# Patient Record
Sex: FEMALE | Race: BLACK OR AFRICAN AMERICAN | Employment: PART TIME | URBAN - METROPOLITAN AREA
[De-identification: names, ages, dates, MRNs, and addresses within clinical notes are randomized per-mention and may not be internally consistent; named-entity substitution may affect disease eponyms.]

---

## 2018-06-10 ENCOUNTER — HOSPITAL ENCOUNTER (EMERGENCY)
Age: 43
Discharge: HOME | End: 2018-06-10
Attending: EMERGENCY MEDICINE
Payer: COMMERCIAL

## 2018-06-10 ENCOUNTER — APPOINTMENT (OUTPATIENT)
Dept: CT IMAGING | Age: 43
End: 2018-06-10
Attending: NURSE PRACTITIONER
Payer: COMMERCIAL

## 2018-06-10 VITALS
WEIGHT: 170 LBS | HEART RATE: 88 BPM | SYSTOLIC BLOOD PRESSURE: 158 MMHG | HEIGHT: 64 IN | OXYGEN SATURATION: 96 % | RESPIRATION RATE: 20 BRPM | DIASTOLIC BLOOD PRESSURE: 67 MMHG | BODY MASS INDEX: 29.02 KG/M2 | TEMPERATURE: 98.7 F

## 2018-06-10 DIAGNOSIS — R30.9 URINARY PAIN: Primary | ICD-10-CM

## 2018-06-10 DIAGNOSIS — R10.84 ABDOMINAL PAIN, GENERALIZED: ICD-10-CM

## 2018-06-10 LAB
ALBUMIN SERPL-MCNC: 2.7 G/DL (ref 3.5–5)
ALBUMIN/GLOB SERPL: 0.6 {RATIO} (ref 1.1–2.2)
ALP SERPL-CCNC: 108 U/L (ref 45–117)
ALT SERPL-CCNC: 19 U/L (ref 12–78)
ANION GAP SERPL CALC-SCNC: 9 MMOL/L (ref 5–15)
APPEARANCE UR: CLEAR
AST SERPL-CCNC: 24 U/L (ref 15–37)
BACTERIA URNS QL MICRO: NEGATIVE /HPF
BASOPHILS # BLD: 0 K/UL (ref 0–0.1)
BASOPHILS NFR BLD: 0 % (ref 0–1)
BILIRUB DIRECT SERPL-MCNC: <0.1 MG/DL (ref 0–0.2)
BILIRUB SERPL-MCNC: 0.4 MG/DL (ref 0.2–1)
BILIRUB UR QL: NEGATIVE
BUN SERPL-MCNC: 20 MG/DL (ref 6–20)
BUN/CREAT SERPL: 18 (ref 12–20)
CALCIUM SERPL-MCNC: 8.7 MG/DL (ref 8.5–10.1)
CHLORIDE SERPL-SCNC: 103 MMOL/L (ref 97–108)
CO2 SERPL-SCNC: 23 MMOL/L (ref 21–32)
COLOR UR: ABNORMAL
CREAT SERPL-MCNC: 1.09 MG/DL (ref 0.55–1.02)
DIFFERENTIAL METHOD BLD: ABNORMAL
EOSINOPHIL # BLD: 0.4 K/UL (ref 0–0.4)
EOSINOPHIL NFR BLD: 4 % (ref 0–7)
EPITH CASTS URNS QL MICRO: ABNORMAL /LPF
ERYTHROCYTE [DISTWIDTH] IN BLOOD BY AUTOMATED COUNT: 14 % (ref 11.5–14.5)
GLOBULIN SER CALC-MCNC: 4.3 G/DL (ref 2–4)
GLUCOSE SERPL-MCNC: 250 MG/DL (ref 65–100)
GLUCOSE UR STRIP.AUTO-MCNC: 250 MG/DL
HCT VFR BLD AUTO: 38.3 % (ref 35–47)
HGB BLD-MCNC: 12.2 G/DL (ref 11.5–16)
HGB UR QL STRIP: NEGATIVE
HYALINE CASTS URNS QL MICRO: ABNORMAL /LPF (ref 0–5)
IMM GRANULOCYTES # BLD: 0.1 K/UL (ref 0–0.04)
IMM GRANULOCYTES NFR BLD AUTO: 1 % (ref 0–0.5)
KETONES UR QL STRIP.AUTO: NEGATIVE MG/DL
LEUKOCYTE ESTERASE UR QL STRIP.AUTO: ABNORMAL
LYMPHOCYTES # BLD: 2.7 K/UL (ref 0.8–3.5)
LYMPHOCYTES NFR BLD: 28 % (ref 12–49)
MCH RBC QN AUTO: 25.5 PG (ref 26–34)
MCHC RBC AUTO-ENTMCNC: 31.9 G/DL (ref 30–36.5)
MCV RBC AUTO: 80 FL (ref 80–99)
MONOCYTES # BLD: 0.8 K/UL (ref 0–1)
MONOCYTES NFR BLD: 9 % (ref 5–13)
NEUTS SEG # BLD: 5.7 K/UL (ref 1.8–8)
NEUTS SEG NFR BLD: 58 % (ref 32–75)
NITRITE UR QL STRIP.AUTO: NEGATIVE
NRBC # BLD: 0 K/UL (ref 0–0.01)
NRBC BLD-RTO: 0 PER 100 WBC
PH UR STRIP: 7 [PH] (ref 5–8)
PLATELET # BLD AUTO: 216 K/UL (ref 150–400)
PMV BLD AUTO: 9.7 FL (ref 8.9–12.9)
POTASSIUM SERPL-SCNC: 4.4 MMOL/L (ref 3.5–5.1)
PROT SERPL-MCNC: 7 G/DL (ref 6.4–8.2)
PROT UR STRIP-MCNC: 30 MG/DL
RBC # BLD AUTO: 4.79 M/UL (ref 3.8–5.2)
RBC #/AREA URNS HPF: ABNORMAL /HPF (ref 0–5)
SODIUM SERPL-SCNC: 135 MMOL/L (ref 136–145)
SP GR UR REFRACTOMETRY: 1.02 (ref 1–1.03)
UA: UC IF INDICATED,UAUC: ABNORMAL
UROBILINOGEN UR QL STRIP.AUTO: 0.2 EU/DL (ref 0.2–1)
WBC # BLD AUTO: 9.7 K/UL (ref 3.6–11)
WBC URNS QL MICRO: ABNORMAL /HPF (ref 0–4)

## 2018-06-10 PROCEDURE — 80076 HEPATIC FUNCTION PANEL: CPT | Performed by: NURSE PRACTITIONER

## 2018-06-10 PROCEDURE — 96375 TX/PRO/DX INJ NEW DRUG ADDON: CPT

## 2018-06-10 PROCEDURE — 99283 EMERGENCY DEPT VISIT LOW MDM: CPT

## 2018-06-10 PROCEDURE — 96374 THER/PROPH/DIAG INJ IV PUSH: CPT

## 2018-06-10 PROCEDURE — 85025 COMPLETE CBC W/AUTO DIFF WBC: CPT | Performed by: NURSE PRACTITIONER

## 2018-06-10 PROCEDURE — 74011250636 HC RX REV CODE- 250/636: Performed by: NURSE PRACTITIONER

## 2018-06-10 PROCEDURE — 74011636320 HC RX REV CODE- 636/320: Performed by: NURSE PRACTITIONER

## 2018-06-10 PROCEDURE — 80048 BASIC METABOLIC PNL TOTAL CA: CPT | Performed by: NURSE PRACTITIONER

## 2018-06-10 PROCEDURE — 81001 URINALYSIS AUTO W/SCOPE: CPT | Performed by: NURSE PRACTITIONER

## 2018-06-10 PROCEDURE — 36415 COLL VENOUS BLD VENIPUNCTURE: CPT | Performed by: NURSE PRACTITIONER

## 2018-06-10 PROCEDURE — 74177 CT ABD & PELVIS W/CONTRAST: CPT

## 2018-06-10 PROCEDURE — 74011250636 HC RX REV CODE- 250/636

## 2018-06-10 PROCEDURE — 74011636320 HC RX REV CODE- 636/320: Performed by: RADIOLOGY

## 2018-06-10 RX ORDER — KETOROLAC TROMETHAMINE 30 MG/ML
INJECTION, SOLUTION INTRAMUSCULAR; INTRAVENOUS
Status: COMPLETED
Start: 2018-06-10 | End: 2018-06-10

## 2018-06-10 RX ORDER — CEPHALEXIN 500 MG/1
500 CAPSULE ORAL 3 TIMES DAILY
Qty: 21 CAP | Refills: 0 | Status: SHIPPED | OUTPATIENT
Start: 2018-06-10 | End: 2018-06-17

## 2018-06-10 RX ORDER — KETOROLAC TROMETHAMINE 30 MG/ML
15 INJECTION, SOLUTION INTRAMUSCULAR; INTRAVENOUS
Status: COMPLETED | OUTPATIENT
Start: 2018-06-10 | End: 2018-06-10

## 2018-06-10 RX ORDER — MORPHINE SULFATE 4 MG/ML
2 INJECTION INTRAVENOUS
Status: COMPLETED | OUTPATIENT
Start: 2018-06-10 | End: 2018-06-10

## 2018-06-10 RX ORDER — TRAMADOL HYDROCHLORIDE 50 MG/1
50 TABLET ORAL
Qty: 15 TAB | Refills: 0 | Status: SHIPPED | OUTPATIENT
Start: 2018-06-10 | End: 2019-01-21

## 2018-06-10 RX ADMIN — MORPHINE SULFATE 2 MG: 4 INJECTION INTRAVENOUS at 15:41

## 2018-06-10 RX ADMIN — IOPAMIDOL 100 ML: 755 INJECTION, SOLUTION INTRAVENOUS at 14:50

## 2018-06-10 RX ADMIN — KETOROLAC TROMETHAMINE 15 MG: 30 INJECTION, SOLUTION INTRAMUSCULAR; INTRAVENOUS at 14:45

## 2018-06-10 RX ADMIN — KETOROLAC TROMETHAMINE 15 MG: 30 INJECTION, SOLUTION INTRAMUSCULAR at 14:45

## 2018-06-10 RX ADMIN — DIATRIZOATE MEGLUMINE AND DIATRIZOATE SODIUM 30 ML: 660; 100 LIQUID ORAL; RECTAL at 14:45

## 2018-06-10 NOTE — ED TRIAGE NOTES
Patient arrived through triage c/o lower abdominal pain, lower back pain and nausea, and dysuria x 1 week. Patient states she was seen at pcp a few days ago and placed on cipro without relief, states symptoms have worsened.

## 2018-06-10 NOTE — ED PROVIDER NOTES
Patient is a 43 y.o. female presenting with urinary pain. The history is provided by the patient. Urinary Pain    This is a new problem. The current episode started more than 2 days ago. The problem has not changed since onset. The quality of the pain is described as burning and aching. The pain is at a severity of 9/10. There has been no fever. There is no history of pyelonephritis. Associated symptoms include frequency and urgency. Pertinent negatives include no chills, no sweats, no nausea, no vomiting, no hematuria, no hesitancy, no flank pain and no vaginal discharge. The patient is not pregnant. She has tried antibiotics and NSAIDs for the symptoms. The treatment provided no relief. Her past medical history is significant for recurrent UTIs. Her past medical history does not include kidney stones, single kidney, urological procedure, urinary stasis, catheterization or urinary catheter problem. Patient reports dysuria which started greater than one week ago. Was seen by primary care and started on Cipro 500 mg, and she reports her symptoms are not improving and she feels she is getting worse. She has pain with urination and has now developed back pain. She has a history of pyelonephritis with last episode occurring a year ago. She reports a history of recurrent urinary tract infections approximately twice a year and she was previously hospitalized approximately 3 years ago for pyelonephritis. Past Medical History:   Diagnosis Date    Arthritis     Bipolar 1 disorder ([address]     Diabetes [facility] [address]     Gastrointestinal disorder     gastroparesis    Gastroparesis     GERD (gastroesophageal reflux disease)     Hypertension     Psychiatric disorder     Bipolar       Past Surgical History:   Procedure Laterality Date    HX GYN      BTL    HX ORTHOPAEDIC      toe         No family history on file.     Social History     Social History    Marital status:      Spouse name: N/A    Number of children: N/A    Years of education: N/A     Occupational History    Not on file. Social History Main Topics    Smoking status: Current Every Day Smoker     Packs/day: 1.00    Smokeless tobacco: Not on file    Alcohol use Yes      Comment: occasionally    Drug use: No    Sexual activity: Not on file     Other Topics Concern    Not on file     Social History Narrative         ALLERGIES: Other medication; Flagyl [metronidazole]; and Levaquin [levofloxacin]    Review of Systems   Constitutional: Negative for activity change, appetite change and chills. Respiratory: Negative for apnea, chest tightness and shortness of breath. Gastrointestinal: Negative for nausea and vomiting. Genitourinary: Positive for dysuria, frequency and urgency. Negative for decreased urine volume, enuresis, flank pain, hematuria, hesitancy, menstrual problem, pelvic pain and vaginal discharge. Vitals:    06/10/18 1349   BP: 161/89   Pulse: 97   Resp: 18   Temp: 98.7 °F (37.1 °C)   SpO2: 97%   Weight: 77.1 kg (170 lb)   Height: 5' 4\" (1.626 m)            Physical Exam   Constitutional: She is oriented to person, place, and time. She appears well-developed and well-nourished. HENT:   Head: Normocephalic and atraumatic. Right Ear: External ear normal.   Left Ear: External ear normal.   Eyes: Pupils are equal, round, and reactive to light. Neck: Normal range of motion. Cardiovascular: Normal rate, regular rhythm, normal heart sounds and intact distal pulses. Pulmonary/Chest: Effort normal and breath sounds normal. No respiratory distress. She has no wheezes. Abdominal: Soft. Bowel sounds are normal. She exhibits no distension and no mass. There is tenderness in the suprapubic area. There is no rebound, no guarding and no CVA tenderness. Neurological: She is alert and oriented to person, place, and time. Psychiatric: She has a normal mood and affect.  Her behavior is normal. Judgment and thought content normal. [facility]      ED Course       Procedures      4:15 PM patient to Ct scan   4:39 PM  Ct reviewed no acute findings. Will D/C Cipro and use Keflex and PRn pain medications.  Follow up with PCP

## 2018-06-10 NOTE — PROGRESS NOTES
During injection of IV contrast for CT A/P, pt complained of pain. Contrast was immediately stopped and pt scanned. CT was reviewed and contrast was in present on images. Pt left arm and IV site were also checked and slight raising of the skin was noticed. IV was taken out, bandage and ice were applied, and ED nurse, Aleja Brand, notified. Discharge instructions were given to pt and copy placed in [facility]. Contrast was injected @ 1.7 / sec and was stopped in under 5 seconds of patient  complaining of pain. Total contrast injected was 48.6cc on [address].

## 2018-06-10 NOTE — DISCHARGE INSTRUCTIONS
Abdominal Pain: Care Instructions  Your Care Instructions    Abdominal pain has many possible causes. Some aren't serious and get better on their own in a few days. Others need more testing and treatment. If your pain continues or gets worse, you need to be rechecked and may need more tests to find out what is wrong. You may need surgery to correct the problem. Don't ignore new symptoms, such as fever, nausea and vomiting, urination problems, pain that gets worse, and dizziness. These may be signs of a more serious problem. Your doctor may have recommended a follow-up visit in the next 8 to 12 hours. If you are not getting better, you may need more tests or treatment. The doctor has checked you carefully, but problems can develop later. If you notice any problems or new symptoms, get medical treatment right away. Follow-up care is a key part of your treatment and safety. Be sure to make and go to all appointments, and call your doctor if you are having problems. It's also a good idea to know your test results and keep a list of the medicines you take. How can you care for yourself at home? · Rest until you feel better. · To prevent dehydration, drink plenty of fluids, enough so that your urine is light yellow or clear like water. Choose water and other caffeine-free clear liquids until you feel better. If you have kidney, heart, or liver disease and have to limit fluids, talk with your doctor before you increase the amount of fluids you drink. · If your stomach is upset, eat mild foods, such as rice, dry toast or crackers, bananas, and applesauce. Try eating several small meals instead of two or three large ones. · Wait until 48 hours after all symptoms have gone away before you have spicy foods, alcohol, and drinks that contain caffeine. · Do not eat foods that are high in fat. · Avoid anti-inflammatory medicines such as aspirin, ibuprofen (Advil, Motrin), and naproxen (Aleve).  These can cause stomach upset. Talk to your doctor if you take daily aspirin for another health problem. When should you call for help? Call 911 anytime you think you may need emergency care. For example, call if:  ? · You passed out (lost consciousness). ? · You pass maroon or very bloody stools. ? · You vomit blood or what looks like coffee grounds. ? · You have new, severe belly pain. ?Call your doctor now or seek immediate medical care if:  ? · Your pain gets worse, especially if it becomes focused in one area of your belly. ? · You have a new or higher fever. ? · Your stools are black and look like tar, or they have streaks of blood. ? · You have unexpected vaginal bleeding. ? · You have symptoms of a urinary tract infection. These may include:  ¨ Pain when you urinate. ¨ Urinating more often than usual.  ¨ Blood in your urine. ? · You are dizzy or lightheaded, or you feel like you may faint. ? Watch closely for changes in your health, and be sure to contact your doctor if:  ? · You are not getting better after 1 day (24 hours). Where can you learn more? Go to http://mauri-mario.info/. Enter C477 in the search box to learn more about \"Abdominal Pain: Care Instructions. \"  Current as of: March 20, 2017  Content Version: 11.4  © 4450-9366 [employer]. Care instructions adapted under license by [employer] (which disclaims liability or warranty for this information). If you have questions about a medical condition or this instruction, always ask your healthcare professional. [place] [address] any warranty or liability for your use of this information. Painful Urination (Dysuria): Care Instructions  Your Care Instructions  Burning pain with urination (dysuria) is a common symptom of a urinary tract infection or other urinary problems. The bladder may become inflamed. This can cause pain when the bladder fills and empties.  You may also feel pain if the tube that carries urine from the bladder to the outside of the body (urethra) gets irritated or infected. Sexually transmitted infections (STIs) also may cause pain when you urinate. Sometimes the pain can be caused by things other than an infection. The urethra can be irritated by soaps, perfumes, or foreign objects in the urethra. Kidney stones can cause pain when they pass through the urethra. The cause may be hard to find. You may need tests. Treatment for painful urination depends on the cause. Follow-up care is a key part of your treatment and safety. Be sure to make and go to all appointments, and call your doctor if you are having problems. It's also a good idea to know your test results and keep a list of the medicines you take. How can you care for yourself at home? · Drink extra water for the next day or two. This will help make the urine less concentrated. (If you have kidney, heart, or liver disease and have to limit fluids, talk with your doctor before you increase the amount of fluids you drink.)  · Avoid drinks that are carbonated or have caffeine. They can irritate the bladder. · Urinate often. Try to empty your bladder each time. For women:  · Urinate right after you have sex. · After going to the bathroom, wipe from front to back. · Avoid douches, bubble baths, and feminine hygiene sprays. And avoid other feminine hygiene products that have deodorants. When should you call for help? Call your doctor now or seek immediate medical care if:  ? · You have new symptoms, such as fever, nausea, or vomiting. ? · You have new or worse symptoms of a urinary problem. For example:  ¨ You have blood or pus in your urine. ¨ You have chills or body aches. ¨ It hurts worse to urinate. ¨ You have groin or belly pain. ¨ You have pain in your back just below your rib cage (the flank area). ? Watch closely for changes in your health, and be sure to contact your doctor if you have any problems. Where can you learn more? Go to http://mauri-mario.info/. Enter J180 in the search box to learn more about \"Painful Urination (Dysuria): Care Instructions. \"  Current as of: May 12, 2017  Content Version: 11.4  © 9244-8603 [employer]. Care instructions adapted under license by [employer] (which disclaims liability or warranty for this information). If you have questions about a medical condition or this instruction, always ask your healthcare professional. [address] any warranty or liability for your use of this information.

## 2018-09-14 NOTE — TELEPHONE ENCOUNTER
Patient last seen 06/05/2018, fax  received from [address], [address] [facility]

## 2018-09-17 RX ORDER — METOPROLOL SUCCINATE 25 MG/1
25 TABLET, EXTENDED RELEASE ORAL DAILY
Qty: 90 TAB | Refills: 0 | Status: SHIPPED | OUTPATIENT
Start: 2018-09-17 | End: 2018-09-18 | Stop reason: SDUPTHER

## 2018-09-18 ENCOUNTER — TELEPHONE (OUTPATIENT)
Dept: FAMILY MEDICINE CLINIC | Age: 43
End: 2018-09-18

## 2018-09-18 RX ORDER — METOPROLOL SUCCINATE 25 MG/1
25 TABLET, EXTENDED RELEASE ORAL DAILY
Qty: 90 TAB | Refills: 0 | Status: SHIPPED | OUTPATIENT
Start: 2018-09-18 | End: 2019-01-21 | Stop reason: SDUPTHER

## 2018-11-30 ENCOUNTER — OFFICE VISIT (OUTPATIENT)
Dept: FAMILY MEDICINE CLINIC | Age: 43
End: 2018-11-30

## 2018-11-30 VITALS
SYSTOLIC BLOOD PRESSURE: 131 MMHG | HEIGHT: 64 IN | BODY MASS INDEX: 29.11 KG/M2 | OXYGEN SATURATION: 100 % | HEART RATE: 75 BPM | TEMPERATURE: 98.5 F | WEIGHT: 170.5 LBS | RESPIRATION RATE: 18 BRPM | DIASTOLIC BLOOD PRESSURE: 81 MMHG

## 2018-11-30 DIAGNOSIS — N30.01 ACUTE CYSTITIS WITH HEMATURIA: ICD-10-CM

## 2018-11-30 DIAGNOSIS — R30.9 URINARY PAIN: Primary | ICD-10-CM

## 2018-11-30 LAB
BILIRUB UR QL STRIP: NEGATIVE
GLUCOSE UR-MCNC: NORMAL MG/DL
KETONES P FAST UR STRIP-MCNC: NEGATIVE MG/DL
PH UR STRIP: 6 [PH] (ref 4.6–8)
PROT UR QL STRIP: POSITIVE
SP GR UR STRIP: 1.02 (ref 1–1.03)
UA UROBILINOGEN AMB POC: NORMAL (ref 0.2–1)
URINALYSIS CLARITY POC: CLEAR
URINALYSIS COLOR POC: YELLOW
URINE BLOOD POC: NORMAL
URINE LEUKOCYTES POC: NORMAL
URINE NITRITES POC: POSITIVE

## 2018-11-30 RX ORDER — DIVALPROEX SODIUM 500 MG/1
TABLET, DELAYED RELEASE ORAL 3 TIMES DAILY
COMMUNITY
End: 2018-12-12 | Stop reason: SDUPTHER

## 2018-11-30 RX ORDER — CLOPIDOGREL BISULFATE 75 MG/1
TABLET ORAL
COMMUNITY
End: 2019-06-07

## 2018-11-30 RX ORDER — DULOXETIN HYDROCHLORIDE 60 MG/1
60 CAPSULE, DELAYED RELEASE ORAL DAILY
COMMUNITY
End: 2019-01-21 | Stop reason: SDUPTHER

## 2018-11-30 RX ORDER — CEPHALEXIN 500 MG/1
500 CAPSULE ORAL 4 TIMES DAILY
Qty: 28 CAP | Refills: 0 | Status: SHIPPED | OUTPATIENT
Start: 2018-11-30 | End: 2018-12-07

## 2018-11-30 RX ORDER — METOPROLOL TARTRATE 50 MG/1
TABLET ORAL 2 TIMES DAILY
COMMUNITY
End: 2019-01-21

## 2018-11-30 RX ORDER — ROSUVASTATIN CALCIUM 20 MG/1
20 TABLET, COATED ORAL
Status: ON HOLD | COMMUNITY
End: 2019-07-27

## 2018-11-30 NOTE — PROGRESS NOTES
HPI       Zeynep Domingo is a 43 y.o. female who presents for urinary symptoms. Patient says that 4 days ago her urine started to smell like ammonia and she was having dysuria with lower abdominal pain and some back pain. She also reports feeling nauseous. She saw a little bit of blood in her urine one time. No history of kidney stones. She has a history of UTI. Has them 2 or 3 times a year  She denies fever, chills, vomiting    PMHx-reviewed:  Past Medical History:   Diagnosis Date    Arthritis     Bipolar 1 disorder (HCC)     Diabetes ([address]     Gastrointestinal disorder     gastroparesis    Gastroparesis     GERD (gastroesophageal reflux disease)     Heart disease     Hypertension     Psychiatric disorder     Bipolar     Meds-reviewed:   Current Outpatient Medications   Medication Sig Dispense Refill    metoprolol tartrate (LOPRESSOR) 50 mg tablet Take  by mouth two (2) times a day.  rosuvastatin (CRESTOR) 20 mg tablet Take 20 mg by mouth nightly.  clopidogrel (PLAVIX) 75 mg tab Take  by mouth.  apixaban (ELIQUIS) 5 mg tablet Take 5 mg by mouth two (2) times a day.  divalproex DR (DEPAKOTE) 500 mg tablet Take  by mouth three (3) times daily.  DULoxetine (CYMBALTA) 60 mg capsule Take 60 mg by mouth daily.  cephALEXin (KEFLEX) 500 mg capsule Take 1 Cap by mouth four (4) times daily for 7 days. 28 Cap 0    insulin lispro (HUMALOG) 100 unit/mL injection by SubCUTAneous route once. Sliding scale  8 units per 15 CHO      gabapentin (NEURONTIN) 100 mg capsule Take 100 mg by mouth four (4) times daily.  B.infantis-B.ani-B.long-B.bifi 10-15 mg TbEC Take 1 Cap by mouth.  cholecalciferol, vitamin D3, 2,000 unit tab Take 2,000 Units by mouth daily.  losartan (COZAAR) 100 mg tablet Take 1 Tab by mouth daily. 30 Tab 0    insulin glargine (LANTUS) 100 unit/mL injection 32 Units by SubCUTAneous route daily.       metoprolol succinate (TOPROL-XL) 25 mg XL tablet Take 1 Tab by mouth daily. 90 Tab 0    traMADol (ULTRAM) 50 mg tablet Take 1 Tab by mouth every eight (8) hours as needed for Pain. Max Daily Amount: 150 mg. 15 Tab 0    atorvastatin (LIPITOR) 20 mg tablet Take 20 mg by mouth daily.  amLODIPine (NORVASC) 2.5 mg tablet Take 2.5 mg by mouth daily.  HYDROcodone-acetaminophen (NORCO) 5-325 mg per tablet Take 1 Tab by mouth every four (4) hours as needed for Pain. Max Daily Amount: 6 Tabs. 20 Tab 0    ondansetron (ZOFRAN ODT) 4 mg disintegrating tablet Take 1 Tab by mouth every eight (8) hours as needed for Nausea. 12 Tab 0    ibuprofen (MOTRIN) 600 mg tablet Take 1 Tab by mouth every six (6) hours as needed for Pain. 20 Tab 0     Allergies-reviewed: Allergies   Allergen Reactions    Other Medication Anaphylaxis     Other anti-inflammatory    Flagyl [Metronidazole] Nausea and Vomiting    Levaquin [Levofloxacin] Nausea Only       Smoker-reviewed:  Social History     Tobacco Use   Smoking Status Current Every Day Smoker    Packs/day: 0.25   Smokeless Tobacco Never Used     ETOH-reviewed:   Social History     Substance and Sexual Activity   Alcohol Use Yes    Comment: occasionally     FH-reviewed:   No family history on file. ROS:  Review of Systems   Constitutional: Negative. Respiratory: Negative. Cardiovascular: Negative. Gastrointestinal: Positive for nausea. Negative for abdominal pain and vomiting. Genitourinary: Positive for dysuria, flank pain, hematuria and pelvic pain. Skin: Negative.       Physical Exam:  Visit Vitals  /81 (BP 1 Location: Left arm, BP Patient Position: Sitting)   Pulse 75   Temp 98.5 °F (36.9 °C) (Oral)   Resp 18   Ht 5' 4\" (1.626 m)   Wt 170 lb 8 oz (77.3 kg)   SpO2 100%   BMI 29.27 kg/m²       Wt Readings from Last 3 Encounters:   11/30/18 170 lb 8 oz (77.3 kg)   06/10/18 170 lb (77.1 kg)   06/13/16 150 lb (68 kg)     BP Readings from Last 3 Encounters:   11/30/18 131/81   06/10/18 158/67   06/13/16 150/74      Physical Exam   Constitutional: She appears well-developed and well-nourished. No distress. Cardiovascular: Normal rate, regular rhythm and normal heart sounds. Pulmonary/Chest: Effort normal and breath sounds normal. No respiratory distress. Abdominal: Soft. Bowel sounds are normal. There is tenderness in the suprapubic area. There is CVA tenderness. There is no rebound and no guarding. Skin: Skin is warm and dry. I personally reviewed the following lab results:   Recent Results (from the past 12 hour(s))   AMB POC URINALYSIS DIP STICK AUTO W/O MICRO    Collection Time: 11/30/18 10:39 AM   Result Value Ref Range    Color (UA POC) Yellow     Clarity (UA POC) Clear     Glucose (UA POC) 2+ Negative    Bilirubin (UA POC) Negative Negative    Ketones (UA POC) Negative Negative    Specific gravity (UA POC) 1.020 1.001 - 1.035    Blood (UA POC) 1+ Negative    pH (UA POC) 6.0 4.6 - 8.0    Protein (UA POC) Positive Negative    Urobilinogen (UA POC) 0.2 mg/dL 0.2 - 1    Nitrites (UA POC) Positive Negative    Leukocyte esterase (UA POC) 2+ Negative            Assessment     43 y.o. female with:    ICD-10-CM ICD-9-CM    1. Urinary pain R30.9 788.1 AMB POC URINALYSIS DIP STICK AUTO W/O MICRO   2. Acute cystitis with hematuria N30.01 595.0 cephALEXin (KEFLEX) 500 mg capsule      CULTURE, URINE              Plan       Orders Placed This Encounter    CULTURE, URINE    AMB POC URINALYSIS DIP STICK AUTO W/O MICRO    cephALEXin (KEFLEX) 500 mg capsule     Patient with dysuria, suprapubic pain and CVA tenderness. UA in office showed + nitrates, 2+ LE, 1+ blood. Will send urine culture and treat with Keflex 500 mg QID. She can take Uristat OTC to help with the pain. tylenol or motrin prn     Patient discussed with Dr. Aby Yeung    I have discussed the diagnosis with the patient and the intended plan as seen in the above orders.  The patient has received an after-visit summary and questions were answered concerning future plans. I have discussed medication side effects and warnings with the patient as well.     Vinita Angeles MD  Family Medicine Resident

## 2018-12-03 LAB — BACTERIA UR CULT: ABNORMAL

## 2018-12-07 ENCOUNTER — TELEPHONE (OUTPATIENT)
Dept: FAMILY MEDICINE CLINIC | Age: 43
End: 2018-12-07

## 2018-12-07 NOTE — TELEPHONE ENCOUNTER
If she continues to have pain she needs to be re-evaluated. If we don't have any appointments she should go to an urgent care or an ED.      Thanks,  Nikky Best, DO

## 2018-12-07 NOTE — TELEPHONE ENCOUNTER
Patient was seen in the office and is still feeling and having a lot of pain. Pain is at a level 9 out of 10. Only has 3 pills left of her antibiotic . Pain when urinate. Patient needs to know what to do or can she get another antibiotic.

## 2018-12-13 NOTE — TELEPHONE ENCOUNTER
Patient advised needs, appointment or can go to [facility] urgent care or ED.  Patient having concerns about financial situation and stated she will figure it out and call back to schedule if needed

## 2019-01-17 RX ORDER — LOSARTAN POTASSIUM AND HYDROCHLOROTHIAZIDE 25; 100 MG/1; MG/1
TABLET ORAL
Qty: 90 TAB | Refills: 1 | Status: SHIPPED | OUTPATIENT
Start: 2019-01-17 | End: 2019-01-21 | Stop reason: SDUPTHER

## 2019-01-17 NOTE — PROGRESS NOTES
CC: UTI    History of Present Illness:  Flash Barrett is a 37 y.o. female. She complains of bilateral lower abdominal pain for 2 months. Gets frequent UTIs, 2 - 3 per year. Last treated 11/30/18 with Keflex and culture grew E. coli. She did complete the course of Keflex. Symptoms did not clear after Keflex course. She does have urinary frequency. She has seen some blood in her urine over the past couple weeks. She denies burning with urination, urgency, nausea, vomtiing, diarrhea, constipation, change in diet. Does have some flank pain. She has one sexual partner. Denies vaginal discharge or itching. Declines STD testing. Has a GYN appointment on 1/31/19. She has HTN treated with losartan/HCTZ 100/25, metoprolol succinate 50mg daily. BP at work has been 140s/80s-90s. She used to take amlodipine but stopped due to LE swelling that improved after she stopped it. Diet tries to eat healthy. Exercise no dedicated exercise time but is active at work. She is followed by endocrinology at [facility] for her diabetes. She takes cymbalta 60mg daily for fibromyalgia. She asks for a refill. Her symptoms are stable on the medicine. She denies side effects from the medicine. She takes depakote 500mg tid for bipolar disorder. She denies depression, anxiety, elevated mood, xiomara symptoms. She denies side effects from the medicine. She says she needs a refill. Allergies   Allergen Reactions    Other Medication Anaphylaxis     Other anti-inflammatory    Flagyl [Metronidazole] Nausea and Vomiting    Levaquin [Levofloxacin] Nausea Only     Current Outpatient Medications   Medication Sig Dispense Refill    metoprolol succinate (TOPROL-XL) 50 mg XL tablet Take  by mouth daily.  gabapentin (NEURONTIN) 300 mg capsule Take 300 mg by mouth three (3) times daily.  DULoxetine (CYMBALTA) 60 mg capsule Take 1 Cap by mouth daily.  90 Cap 1    divalproex DR (DEPAKOTE) 500 mg tablet Take 1 Tab by mouth three (3) times daily. 270 Tab 1    losartan-hydroCHLOROthiazide (HYZAAR) 100-25 mg per tablet TAKE 1 TABLET BY MOUTH ONCE DAILY 90 Tab 1    rosuvastatin (CRESTOR) 20 mg tablet Take 20 mg by mouth nightly.  clopidogrel (PLAVIX) 75 mg tab Take  by mouth.  apixaban (ELIQUIS) 5 mg tablet Take 5 mg by mouth two (2) times a day.  insulin lispro (HUMALOG) 100 unit/mL injection by SubCUTAneous route once. Sliding scale  8 units per 15 CHO      insulin glargine (LANTUS) 100 unit/mL injection 32 Units by SubCUTAneous route daily. There is no problem list on file for this patient. Past Medical History:   Diagnosis Date    Arthritis     [facility]     Diabetes ([facility]     Gastrointestinal disorder     gastroparesis    Gastroparesis     GERD (gastroesophageal reflux disease)     Heart disease     Hypertension     Psychiatric disorder     Bipolar     Past Surgical History:   Procedure Laterality Date    HX ANKLE FRACTURE TX      HX CAROTID STENT      HX GYN      BTL    HX ORTHOPAEDIC      toe     Social History     Socioeconomic History    Marital status:      Spouse name: Not on file    Number of children: Not on file    Years of education: Not on file    Highest education level: Not on file   Tobacco Use    Smoking status: Current Every Day Smoker     Packs/day: 0.25    Smokeless tobacco: Never Used   Substance and Sexual Activity    Alcohol use: Yes     Comment: occasionally    Drug use: No    Sexual activity: Yes     Family History   Problem Relation Age of Onset    Thyroid Disease Mother     Hypertension Father     Heart Disease Father     Diabetes Sister     Heart Disease Sister     Hypertension Sister     Thyroid Disease Brother          Review of Systems   Constitutional: Negative for chills and fever. Respiratory: Negative for shortness of breath. Cardiovascular: Negative for chest pain. Gastrointestinal: Positive for abdominal pain.  Negative for constipation, diarrhea, nausea and vomiting. Genitourinary: Positive for flank pain, frequency and hematuria. Negative for dysuria and urgency. Musculoskeletal: Negative for myalgias. Psychiatric/Behavioral: Negative for depression, hallucinations and suicidal ideas. The patient is not nervous/anxious and does not have insomnia. Physical Exam:  Visit Vitals  /84 (BP 1 Location: Right arm, BP Patient Position: Sitting)   Pulse 78   Temp 97.9 °F (36.6 °C) (Oral)   Resp 18   Ht 5' 4\" (1.626 m)   Wt 174 lb (78.9 kg)   SpO2 99%   BMI 29.87 kg/m²     Physical Exam   Constitutional: She is oriented to person, place, and time. She appears well-developed and well-nourished. No distress. Cardiovascular: Normal rate, regular rhythm and normal heart sounds. Pulmonary/Chest: Effort normal and breath sounds normal.   Abdominal: Soft. Bowel sounds are normal. She exhibits no distension and no mass. There is tenderness (suprapubic and bilateral lower quadrants). There is CVA tenderness. There is no rebound and no guarding. Neurological: She is alert and oriented to person, place, and time. Psychiatric: She has a normal mood and affect.  Her behavior is normal.     Recent Results (from the past 12 hour(s))   AMB POC URINALYSIS DIP STICK AUTO W/O MICRO    Collection Time: 01/21/19 10:56 AM   Result Value Ref Range    Color (UA POC) Yellow     Clarity (UA POC) Clear     Glucose (UA POC) 1+ Negative    Bilirubin (UA POC) Negative Negative    Ketones (UA POC) Negative Negative    Specific gravity (UA POC) 1.020 1.001 - 1.035    Blood (UA POC) 2+ Negative    pH (UA POC) 5.5 4.6 - 8.0    Protein (UA POC) Positive Negative    Urobilinogen (UA POC) 0.2 mg/dL 0.2 - 1    Nitrites (UA POC) Negative Negative    Leukocyte esterase (UA POC) Negative Negative   AMB POC GLUCOSE BLOOD, BY GLUCOSE MONITORING DEVICE    Collection Time: 01/21/19 11:05 AM   Result Value Ref Range    Glucose  mg/dL Assessment/Plan:  1. Essential hypertension  At goal today. Check labs. - METABOLIC PANEL, BASIC  - losartan-hydroCHLOROthiazide (HYZAAR) 100-25 mg per tablet; TAKE 1 TABLET BY MOUTH ONCE DAILY  Dispense: 90 Tab; Refill: 1    2. Urinary pain  Urinalysis today does not appear infectious. Will check microscopy and culture. Check retroperitoneal ultrasound to assess for hydronephrosis or nephrolithiasis. - CULTURE, URINE  - AMB POC URINALYSIS DIP STICK AUTO W/O MICRO  - US RETROPERITONEUM COMP; Future  - REFERRAL TO UROLOGY    3. Abdominal pain, unspecified abdominal location  Mild suprapubic, bilateral lower, and flank tenderness on exam today. Frequent UTI history and hematuria, and smoking, will assess with retroperitoneal ultrasound, urine microscopy, and urine culture and referral to urology. She says she will call [facility] urology to make an appointment; she works at [address] [place] [address] COMP; Future  - REFERRAL TO UROLOGY    4. Fibromyalgia  Stable  - DULoxetine (CYMBALTA) 60 mg capsule; Take 1 Cap by mouth daily. Dispense: 90 Cap; Refill: 1    5. Bipolar 1 disorder (HCC)  Stable  - divalproex DR (DEPAKOTE) 500 mg tablet; Take 1 Tab by mouth three (3) times daily. Dispense: 270 Tab; Refill: 1    6. Gross hematuria   See above  - REFERRAL TO UROLOGY  - CULTURE, URINE  - AMB POC URINALYSIS DIP STICK AUTO W/O MICRO  - US RETROPERITONEUM COMP; Future      Follow-up Disposition:  Return in about 4 weeks (around 2/18/2019). Diagnoses, tests, plan, and follow up discussed with patient. I have given to and reviewed with the patient the after visit summary. I have reviewed medication side effects and precautions. Patient expressed agreement and understanding. All questions were answered. Discussed with Dr. Asha Resendiz.

## 2019-01-21 ENCOUNTER — OFFICE VISIT (OUTPATIENT)
Dept: FAMILY MEDICINE CLINIC | Age: 44
End: 2019-01-21

## 2019-01-21 VITALS
DIASTOLIC BLOOD PRESSURE: 84 MMHG | HEIGHT: 64 IN | WEIGHT: 174 LBS | BODY MASS INDEX: 29.71 KG/M2 | OXYGEN SATURATION: 99 % | HEART RATE: 78 BPM | SYSTOLIC BLOOD PRESSURE: 134 MMHG | TEMPERATURE: 97.9 F | RESPIRATION RATE: 18 BRPM

## 2019-01-21 DIAGNOSIS — R10.9 ABDOMINAL PAIN, UNSPECIFIED ABDOMINAL LOCATION: ICD-10-CM

## 2019-01-21 DIAGNOSIS — F31.9 BIPOLAR 1 DISORDER (HCC): ICD-10-CM

## 2019-01-21 DIAGNOSIS — R31.0 GROSS HEMATURIA: ICD-10-CM

## 2019-01-21 DIAGNOSIS — M79.7 FIBROMYALGIA: ICD-10-CM

## 2019-01-21 DIAGNOSIS — R30.9 URINARY PAIN: ICD-10-CM

## 2019-01-21 DIAGNOSIS — I10 ESSENTIAL HYPERTENSION: Primary | ICD-10-CM

## 2019-01-21 LAB
BILIRUB UR QL STRIP: NEGATIVE
GLUCOSE POC: 234 MG/DL
GLUCOSE UR-MCNC: NORMAL MG/DL
KETONES P FAST UR STRIP-MCNC: NEGATIVE MG/DL
PH UR STRIP: 5.5 [PH] (ref 4.6–8)
PROT UR QL STRIP: POSITIVE
SP GR UR STRIP: 1.02 (ref 1–1.03)
UA UROBILINOGEN AMB POC: NORMAL (ref 0.2–1)
URINALYSIS CLARITY POC: CLEAR
URINALYSIS COLOR POC: YELLOW
URINE BLOOD POC: NORMAL
URINE LEUKOCYTES POC: NEGATIVE
URINE NITRITES POC: NEGATIVE

## 2019-01-21 RX ORDER — LOSARTAN POTASSIUM AND HYDROCHLOROTHIAZIDE 25; 100 MG/1; MG/1
TABLET ORAL
Qty: 90 TAB | Refills: 1 | Status: SHIPPED | OUTPATIENT
Start: 2019-01-21 | End: 2019-01-23 | Stop reason: ALTCHOICE

## 2019-01-21 RX ORDER — METOPROLOL SUCCINATE 50 MG/1
50 TABLET, EXTENDED RELEASE ORAL DAILY
Status: ON HOLD | COMMUNITY
End: 2019-07-30 | Stop reason: SDUPTHER

## 2019-01-21 RX ORDER — DIVALPROEX SODIUM 500 MG/1
500 TABLET, DELAYED RELEASE ORAL 3 TIMES DAILY
Qty: 270 TAB | Refills: 1 | Status: SHIPPED | OUTPATIENT
Start: 2019-01-21 | End: 2019-07-30

## 2019-01-21 RX ORDER — DULOXETIN HYDROCHLORIDE 60 MG/1
60 CAPSULE, DELAYED RELEASE ORAL DAILY
Qty: 90 CAP | Refills: 1 | Status: SHIPPED | OUTPATIENT
Start: 2019-01-21 | End: 2021-06-07 | Stop reason: SDUPTHER

## 2019-01-21 RX ORDER — GABAPENTIN 300 MG/1
300 CAPSULE ORAL 3 TIMES DAILY
COMMUNITY
End: 2021-06-07 | Stop reason: SDUPTHER

## 2019-01-21 NOTE — PROGRESS NOTES
1. Have you been to the ER, urgent care clinic since your last visit? Hospitalized since your last visit? No    2. Have you seen or consulted any other health care providers outside of the [address] [place] [address] since your last visit? Include any pap smears or colon screening.  No

## 2019-01-21 NOTE — PATIENT INSTRUCTIONS
Ultrasound of the Abdomen: About This Test  What is it? An abdominal ultrasound uses reflected sound waves to produce a picture of the organs and blood vessels in the upper belly. These include your liver, gallbladder, spleen, pancreas, kidneys, and major blood vessels like the aorta. The sound waves create a picture on a video monitor. Why is this test done? An ultrasound can be done to:  · Find out what's causing belly pain--a gallstone, for example. · Look at a lump or mass that was found in a prior exam and, in some cases, tell what it is. · Check for problems in the liver and kidneys. · Look for changes in an aortic aneurysm, a bulging section in the wall of the large artery that carries blood out of the heart. · Find fluid in the belly. · Guide needles or other medical instruments in treatment. How can you prepare for the test?  · Depending on the part of your belly your doctor will be looking at, you may need to eat a fat-free meal the night before your test. Or you may need to avoid eating for 4 to 12 hours before the test. Your doctor will tell you what to do. What happens before the test?  · You will need to take off any jewelry that might interfere with the ultrasound test.  · You will need to take off your clothes around the area to be scanned. You will get a cloth or paper covering to use during the test.  What happens during the test?  · You lie down on your back or your side on an exam table. · The doctor or technologist spreads some warm gel on your belly to improve the transmission of the sound waves. A small handheld unit called a transducer is pressed against your belly and is moved back and forth. A picture of the organs and blood vessels can be seen on a video monitor. · You need to lie still. You may be asked to take a breath and hold it for several seconds during the scanning. What else should you know about the test?  · The ultrasound is a very safe procedure.   · There is no discomfort from the test itself. If your belly hurts already, you will feel some pain from the probe being pressed down on it. · You will not hear or feel the sound waves. How long does the test take? · The test will take about 30 minutes. · You may be asked to wait until the radiologist has reviewed the scan. He or she may want to do more ultrasound views of some areas of your belly. What happens after the test?  · You will probably be able to go home right away. · You can go back to your usual activities right away. Follow-up care is a key part of your treatment and safety. Be sure to make and go to all appointments, and call your doctor if you are having problems. It's also a good idea to keep a list of the medicines you take. Ask your doctor when you can expect to have your test results. Where can you learn more? Go to http://mauri-mario.info/. Enter T376 in the search box to learn more about \"Ultrasound of the Abdomen: About This Test.\"  Current as of: June 25, 2018  Content Version: 11.9  © 2167-5186 [employer]. Care instructions adapted under license by [employer] (which disclaims liability or warranty for this information). If you have questions about a medical condition or this instruction, always ask your healthcare professional. [address] any warranty or liability for your use of this information.

## 2019-01-22 LAB — BACTERIA UR CULT: NO GROWTH

## 2019-01-23 ENCOUNTER — TELEPHONE (OUTPATIENT)
Dept: FAMILY MEDICINE CLINIC | Age: 44
End: 2019-01-23

## 2019-01-23 DIAGNOSIS — I10 ESSENTIAL HYPERTENSION: Primary | ICD-10-CM

## 2019-01-23 RX ORDER — HYDROCHLOROTHIAZIDE 25 MG/1
25 TABLET ORAL DAILY
Qty: 90 TAB | Refills: 1 | Status: SHIPPED | OUTPATIENT
Start: 2019-01-23 | End: 2019-06-08

## 2019-01-23 RX ORDER — LOSARTAN POTASSIUM 100 MG/1
100 TABLET ORAL DAILY
Qty: 90 TAB | Refills: 1 | Status: SHIPPED | OUTPATIENT
Start: 2019-01-23 | End: 2019-06-08

## 2019-01-23 NOTE — TELEPHONE ENCOUNTER
Please call patient and tell her the combination blood pressure pill she normally takes is on backorder, so I have sent the two individual medicines to her pharmacy.

## 2019-02-18 RX ORDER — INSULIN GLARGINE 100 [IU]/ML
32 INJECTION, SOLUTION SUBCUTANEOUS DAILY
Qty: 1 VIAL | OUTPATIENT
Start: 2019-02-18

## 2019-02-18 RX ORDER — INSULIN GLARGINE 100 [IU]/ML
32 INJECTION, SOLUTION SUBCUTANEOUS DAILY
Qty: 1 VIAL | Refills: 0 | Status: SHIPPED | OUTPATIENT
Start: 2019-02-18 | End: 2019-06-07 | Stop reason: DRUGHIGH

## 2019-02-18 RX ORDER — INSULIN GLARGINE 100 [IU]/ML
32 INJECTION, SOLUTION SUBCUTANEOUS DAILY
Qty: 1 VIAL | Status: CANCELLED | OUTPATIENT
Start: 2019-02-18

## 2019-02-18 NOTE — TELEPHONE ENCOUNTER
Patient stated she has an appointment scheduled with [facility] 03/1/2019. I advised her Dr. Alfredo Basurto stated he would provide a temporary refill to hold her over but future refills should be obtained from [facility].  Patient stated understanding

## 2019-02-18 NOTE — TELEPHONE ENCOUNTER
Patient was seen in out office on 11/30/18 and 01/21/19. Patient was seen in June 2018 prior to the new system coming into effect.

## 2019-03-02 ENCOUNTER — HOSPITAL ENCOUNTER (EMERGENCY)
Age: 44
Discharge: HOME | End: 2019-03-02
Attending: EMERGENCY MEDICINE
Payer: COMMERCIAL

## 2019-03-02 VITALS
HEART RATE: 72 BPM | TEMPERATURE: 98.1 F | DIASTOLIC BLOOD PRESSURE: 92 MMHG | OXYGEN SATURATION: 99 % | SYSTOLIC BLOOD PRESSURE: 148 MMHG | RESPIRATION RATE: 20 BRPM

## 2019-03-02 DIAGNOSIS — R73.9 HYPERGLYCEMIA: ICD-10-CM

## 2019-03-02 DIAGNOSIS — N30.01 ACUTE CYSTITIS WITH HEMATURIA: Primary | ICD-10-CM

## 2019-03-02 LAB
ANION GAP BLD CALC-SCNC: 19 MMOL/L (ref 10–20)
APPEARANCE UR: ABNORMAL
BACTERIA URNS QL MICRO: ABNORMAL /HPF
BILIRUB UR QL: NEGATIVE
BUN BLD-MCNC: 21 MG/DL (ref 9–20)
CA-I BLD-MCNC: 1.23 MMOL/L (ref 1.12–1.32)
CHLORIDE BLD-SCNC: 103 MMOL/L (ref 98–107)
CO2 BLD-SCNC: 24 MMOL/L (ref 21–32)
COLOR UR: ABNORMAL
COMMENT, HOLDF: NORMAL
CREAT BLD-MCNC: 0.9 MG/DL (ref 0.6–1.3)
EPITH CASTS URNS QL MICRO: ABNORMAL /LPF
GLUCOSE BLD-MCNC: 263 MG/DL (ref 65–100)
GLUCOSE UR STRIP.AUTO-MCNC: >1000 MG/DL
HCT VFR BLD CALC: 47 % (ref 35–47)
HGB UR QL STRIP: ABNORMAL
HYALINE CASTS URNS QL MICRO: ABNORMAL /LPF (ref 0–5)
KETONES UR QL STRIP.AUTO: NEGATIVE MG/DL
LEUKOCYTE ESTERASE UR QL STRIP.AUTO: ABNORMAL
NITRITE UR QL STRIP.AUTO: POSITIVE
PH UR STRIP: 5.5 [PH] (ref 5–8)
POTASSIUM BLD-SCNC: 3.9 MMOL/L (ref 3.5–5.1)
PROT UR STRIP-MCNC: 100 MG/DL
RBC #/AREA URNS HPF: ABNORMAL /HPF (ref 0–5)
SAMPLES BEING HELD,HOLD: NORMAL
SERVICE CMNT-IMP: ABNORMAL
SODIUM BLD-SCNC: 141 MMOL/L (ref 136–145)
SP GR UR REFRACTOMETRY: 1.02 (ref 1–1.03)
UR CULT HOLD, URHOLD: NORMAL
UROBILINOGEN UR QL STRIP.AUTO: 1 EU/DL (ref 0.2–1)
WBC URNS QL MICRO: ABNORMAL /HPF (ref 0–4)

## 2019-03-02 PROCEDURE — 87086 URINE CULTURE/COLONY COUNT: CPT

## 2019-03-02 PROCEDURE — 81001 URINALYSIS AUTO W/SCOPE: CPT

## 2019-03-02 PROCEDURE — 87077 CULTURE AEROBIC IDENTIFY: CPT

## 2019-03-02 PROCEDURE — 80047 BASIC METABLC PNL IONIZED CA: CPT

## 2019-03-02 PROCEDURE — 74011250637 HC RX REV CODE- 250/637: Performed by: NURSE PRACTITIONER

## 2019-03-02 PROCEDURE — 99284 EMERGENCY DEPT VISIT MOD MDM: CPT

## 2019-03-02 PROCEDURE — 87186 SC STD MICRODIL/AGAR DIL: CPT

## 2019-03-02 PROCEDURE — 77030038269 HC DRN EXT URIN PURWCK BARD -A

## 2019-03-02 RX ORDER — SULFAMETHOXAZOLE AND TRIMETHOPRIM 800; 160 MG/1; MG/1
1 TABLET ORAL 2 TIMES DAILY
Qty: 20 TAB | Refills: 0 | Status: SHIPPED | OUTPATIENT
Start: 2019-03-02 | End: 2019-03-02

## 2019-03-02 RX ORDER — TRAMADOL HYDROCHLORIDE 50 MG/1
100 TABLET ORAL
Status: COMPLETED | OUTPATIENT
Start: 2019-03-02 | End: 2019-03-02

## 2019-03-02 RX ORDER — PHENAZOPYRIDINE HYDROCHLORIDE 100 MG/1
200 TABLET, FILM COATED ORAL
Status: COMPLETED | OUTPATIENT
Start: 2019-03-02 | End: 2019-03-02

## 2019-03-02 RX ORDER — TRAMADOL HYDROCHLORIDE 50 MG/1
50 TABLET ORAL
Qty: 8 TAB | Refills: 0 | Status: SHIPPED | OUTPATIENT
Start: 2019-03-02 | End: 2019-03-04

## 2019-03-02 RX ORDER — AMOXICILLIN AND CLAVULANATE POTASSIUM 875; 125 MG/1; MG/1
1 TABLET, FILM COATED ORAL 2 TIMES DAILY
Qty: 20 TAB | Refills: 0 | Status: SHIPPED | OUTPATIENT
Start: 2019-03-02 | End: 2019-03-12

## 2019-03-02 RX ORDER — IBUPROFEN 400 MG/1
800 TABLET ORAL
Status: DISCONTINUED | OUTPATIENT
Start: 2019-03-02 | End: 2019-03-02

## 2019-03-02 RX ORDER — FLUCONAZOLE 150 MG/1
150 TABLET ORAL
Qty: 1 TAB | Refills: 0 | Status: SHIPPED | OUTPATIENT
Start: 2019-03-02 | End: 2019-03-02

## 2019-03-02 RX ADMIN — PHENAZOPYRIDINE HYDROCHLORIDE 200 MG: 100 TABLET ORAL at 18:52

## 2019-03-02 RX ADMIN — TRAMADOL HYDROCHLORIDE 100 MG: 50 TABLET, FILM COATED ORAL at 19:20

## 2019-03-02 NOTE — ED PROVIDER NOTES
37 y.o. female with past medical history significant for HTN, Gastroparesis, CAD and IDDM who presents ambulatory to the ED with chief complaint of dsyuria onset towards the end of January 2019, with associated odorous urine, chills, myalgias, nausea, decreased appetite and 8/10 lower back pain (x1.5 weeks). Pt notes that she was seen by her gynecologist, who dx'd her with a UTI and prescribed her Macrobid (end of January 2019). When her sx continued to persist, she was seen again and was prescribed Keflex (also end of January 2019). She notes that sx still have not resolved and seem to be getting worse. Pt states that although she has been taking her long-acting insulin as prescribed, her BG has still been high; she only takes the short-acting when she eats, and as she has not been eating very much, she has not been taking the short acting insulin very often. Pt denies fever,abdominal pain/ pelvic pain, cough,  vomiting and congestion. She states that she has been taking Tylenol and Pyridium, but neither have helped her sx. Pt notes that Augmentin has worked well for her UTIs in the past. There are no other acute medical concerns at this time. Positive Tobacco use; Negative EtOH use; Negative Illicit Drug Abuse PCP: Megan Barrow MD 
 
Note written by Vani Martinez, as dictated by Luis Angel Romero NP 6:37 PM  
 
 
The history is provided by the patient and medical records. No  was used. Past Medical History:  
Diagnosis Date  Arthritis  B[facility] 1 disorder ([facility]  Diabetes ([facility]  Gastrointestinal disorder   
 gastroparesis  Gastroparesis  GERD (gastroesophageal reflux disease)  Heart disease  Hypertension  Psychiatric disorder Bipolar Past Surgical History:  
Procedure Laterality Date  HX ANKLE FRACTURE TX    
 HX CAROTID STENT    
 HX GYN    
 BTL  
 HX ORTHOPAEDIC    
 toe Family History: Problem Relation Age of Onset  Thyroid Disease Mother  Hypertension Father  Heart Disease Father  Diabetes Sister  Heart Disease Sister  Hypertension Sister  Thyroid Disease Brother Social History Socioeconomic History  Marital status:  Spouse name: Not on file  Number of children: Not on file  Years of education: Not on file  Highest education level: Not on file Social Needs  Financial resource strain: Not on file  Food insecurity - worry: Not on file  Food insecurity - inability: Not on file  Transportation needs - medical: Not on file  Transportation needs - non-medical: Not on file Occupational History  Not on file Tobacco Use  Smoking status: Current Every Day Smoker Packs/day: 0.25  Smokeless tobacco: Never Used Substance and Sexual Activity  Alcohol use: Yes Comment: occasionally  Drug use: No  
 Sexual activity: Yes Other Topics Concern  Not on file Social History Narrative  Not on file ALLERGIES: Other medication; Flagyl [metronidazole]; and Levaquin [levofloxacin] Review of Systems Constitutional: Positive for appetite change and chills. Negative for fatigue and fever. HENT: Negative. Negative for congestion, ear pain, facial swelling, rhinorrhea, sneezing and sore throat. Eyes: Negative for pain, discharge and itching. Respiratory: Negative for cough, chest tightness and shortness of breath. Cardiovascular: Negative. Negative for chest pain and leg swelling. Gastrointestinal: Positive for nausea. Negative for abdominal distention, abdominal pain, constipation, diarrhea and vomiting. Genitourinary: Positive for dysuria and frequency. Negative for difficulty urinating and urgency. Musculoskeletal: Positive for back pain and myalgias. Negative for arthralgias, joint swelling, neck pain and neck stiffness. Skin: Negative for color change and rash. Neurological: Negative for dizziness, numbness and headaches. Psychiatric/Behavioral: Negative for confusion and decreased concentration. All other systems reviewed and are negative. Vitals:  
 03/02/19 1822 03/02/19 1841 BP:  144/86 Pulse: 83 72 Resp:  20 Temp:  98.1 °F (36.7 °C) SpO2: 100% 99% Physical Exam  
Constitutional: She is oriented to person, place, and time. She appears well-developed and well-nourished. No distress. HENT:  
Head: Normocephalic and atraumatic. Right Ear: External ear normal.  
Left Ear: External ear normal.  
Nose: Nose normal.  
Mouth/Throat: Oropharynx is clear and moist. No oropharyngeal exudate. Eyes: Conjunctivae and EOM are normal. Pupils are equal, round, and reactive to light. Neck: Normal range of motion. Neck supple. Cardiovascular: Normal rate, regular rhythm, normal heart sounds and intact distal pulses. Pulmonary/Chest: Effort normal and breath sounds normal.  
Abdominal: Soft. There is no tenderness. There is no rebound and no guarding. Musculoskeletal: Normal range of motion. Neurological: She is alert and oriented to person, place, and time. Skin: Skin is warm and dry. Psychiatric: She has a normal mood and affect. Her behavior is normal. Judgment and thought content normal.  
Nursing note and vitals reviewed. MDM Number of Diagnoses or Management Options Acute cystitis with hematuria:  
Hyperglycemia:  
Diagnosis management comments: DDx: UTI, hyperglycemia, AJ, dehydration 36 yo F presents w/ concerns of acute on chronic UTI  
- UA (+) UTI- UCx pending- started on Augmentin per last available C&S \ 
- elevated BG- no s/sx of emergent diabetic crisis, renal fxn intact  
- referred to urology given chronicity of UTI, urged f/u - Push fluids, take antibiotics as directed, patient may use OTC pyridium as desired  
- reasons to return to ED provided Amount and/or Complexity of Data Reviewed Clinical lab tests: ordered and reviewed Review and summarize past medical records: yes Procedures LABORATORY TESTS: 
Recent Results (from the past 12 hour(s)) URINALYSIS W/MICROSCOPIC Collection Time: 03/02/19  6:52 PM  
Result Value Ref Range Color YELLOW/STRAW Appearance CLOUDY (A) CLEAR Specific gravity 1.019 1.003 - 1.030    
 pH (UA) 5.5 5.0 - 8.0 Protein 100 (A) NEG mg/dL Glucose >1,000 (A) NEG mg/dL Ketone NEGATIVE  NEG mg/dL Bilirubin NEGATIVE  NEG Blood MODERATE (A) NEG Urobilinogen 1.0 0.2 - 1.0 EU/dL Nitrites POSITIVE (A) NEG Leukocyte Esterase SMALL (A) NEG    
 WBC 10-20 0 - 4 /hpf  
 RBC 5-10 0 - 5 /hpf Epithelial cells FEW FEW /lpf Bacteria 4+ (A) NEG /hpf Hyaline cast 0-2 0 - 5 /lpf URINE CULTURE HOLD SAMPLE Collection Time: 03/02/19  6:52 PM  
Result Value Ref Range Urine culture hold URINE ON HOLD IN MICROBIOLOGY DEPT FOR 3 DAYS. IF UNPRESERVED URINE IS SUBMITTED, IT CANNOT BE USED FOR ADDITIONAL TESTING AFTER 24 HRS, RECOLLECTION WILL BE REQUIRED. SAMPLES BEING HELD Collection Time: 03/02/19  6:52 PM  
Result Value Ref Range SAMPLES BEING HELD 1LAV   
 COMMENT Add-on orders for these samples will be processed based on acceptable specimen integrity and analyte stability, which may vary by analyte. POC CHEM8 Collection Time: 03/02/19  7:08 PM  
Result Value Ref Range Calcium, ionized (POC) 1.23 1.12 - 1.32 mmol/L Sodium (POC) 141 136 - 145 mmol/L Potassium (POC) 3.9 3.5 - 5.1 mmol/L Chloride (POC) 103 98 - 107 mmol/L  
 CO2 (POC) 24 21 - 32 mmol/L Anion gap (POC) 19 10 - 20 mmol/L Glucose (POC) 263 (H) 65 - 100 mg/dL BUN (POC) 21 (H) 9 - 20 mg/dL Creatinine (POC) 0.9 0.6 - 1.3 mg/dL GFRAA, POC >60 >60 ml/min/1.73m2 GFRNA, POC >60 >60 ml/min/1.73m2 Hematocrit (POC) 47 35.0 - 47.0 % Comment Comment Not Indicated.     
 
 
IMAGING RESULTS: 
 No orders to display MEDICATIONS GIVEN: 
Medications  
phenazopyridine (PYRIDIUM) tablet 200 mg (200 mg Oral Given 3/2/19 1852) traMADol (ULTRAM) tablet 100 mg (100 mg Oral Given 3/2/19 1920) IMPRESSION: 
1. Acute cystitis with hematuria 2. Hyperglycemia PLAN: 
1. Discharge Medication List as of 3/2/2019  7:31 PM  
  
START taking these medications Details  
traMADol (ULTRAM) 50 mg tablet Take 1 Tab by mouth every six (6) hours as needed for Pain for up to 2 days. Max Daily Amount: 200 mg., Print, Disp-8 Tab, R-0  
  
trimethoprim-sulfamethoxazole (BACTRIM DS) 160-800 mg per tablet Take 1 Tab by mouth two (2) times a day for 10 days. , Print, Disp-20 Tab, R-0  
  
  
CONTINUE these medications which have NOT CHANGED Details  
insulin glargine (LANTUS U-100 INSULIN) 100 unit/mL injection 32 Units by SubCUTAneous route daily. , NormalFuture refills should be directed to her [facility] provider. Disp-1 Vial, R-0  
  
losartan (COZAAR) 100 mg tablet Take 1 Tab by mouth daily. , Normal, Disp-90 Tab, R-1  
  
hydroCHLOROthiazide (HYDRODIURIL) 25 mg tablet Take 1 Tab by mouth daily. , Normal, Disp-90 Tab, R-1  
  
metoprolol succinate (TOPROL-XL) 50 mg XL tablet Take  by mouth daily. , Historical Med  
  
gabapentin (NEURONTIN) 300 mg capsule Take 300 mg by mouth three (3) times daily. , Historical Med DULoxetine (CYMBALTA) 60 mg capsule Take 1 Cap by mouth daily. , Normal, Disp-90 Cap, R-1  
  
divalproex DR (DEPAKOTE) 500 mg tablet Take 1 Tab by mouth three (3) times daily. , Normal, Disp-270 Tab, R-1  
  
rosuvastatin (CRESTOR) 20 mg tablet Take 20 mg by mouth nightly., Historical Med  
  
clopidogrel (PLAVIX) 75 mg tab Take  by mouth., Historical Med  
  
apixaban (ELIQUIS) 5 mg tablet Take 5 mg by mouth two (2) times a day., Historical Med  
  
insulin lispro (HUMALOG) 100 unit/mL injection by SubCUTAneous route once. Sliding scale 8 units per 15 CHO, Historical Med 2. Follow-up Information Follow up With Specialties Details Why Contact Info Alvin Fernandes MD Urology, Gynecology Schedule an appointment as soon as possible for a visit for urology follow up and ongoing management of your chronic UTI  [address] [facility] [address] [facility] [address] 
[phone] [facility] EMERGENCY DEP Emergency Medicine Go to As needed, If symptoms worsen [address] [place] [id number] [phone] 3. Return to ED if worse

## 2019-03-03 NOTE — DISCHARGE INSTRUCTIONS

## 2019-03-04 LAB
BACTERIA SPEC CULT: ABNORMAL
CC UR VC: ABNORMAL
SERVICE CMNT-IMP: ABNORMAL

## 2019-04-15 ENCOUNTER — HOSPITAL ENCOUNTER (EMERGENCY)
Age: 44
Discharge: HOME | End: 2019-04-15
Attending: EMERGENCY MEDICINE
Payer: COMMERCIAL

## 2019-04-15 VITALS
RESPIRATION RATE: 16 BRPM | SYSTOLIC BLOOD PRESSURE: 122 MMHG | BODY MASS INDEX: 28.17 KG/M2 | HEIGHT: 64 IN | HEART RATE: 78 BPM | DIASTOLIC BLOOD PRESSURE: 59 MMHG | TEMPERATURE: 98.1 F | WEIGHT: 165 LBS | OXYGEN SATURATION: 98 %

## 2019-04-15 DIAGNOSIS — G47.00 INSOMNIA, UNSPECIFIED TYPE: Primary | ICD-10-CM
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
## 2019-04-15 LAB
ALBUMIN SERPL-MCNC: 3.3 G/DL (ref 3.5–5)
ALBUMIN/GLOB SERPL: 1 {RATIO} (ref 1.1–2.2)
ALP SERPL-CCNC: 133 U/L (ref 45–117)
ALT SERPL-CCNC: 18 U/L (ref 12–78)
ANION GAP SERPL CALC-SCNC: 5 MMOL/L (ref 5–15)
APAP SERPL-MCNC: 6 UG/ML (ref 10–30)
APAP SERPL-MCNC: 9 UG/ML (ref 10–30)
APPEARANCE UR: CLEAR
AST SERPL-CCNC: 16 U/L (ref 15–37)
ATRIAL RATE: 74 BPM
BACTERIA URNS QL MICRO: ABNORMAL /HPF
BASOPHILS # BLD: 0.1 K/UL (ref 0–0.1)
BASOPHILS NFR BLD: 1 % (ref 0–1)
BILIRUB SERPL-MCNC: 0.2 MG/DL (ref 0.2–1)
BILIRUB UR QL: NEGATIVE
BUN SERPL-MCNC: 23 MG/DL (ref 6–20)
BUN/CREAT SERPL: 19 (ref 12–20)
CALCIUM SERPL-MCNC: 9.2 MG/DL (ref 8.5–10.1)
CALCULATED P AXIS, ECG09: 54 DEGREES
CALCULATED R AXIS, ECG10: 99 DEGREES
CALCULATED T AXIS, ECG11: 86 DEGREES
CHLORIDE SERPL-SCNC: 102 MMOL/L (ref 97–108)
CO2 SERPL-SCNC: 28 MMOL/L (ref 21–32)
COLOR UR: ABNORMAL
COMMENT, HOLDF: NORMAL
CREAT SERPL-MCNC: 1.19 MG/DL (ref 0.55–1.02)
DIAGNOSIS, 93000: NORMAL
DIFFERENTIAL METHOD BLD: ABNORMAL
EOSINOPHIL # BLD: 0.2 K/UL (ref 0–0.4)
EOSINOPHIL NFR BLD: 3 % (ref 0–7)
EPITH CASTS URNS QL MICRO: ABNORMAL /LPF
ERYTHROCYTE [DISTWIDTH] IN BLOOD BY AUTOMATED COUNT: 15.2 % (ref 11.5–14.5)
ETHANOL SERPL-MCNC: <10 MG/DL
GLOBULIN SER CALC-MCNC: 3.4 G/DL (ref 2–4)
GLUCOSE BLD STRIP.AUTO-MCNC: 205 MG/DL (ref 65–100)
GLUCOSE SERPL-MCNC: 195 MG/DL (ref 65–100)
GLUCOSE UR STRIP.AUTO-MCNC: 250 MG/DL
HCT VFR BLD AUTO: 41.4 % (ref 35–47)
HGB BLD-MCNC: 13.2 G/DL (ref 11.5–16)
HGB UR QL STRIP: ABNORMAL
HYALINE CASTS URNS QL MICRO: ABNORMAL /LPF (ref 0–5)
IMM GRANULOCYTES # BLD AUTO: 0.1 K/UL (ref 0–0.04)
IMM GRANULOCYTES NFR BLD AUTO: 1 % (ref 0–0.5)
KETONES UR QL STRIP.AUTO: NEGATIVE MG/DL
LEUKOCYTE ESTERASE UR QL STRIP.AUTO: NEGATIVE
LYMPHOCYTES # BLD: 3 K/UL (ref 0.8–3.5)
LYMPHOCYTES NFR BLD: 37 % (ref 12–49)
MCH RBC QN AUTO: 26.8 PG (ref 26–34)
MCHC RBC AUTO-ENTMCNC: 31.9 G/DL (ref 30–36.5)
MCV RBC AUTO: 84 FL (ref 80–99)
MONOCYTES # BLD: 0.5 K/UL (ref 0–1)
MONOCYTES NFR BLD: 6 % (ref 5–13)
NEUTS SEG # BLD: 4.3 K/UL (ref 1.8–8)
NEUTS SEG NFR BLD: 52 % (ref 32–75)
NITRITE UR QL STRIP.AUTO: POSITIVE
NRBC # BLD: 0 K/UL (ref 0–0.01)
NRBC BLD-RTO: 0 PER 100 WBC
P-R INTERVAL, ECG05: 144 MS
PH UR STRIP: 6 [PH] (ref 5–8)
PLATELET # BLD AUTO: 279 K/UL (ref 150–400)
PMV BLD AUTO: 10.1 FL (ref 8.9–12.9)
POTASSIUM SERPL-SCNC: 3.8 MMOL/L (ref 3.5–5.1)
PROT SERPL-MCNC: 6.7 G/DL (ref 6.4–8.2)
PROT UR STRIP-MCNC: 30 MG/DL
Q-T INTERVAL, ECG07: 396 MS
QRS DURATION, ECG06: 98 MS
QTC CALCULATION (BEZET), ECG08: 439 MS
RBC # BLD AUTO: 4.93 M/UL (ref 3.8–5.2)
RBC #/AREA URNS HPF: ABNORMAL /HPF (ref 0–5)
SALICYLATES SERPL-MCNC: 2.4 MG/DL (ref 2.8–20)
SALICYLATES SERPL-MCNC: <1.7 MG/DL (ref 2.8–20)
SAMPLES BEING HELD,HOLD: NORMAL
SERVICE CMNT-IMP: ABNORMAL
SODIUM SERPL-SCNC: 135 MMOL/L (ref 136–145)
SP GR UR REFRACTOMETRY: 1.02 (ref 1–1.03)
TSH SERPL DL<=0.05 MIU/L-ACNC: 0.6 UIU/ML (ref 0.36–3.74)
UR CULT HOLD, URHOLD: NORMAL
UROBILINOGEN UR QL STRIP.AUTO: 1 EU/DL (ref 0.2–1)
VALPROATE SERPL-MCNC: 29 UG/ML (ref 50–100)
VENTRICULAR RATE, ECG03: 74 BPM
WBC # BLD AUTO: 8.1 K/UL (ref 3.6–11)
WBC URNS QL MICRO: ABNORMAL /HPF (ref 0–4)

## 2019-04-15 PROCEDURE — 85025 COMPLETE CBC W/AUTO DIFF WBC: CPT

## 2019-04-15 PROCEDURE — 36415 COLL VENOUS BLD VENIPUNCTURE: CPT

## 2019-04-15 PROCEDURE — 87186 SC STD MICRODIL/AGAR DIL: CPT

## 2019-04-15 PROCEDURE — 93005 ELECTROCARDIOGRAM TRACING: CPT

## 2019-04-15 PROCEDURE — 80164 ASSAY DIPROPYLACETIC ACD TOT: CPT

## 2019-04-15 PROCEDURE — 82962 GLUCOSE BLOOD TEST: CPT

## 2019-04-15 PROCEDURE — 80053 COMPREHEN METABOLIC PANEL: CPT

## 2019-04-15 PROCEDURE — 99285 EMERGENCY DEPT VISIT HI MDM: CPT

## 2019-04-15 PROCEDURE — 81001 URINALYSIS AUTO W/SCOPE: CPT

## 2019-04-15 PROCEDURE — 90791 PSYCH DIAGNOSTIC EVALUATION: CPT

## 2019-04-15 PROCEDURE — 87077 CULTURE AEROBIC IDENTIFY: CPT

## 2019-04-15 PROCEDURE — 87086 URINE CULTURE/COLONY COUNT: CPT

## 2019-04-15 PROCEDURE — 80307 DRUG TEST PRSMV CHEM ANLYZR: CPT

## 2019-04-15 PROCEDURE — 84443 ASSAY THYROID STIM HORMONE: CPT

## 2019-04-15 NOTE — ED TRIAGE NOTES
Triage Note: Patient is coming in with insomnia last night and has taken 40 mg of Valium since last early this morning.

## 2019-04-15 NOTE — DISCHARGE INSTRUCTIONS
Patient Education        Insomnia: Care Instructions  Your Care Instructions    Insomnia is the inability to sleep well. It is a common problem for most people at some time. Insomnia may make it hard for you to get to sleep, stay asleep, or sleep as long as you need to. This can make you tired and grouchy during the day. It can also make you forgetful, less effective at work, and unhappy. Insomnia can be caused by conditions such as depression or anxiety. Pain can also affect your ability to sleep. When these problems are solved, the insomnia usually clears up. But sometimes bad sleep habits can cause insomnia. If insomnia is affecting your work or your enjoyment of life, you can take steps to improve your sleep. Follow-up care is a key part of your treatment and safety. Be sure to make and go to all appointments, and call your doctor if you are having problems. It's also a good idea to know your test results and keep a list of the medicines you take. How can you care for yourself at home? What to avoid  · Do not have drinks with caffeine, such as coffee or black tea, for 8 hours before bed. · Do not smoke or use other types of tobacco near bedtime. Nicotine is a stimulant and can keep you awake. · Avoid drinking alcohol late in the evening, because it can cause you to wake in the middle of the night. · Do not eat a big meal close to bedtime. If you are hungry, eat a light snack. · Do not drink a lot of water close to bedtime, because the need to urinate may wake you up during the night. · Do not read or watch TV in bed. Use the bed only for sleeping and sexual activity. What to try  · Go to bed at the same time every night, and wake up at the same time every morning. Do not take naps during the day. · Keep your bedroom quiet, dark, and cool. · Sleep on a comfortable pillow and mattress. · If watching the clock makes you anxious, turn it facing away from you so you cannot see the time.   · If you worry when you lie down, start a worry book. Well before bedtime, write down your worries, and then set the book and your concerns aside. · Try meditation or other relaxation techniques before you go to bed. · If you cannot fall asleep, get up and go to another room until you feel sleepy. Do something relaxing. Repeat your bedtime routine before you go to bed again. · Make your house quiet and calm about an hour before bedtime. Turn down the lights, turn off the TV, log off the computer, and turn down the volume on music. This can help you relax after a busy day. When should you call for help? Watch closely for changes in your health, and be sure to contact your doctor if:    · Your efforts to improve your sleep do not work.     · Your insomnia gets worse.     · You have been feeling down, depressed, or hopeless or have lost interest in things that you usually enjoy. Where can you learn more? Go to http://mauri-mario.info/. Enter P513 in the search box to learn more about \"Insomnia: Care Instructions. \"  Current as of: June 28, 2018  Content Version: 11.9  © 2215-9165 [employer]. Care instructions adapted under license by [employer] (which disclaims liability or warranty for this information). If you have questions about a medical condition or this instruction, always ask your healthcare professional. [address] any warranty or liability for your use of this information.

## 2019-04-15 NOTE — BSMART NOTE
Comprehensive Assessment Form Part 1 Section I - Disposition Axis I - Bipolar Disorder by history Insomnia Axis II - Deferred Axis III - Past Medical History:  
Diagnosis Date  Arthritis  B[facility] 1 disorder ([facility] [address] [facility]  [facility]  Gastrointestinal disorder   
 gastroparesis  Gastroparesis  GERD (gastroesophageal reflux disease)  Heart disease  Hypertension  Psychiatric disorder Bipolar Axis IV - Medication noncompliance, employment, sleep [place] V - 50 The Medical Doctor to Psychiatrist conference was not completed. The Medical Doctor is in agreement with Psychiatrist disposition because of (reason) patient is not seeking admission and does not meet criteria for a TDO. The plan is discharge with resources once medically cleared. Patient given information for [place] [address] providers in [facility] as she lives in [place] but stays frequently in [facility]. The Payor source is [facility]. Section II - Integrated Summary Summary:  Patient is a 46yo female who presents to the ER due to insomnia and taking valium with alcohol to try to sleep. At patient's request her boyfriend was present during assessment. She reports that she has struggled with sleep for the last few months since running out of her 25mg of Seroquel. She reports she had a disagreement with her psychiatrist so she has not gone back and has not had Seroquel. She reports that she takes Cymbalta for depression and fibromyalgia from her neurologist and that she gets Depakote from her PCP for her mood. She reports having issues with these providers as well and has not gone back so she has not asked if they would write her a prescription for Seroquel to help her sleep. Patient denies suicidal and homicidal ideation. She reports trying to sleep and taking more medication because it did not help and have some alcohol as well to help.  She talked to her mother who was concerned by the amount of medication she took which is why she is currently at the ER. She reports she would have rather stayed home and slept. Patient reports some depression due to not sleeping and stress at work. She reports she does but does not want to step down from her current position as a  but knows she needs to due to her current stress. She reports working at [employer] for 5 years but in this position for about 1 year. She reports she was placed on Seroquel when she was last at [facility] for her mental health and suicidal ideation. She reports past attempts including by overdosing. She again denied today was an overdose attempt. Patient denies needing admission and states that she would be interested in a new psychiatrist and possibly a counselor. Patient reports she lives in [place] but that she stays in [place] [address] and would be open to any facilities in the [facility] that accept [place]. Discussed insurance website that patient could verify specific facilities accept her insurance or calling the facilities as patient reports concern that she would not have a facility that accepts her plan. Also gave information for partial hospitalization and IOP at [facility] in [facility] as patient reports being open the idea of group counseling and IOP. Patient and boyfriend did not have any other concerns for patient other than her blood sugar and medical results. The patienthas demonstrated mental capacity to provide informed consent. The information is given by the patient and past medical records. The Chief Complaint is insomnia with overdose on valium. The Precipitant Factors are chronic insomnia. Previous Hospitalizations: yes The patient has not previously been in restraints. Current Psychiatrist and/or  is patient is unsure of name but has not seen \"in a while\".  
 
Lethality Assessment: 
 
The potential for suicide noted by the following: previous history of attempts which occured in the form of overdose . The potential for homicide is not noted. The patient has not been a perpetrator of sexual or physical abuse. There are not pending charges. The patient is not felt to be at risk for self harm or harm to others. The attending nurse was advised that security has not been notified. Section III - Psychosocial 
The patient's overall mood and attitude is calm and cooperative but sleepy and frustrated. Feelings of helplessness and hopelessness are not observed. Generalized anxiety is not observed. Panic is not observed. Phobias are not observed. Obsessive compulsive tendencies are not observed. Section IV - Mental Status Exam 
The patient's appearance shows no evidence of impairment. The patient's behavior shows poor impulse control. The patient is oriented to time, place, person and situation. The patient's speech shows no evidence of impairment. The patient's mood is euthymic and is irritable. The range of affect is constricted. The patient's thought content demonstrates no evidence of impairment. The thought process shows no evidence of impairment. The patient's perception shows no evidence of impairment. The patient's memory shows no evidence of impairment. The patient's appetite shows no evidence of impairment. The patient's sleep has evidence of insomnia. The patient's insight shows no evidence of impairment. The patient's judgement shows no evidence of impairment. Section V - Substance Abuse The patient is using substances. The patient is using alcohol for unknown with last use on today and benzodiazepines/barbiturates orally for unknown with last use on today. The patient has experienced the following withdrawal symptoms: N/A. Section VI - Living Arrangements The patient has a significant other. The patient does plan to return home upon discharge. The patient does not have legal issues pending. The patient's source of income comes from employment. Orthodoxy and cultural practices have not been voiced at this time. The patient's greatest support comes from boyfriend and mother and this person will be involved with the treatment. The patient has not been in an event described as horrible or outside the realm of ordinary life experience either currently or in the past. 
The patient has not been a victim of sexual/physical abuse. Section VII - Other Areas of Clinical Concern The highest grade achieved is not assessed with the overall quality of school experience being described as not assessed. The patient is currently employed and speaks [place] as a primary language. The patient has no communication impairments affecting communication. The patient's preference for learning can be described as: can read and write adequately.   The patient's hearing is normal.  The patient's vision is normal. 
 
 
Rica Aguilera [facility]

## 2019-04-15 NOTE — ED NOTES
Spoke to Miguelito at [facility] control. Will call back to update when we get lab results

## 2019-04-15 NOTE — ED PROVIDER NOTES
37 y.o. female with past medical history significant for diabetes, hypertension, heart disease, and bipolar 1 disorder who presents from home via a private vehicle with chief complaint of severe insomnia. Pt reports that she has a history of insomnia and sometimes does not sleep for up to three days. Pt reports she took 10 mg of valium around 0800 and took 30 mg more 20 minutes later because she could not fall asleep. Pt states she is not suicidal or homicidal at this time, she just wanted to fall asleep. Pt reports the valium is not prescribed to her and was given to her by a friend because of her difficulty sleeping. Pt reports that she also had 2 8 oz glasses of wine this morning. Furthermore, pt reports taking Tylenol 1 g at 0300 and another gram of tylenol at 0500. Pt states her mother told her to come to the ED after she told her about the medications she had taken today. Pt states her mother was worried about the effects of the medications on her heart and kidney. Pt notes that she has a history of marijuana and alcohol abuse. Pt reports that she has been drink 2-3 8 oz glasses of wine recently. Of note, pt reports that she has attempted suicide by overdosing in the past. Pt states feeling a bit depressed at this time. Pt reports she is on Depakote for bipolar 1. Pt denies any using other medications not prescribed to her. Pt denies any SI, HI, cough, vomiting, diarrhea, congestion, nausea, vomiting, or other acute medical concerns at this time. Chart review: Pt last seen in the ED on 3/2/19 for cystitis, hematuria, and hyperglycemia. Social hx - Tobacco use: currently smokes 0.5 packs/day, Alcohol Use: drinks 2-3 8 oz glasses of wine daily PCP: Timothy Hercules MD 
 
Note written by Vernona Opitz, Scribe, as dictated by Sarah Odell,  11:40 AM 
 
 
 
  
 
Past Medical History:  
Diagnosis Date  Arthritis  Bipolar 1 disorder ([facility]  Diabetes ([facility]  Gastrointestinal disorder   
 gastroparesis  Gastroparesis  GERD (gastroesophageal reflux disease)  Heart disease  Hypertension  Psychiatric disorder Bipolar Past Surgical History:  
Procedure Laterality Date  HX ANKLE FRACTURE TX    
 HX CAROTID STENT    
 HX GYN    
 BTL  
 HX ORTHOPAEDIC    
 toe Family History:  
Problem Relation Age of Onset  Thyroid Disease Mother  Hypertension Father  Heart Disease Father  Diabetes Sister  Heart Disease Sister  Hypertension Sister  Thyroid Disease Brother Social History Socioeconomic History  Marital status:  Spouse name: Not on file  Number of children: Not on file  Years of education: Not on file  Highest education level: Not on file Occupational History  Not on file Social Needs  Financial resource strain: Not on file  Food insecurity:  
  Worry: Not on file Inability: Not on file  Transportation needs:  
  Medical: Not on file Non-medical: Not on file Tobacco Use  Smoking status: Current Every Day Smoker Packs/day: 0.25  Smokeless tobacco: Never Used Substance and Sexual Activity  Alcohol use: Yes Comment: occasionally  Drug use: No  
 Sexual activity: Yes Lifestyle  Physical activity:  
  Days per week: Not on file Minutes per session: Not on file  Stress: Not on file Relationships  Social connections:  
  Talks on phone: Not on file Gets together: Not on file Attends Sikhism service: Not on file Active member of club or organization: Not on file Attends meetings of clubs or organizations: Not on file Relationship status: Not on file  Intimate partner violence:  
  Fear of current or ex partner: Not on file Emotionally abused: Not on file Physically abused: Not on file Forced sexual activity: Not on file Other Topics Concern  Not on file Social History Narrative  Not on file ALLERGIES: Other medication; Flagyl [metronidazole]; and Levaquin [levofloxacin] Review of Systems Constitutional: Negative for chills and fever. HENT: Negative for congestion. Respiratory: Negative for shortness of breath. Cardiovascular: Negative for chest pain. Gastrointestinal: Negative for nausea and vomiting. Psychiatric/Behavioral: Positive for sleep disturbance. Negative for self-injury and suicidal ideas. All other systems reviewed and are negative. Vitals:  
 04/15/19 1032 04/15/19 1052 04/15/19 1208 BP:  116/71 (!) 183/104 Pulse: 78 80 90 Resp:  20 16 Temp:  97.8 °F (36.6 °C) 98.6 °F (37 °C) SpO2: 96% 97% 92% Weight:  74.4 kg (164 lb) 74.8 kg (165 lb) Height:  5' 4\" (1.626 m) 5' 4\" (1.626 m) Physical Exam  
Nursing note and vitals reviewed. Constitutional: Pt appears sleepy, but is awake and alert. Pt appears well-developed and well-nourished. NAD. HENT:  
Head: Normocephalic and atraumatic. Nose: Nose normal.  
Mouth/Throat: Oropharynx is clear and moist. No oropharyngeal exudate. Eyes: Conjunctivae and extraocular motions are normal. Pupils are equal, round, and reactive to light. Right eye exhibits no discharge. Left eye exhibits no discharge. No scleral icterus. Neck: No tracheal deviation present. Supple neck. Cardiovascular: Normal rate, regular rhythm, normal heart sounds and intact distal pulses. Exam reveals no gallop and no friction rub. No murmur heard. Pulmonary/Chest: Effort normal and breath sounds normal.  Pt  has no wheezes. Pt  has no rales. Abdominal: Soft. Pt  exhibits no distension and no mass. No tenderness. Pt  has no rebound and no guarding. Musculoskeletal:  Pt  exhibits no edema and no tenderness. Ext: Normal ROM in all four extremities; not tender to palpation; distal pulses are normal, no edema. Neurological:  Pt is alert.   nonfocal neuro exam. 
 Skin: Skin is warm and dry. Pt  is not diaphoretic. Psychiatric:  Pt  has a normal mood and affect. Behavior is normal. No suicidal ideation or homicidal ideation. Note written by Vani Barbosa, as dictated by Luciano Reich DO 11:40 AM 
 
[place] Procedures PROGRESS NOTE: 
1:24 PM 
BSMART has evaluated the pt. Pt had been previously prescribed Seroquel to help her sleep by her psychiatrist, but has not been prescribed it anymore as she had an argument with her psychiatrist and has not returned to see them again. 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 BSMART agrees with my assessment and will provide her with resources to follow-up as an outpatient. 2:09 PM 
Patient's results have been reviewed with them. Patient and/or family have verbally conveyed their understanding and agreement of the patient's signs, symptoms, diagnosis, treatment and prognosis and additionally agree to follow up as recommended or return to the Emergency Room should their condition change prior to follow-up. Discharge instructions have also been provided to the patient with some educational information regarding their diagnosis as well a list of reasons why they would want to return to the ER prior to their follow-up appointment should their condition change. Consulted BSMART Consulted Poison Control DC home Labs Reviewed CULTURE, URINE - Abnormal; Notable for the following components:  
    Result Value Culture result: GRAM NEGATIVE RODS (*) All other components within normal limits CBC WITH AUTOMATED DIFF - Abnormal; Notable for the following components: RDW 15.2 (*) IMMATURE GRANULOCYTES 1 (*)   
 ABS. IMM. GRANS. 0.1 (*) All other components within normal limits METABOLIC PANEL, COMPREHENSIVE - Abnormal; Notable for the following components:  
 Sodium 135 (*) Glucose 195 (*) BUN 23 (*) Creatinine 1.19 (*)   
 GFR est non-AA 50 (*) Alk. phosphatase 133 (*) Albumin 3.3 (*) A-G Ratio 1.0 (*) All other components within normal limits URINALYSIS W/MICROSCOPIC - Abnormal; Notable for the following components:  
 Protein 30 (*) Glucose 250 (*) Blood MODERATE (*) Nitrites POSITIVE (*) Bacteria 4+ (*) All other components within normal limits VALPROIC ACID - Abnormal; Notable for the following components:  
 Valproic acid 29 (*) All other components within normal limits ACETAMINOPHEN - Abnormal; Notable for the following components:  
 Acetaminophen level 6 (*) All other components within normal limits SALICYLATE - Abnormal; Notable for the following components:  
 Salicylate level 2.4 (*) All other components within normal limits ACETAMINOPHEN - Abnormal; Notable for the following components:  
 Acetaminophen level 9 (*) All other components within normal limits SALICYLATE - Abnormal; Notable for the following components:  
 Salicylate level <4.4 (*) All other components within normal limits GLUCOSE, POC - Abnormal; Notable for the following components:  
 Glucose (POC) 205 (*) All other components within normal limits URINE CULTURE HOLD SAMPLE  
SAMPLES BEING HELD  
ETHYL ALCOHOL  
DRUG SCREEN, URINE  
TSH 3RD GENERATION

## 2019-04-16 ENCOUNTER — PATIENT OUTREACH (OUTPATIENT)
Dept: FAMILY MEDICINE CLINIC | Age: 44
End: 2019-04-16

## 2019-04-16 NOTE — PROGRESS NOTES
This NN was able to reach pt who states she is at an appointment right now to get a root canal.  This NN advised to pt to let her dentist know the amount of medication that was taken and that she was in the ED yesterday. This NN advised that the dentist might not want to do the procedure due to the amount of medication that was ingested. Will NN will follow up with pt tomorrow.

## 2019-04-17 LAB
BACTERIA SPEC CULT: ABNORMAL
CC UR VC: ABNORMAL
SERVICE CMNT-IMP: ABNORMAL

## 2019-04-17 NOTE — PROGRESS NOTES
Attempted to reach patient. Message left for call back concerning culture result and need for treatment.  
P; macrobid 100 mg bid x 3 d

## 2019-04-19 RX ORDER — AMOXICILLIN AND CLAVULANATE POTASSIUM 875; 125 MG/1; MG/1
1 TABLET, FILM COATED ORAL 2 TIMES DAILY
Qty: 14 TAB | Refills: 0 | Status: SHIPPED | OUTPATIENT
Start: 2019-04-19 | End: 2019-04-26

## 2019-04-19 RX ORDER — FLUCONAZOLE 150 MG/1
150 TABLET ORAL ONCE
Qty: 1 TAB | Refills: 0 | Status: SHIPPED | OUTPATIENT
Start: 2019-04-19 | End: 2019-04-19

## 2019-04-19 NOTE — PROGRESS NOTES
I called and spoke with patient. She reports mild dysuria. She would like augmentin.  
ERx for augmentin 1 bid x 7 d to [facility] [address]

## 2019-04-30 ENCOUNTER — PATIENT OUTREACH (OUTPATIENT)
Dept: FAMILY MEDICINE CLINIC | Age: 44
End: 2019-04-30

## 2019-06-07 ENCOUNTER — APPOINTMENT (OUTPATIENT)
Dept: NON INVASIVE DIAGNOSTICS | Age: 44
DRG: 280 | End: 2019-06-07
Attending: FAMILY MEDICINE
Payer: COMMERCIAL

## 2019-06-07 ENCOUNTER — APPOINTMENT (OUTPATIENT)
Dept: GENERAL RADIOLOGY | Age: 44
DRG: 280 | End: 2019-06-07
Attending: EMERGENCY MEDICINE
Payer: COMMERCIAL

## 2019-06-07 ENCOUNTER — HOSPITAL ENCOUNTER (INPATIENT)
Age: 44
LOS: 1 days | Discharge: ANOTHER HOSPITAL | DRG: 280 | End: 2019-06-08
Attending: EMERGENCY MEDICINE | Admitting: INTERNAL MEDICINE
Payer: COMMERCIAL

## 2019-06-07 DIAGNOSIS — I21.4 NON-STEMI (NON-ST ELEVATED MYOCARDIAL INFARCTION) (HCC): ICD-10-CM

## 2019-06-07 DIAGNOSIS — J81.0 ACUTE PULMONARY EDEMA (HCC): Primary | ICD-10-CM

## 2019-06-07 PROBLEM — R06.02 SOB (SHORTNESS OF BREATH): Status: ACTIVE | Noted: 2019-06-07

## 2019-06-07 LAB
ALBUMIN SERPL-MCNC: 3 G/DL (ref 3.5–5)
ALBUMIN/GLOB SERPL: 0.8 {RATIO} (ref 1.1–2.2)
ALP SERPL-CCNC: 117 U/L (ref 45–117)
ALT SERPL-CCNC: 29 U/L (ref 12–78)
ANION GAP SERPL CALC-SCNC: 5 MMOL/L (ref 5–15)
APTT PPP: 30.4 SEC (ref 22.1–32)
AST SERPL-CCNC: 38 U/L (ref 15–37)
BASOPHILS # BLD: 0 K/UL (ref 0–0.1)
BASOPHILS NFR BLD: 0 % (ref 0–1)
BILIRUB SERPL-MCNC: 0.4 MG/DL (ref 0.2–1)
BNP SERPL-MCNC: 9863 PG/ML
BUN SERPL-MCNC: 27 MG/DL (ref 6–20)
BUN/CREAT SERPL: 26 (ref 12–20)
CALCIUM SERPL-MCNC: 9.1 MG/DL (ref 8.5–10.1)
CHLORIDE SERPL-SCNC: 105 MMOL/L (ref 97–108)
CO2 SERPL-SCNC: 25 MMOL/L (ref 21–32)
COMMENT, HOLDF: NORMAL
CREAT SERPL-MCNC: 1.02 MG/DL (ref 0.55–1.02)
DIFFERENTIAL METHOD BLD: ABNORMAL
ECHO AO ROOT DIAM: 2.73 CM
ECHO AV AREA PEAK VELOCITY: 2.1 CM2
ECHO AV PEAK GRADIENT: 6.1 MMHG
ECHO AV PEAK VELOCITY: 123.32 CM/S
ECHO EST RA PRESSURE: 5 MMHG
ECHO LA AREA 4C: 20.7 CM2
ECHO LA MAJOR AXIS: 4.49 CM
ECHO LA TO AORTIC ROOT RATIO: 1.64
ECHO LA VOL 2C: 56.05 ML (ref 22–52)
ECHO LA VOL 4C: 66.33 ML (ref 22–52)
ECHO LA VOL BP: 65.27 ML (ref 22–52)
ECHO LA VOL/BSA BIPLANE: 36.03 ML/M2 (ref 16–28)
ECHO LA VOLUME INDEX A2C: 30.94 ML/M2 (ref 16–28)
ECHO LA VOLUME INDEX A4C: 36.61 ML/M2 (ref 16–28)
ECHO LV E' LATERAL VELOCITY: 7.41 CM/S
ECHO LV E' SEPTAL VELOCITY: 12.07 CM/S
ECHO LV EDV A2C: 143.2 ML
ECHO LV EDV A4C: 102 ML
ECHO LV EDV BP: 123.6 ML (ref 56–104)
ECHO LV EDV INDEX A4C: 56.3 ML/M2
ECHO LV EDV INDEX BP: 68.2 ML/M2
ECHO LV EDV NDEX A2C: 79 ML/M2
ECHO LV EJECTION FRACTION A2C: 45 %
ECHO LV EJECTION FRACTION A4C: 16 %
ECHO LV EJECTION FRACTION BIPLANE: 31.6 % (ref 55–100)
ECHO LV ESV A2C: 78.6 ML
ECHO LV ESV A4C: 85.9 ML
ECHO LV ESV BP: 84.6 ML (ref 19–49)
ECHO LV ESV INDEX A2C: 43.4 ML/M2
ECHO LV ESV INDEX A4C: 47.4 ML/M2
ECHO LV ESV INDEX BP: 46.7 ML/M2
ECHO LV INTERNAL DIMENSION DIASTOLIC: 5.21 CM (ref 3.9–5.3)
ECHO LV INTERNAL DIMENSION SYSTOLIC: 3.86 CM
ECHO LV IVSD: 0.96 CM (ref 0.6–0.9)
ECHO LV MASS 2D: 229.8 G (ref 67–162)
ECHO LV MASS INDEX 2D: 126.8 G/M2 (ref 43–95)
ECHO LV POSTERIOR WALL DIASTOLIC: 1.05 CM (ref 0.6–0.9)
ECHO LVOT DIAM: 1.84 CM
ECHO LVOT PEAK GRADIENT: 3.8 MMHG
ECHO LVOT PEAK VELOCITY: 97.81 CM/S
ECHO MV A VELOCITY: 26.02 CM/S
ECHO MV AREA PHT: 5.1 CM2
ECHO MV E DECELERATION TIME (DT): 149.7 MS
ECHO MV E VELOCITY: 114.33 CM/S
ECHO MV E/A RATIO: 4.39
ECHO MV E/E' LATERAL: 15.43
ECHO MV E/E' RATIO (AVERAGED): 12.45
ECHO MV E/E' SEPTAL: 9.47
ECHO MV PRESSURE HALF TIME (PHT): 43.4 MS
ECHO PULMONARY ARTERY SYSTOLIC PRESSURE (PASP): 36.2 MMHG
ECHO PV MAX VELOCITY: 86.24 CM/S
ECHO PV PEAK GRADIENT: 3 MMHG
ECHO RIGHT VENTRICULAR SYSTOLIC PRESSURE (RVSP): 36.2 MMHG
ECHO RV INTERNAL DIMENSION: 3.44 CM
ECHO RV TAPSE: 2.63 CM (ref 1.5–2)
ECHO TV REGURGITANT MAX VELOCITY: 279.5 CM/S
ECHO TV REGURGITANT PEAK GRADIENT: 31.2 MMHG
EOSINOPHIL # BLD: 0 K/UL (ref 0–0.4)
EOSINOPHIL NFR BLD: 0 % (ref 0–7)
ERYTHROCYTE [DISTWIDTH] IN BLOOD BY AUTOMATED COUNT: 15 % (ref 11.5–14.5)
EST. AVERAGE GLUCOSE BLD GHB EST-MCNC: 197 MG/DL
GLOBULIN SER CALC-MCNC: 3.9 G/DL (ref 2–4)
GLUCOSE SERPL-MCNC: 248 MG/DL (ref 65–100)
HBA1C MFR BLD: 8.5 % (ref 4.2–6.3)
HCT VFR BLD AUTO: 42.3 % (ref 35–47)
HGB BLD-MCNC: 13.6 G/DL (ref 11.5–16)
IMM GRANULOCYTES # BLD AUTO: 0.1 K/UL (ref 0–0.04)
IMM GRANULOCYTES NFR BLD AUTO: 1 % (ref 0–0.5)
LACTATE SERPL-SCNC: 1.7 MMOL/L (ref 0.4–2)
LYMPHOCYTES # BLD: 1.5 K/UL (ref 0.8–3.5)
LYMPHOCYTES NFR BLD: 8 % (ref 12–49)
MCH RBC QN AUTO: 26.5 PG (ref 26–34)
MCHC RBC AUTO-ENTMCNC: 32.2 G/DL (ref 30–36.5)
MCV RBC AUTO: 82.5 FL (ref 80–99)
MONOCYTES # BLD: 1.1 K/UL (ref 0–1)
MONOCYTES NFR BLD: 6 % (ref 5–13)
NEUTS SEG # BLD: 15.7 K/UL (ref 1.8–8)
NEUTS SEG NFR BLD: 85 % (ref 32–75)
NRBC # BLD: 0 K/UL (ref 0–0.01)
NRBC BLD-RTO: 0 PER 100 WBC
PLATELET # BLD AUTO: 353 K/UL (ref 150–400)
PMV BLD AUTO: 10.4 FL (ref 8.9–12.9)
POTASSIUM SERPL-SCNC: 4.2 MMOL/L (ref 3.5–5.1)
PROT SERPL-MCNC: 6.9 G/DL (ref 6.4–8.2)
RBC # BLD AUTO: 5.13 M/UL (ref 3.8–5.2)
SAMPLES BEING HELD,HOLD: NORMAL
SODIUM SERPL-SCNC: 135 MMOL/L (ref 136–145)
THERAPEUTIC RANGE,PTTT: NORMAL SECS (ref 58–77)
TROPONIN I SERPL-MCNC: 7.9 NG/ML
TROPONIN I SERPL-MCNC: 8.94 NG/ML
WBC # BLD AUTO: 18.5 K/UL (ref 3.6–11)

## 2019-06-07 PROCEDURE — 93306 TTE W/DOPPLER COMPLETE: CPT

## 2019-06-07 PROCEDURE — 74011250636 HC RX REV CODE- 250/636: Performed by: FAMILY MEDICINE

## 2019-06-07 PROCEDURE — 94660 CPAP INITIATION&MGMT: CPT

## 2019-06-07 PROCEDURE — 83036 HEMOGLOBIN GLYCOSYLATED A1C: CPT

## 2019-06-07 PROCEDURE — 74011250637 HC RX REV CODE- 250/637: Performed by: FAMILY MEDICINE

## 2019-06-07 PROCEDURE — 87040 BLOOD CULTURE FOR BACTERIA: CPT

## 2019-06-07 PROCEDURE — 93005 ELECTROCARDIOGRAM TRACING: CPT

## 2019-06-07 PROCEDURE — 83605 ASSAY OF LACTIC ACID: CPT

## 2019-06-07 PROCEDURE — 71045 X-RAY EXAM CHEST 1 VIEW: CPT

## 2019-06-07 PROCEDURE — 74011000258 HC RX REV CODE- 258: Performed by: EMERGENCY MEDICINE

## 2019-06-07 PROCEDURE — 85730 THROMBOPLASTIN TIME PARTIAL: CPT

## 2019-06-07 PROCEDURE — 94762 N-INVAS EAR/PLS OXIMTRY CONT: CPT

## 2019-06-07 PROCEDURE — 36415 COLL VENOUS BLD VENIPUNCTURE: CPT

## 2019-06-07 PROCEDURE — 74011250636 HC RX REV CODE- 250/636: Performed by: EMERGENCY MEDICINE

## 2019-06-07 PROCEDURE — 83880 ASSAY OF NATRIURETIC PEPTIDE: CPT

## 2019-06-07 PROCEDURE — 96365 THER/PROPH/DIAG IV INF INIT: CPT

## 2019-06-07 PROCEDURE — 77030013033 HC MSK BPAP/CPAP MMKA -B

## 2019-06-07 PROCEDURE — 84484 ASSAY OF TROPONIN QUANT: CPT

## 2019-06-07 PROCEDURE — 99218 HC RM OBSERVATION: CPT

## 2019-06-07 PROCEDURE — 99285 EMERGENCY DEPT VISIT HI MDM: CPT

## 2019-06-07 PROCEDURE — 80053 COMPREHEN METABOLIC PANEL: CPT

## 2019-06-07 PROCEDURE — 96375 TX/PRO/DX INJ NEW DRUG ADDON: CPT

## 2019-06-07 PROCEDURE — 85025 COMPLETE CBC W/AUTO DIFF WBC: CPT

## 2019-06-07 PROCEDURE — 36600 WITHDRAWAL OF ARTERIAL BLOOD: CPT

## 2019-06-07 RX ORDER — ROSUVASTATIN CALCIUM 10 MG/1
20 TABLET, COATED ORAL
Status: DISCONTINUED | OUTPATIENT
Start: 2019-06-07 | End: 2019-06-08 | Stop reason: HOSPADM

## 2019-06-07 RX ORDER — ACETAMINOPHEN 325 MG/1
650 TABLET ORAL
Status: DISCONTINUED | OUTPATIENT
Start: 2019-06-07 | End: 2019-06-08 | Stop reason: HOSPADM

## 2019-06-07 RX ORDER — HEPARIN SODIUM 10000 [USP'U]/100ML
12-25 INJECTION, SOLUTION INTRAVENOUS
Status: DISCONTINUED | OUTPATIENT
Start: 2019-06-07 | End: 2019-06-07

## 2019-06-07 RX ORDER — SODIUM CHLORIDE 0.9 % (FLUSH) 0.9 %
5-40 SYRINGE (ML) INJECTION AS NEEDED
Status: DISCONTINUED | OUTPATIENT
Start: 2019-06-07 | End: 2019-06-08 | Stop reason: HOSPADM

## 2019-06-07 RX ORDER — SODIUM CHLORIDE 0.9 % (FLUSH) 0.9 %
5-40 SYRINGE (ML) INJECTION EVERY 8 HOURS
Status: DISCONTINUED | OUTPATIENT
Start: 2019-06-07 | End: 2019-06-08 | Stop reason: HOSPADM

## 2019-06-07 RX ORDER — INSULIN GLARGINE 100 [IU]/ML
28 INJECTION, SOLUTION SUBCUTANEOUS
Status: DISCONTINUED | OUTPATIENT
Start: 2019-06-07 | End: 2019-06-07

## 2019-06-07 RX ORDER — ASPIRIN 81 MG/1
162 TABLET ORAL DAILY
Status: ON HOLD | COMMUNITY
End: 2019-07-26

## 2019-06-07 RX ORDER — GABAPENTIN 300 MG/1
300 CAPSULE ORAL 3 TIMES DAILY
Status: DISCONTINUED | OUTPATIENT
Start: 2019-06-07 | End: 2019-06-08 | Stop reason: HOSPADM

## 2019-06-07 RX ORDER — ASPIRIN 81 MG/1
81 TABLET ORAL DAILY
Status: DISCONTINUED | OUTPATIENT
Start: 2019-06-08 | End: 2019-06-08 | Stop reason: HOSPADM

## 2019-06-07 RX ORDER — LOSARTAN POTASSIUM 50 MG/1
100 TABLET ORAL DAILY
Status: DISCONTINUED | OUTPATIENT
Start: 2019-06-08 | End: 2019-06-07

## 2019-06-07 RX ORDER — DIVALPROEX SODIUM 500 MG/1
500 TABLET, DELAYED RELEASE ORAL 3 TIMES DAILY
Status: DISCONTINUED | OUTPATIENT
Start: 2019-06-07 | End: 2019-06-08 | Stop reason: HOSPADM

## 2019-06-07 RX ORDER — DULOXETIN HYDROCHLORIDE 60 MG/1
60 CAPSULE, DELAYED RELEASE ORAL DAILY
Status: DISCONTINUED | OUTPATIENT
Start: 2019-06-08 | End: 2019-06-08 | Stop reason: HOSPADM

## 2019-06-07 RX ORDER — FUROSEMIDE 10 MG/ML
40 INJECTION INTRAMUSCULAR; INTRAVENOUS
Status: COMPLETED | OUTPATIENT
Start: 2019-06-07 | End: 2019-06-07

## 2019-06-07 RX ORDER — MORPHINE SULFATE 2 MG/ML
4 INJECTION, SOLUTION INTRAMUSCULAR; INTRAVENOUS
Status: COMPLETED | OUTPATIENT
Start: 2019-06-07 | End: 2019-06-07

## 2019-06-07 RX ORDER — METOPROLOL SUCCINATE 50 MG/1
50 TABLET, EXTENDED RELEASE ORAL DAILY
Status: DISCONTINUED | OUTPATIENT
Start: 2019-06-08 | End: 2019-06-07

## 2019-06-07 RX ORDER — ONDANSETRON 2 MG/ML
4 INJECTION INTRAMUSCULAR; INTRAVENOUS
Status: COMPLETED | OUTPATIENT
Start: 2019-06-07 | End: 2019-06-07

## 2019-06-07 RX ORDER — MAGNESIUM SULFATE 100 %
4 CRYSTALS MISCELLANEOUS AS NEEDED
Status: DISCONTINUED | OUTPATIENT
Start: 2019-06-07 | End: 2019-06-08 | Stop reason: HOSPADM

## 2019-06-07 RX ORDER — FUROSEMIDE 10 MG/ML
40 INJECTION INTRAMUSCULAR; INTRAVENOUS 2 TIMES DAILY
Status: DISCONTINUED | OUTPATIENT
Start: 2019-06-07 | End: 2019-06-08 | Stop reason: HOSPADM

## 2019-06-07 RX ORDER — METOPROLOL SUCCINATE 25 MG/1
25 TABLET, EXTENDED RELEASE ORAL DAILY
Status: DISCONTINUED | OUTPATIENT
Start: 2019-06-08 | End: 2019-06-08 | Stop reason: HOSPADM

## 2019-06-07 RX ORDER — DEXTROSE 50 % IN WATER (D50W) INTRAVENOUS SYRINGE
25-50 AS NEEDED
Status: DISCONTINUED | OUTPATIENT
Start: 2019-06-07 | End: 2019-06-08 | Stop reason: HOSPADM

## 2019-06-07 RX ORDER — INSULIN LISPRO 100 [IU]/ML
INJECTION, SOLUTION INTRAVENOUS; SUBCUTANEOUS AS NEEDED
COMMUNITY
End: 2019-07-30

## 2019-06-07 RX ORDER — INSULIN GLARGINE 100 [IU]/ML
28 INJECTION, SOLUTION SUBCUTANEOUS
COMMUNITY
End: 2019-07-30

## 2019-06-07 RX ORDER — INSULIN LISPRO 100 [IU]/ML
INJECTION, SOLUTION INTRAVENOUS; SUBCUTANEOUS EVERY 6 HOURS
Status: DISCONTINUED | OUTPATIENT
Start: 2019-06-08 | End: 2019-06-08

## 2019-06-07 RX ORDER — ONDANSETRON 2 MG/ML
4 INJECTION INTRAMUSCULAR; INTRAVENOUS
Status: DISCONTINUED | OUTPATIENT
Start: 2019-06-07 | End: 2019-06-08 | Stop reason: HOSPADM

## 2019-06-07 RX ADMIN — MORPHINE SULFATE 4 MG: 2 INJECTION, SOLUTION INTRAMUSCULAR; INTRAVENOUS at 18:32

## 2019-06-07 RX ADMIN — FUROSEMIDE 40 MG: 10 INJECTION, SOLUTION INTRAMUSCULAR; INTRAVENOUS at 17:43

## 2019-06-07 RX ADMIN — ROSUVASTATIN CALCIUM 20 MG: 10 TABLET, COATED ORAL at 21:08

## 2019-06-07 RX ADMIN — CEFEPIME HYDROCHLORIDE 1 G: 1 INJECTION, POWDER, FOR SOLUTION INTRAMUSCULAR; INTRAVENOUS at 19:17

## 2019-06-07 RX ADMIN — GABAPENTIN 300 MG: 300 CAPSULE ORAL at 21:08

## 2019-06-07 RX ADMIN — NITROGLYCERIN 0.5 INCH: 20 OINTMENT TOPICAL at 21:08

## 2019-06-07 RX ADMIN — Medication 10 ML: at 21:22

## 2019-06-07 RX ADMIN — APIXABAN 5 MG: 5 TABLET, FILM COATED ORAL at 21:08

## 2019-06-07 RX ADMIN — FUROSEMIDE 40 MG: 10 INJECTION, SOLUTION INTRAMUSCULAR; INTRAVENOUS at 21:26

## 2019-06-07 RX ADMIN — DIVALPROEX SODIUM 500 MG: 500 TABLET, DELAYED RELEASE ORAL at 21:08

## 2019-06-07 RX ADMIN — AZITHROMYCIN MONOHYDRATE 500 MG: 500 INJECTION, POWDER, LYOPHILIZED, FOR SOLUTION INTRAVENOUS at 20:59

## 2019-06-07 RX ADMIN — ONDANSETRON 4 MG: 2 INJECTION INTRAMUSCULAR; INTRAVENOUS at 18:33

## 2019-06-07 NOTE — PROGRESS NOTES
Admission Medication Reconciliation:    Information obtained from:  patient, RxQuery    Comments/Recommendations:     Spoke with patient regarding Jose Greene's medications. She was on CPAP at the time of my interview but was willing and able to participate. The following changes were made to the PTA medication list:  1. Removed clopidogrel  2. Added aspirin  3. Adjusted the following:  - Lantus 32 units daily --> 28 units qHS  - Humalog SSI --> 5 units TIDAC + SSI    Allergies and reactions were verified with the patient. I have added imipramine and adjusted \"other medication\" to diclofenac per patient report. Patient's pharmacy: [facility] on [address] [place] [address]       Allergies:  Voltaren [diclofenac sodium]; Flagyl [metronidazole]; Imipramine; and Levaquin [levofloxacin]    Chief Complaint for this Admission:    Chief Complaint   Patient presents with    Shortness of Breath       Significant PMH/Disease States:   Past Medical History:   Diagnosis Date    Arthritis     Bipolar 1 disorder ([facility] [address]     Diabetes ([facility] [address]     Gastrointestinal disorder     gastroparesis    Gastroparesis     GERD (gastroesophageal reflux disease)     Heart disease     Hypertension     Psychiatric disorder     Bipolar       Prior to Admission Medications:   Prior to Admission Medications   Prescriptions Last Dose Informant Patient Reported? Taking? DULoxetine (CYMBALTA) 60 mg capsule 6/6/2019 at Unknown time  No Yes   Sig: Take 1 Cap by mouth daily. apixaban (ELIQUIS) 5 mg tablet 6/6/2019 at Unknown time  Yes Yes   Sig: Take 5 mg by mouth two (2) times a day. aspirin delayed-release 81 mg tablet 6/6/2019 at Unknown time  Yes Yes   Sig: Take 81 mg by mouth daily. divalproex DR (DEPAKOTE) 500 mg tablet 6/6/2019 at Unknown time  No Yes   Sig: Take 1 Tab by mouth three (3) times daily. gabapentin (NEURONTIN) 300 mg capsule 6/6/2019 at Unknown time  Yes Yes   Sig: Take 300 mg by mouth three (3) times daily.    hydroCHLOROthiazide (HYDRODIURIL) 25 mg tablet 2019 at Unknown time  No Yes   Sig: Take 1 Tab by mouth daily. insulin glargine (LANTUS U-100 INSULIN) 100 unit/mL injection 2019 at Unknown time  Yes Yes   Si Units by SubCUTAneous route nightly. insulin lispro (HUMALOG U-100 INSULIN) 100 unit/mL injection   Yes Yes   Sig: by SubCUTAneous route as needed. Sliding scale   insulin lispro (HUMALOG) 100 unit/mL injection   Yes No   Si Units by SubCUTAneous route Before breakfast, lunch, and dinner. losartan (COZAAR) 100 mg tablet 2019 at Unknown time  No Yes   Sig: Take 1 Tab by mouth daily. metoprolol succinate (TOPROL-XL) 50 mg XL tablet 2019 at Unknown time  Yes Yes   Sig: Take 50 mg by mouth daily. rosuvastatin (CRESTOR) 20 mg tablet 2019 at Unknown time  Yes Yes   Sig: Take 20 mg by mouth nightly. Facility-Administered Medications: None       Thank you for allowing me to participate in this patient's care. Please call the main pharmacy at  or the [facility] pharmacist at  with any questions.     Rekha Lim PharmD

## 2019-06-07 NOTE — ED NOTES
1907L: TRANSFER - OUT REPORT:    Verbal report given to Kirby Izquierdo RN (name) on [address]  being transferred to CCU (unit) for routine progression of care       Report consisted of patients Situation, Background, Assessment and   Recommendations(SBAR). Information from the following report(s) SBAR, Kardex, ED Summary, MAR, Recent Results and Cardiac Rhythm Sinus tach was reviewed with the receiving nurse. Lines:   Peripheral IV 06/07/19 Left Hand (Active)   Site Assessment Clean, dry, & intact 6/7/2019  4:51 PM   Phlebitis Assessment 0 6/7/2019  4:51 PM   Infiltration Assessment 0 6/7/2019  4:51 PM   Dressing Status Clean, dry, & intact 6/7/2019  4:51 PM   Dressing Type Transparent 6/7/2019  4:51 PM   Hub Color/Line Status Patent; Flushed;Capped;Blue 6/7/2019  4:51 PM   Action Taken Blood drawn 6/7/2019  4:51 PM        Opportunity for questions and clarification was provided.       Patient transported with:   Monitor  O2 @ CPAP liters  Registered Nurse

## 2019-06-07 NOTE — ED TRIAGE NOTES
She arrives coughing, short of breath. She took a nap and awoke unable to breath, and coughing. Her  brought her in a wheelchair. Taken to trreatment area and placed on 100% non rebreather.

## 2019-06-07 NOTE — PROGRESS NOTES
06/07/19 1954   CPAP/BIPAP   CPAP/BIPAP Start/Stop On   Device Mode CPAP   PIP Observed 5 cm H20   EPAP (cm H2O) 5 cm H2O   Vt Spont (ml) 612 ml   Ve Observed (l/min) 15.2 l/min   Total RR (Spontaneous) 26 breaths per minute   Leak (Estimated) 12 L/min   Pt's Home Machine No   Biomedical Check Performed Yes   Settings Verified Yes     Pt transported with CPAP to CCU.  No complications noted

## 2019-06-07 NOTE — PROGRESS NOTES
TRANSFER - IN REPORT:    Verbal report received from Mauricio Yarbrough RN(name) on [address]  being received from ED(unit) for routine progression of care      Report consisted of patients Situation, Background, Assessment and   Recommendations(SBAR). Information from the following report(s) SBAR, MAR, Recent Results and Cardiac Rhythm ST was reviewed with the receiving nurse. Opportunity for questions and clarification was provided. Verbal shift change report given to Arnulfo Sidhu RN (oncoming nurse) by Josue Lou RN (offgoing nurse). Report included the following information ED Summary and MAR. Patient has not arrived on the unit at the time of handoff.

## 2019-06-07 NOTE — ED PROVIDER NOTES
37 y.o. female with past medical history significant for gastroparesis, diabetes, hypertension, bipolar, GERD, arthritis, and heart disease who presents from home via personal vehicle with chief complaint of SOB. Pt states she was taking a nap this afternoon and when she woke up she was coughing constantly and was extremely SOB. Pt states these symptoms have never happened to her before. Pt states she felt completely fine prior to her nap. Pt states she has a history of clogged arteries and stents. Pt denies history of asthma or COPD. Pt denies leg pain. Pt denies CP, fever and chills. There are no other acute medical concerns at this time. Social hx: Current everyday tobacco smoker (0.25 packs/day), Occasional EtOH use    PCP: Sylvia Dorantes MD    Note written by Irais Gallardo. Connie Maldonado, as dictated by Lily Wong MD 4:46 PM          The history is provided by the patient. No  was used.         Past Medical History:   Diagnosis Date    Arthritis     Bipolar 1 disorder ([facility]     Diabetes ([facility]     Gastrointestinal disorder     gastroparesis    Gastroparesis     GERD (gastroesophageal reflux disease)     Heart disease     Hypertension     Psychiatric disorder     Bipolar       Past Surgical History:   Procedure Laterality Date    HX ANKLE FRACTURE TX      HX CAROTID STENT      HX GYN      BTL    HX ORTHOPAEDIC      toe         Family History:   Problem Relation Age of Onset    Thyroid Disease Mother     Hypertension Father     Heart Disease Father     Diabetes Sister     Heart Disease Sister     Hypertension Sister     Thyroid Disease Brother        Social History     Socioeconomic History    Marital status:      Spouse name: Not on file    Number of children: Not on file    Years of education: Not on file    Highest education level: Not on file   Occupational History    Not on file   Social Needs    Financial resource strain: Not on file   Gabriel-Candi insecurity:     Worry: Not on file     Inability: Not on file    Transportation needs:     Medical: Not on file     Non-medical: Not on file   Tobacco Use    Smoking status: Current Every Day Smoker     Packs/day: 0.25    Smokeless tobacco: Never Used   Substance and Sexual Activity    Alcohol use: Yes     Comment: occasionally    Drug use: No    Sexual activity: Yes   Lifestyle    Physical activity:     Days per week: Not on file     Minutes per session: Not on file    Stress: Not on file   Relationships    Social connections:     Talks on phone: Not on file     Gets together: Not on file     Attends Yazidi service: Not on file     Active member of club or organization: Not on file     Attends meetings of clubs or organizations: Not on file     Relationship status: Not on file    Intimate partner violence:     Fear of current or ex partner: Not on file     Emotionally abused: Not on file     Physically abused: Not on file     Forced sexual activity: Not on file   Other Topics Concern    Not on file   Social History Narrative    Not on file         ALLERGIES: Other medication; Flagyl [metronidazole]; and Levaquin [levofloxacin]    Review of Systems   Constitutional: Negative for activity change, appetite change, chills, fatigue and fever. HENT: Negative for ear pain, facial swelling, sore throat and trouble swallowing. Eyes: Negative for pain, discharge and visual disturbance. Respiratory: Positive for cough and shortness of breath. Negative for chest tightness and wheezing. Cardiovascular: Negative for chest pain and palpitations. Gastrointestinal: Negative for abdominal pain, blood in stool, nausea and vomiting. Genitourinary: Negative for difficulty urinating, flank pain and hematuria. Musculoskeletal: Negative for arthralgias, joint swelling, myalgias and neck pain. Skin: Negative for color change and rash. Neurological: Negative for dizziness, weakness, numbness and headaches. Hematological: Negative for adenopathy. Does not bruise/bleed easily. Psychiatric/Behavioral: Negative for behavioral problems, confusion and sleep disturbance. All other systems reviewed and are negative. Vitals:    06/07/19 1636   SpO2: (!) 88%            Physical Exam   Constitutional: She is oriented to person, place, and time. She appears well-developed and well-nourished. No distress. HENT:   Head: Normocephalic and atraumatic. Nose: Nose normal.   Mouth/Throat: Oropharynx is clear and moist.   Eyes: Pupils are equal, round, and reactive to light. Conjunctivae and EOM are normal. No scleral icterus. Neck: Normal range of motion. Neck supple. No JVD present. No tracheal deviation present. No thyromegaly present. No carotid bruits noted. Pt has no neck vein distension. Cardiovascular: Normal rate, regular rhythm, normal heart sounds and intact distal pulses. Exam reveals no gallop and no friction rub. No murmur heard. Pulmonary/Chest: Effort normal. No respiratory distress. She exhibits no tenderness. Pt has diffuse crackles bilaterally. Abdominal: Soft. Bowel sounds are normal. She exhibits no distension and no mass. There is no tenderness. There is no rebound and no guarding. Musculoskeletal: Normal range of motion. She exhibits no edema or tenderness. Lymphadenopathy:     She has no cervical adenopathy. Neurological: She is alert and oriented to person, place, and time. She has normal reflexes. No cranial nerve deficit. Coordination normal.   Skin: Skin is warm and dry. No rash noted. No erythema. Pt has 1+ pitting edema in bilateral lower extremities. Psychiatric: She has a normal mood and affect. Her behavior is normal. Judgment and thought content normal.   Nursing note and vitals reviewed. Note written by Catalina Irvin, as dictated by Devin Machuca MD 4:45 PM    MDM  Number of Diagnoses or Management Options  Acute pulmonary edema [facility]): new and requires workup     Amount and/or Complexity of Data Reviewed  Clinical lab tests: reviewed and ordered  Tests in the radiology section of CPT®: ordered and reviewed  Decide to obtain previous medical records or to obtain history from someone other than the patient: yes  Review and summarize past medical records: yes  Discuss the patient with other providers: yes  Independent visualization of images, tracings, or specimens: yes           Procedures    Patents x rays demonstrate what appears to be pulmonary edema. She has no fever or productive cough. All symptoms began abruptly. Suspect flash pulmonary edema. The troponin returned at 7.9   ED MD EKG interpretation : Sinus tach at 123 BPM. No ectopy noted. Intervals normal. ST depression laterally. Balta Muñiz MD    Hospitalist [facility] for Admission  5:51 PM    ED Room Number: ER05/05  Patient Name and age:  Sharin Bumpers [place] 37 y.o.  female  Working Diagnosis:   1. Acute pulmonary edema (HCC)    2. Acute non stemi  Readmission: no  Isolation Requirements:  no  Recommended Level of Care:  ICU  Code Status:  full    ED MD Ekg interpretation: Sinus tach. At 123 BPM. No ectopy noted. Axis and intervals are normal. Poor R wave progression. Non specific ST T changes . Balta Muñiz MD    6:26 PM  Pt's troponin came back at 7.9. Pt is resting more comfortably with CPAP machine. I will be administering morphine for chest pain. Patient is being admitted for further evaluation and treatment. Have consulted cardiology as well. Patient has stabilized with meds and bipap. Feeling better and speaking in full sentences.  No chest pain

## 2019-06-07 NOTE — PROGRESS NOTES
06/07/19 1750   CPAP/BIPAP   CPAP/BIPAP Start/Stop On   Device Mode CPAP   $$ CPAP Daily Yes   Bio[employer] ID # [id number]   Mask Type and Size Medium   Skin Condition intact   PIP Observed 5 cm H20   EPAP (cm H2O) 5 cm H2O   Vt Spont (ml) 536 ml   Ve Observed (l/min) 14.7 l/min   Total RR (Spontaneous) 32 breaths per minute   Leak (Estimated) 3 L/min   Pt's Home Machine No   Biomedical Check Performed Yes   Settings Verified Yes       Pt placed on CPAP at this time, per order.  Pt tolerating well

## 2019-06-08 ENCOUNTER — APPOINTMENT (OUTPATIENT)
Dept: GENERAL RADIOLOGY | Age: 44
DRG: 280 | End: 2019-06-08
Attending: INTERNAL MEDICINE
Payer: COMMERCIAL

## 2019-06-08 VITALS
HEART RATE: 87 BPM | TEMPERATURE: 98.7 F | RESPIRATION RATE: 17 BRPM | BODY MASS INDEX: 29.28 KG/M2 | DIASTOLIC BLOOD PRESSURE: 61 MMHG | OXYGEN SATURATION: 99 % | SYSTOLIC BLOOD PRESSURE: 100 MMHG | WEIGHT: 171.52 LBS | HEIGHT: 64 IN

## 2019-06-08 PROBLEM — I21.4 NSTEMI (NON-ST ELEVATED MYOCARDIAL INFARCTION) (HCC): Status: ACTIVE | Noted: 2019-06-08

## 2019-06-08 PROBLEM — I50.9 ACUTE EXACERBATION OF CHF (CONGESTIVE HEART FAILURE) (HCC): Status: ACTIVE | Noted: 2019-06-08

## 2019-06-08 LAB
ANION GAP SERPL CALC-SCNC: 7 MMOL/L (ref 5–15)
APPEARANCE UR: ABNORMAL
APTT PPP: 36.3 SEC (ref 22.1–32)
BACTERIA URNS QL MICRO: NEGATIVE /HPF
BASOPHILS # BLD: 0 K/UL (ref 0–0.1)
BASOPHILS NFR BLD: 0 % (ref 0–1)
BILIRUB UR QL: NEGATIVE
BUN SERPL-MCNC: 26 MG/DL (ref 6–20)
BUN/CREAT SERPL: 25 (ref 12–20)
CALCIUM SERPL-MCNC: 8.6 MG/DL (ref 8.5–10.1)
CHLORIDE SERPL-SCNC: 105 MMOL/L (ref 97–108)
CHOLEST SERPL-MCNC: 115 MG/DL
CO2 SERPL-SCNC: 27 MMOL/L (ref 21–32)
COLOR UR: ABNORMAL
CREAT SERPL-MCNC: 1.03 MG/DL (ref 0.55–1.02)
DIFFERENTIAL METHOD BLD: ABNORMAL
EOSINOPHIL # BLD: 0 K/UL (ref 0–0.4)
EOSINOPHIL NFR BLD: 0 % (ref 0–7)
EPITH CASTS URNS QL MICRO: ABNORMAL /LPF
ERYTHROCYTE [DISTWIDTH] IN BLOOD BY AUTOMATED COUNT: 15.4 % (ref 11.5–14.5)
GLUCOSE BLD STRIP.AUTO-MCNC: 141 MG/DL (ref 65–100)
GLUCOSE BLD STRIP.AUTO-MCNC: 149 MG/DL (ref 65–100)
GLUCOSE BLD STRIP.AUTO-MCNC: 240 MG/DL (ref 65–100)
GLUCOSE BLD STRIP.AUTO-MCNC: 396 MG/DL (ref 65–100)
GLUCOSE SERPL-MCNC: 126 MG/DL (ref 65–100)
GLUCOSE UR STRIP.AUTO-MCNC: NEGATIVE MG/DL
HCT VFR BLD AUTO: 40.2 % (ref 35–47)
HDLC SERPL-MCNC: 64 MG/DL
HDLC SERPL: 1.8 {RATIO} (ref 0–5)
HGB BLD-MCNC: 12.7 G/DL (ref 11.5–16)
HGB UR QL STRIP: ABNORMAL
HYALINE CASTS URNS QL MICRO: ABNORMAL /LPF (ref 0–5)
IMM GRANULOCYTES # BLD AUTO: 0.1 K/UL (ref 0–0.04)
IMM GRANULOCYTES NFR BLD AUTO: 1 % (ref 0–0.5)
KETONES UR QL STRIP.AUTO: NEGATIVE MG/DL
LDLC SERPL CALC-MCNC: 35.2 MG/DL (ref 0–100)
LEUKOCYTE ESTERASE UR QL STRIP.AUTO: ABNORMAL
LIPID PROFILE,FLP: NORMAL
LYMPHOCYTES # BLD: 2.2 K/UL (ref 0.8–3.5)
LYMPHOCYTES NFR BLD: 14 % (ref 12–49)
MCH RBC QN AUTO: 26 PG (ref 26–34)
MCHC RBC AUTO-ENTMCNC: 31.6 G/DL (ref 30–36.5)
MCV RBC AUTO: 82.4 FL (ref 80–99)
MONOCYTES # BLD: 1 K/UL (ref 0–1)
MONOCYTES NFR BLD: 6 % (ref 5–13)
NEUTS SEG # BLD: 12.7 K/UL (ref 1.8–8)
NEUTS SEG NFR BLD: 79 % (ref 32–75)
NITRITE UR QL STRIP.AUTO: NEGATIVE
NRBC # BLD: 0 K/UL (ref 0–0.01)
NRBC BLD-RTO: 0 PER 100 WBC
PH UR STRIP: 6 [PH] (ref 5–8)
PLATELET # BLD AUTO: 305 K/UL (ref 150–400)
PMV BLD AUTO: 9.9 FL (ref 8.9–12.9)
POTASSIUM SERPL-SCNC: 4.3 MMOL/L (ref 3.5–5.1)
PROT UR STRIP-MCNC: NEGATIVE MG/DL
RBC # BLD AUTO: 4.88 M/UL (ref 3.8–5.2)
RBC #/AREA URNS HPF: ABNORMAL /HPF (ref 0–5)
SERVICE CMNT-IMP: ABNORMAL
SODIUM SERPL-SCNC: 139 MMOL/L (ref 136–145)
SP GR UR REFRACTOMETRY: 1.01 (ref 1–1.03)
THERAPEUTIC RANGE,PTTT: ABNORMAL SECS (ref 58–77)
TRIGL SERPL-MCNC: 79 MG/DL (ref ?–150)
TROPONIN I SERPL-MCNC: 10.1 NG/ML
TROPONIN I SERPL-MCNC: 11.9 NG/ML
TROPONIN I SERPL-MCNC: 13 NG/ML
UR CULT HOLD, URHOLD: NORMAL
UROBILINOGEN UR QL STRIP.AUTO: 0.2 EU/DL (ref 0.2–1)
VLDLC SERPL CALC-MCNC: 15.8 MG/DL
WBC # BLD AUTO: 16.1 K/UL (ref 3.6–11)
WBC URNS QL MICRO: >100 /HPF (ref 0–4)

## 2019-06-08 PROCEDURE — 77010033678 HC OXYGEN DAILY

## 2019-06-08 PROCEDURE — C1751 CATH, INF, PER/CENT/MIDLINE: HCPCS

## 2019-06-08 PROCEDURE — 36600 WITHDRAWAL OF ARTERIAL BLOOD: CPT

## 2019-06-08 PROCEDURE — 82962 GLUCOSE BLOOD TEST: CPT

## 2019-06-08 PROCEDURE — 93005 ELECTROCARDIOGRAM TRACING: CPT

## 2019-06-08 PROCEDURE — 71045 X-RAY EXAM CHEST 1 VIEW: CPT

## 2019-06-08 PROCEDURE — 74011250636 HC RX REV CODE- 250/636: Performed by: FAMILY MEDICINE

## 2019-06-08 PROCEDURE — 74011636637 HC RX REV CODE- 636/637: Performed by: INTERNAL MEDICINE

## 2019-06-08 PROCEDURE — 80048 BASIC METABOLIC PNL TOTAL CA: CPT

## 2019-06-08 PROCEDURE — 74011250637 HC RX REV CODE- 250/637: Performed by: FAMILY MEDICINE

## 2019-06-08 PROCEDURE — 36415 COLL VENOUS BLD VENIPUNCTURE: CPT

## 2019-06-08 PROCEDURE — 85730 THROMBOPLASTIN TIME PARTIAL: CPT

## 2019-06-08 PROCEDURE — 85025 COMPLETE CBC W/AUTO DIFF WBC: CPT

## 2019-06-08 PROCEDURE — 81001 URINALYSIS AUTO W/SCOPE: CPT

## 2019-06-08 PROCEDURE — 99218 HC RM OBSERVATION: CPT

## 2019-06-08 PROCEDURE — 74011636637 HC RX REV CODE- 636/637: Performed by: FAMILY MEDICINE

## 2019-06-08 PROCEDURE — 77030002996 HC SUT SLK J&J -A

## 2019-06-08 PROCEDURE — 84484 ASSAY OF TROPONIN QUANT: CPT

## 2019-06-08 PROCEDURE — 65620000000 HC RM CCU GENERAL

## 2019-06-08 PROCEDURE — 74011250637 HC RX REV CODE- 250/637: Performed by: NURSE PRACTITIONER

## 2019-06-08 PROCEDURE — 80061 LIPID PANEL: CPT

## 2019-06-08 PROCEDURE — 74011250636 HC RX REV CODE- 250/636: Performed by: INTERNAL MEDICINE

## 2019-06-08 RX ORDER — MORPHINE SULFATE 2 MG/ML
2 INJECTION, SOLUTION INTRAMUSCULAR; INTRAVENOUS
Status: DISCONTINUED | OUTPATIENT
Start: 2019-06-08 | End: 2019-06-08 | Stop reason: HOSPADM

## 2019-06-08 RX ORDER — INSULIN GLARGINE 100 [IU]/ML
20 INJECTION, SOLUTION SUBCUTANEOUS
Status: DISCONTINUED | OUTPATIENT
Start: 2019-06-08 | End: 2019-06-08 | Stop reason: HOSPADM

## 2019-06-08 RX ORDER — INSULIN LISPRO 100 [IU]/ML
8 INJECTION, SOLUTION INTRAVENOUS; SUBCUTANEOUS ONCE
Status: COMPLETED | OUTPATIENT
Start: 2019-06-08 | End: 2019-06-08

## 2019-06-08 RX ORDER — HEPARIN SODIUM 10000 [USP'U]/100ML
12-25 INJECTION, SOLUTION INTRAVENOUS
Qty: 1 ML | Refills: 0 | Status: SHIPPED
Start: 2019-06-08 | End: 2019-07-30

## 2019-06-08 RX ORDER — FUROSEMIDE 10 MG/ML
40 INJECTION INTRAMUSCULAR; INTRAVENOUS 2 TIMES DAILY
Qty: 1 VIAL | Refills: 0 | Status: ON HOLD
Start: 2019-06-09 | End: 2019-07-27

## 2019-06-08 RX ORDER — INSULIN LISPRO 100 [IU]/ML
INJECTION, SOLUTION INTRAVENOUS; SUBCUTANEOUS
Status: DISCONTINUED | OUTPATIENT
Start: 2019-06-08 | End: 2019-06-08 | Stop reason: HOSPADM

## 2019-06-08 RX ORDER — HEPARIN SODIUM 10000 [USP'U]/100ML
12-25 INJECTION, SOLUTION INTRAVENOUS
Status: DISCONTINUED | OUTPATIENT
Start: 2019-06-08 | End: 2019-06-08 | Stop reason: HOSPADM

## 2019-06-08 RX ORDER — HEPARIN SODIUM 5000 [USP'U]/ML
4000 INJECTION, SOLUTION INTRAVENOUS; SUBCUTANEOUS ONCE
Status: COMPLETED | OUTPATIENT
Start: 2019-06-08 | End: 2019-06-08

## 2019-06-08 RX ADMIN — DIVALPROEX SODIUM 500 MG: 500 TABLET, DELAYED RELEASE ORAL at 11:24

## 2019-06-08 RX ADMIN — INSULIN LISPRO 2 UNITS: 100 INJECTION, SOLUTION INTRAVENOUS; SUBCUTANEOUS at 00:19

## 2019-06-08 RX ADMIN — ACETAMINOPHEN 650 MG: 325 TABLET ORAL at 11:26

## 2019-06-08 RX ADMIN — Medication 10 ML: at 06:00

## 2019-06-08 RX ADMIN — ASPIRIN 81 MG: 81 TABLET ORAL at 11:24

## 2019-06-08 RX ADMIN — DIVALPROEX SODIUM 500 MG: 500 TABLET, DELAYED RELEASE ORAL at 16:26

## 2019-06-08 RX ADMIN — DULOXETINE HYDROCHLORIDE 60 MG: 60 CAPSULE, DELAYED RELEASE ORAL at 11:24

## 2019-06-08 RX ADMIN — Medication 10 ML: at 14:33

## 2019-06-08 RX ADMIN — METOPROLOL SUCCINATE 25 MG: 25 TABLET, EXTENDED RELEASE ORAL at 11:24

## 2019-06-08 RX ADMIN — INSULIN LISPRO 8 UNITS: 100 INJECTION, SOLUTION INTRAVENOUS; SUBCUTANEOUS at 17:17

## 2019-06-08 RX ADMIN — FUROSEMIDE 40 MG: 10 INJECTION, SOLUTION INTRAMUSCULAR; INTRAVENOUS at 17:16

## 2019-06-08 RX ADMIN — FUROSEMIDE 40 MG: 10 INJECTION, SOLUTION INTRAMUSCULAR; INTRAVENOUS at 11:23

## 2019-06-08 RX ADMIN — HEPARIN SODIUM AND DEXTROSE 12 UNITS/KG/HR: 10000; 5 INJECTION INTRAVENOUS at 19:17

## 2019-06-08 RX ADMIN — INSULIN GLARGINE 20 UNITS: 100 INJECTION, SOLUTION SUBCUTANEOUS at 17:58

## 2019-06-08 RX ADMIN — GABAPENTIN 300 MG: 300 CAPSULE ORAL at 11:24

## 2019-06-08 RX ADMIN — GABAPENTIN 300 MG: 300 CAPSULE ORAL at 16:26

## 2019-06-08 RX ADMIN — CHLORASEPTIC 1 SPRAY: 1.5 LIQUID ORAL at 04:56

## 2019-06-08 RX ADMIN — HEPARIN SODIUM 4000 UNITS: 5000 INJECTION INTRAVENOUS; SUBCUTANEOUS at 19:17

## 2019-06-08 NOTE — PROGRESS NOTES
Hospitalist Progress Note  Magi Godfrey MD  Answering service: [phone] from in house phone        Date of Service:  2019  NAME:  Sahra King  :  1975  MRN:  [id number]      Admission Summary:   The patient is a 68-year-old female with past medical history of gastroparesis, diabetes mellitus type 2, hypertension, bipolar disorder, GERD, arthritis and coronary artery disease, who presents to the hospital with the above-mentioned symptom. The patient reports that she had sudden onset of shortness of breath this afternoon where she was sleeping on her bed when she woke up and was coughing and had extreme shortness of breath. Interval history / Subjective:    Troponin elevated this afternoon, plan for cath tomorrow since she already received a dose of her home apixaban. Patient;s initial SOB has improved, remains on NC     Assessment & Plan:     NSTEMI -   - trend Troponin   - EKG with any acute changes  - Plan for cath   - Nitro, O2, and morphine PRN   - Cardiology following   - Received apixaban evening of , per cardiology do not start heparin until 24 hours after her dose of apixaban which would be this evening.      Flash pulmonary edema - likely secondary to NSTEMI  - Lasix per cardiology   - I and O, and daily weights  - Repeat echocardiogram pending     Type 2 diabetes - A1c 8.6%  - SSI, POCT glucose checks and hypoglycemia protocol  - Start glargine 20U daily     Leukocytosis - likely reactive, no further signs of infection, will monitor     Code status: FULL  DVT prophylaxis: apixaban, heparin tomorrow     Care Plan discussed with: Patient/Family  Disposition: TBD     Hospital Problems  Date Reviewed: 2019          Codes Class Noted POA    SOB (shortness of breath) ICD-10-CM: R06.02  ICD-9-CM: 786.05  2019 Unknown                Review of Systems:   A comprehensive review of systems was negative except for that written in the HPI. Vital Signs:    Last 24hrs VS reviewed since prior progress note. Most recent are:  Visit Vitals  /60   Pulse 99   Temp 98.7 °F (37.1 °C)   Resp 21   Ht 5' 4\" (1.626 m)   Wt 77.8 kg (171 lb 8.3 oz)   SpO2 97%   BMI 29.44 kg/m²         Intake/Output Summary (Last 24 hours) at 6/8/2019 1637  Last data filed at 6/8/2019 1340  Gross per 24 hour   Intake 250 ml   Output 2225 ml   Net -1975 ml        Physical Examination:             Constitutional:  No acute distress, cooperative, pleasant    ENT:  Oral mucous moist, oropharynx benign. Neck supple,    Resp:  Crackles at the bases   CV:  Regular rhythm, normal rate, no murmurs, gallops, rubs     GI:  Soft, non distended, non tender. normoactive bowel sounds, no hepatosplenomegaly     Musculoskeletal:  1+ edema, warm, 2+ pulses throughout    Neurologic:  Moves all extremities. AAOx3, CN II-XII reviewed     Skin:  Good turgor, no rashes or ulcers       Data Review:    Imaging and laboratory data reviewed. Labs:     Recent Labs     06/08/19  0455 06/07/19  1649   WBC 16.1* 18.5*   HGB 12.7 13.6   HCT 40.2 42.3    353     Recent Labs     06/08/19  0457 06/07/19  1649    135*   K 4.3 4.2    105   CO2 27 25   BUN 26* 27*   CREA 1.03* 1.02   * 248*   CA 8.6 9.1     Recent Labs     06/07/19  1649   SGOT 38*   ALT 29      TBILI 0.4   TP 6.9   ALB 3.0*   GLOB 3.9     Recent Labs     06/08/19  0455 06/07/19  1649   APTT 36.3* 30.4      No results for input(s): FE, TIBC, PSAT, FERR in the last 72 hours. No results found for: FOL, RBCF   No results for input(s): PH, PCO2, PO2 in the last 72 hours.   Recent Labs     06/08/19  1131 06/08/19  0455 06/07/19  2210   TROIQ 13.00* 11.90* 8.94*     Lab Results   Component Value Date/Time    Cholesterol, total 115 06/08/2019 04:57 AM    HDL Cholesterol 64 06/08/2019 04:57 AM    LDL, calculated 35.2 06/08/2019 04:57 AM    Triglyceride 79 06/08/2019 04:57 AM    CHOL/HDL Ratio 1.8 06/08/2019 04:57 AM     Lab Results   Component Value Date/Time    Glucose (POC) 396 (H) 06/08/2019 04:21 PM    Glucose (POC) 141 (H) 06/08/2019 11:40 AM    Glucose (POC) 149 (H) 06/08/2019 06:14 AM    Glucose (POC) 240 (H) 06/08/2019 12:13 AM    Glucose (POC) 205 (H) 04/15/2019 01:16 PM     Lab Results   Component Value Date/Time    Color YELLOW/STRAW 06/08/2019 01:55 PM    Appearance CLOUDY (A) 06/08/2019 01:55 PM    Specific gravity 1.013 06/08/2019 01:55 PM    pH (UA) 6.0 06/08/2019 01:55 PM    Protein NEGATIVE  06/08/2019 01:55 PM    Glucose NEGATIVE  06/08/2019 01:55 PM    Ketone NEGATIVE  06/08/2019 01:55 PM    Bilirubin NEGATIVE  06/08/2019 01:55 PM    Urobilinogen 0.2 06/08/2019 01:55 PM    Nitrites NEGATIVE  06/08/2019 01:55 PM    Leukocyte Esterase MODERATE (A) 06/08/2019 01:55 PM    Epithelial cells FEW 06/08/2019 01:55 PM    Bacteria NEGATIVE  06/08/2019 01:55 PM    WBC >100 (H) 06/08/2019 01:55 PM    RBC 10-20 06/08/2019 01:55 PM         Medications Reviewed:     Current Facility-Administered Medications   Medication Dose Route Frequency    phenol throat spray (CHLORASEPTIC) 1 Spray  1 Spray Oral PRN    aspirin delayed-release tablet 81 mg  81 mg Oral DAILY    divalproex DR (DEPAKOTE) tablet 500 mg  500 mg Oral TID    DULoxetine (CYMBALTA) capsule 60 mg  60 mg Oral DAILY    gabapentin (NEURONTIN) capsule 300 mg  300 mg Oral TID    rosuvastatin (CRESTOR) tablet 20 mg  20 mg Oral QHS    sodium chloride (NS) flush 5-40 mL  5-40 mL IntraVENous Q8H    sodium chloride (NS) flush 5-40 mL  5-40 mL IntraVENous PRN    furosemide (LASIX) injection 40 mg  40 mg IntraVENous BID    ondansetron (ZOFRAN) injection 4 mg  4 mg IntraVENous Q6H PRN    glucose chewable tablet 16 g  4 Tab Oral PRN    dextrose (D50W) injection syrg 12.5-25 g  25-50 mL IntraVENous PRN    glucagon (GLUCAGEN) injection 1 mg  1 mg IntraMUSCular PRN    insulin lispro (HUMALOG) injection SubCUTAneous Q6H    metoprolol succinate (TOPROL-XL) XL tablet 25 mg  25 mg Oral DAILY    acetaminophen (TYLENOL) tablet 650 mg  650 mg Oral Q4H PRN     ______________________________________________________________________  EXPECTED LENGTH OF STAY: - - -  ACTUAL LENGTH OF STAY:          0                 Erika Baca MD

## 2019-06-08 NOTE — H&P
[address] [facility]  HISTORY AND PHYSICAL    Name:  Felix Warner  MR#:  [id number]  :  1975  ACCOUNT #:  [de-identified]  ADMIT DATE:  2019    CHIEF COMPLAINT:  Shortness of breath. HISTORY OF PRESENT ILLNESS:  The patient is a 60-year-old female with past medical history of gastroparesis, diabetes mellitus type 2, hypertension, bipolar disorder, GERD, arthritis and coronary artery disease, who presents to the hospital with the above-mentioned symptom. The patient reports that she had sudden onset of shortness of breath this afternoon where she was sleeping on her bed when she woke up and was coughing and had extreme shortness of breath. The patient reports that she has never had shortness of breath in the past.  Reports that she has a history of DVT and is on Eliquis for that and has been taking the medication. The patient reports she has a history of multiple cardiac stents in the past.  The patient reports that the shortness of breath got worse, she got concerned and decided to come to the hospital.  In the ER, the patient was found to be hypoxic with a pulse oximetry of around 88%, tachycardic with heart rate of around 120 and a temperature of 99.4. The patient got a chest x-ray which showed possibly bilateral infiltrates, pneumonia versus pulmonary effusion, and the patient also was found to have a troponin of 7.9 and was requested to be admitted under hospitalist service. The patient reports that she has minimal chest pain associated with her symptoms. Denies any radiation of pain. Denies any nausea, vomiting, palpitation, diaphoresis associated with the pain. The patient reports that she has been taking her medications on a regular basis. The patient denies any headache, blurry vision, sore throat, trouble swallowing, trouble with speech. Does admit to orthopnea.   Denies any abdominal pain, constipation, diarrhea, urinary symptoms, focal or generalized neurological weakness, recent travel, sick contacts, falls, injuries, hematemesis, melena, hemoptysis, hematuria or any other concerns or problems. PAST MEDICAL HISTORY:  See above. HOME MEDICATIONS:  Currently, the patient is on  1. Lantus 28 units daily. 2.  Aspirin 81 mg daily. 3.  Losartan 100 mg daily. 4.  Hydrochlorothiazide 100 mg daily. 5.  Metoprolol 50 mg daily. 6.  Gabapentin 300 mg daily. 7.  Cymbalta 60 mg daily. 8.  Depakote 500 mg t.i.d.  9.  Crestor 20 mg daily. 10.  Eliquis 5 mg b.i.d.  11.  Sliding scale Novolog insulin. ALLERGIES:  TO VOLTAREN, FLAGYL, IMIPRAMINE, LEVAQUIN. SOCIAL HISTORY:  Everyday current smoker, smokes quarter of a pack per day. Occasional alcohol. No IV drug abuse. Lives at home. FAMILY HISTORY:  Mother has a history of thyroid disease. Father has a history of hypertension and heart disease. Sister has a history of diabetes, heart disease, hypertension. Brother has a history of thyroid disease. REVIEW OF SYMPTOMS:  All systems were reviewed and found to be essentially negative except for the symptoms mentioned above. PHYSICAL EXAMINATION:  VITAL SIGNS:  Temperature 99.4, pulse 113, respiratory rate 28, blood pressure 137/90, pulse oximetry 95% on room air. GENERAL:  Alert, oriented x3, awake, moderately distressed, pleasant female, appears her stated age. HEENT:  Pupils equal and reactive to light. Dry mucous membranes. Tympanic membranes clear. NECK:  Supple. CHEST:  Decreased basilar breath sounds with crackles. HEART:  S1 and S2 were heard. ABDOMEN:  Soft, nontender, nondistended. Bowel sounds are physiologic. EXTREMITIES:  No clubbing, no cyanosis. Trace edema. NEURO/PSYCH:  Pleasant mood and affect. Cranial nerves II through XII grossly intact. No focal neurological deficits. SKIN:  Warm. LABORATORY DATA:  White count 18.5, hemoglobin 13.6, hematocrit 42.3, platelets 821.   Sodium 135, potassium 4.2, chloride 105, bicarbonate 25, anion gap 5, glucose 248, BUN 27, creatinine 1.02, calcium 9.1, bilirubin total 0.4, ALT 29, AST 38, alkaline phosphatase 117, lactic acid 1.7. Troponin 7.90. Blood cultures are pending. X-ray of the chest shows diffuse bilateral airspace disease. EKG shows sinus tachycardia. ASSESSMENT AND PLAN:  1.  Non-ST elevation myocardial infarction:  The patient will be admitted on a telemetry bed. Spoke with Dr. Ana Yu who will be coming to evaluate the patient. He agrees with current management and plan. Continue Eliquis. We will hold all other blood pressure medications, especially if blood pressures drop. We will see if the patient tolerates nitroglycerin. We will continue aspirin. We will cycle cardiac enzymes x3 sets, stat echocardiogram and further intervention per hospital course. Monitor on telemetry and reassess as needed. The patient may need cardiac catheterization tonight. We will defer to Cardiology. Reassess as needed and continue to closely monitor. 2.  Flash pulmonary edema:  Likely cardiogenic secondary to non-ST elevation myocardial infarction. Lasix b.i.d. as tolerated by blood pressure. Strict intake and outputs, daily weights, aspirin and close monitoring. Further intervention per hospital course. Reassess as needed. Continue to closely monitor. Echocardiogram pending. 3.  Diabetes mellitus type 2:  Poorly controlled. Sliding scale Novolog insulin, Accu-Cheks, diet control, close monitoring. Further intervention per hospital course. 4.  Leukocytosis:  Unclear etiology, stress related versus other etiology. Get a UA. The patient received antibiotics in the emergency room. Currently, no signs of infection. We will hold antibiotics for now and repeat labs in the morning. Continue to closely monitor. 7.  Gastrointestinal and deep venous thrombosis prophylaxes: The patient is on Eliquis.       Neal Simmons MD [id number]  D:  06/07/2019 20:12  T: 06/07/2019 21:25  JOB #:  [id number]

## 2019-06-08 NOTE — PROGRESS NOTES
1930 sbar received from [address] [place] [address] rn    2015 Pt arrived onto unit Primary Nurse Miky Mcclure, LEISA and Mary Jane Sagastume, RN performed a dual skin assessment on this patient No impairment noted    Current Bed:   [facility] SPORT (air with burgundy cover) yes      Eben score is charted     2130 EKG completed. Notified Dr. Tiffanie Salazar of 40mg lasix orders at 2000. MD stated to give the second dose 12 hrs from 1st dose. 2140 Dr. Justen Molina at pt bedside. reviewing echo. Orders to give 40 mg lasix and to get medical records from [facility].     2200 Dr. Justen Molina at pt bedside.  Received orders to discontinue eliquis in case of possible cath and keep pt NPO    0500 am labs drawn    0630 Dr. Justen Molina paged about pt elevated troponins    0730 sbar given to elio hilario

## 2019-06-08 NOTE — PROGRESS NOTES
Ms. Selene Hardy and her mother were seen to deliver the observation letter. This was completed. Ms. Selene Hardy also requested information to transfer to [facility]. She is a [employer] employee and is concerned that her health insurance will not cover her at [facility].  She was given her doctor's phone number at [facility], Dr. Edgar Obrien and the phone number for the transfer office if she cannot talk to Dr. Belkis Burris. Nursing and the Cardiologist were informed. Nursing called the Access Line. Will continue to follow for discharge planning.   Signed By: Ester Caruso LCSW     June 8, 2019

## 2019-06-08 NOTE — CONSULTS
Cardiology Consult/Progress Note           6/7/2019  4:35 PM   SOB (shortness of breath) [R06.02]    Subjective:     Reason for visit/consult: Acute onset shortness of breath    Maeve Ty is a 37 y.o. female admitted for SOB (shortness of breath) [R06.02]. M [facility] had presented with acute cardiogenic pulmonary edema which was secondary to severe LV systolic dysfunction from likely underlying coronary artery disease. After overnight aggressive diuresis she has improved significantly. Her oxygenation is better she can lay flat and is not requiring CPAP anymore. Troponins are consistent with non-ST elevation myocardial      Past social and family history: Could not be obtained due to ongoing respiratory distress leading to limited conversation. From the chart it was noted that she has history of prior coronary disease, diabetes, bipolar disorder, tobacco abuse, anxiety. Investigations    Trop 15  ECG: Sinus tachycardia with poor progression of R waves in precordial leads and nonspecific ST-T changes. Echo: LV systolic function of about 25 to 30% with regional wall motion abnormalities apparent in. LAD and RCA territory. No evidence of significant mitral or aortic valve disease though images are suboptimal.  No evidence of RV systolic dysfunction or enlargement. Chest x-ray portable: Consistent with bilateral pulmonary edema of cardiogenic etiology   Assessment and PLAN   1) Acute hypoxemic respiratory failure: Improving with IV diuretics. Plan to continue twice a day IV Lasix. Recommend daily I/O.       2) NSTEMI/coronary artery disease manifested in the form of prior multivessel disease status post PCI of 1 of the 2 vessels and failed PCI to the other vessel. LV systolic function is not known from the past but echo today shows severely reduced LV systolic function which is the most likely etiology of her cardiogenic pulmonary edema.   She will need a coronary angiography once she is able to lay flat. There is evidence of myocardial injury as suggested by troponin elevation and hence we will treat her as non-ST elevation myocardial infarction. At this time it is reasonable to hold off apixaban in preparation for cath. She would be at high risk for any revascularization given her extremely reduced LV systolic function and ongoing decompensated heart failure. Will plan to transition her to heparin 24 hours to 36 hours after last dose of apixaban. She will likely require surgical revascularization hence will recommend holding off Plavix. Will plan for coronary angiography/left heart cath tomorrow. Will start heparin. May need hemodynamic support with Impella should revascularization be considered    3) Hypertension: Uncertain about the control of hypertension at this time continue home medications    4) Diabetes mellitus: Appears to be poorly controlled has evidence of endorgan dysfunction/neuropathy with underlying gastroparesis. Defer management to primary team.    5) Tobacco abuse: Currently smokes 0.25 pack per day. Recommend discontinuing smoking. 6) History of deep vein thrombosis: Currently no evidence on echocardiogram to suggest RV failure. While her chest pain is atypical he still's appears to be consistent with cardiac in etiology rather than PE related. May have to temporarily discontinue apixaban as we are planning to catheter.       Patient Active Problem List    Diagnosis Date Noted    SOB (shortness of breath) 06/07/2019      Melida Lee MD  Past Medical History:   Diagnosis Date    Arthritis     Bipolar 1 disorder ([facility]     Diabetes ([facility]     Gastrointestinal disorder     gastroparesis    Gastroparesis     GERD (gastroesophageal reflux disease)     Heart disease     Hypertension     Psychiatric disorder     Bipolar      Past Surgical History:   Procedure Laterality Date    HX ANKLE FRACTURE TX      HX CAROTID STENT  HX GYN      BTL    HX ORTHOPAEDIC      toe     Allergies   Allergen Reactions    Voltaren [Diclofenac Sodium] Anaphylaxis    Flagyl [Metronidazole] Nausea and Vomiting    Imipramine Hives    Levaquin [Levofloxacin] Nausea and Vomiting      Family History   Problem Relation Age of Onset    Thyroid Disease Mother     Hypertension Father     Heart Disease Father     Diabetes Sister     Heart Disease Sister     Hypertension Sister     Thyroid Disease Brother       Current Facility-Administered Medications   Medication Dose Route Frequency    phenol throat spray (CHLORASEPTIC) 1 Spray  1 Spray Oral PRN    aspirin delayed-release tablet 81 mg  81 mg Oral DAILY    divalproex DR (DEPAKOTE) tablet 500 mg  500 mg Oral TID    DULoxetine (CYMBALTA) capsule 60 mg  60 mg Oral DAILY    gabapentin (NEURONTIN) capsule 300 mg  300 mg Oral TID    rosuvastatin (CRESTOR) tablet 20 mg  20 mg Oral QHS    sodium chloride (NS) flush 5-40 mL  5-40 mL IntraVENous Q8H    sodium chloride (NS) flush 5-40 mL  5-40 mL IntraVENous PRN    furosemide (LASIX) injection 40 mg  40 mg IntraVENous BID    ondansetron (ZOFRAN) injection 4 mg  4 mg IntraVENous Q6H PRN    glucose chewable tablet 16 g  4 Tab Oral PRN    dextrose (D50W) injection syrg 12.5-25 g  25-50 mL IntraVENous PRN    glucagon (GLUCAGEN) injection 1 mg  1 mg IntraMUSCular PRN    insulin lispro (HUMALOG) injection   SubCUTAneous Q6H    metoprolol succinate (TOPROL-XL) XL tablet 25 mg  25 mg Oral DAILY    acetaminophen (TYLENOL) tablet 650 mg  650 mg Oral Q4H PRN      Prior to Admission Medications   Prescriptions Last Dose Informant Patient Reported? Taking? DULoxetine (CYMBALTA) 60 mg capsule 6/6/2019 at Unknown time  No Yes   Sig: Take 1 Cap by mouth daily. apixaban (ELIQUIS) 5 mg tablet 6/6/2019 at Unknown time  Yes Yes   Sig: Take 5 mg by mouth two (2) times a day.    aspirin delayed-release 81 mg tablet 6/6/2019 at Unknown time  Yes Yes   Sig: Take 81 mg by mouth daily. divalproex DR (DEPAKOTE) 500 mg tablet 2019 at Unknown time  No Yes   Sig: Take 1 Tab by mouth three (3) times daily. gabapentin (NEURONTIN) 300 mg capsule 2019 at Unknown time  Yes Yes   Sig: Take 300 mg by mouth three (3) times daily. hydroCHLOROthiazide (HYDRODIURIL) 25 mg tablet 2019 at Unknown time  No Yes   Sig: Take 1 Tab by mouth daily. insulin glargine (LANTUS U-100 INSULIN) 100 unit/mL injection 2019 at Unknown time  Yes Yes   Si Units by SubCUTAneous route nightly. insulin lispro (HUMALOG U-100 INSULIN) 100 unit/mL injection   Yes Yes   Sig: by SubCUTAneous route as needed. Sliding scale   insulin lispro (HUMALOG) 100 unit/mL injection   Yes No   Si Units by SubCUTAneous route Before breakfast, lunch, and dinner. losartan (COZAAR) 100 mg tablet 2019 at Unknown time  No Yes   Sig: Take 1 Tab by mouth daily. metoprolol succinate (TOPROL-XL) 50 mg XL tablet 2019 at Unknown time  Yes Yes   Sig: Take 50 mg by mouth daily. rosuvastatin (CRESTOR) 20 mg tablet 2019 at Unknown time  Yes Yes   Sig: Take 20 mg by mouth nightly. Facility-Administered Medications: None          Labs:   Recent Results (from the past 24 hour(s))   CBC WITH AUTOMATED DIFF    Collection Time: 19  4:49 PM   Result Value Ref Range    WBC 18.5 (H) 3.6 - 11.0 K/uL    RBC 5.13 3.80 - 5.20 M/uL    HGB 13.6 11.5 - 16.0 g/dL    HCT 42.3 35.0 - 47.0 %    MCV 82.5 80.0 - 99.0 FL    MCH 26.5 26.0 - 34.0 PG    MCHC 32.2 30.0 - 36.5 g/dL    RDW 15.0 (H) 11.5 - 14.5 %    PLATELET 254 901 - 990 K/uL    MPV 10.4 8.9 - 12.9 FL    NRBC 0.0 0  WBC    ABSOLUTE NRBC 0.00 0.00 - 0.01 K/uL    NEUTROPHILS 85 (H) 32 - 75 %    LYMPHOCYTES 8 (L) 12 - 49 %    MONOCYTES 6 5 - 13 %    EOSINOPHILS 0 0 - 7 %    BASOPHILS 0 0 - 1 %    IMMATURE GRANULOCYTES 1 (H) 0.0 - 0.5 %    ABS. NEUTROPHILS 15.7 (H) 1.8 - 8.0 K/UL    ABS. LYMPHOCYTES 1.5 0.8 - 3.5 K/UL    ABS.  MONOCYTES 1.1 (H) 0.0 - 1.0 K/UL    ABS. EOSINOPHILS 0.0 0.0 - 0.4 K/UL    ABS. BASOPHILS 0.0 0.0 - 0.1 K/UL    ABS. IMM. GRANS. 0.1 (H) 0.00 - 0.04 K/UL    DF AUTOMATED     METABOLIC PANEL, COMPREHENSIVE    Collection Time: 06/07/19  4:49 PM   Result Value Ref Range    Sodium 135 (L) 136 - 145 mmol/L    Potassium 4.2 3.5 - 5.1 mmol/L    Chloride 105 97 - 108 mmol/L    CO2 25 21 - 32 mmol/L    Anion gap 5 5 - 15 mmol/L    Glucose 248 (H) 65 - 100 mg/dL    BUN 27 (H) 6 - 20 MG/DL    Creatinine 1.02 0.55 - 1.02 MG/DL    BUN/Creatinine ratio 26 (H) 12 - 20      GFR est AA >60 >60 ml/min/1.73m2    GFR est non-AA 59 (L) >60 ml/min/1.73m2    Calcium 9.1 8.5 - 10.1 MG/DL    Bilirubin, total 0.4 0.2 - 1.0 MG/DL    ALT (SGPT) 29 12 - 78 U/L    AST (SGOT) 38 (H) 15 - 37 U/L    Alk. phosphatase 117 45 - 117 U/L    Protein, total 6.9 6.4 - 8.2 g/dL    Albumin 3.0 (L) 3.5 - 5.0 g/dL    Globulin 3.9 2.0 - 4.0 g/dL    A-G Ratio 0.8 (L) 1.1 - 2.2     NT-PRO BNP    Collection Time: 06/07/19  4:49 PM   Result Value Ref Range    NT pro-BNP 9,863 (H) <125 PG/ML   TROPONIN I    Collection Time: 06/07/19  4:49 PM   Result Value Ref Range    Troponin-I, Qt. 7.90 (H) <0.05 ng/mL   SAMPLES BEING HELD    Collection Time: 06/07/19  4:49 PM   Result Value Ref Range    SAMPLES BEING HELD 1BLUE,1RED     COMMENT        Add-on orders for these samples will be processed based on acceptable specimen integrity and analyte stability, which may vary by analyte.    PTT    Collection Time: 06/07/19  4:49 PM   Result Value Ref Range    aPTT 30.4 22.1 - 32.0 sec    aPTT, therapeutic range     58.0 - 77.0 SECS   EKG, 12 LEAD, INITIAL    Collection Time: 06/07/19  5:47 PM   Result Value Ref Range    Ventricular Rate 123 BPM    Atrial Rate 123 BPM    P-R Interval 154 ms    QRS Duration 84 ms    Q-T Interval 306 ms    QTC Calculation (Bezet) 438 ms    Calculated P Axis 72 degrees    Calculated R Axis 108 degrees    Calculated T Axis 63 degrees    Diagnosis       Sinus tachycardia  Low voltage QRS  When compared with ECG of 15-APR-2019 10:58,  Vent.  rate has increased BY  49 BPM  ST now depressed in Lateral leads  Nonspecific T wave abnormality no longer evident in Lateral leads     LACTIC ACID    Collection Time: 06/07/19  6:44 PM   Result Value Ref Range    Lactic acid 1.7 0.4 - 2.0 MMOL/L   CULTURE, BLOOD, PAIRED    Collection Time: 06/07/19  6:44 PM   Result Value Ref Range    Special Requests: NO SPECIAL REQUESTS      Culture result: NO GROWTH AFTER 10 HOURS     EKG, 12 LEAD, INITIAL    Collection Time: 06/07/19  8:05 PM   Result Value Ref Range    Ventricular Rate 112 BPM    Atrial Rate 112 BPM    P-R Interval 156 ms    QRS Duration 96 ms    Q-T Interval 324 ms    QTC Calculation (Bezet) 442 ms    Calculated P Axis 61 degrees    Calculated R Axis 115 degrees    Calculated T Axis 72 degrees    Diagnosis       Sinus tachycardia  Right axis deviation  Septal infarct , age undetermined  When compared with ECG of 07-JUN-2019 17:47,  No significant change was found     TROPONIN I    Collection Time: 06/07/19 10:10 PM   Result Value Ref Range    Troponin-I, Qt. 8.94 (H) <0.05 ng/mL   HEMOGLOBIN A1C WITH EAG    Collection Time: 06/07/19 10:10 PM   Result Value Ref Range    Hemoglobin A1c 8.5 (H) 4.2 - 6.3 %    Est. average glucose 197 mg/dL   GLUCOSE, POC    Collection Time: 06/08/19 12:13 AM   Result Value Ref Range    Glucose (POC) 240 (H) 65 - 100 mg/dL    Performed by Talia Hu    PTT    Collection Time: 06/08/19  4:55 AM   Result Value Ref Range    aPTT 36.3 (H) 22.1 - 32.0 sec    aPTT, therapeutic range     58.0 - 77.0 SECS   CBC WITH AUTOMATED DIFF    Collection Time: 06/08/19  4:55 AM   Result Value Ref Range    WBC 16.1 (H) 3.6 - 11.0 K/uL    RBC 4.88 3.80 - 5.20 M/uL    HGB 12.7 11.5 - 16.0 g/dL    HCT 40.2 35.0 - 47.0 %    MCV 82.4 80.0 - 99.0 FL    MCH 26.0 26.0 - 34.0 PG    MCHC 31.6 30.0 - 36.5 g/dL    RDW 15.4 (H) 11.5 - 14.5 %    PLATELET 108 378 - 808 K/uL    MPV 9.9 8.9 - 12.9 FL    NRBC 0.0 0  WBC    ABSOLUTE NRBC 0.00 0.00 - 0.01 K/uL    NEUTROPHILS 79 (H) 32 - 75 %    LYMPHOCYTES 14 12 - 49 %    MONOCYTES 6 5 - 13 %    EOSINOPHILS 0 0 - 7 %    BASOPHILS 0 0 - 1 %    IMMATURE GRANULOCYTES 1 (H) 0.0 - 0.5 %    ABS. NEUTROPHILS 12.7 (H) 1.8 - 8.0 K/UL    ABS. LYMPHOCYTES 2.2 0.8 - 3.5 K/UL    ABS. MONOCYTES 1.0 0.0 - 1.0 K/UL    ABS. EOSINOPHILS 0.0 0.0 - 0.4 K/UL    ABS. BASOPHILS 0.0 0.0 - 0.1 K/UL    ABS. IMM.  GRANS. 0.1 (H) 0.00 - 0.04 K/UL    DF AUTOMATED     TROPONIN I    Collection Time: 06/08/19  4:55 AM   Result Value Ref Range    Troponin-I, Qt. 11.90 (H) <0.05 ng/mL   LIPID PANEL    Collection Time: 06/08/19  4:57 AM   Result Value Ref Range    LIPID PROFILE          Cholesterol, total 115 <200 MG/DL    Triglyceride 79 <150 MG/DL    HDL Cholesterol 64 MG/DL    LDL, calculated 35.2 0 - 100 MG/DL    VLDL, calculated 15.8 MG/DL    CHOL/HDL Ratio 1.8 0.0 - 5.0     METABOLIC PANEL, BASIC    Collection Time: 06/08/19  4:57 AM   Result Value Ref Range    Sodium 139 136 - 145 mmol/L    Potassium 4.3 3.5 - 5.1 mmol/L    Chloride 105 97 - 108 mmol/L    CO2 27 21 - 32 mmol/L    Anion gap 7 5 - 15 mmol/L    Glucose 126 (H) 65 - 100 mg/dL    BUN 26 (H) 6 - 20 MG/DL    Creatinine 1.03 (H) 0.55 - 1.02 MG/DL    BUN/Creatinine ratio 25 (H) 12 - 20      GFR est AA >60 >60 ml/min/1.73m2    GFR est non-AA 58 (L) >60 ml/min/1.73m2    Calcium 8.6 8.5 - 10.1 MG/DL   GLUCOSE, POC    Collection Time: 06/08/19  6:14 AM   Result Value Ref Range    Glucose (POC) 149 (H) 65 - 100 mg/dL    Performed by [facility]    EKG, 12 LEAD, INITIAL    Collection Time: 06/08/19  6:24 AM   Result Value Ref Range    Ventricular Rate 112 BPM    Atrial Rate 112 BPM    P-R Interval 142 ms    QRS Duration 96 ms    Q-T Interval 340 ms    QTC Calculation (Bezet) 464 ms    Calculated P Axis 45 degrees    Calculated R Axis 115 degrees    Calculated T Axis 74 degrees    Diagnosis       Sinus tachycardia  Right axis deviation  Septal infarct (cited on or before 07-JUN-2019)  When compared with ECG of 07-JUN-2019 20:05,  No significant change was found          Review of Symptoms:  Respiratory: Orthopnea along with dyspnea at rest noted   Cardiovascular: Reports CP, palpitations, sweating. Does not report any recent progression of leg edema. Does not report of any fevers phlegm but reports occasional cough in the last few hours. Otherwise no other pertinent positive or negative symptoms on ROS. Subjective:      Visit Vitals  /72 (BP 1 Location: Left arm, BP Patient Position: At rest)   Pulse (!) 101   Temp 99.2 °F (37.3 °C)   Resp 21   Ht 5' 4\" (1.626 m)   Wt 171 lb 8.3 oz (77.8 kg)   SpO2 97%   BMI 29.44 kg/m²       /72 (BP 1 Location: Left arm, BP Patient Position: At rest)   Pulse (!) 101   Temp 99.2 °F (37.3 °C)   Resp 21   Ht 5' 4\" (1.626 m)   Wt 171 lb 8.3 oz (77.8 kg)   SpO2 97%   BMI 29.44 kg/m²   General:    Alert, appears to be in respiratory distress. Psychiatric:   Unable to assess   Eye/ENT:      Pupils equal, No asymmetry, Conjunctival pink. Able to hear voice at normal amplitude   Lungs:      Visibly symmetric chest expansion, No palpable tenderness. Crakles to mid lung field bilaterally. Heart[de-identified]    Regular rate and rhythm, S1, S2 normal, no murmur, click, rub or gallop. No JVD, Normal palpable peripheral pulses. No cyanosis   Abdomen:     Soft, non-tender. Bowel sounds normal. No masses,  No      organomegaly. Extremities:   Extremities normal, atraumatic, no edema.    Neurologic:   Unable to assess         Torrie Justice MD  06/08/19  10:55 PM     [facility] Office:   [address] [facility] Office:  [address] [place] [address] [facility]  [address]     [address] [facility], [address] [place] [address] Nw  P: [phone]    P: [phone]  F: [phone]    F: [phone]

## 2019-06-08 NOTE — PROGRESS NOTES
Problem: Falls - Risk of  Goal: *Absence of Falls  Description  Document Harinder Mera Fall Risk and appropriate interventions in the flowsheet. Outcome: Progressing Towards Goal     Problem: Patient Education: Go to Patient Education Activity  Goal: Patient/Family Education  Outcome: Progressing Towards Goal     Problem: Diabetes Self-Management  Goal: *Disease process and treatment process  Description  Define diabetes and identify own type of diabetes; list 3 options for treating diabetes. Outcome: Progressing Towards Goal  Goal: *Incorporating nutritional management into lifestyle  Description  Describe effect of type, amount and timing of food on blood glucose; list 3 methods for planning meals. Outcome: Progressing Towards Goal  Goal: *Incorporating physical activity into lifestyle  Description  State effect of exercise on blood glucose levels. Outcome: Progressing Towards Goal  Goal: *Developing strategies to promote health/change behavior  Description  Define the ABC's of diabetes; identify appropriate screenings, schedule and personal plan for screenings. Outcome: Progressing Towards Goal  Goal: *Using medications safely  Description  State effect of diabetes medications on diabetes; name diabetes medication taking, action and side effects. Outcome: Progressing Towards Goal  Goal: *Monitoring blood glucose, interpreting and using results  Description  Identify recommended blood glucose targets  and personal targets. Outcome: Progressing Towards Goal  Goal: *Prevention, detection, treatment of acute complications  Description  List symptoms of hyper- and hypoglycemia; describe how to treat low blood sugar and actions for lowering  high blood glucose level.   Outcome: Progressing Towards Goal  Goal: *Prevention, detection and treatment of chronic complications  Description  Define the natural course of diabetes and describe the relationship of blood glucose levels to long term complications of diabetes.   Outcome: Progressing Towards Goal  Goal: *Developing strategies to address psychosocial issues  Description  Describe feelings about living with diabetes; identify support needed and support network  Outcome: Progressing Towards Goal  Goal: *Insulin pump training  Outcome: Progressing Towards Goal  Goal: *Sick day guidelines  Outcome: Progressing Towards Goal  Goal: *Patient Specific Goal (EDIT GOAL, INSERT TEXT)  Outcome: Progressing Towards Goal     Problem: Patient Education: Go to Patient Education Activity  Goal: Patient/Family Education  Outcome: Progressing Towards Goal     Problem: Patient Education: Go to Patient Education Activity  Goal: Patient/Family Education  Outcome: Progressing Towards Goal     Problem: Heart Failure: Day 1  Goal: Off Pathway (Use only if patient is Off Pathway)  Outcome: Progressing Towards Goal  Goal: Activity/Safety  Outcome: Progressing Towards Goal  Goal: Consults, if ordered  Outcome: Progressing Towards Goal  Goal: Diagnostic Test/Procedures  Outcome: Progressing Towards Goal  Goal: Nutrition/Diet  Outcome: Progressing Towards Goal  Goal: Discharge Planning  Outcome: Progressing Towards Goal  Goal: Medications  Outcome: Progressing Towards Goal  Goal: Respiratory  Outcome: Progressing Towards Goal  Goal: Treatments/Interventions/Procedures  Outcome: Progressing Towards Goal  Goal: Psychosocial  Outcome: Progressing Towards Goal  Goal: *Oxygen saturation within defined limits  Outcome: Progressing Towards Goal  Goal: *Hemodynamically stable  Outcome: Progressing Towards Goal  Goal: *Optimal pain control at patient's stated goal  Outcome: Progressing Towards Goal  Goal: *Anxiety reduced or absent  Outcome: Progressing Towards Goal     Problem: Heart Failure: Day 2  Goal: Off Pathway (Use only if patient is Off Pathway)  Outcome: Progressing Towards Goal  Goal: Activity/Safety  Outcome: Progressing Towards Goal  Goal: Consults, if ordered  Outcome: Progressing Towards Goal  Goal: Diagnostic Test/Procedures  Outcome: Progressing Towards Goal  Goal: Nutrition/Diet  Outcome: Progressing Towards Goal  Goal: Discharge Planning  Outcome: Progressing Towards Goal  Goal: Medications  Outcome: Progressing Towards Goal  Goal: Respiratory  Outcome: Progressing Towards Goal  Goal: Treatments/Interventions/Procedures  Outcome: Progressing Towards Goal  Goal: Psychosocial  Outcome: Progressing Towards Goal  Goal: *Oxygen saturation within defined limits  Outcome: Progressing Towards Goal  Goal: *Hemodynamically stable  Outcome: Progressing Towards Goal  Goal: *Optimal pain control at patient's stated goal  Outcome: Progressing Towards Goal  Goal: *Anxiety reduced or absent  Outcome: Progressing Towards Goal  Goal: *Demonstrates progressive activity  Outcome: Progressing Towards Goal     Problem: Heart Failure: Day 3  Goal: Off Pathway (Use only if patient is Off Pathway)  Outcome: Progressing Towards Goal  Goal: Activity/Safety  Outcome: Progressing Towards Goal  Goal: Diagnostic Test/Procedures  Outcome: Progressing Towards Goal  Goal: Nutrition/Diet  Outcome: Progressing Towards Goal  Goal: Discharge Planning  Outcome: Progressing Towards Goal  Goal: Medications  Outcome: Progressing Towards Goal  Goal: Respiratory  Outcome: Progressing Towards Goal  Goal: Treatments/Interventions/Procedures  Outcome: Progressing Towards Goal  Goal: Psychosocial  Outcome: Progressing Towards Goal  Goal: *Oxygen saturation within defined limits  Outcome: Progressing Towards Goal  Goal: *Hemodynamically stable  Outcome: Progressing Towards Goal  Goal: *Optimal pain control at patient's stated goal  Outcome: Progressing Towards Goal  Goal: *Anxiety reduced or absent  Outcome: Progressing Towards Goal  Goal: *Demonstrates progressive activity  Outcome: Progressing Towards Goal     Problem: Heart Failure: Day 4  Goal: Off Pathway (Use only if patient is Off Pathway)  Outcome: Progressing Towards Goal  Goal: Activity/Safety  Outcome: Progressing Towards Goal  Goal: Diagnostic Test/Procedures  Outcome: Progressing Towards Goal  Goal: Nutrition/Diet  Outcome: Progressing Towards Goal  Goal: Discharge Planning  Outcome: Progressing Towards Goal  Goal: Medications  Outcome: Progressing Towards Goal  Goal: Respiratory  Outcome: Progressing Towards Goal  Goal: Treatments/Interventions/Procedures  Outcome: Progressing Towards Goal  Goal: Psychosocial  Outcome: Progressing Towards Goal  Goal: *Oxygen saturation within defined limits  Outcome: Progressing Towards Goal  Goal: *Hemodynamically stable  Outcome: Progressing Towards Goal  Goal: *Optimal pain control at patient's stated goal  Outcome: Progressing Towards Goal  Goal: *Anxiety reduced or absent  Outcome: Progressing Towards Goal  Goal: *Demonstrates progressive activity  Outcome: Progressing Towards Goal     Problem: Heart Failure: Discharge Outcomes  Goal: *Demonstrates ability to perform prescribed activity without shortness of breath or discomfort  Outcome: Progressing Towards Goal  Goal: *Left ventricular function assessment completed prior to or during stay, or planned for post-discharge  Outcome: Progressing Towards Goal  Goal: *ACEI prescribed if LVEF less than 40% and no contraindications or ARB prescribed  Outcome: Progressing Towards Goal  Goal: *Verbalizes understanding and describes prescribed diet  Outcome: Progressing Towards Goal  Goal: *Verbalizes understanding/describes prescribed medications  Outcome: Progressing Towards Goal  Goal: *Describes available resources and support systems  Description  (eg: Home Health, Palliative Care, Advanced Medical Directive)  Outcome: Progressing Towards Goal  Goal: *Describes smoking cessation resources  Outcome: Progressing Towards Goal  Goal: *Understands and describes signs and symptoms to report to providers(Stroke Metric)  Outcome: Progressing Towards Goal  Goal: *Describes/verbalizes understanding of follow-up/return appt  Description  (eg: to physicians, diabetes treatment coordinator, and other resources  Outcome: Progressing Towards Goal  Goal: *Describes importance of continuing daily weights and changes to report to physician  Outcome: Progressing Towards Goal

## 2019-06-08 NOTE — CONSULTS
Cardiology Consult/Progress Note           6/7/2019  4:35 PM   SOB (shortness of breath) [R06.02]    Subjective:     Reason for visit/consult: Acute onset shortness of breath    Lory Andino is a 37 y.o. female admitted for SOB (shortness of breath) [R06.02]. Ms. Shi Dockery reported rapidly progressive shortness of breath starting this afternoon. This is been progressing over the past 8 hours. She reports associated orthopnea. There has been no specific relieving factor. She does not report any associated fevers but has been coughing over the last few hours. She does not report any phlegm production. Associated with shortness of breath she also reports some chest pain which is mostly precordial in location. The chest pain radiates to the back. It worsens with deep breath. There are no specific relieving factors. It is continuing off and on for the past 2 to 3 hours and patient feels it is secondary to recurrent coughing. Patient has history of hypertension poorly controlled diabetes and is a current smoker. She has prior history of coronary artery disease the details of which are currently unavailable but patient gives a limited information that she underwent a PCI less than 2 years ago and 1 of her coronaries. Another coronary artery could not be opened up but had significant disease. This was all performed at [facility]. She does not report of any prior history of congestive heart failure. She also has history of deep vein thrombosis without any prior pulmonary embolism. She has secondary venous stasis from chronic venous insufficiency related to above. Further history is unavailable because of ongoing respiratory distress as patient is requiring CPAP for ventilation. Past social and family history: Could not be obtained due to ongoing respiratory distress leading to limited conversation.   From the chart it was noted that she has history of prior coronary disease, diabetes, bipolar disorder, tobacco abuse, anxiety. Investigations  ECG: Sinus tachycardia with poor progression of R waves in precordial leads and nonspecific ST-T changes. Echo: LV systolic function of about 25 to 30% with regional wall motion abnormalities apparent in. LAD and RCA territory. No evidence of significant mitral or aortic valve disease though images are suboptimal.  No evidence of RV systolic dysfunction or enlargement. Chest x-ray portable: Consistent with bilateral pulmonary edema of cardiogenic etiology   Assessment and PLAN   1) Acute hypoxemic respiratory failure: Patient presents with acute hypoxemic respiratory failure likely secondary to cardiogenic pulmonary edema. She has prior history of coronary artery disease and may have developed ischemic cardiomyopathy secondary to it. Currently the cornerstone of therapy would be aggressive diuresis with IV Lasix. Would recommend monitoring electrolytes during this time. Will recommend consideration for nitroglycerin if blood pressure allows    30 minutes were spent on critical care assessing the patient, etiology of her acute respiratory failure personally reviewing investigations and initiating interventions/management. 2) Coronary artery disease manifested in the form of prior multivessel disease status post PCI of 1 of the 2 vessels and failed PCI to the other vessel. LV systolic function is not known from the past but echo today shows severely reduced LV systolic function which is the most likely etiology of her cardiogenic pulmonary edema. She will need a coronary angiography once she is able to lay flat. There is evidence of myocardial injury as suggested by troponin elevation and hence we will treat her as non-ST elevation myocardial infarction. At this time it is reasonable to hold off apixaban in preparation for cath.   She would be at high risk for any revascularization given her extremely reduced LV systolic function and ongoing decompensated heart failure. Will plan to transition her to heparin 24 hours to 36 hours after last dose of apixaban. She will likely require surgical revascularization hence will recommend holding off Plavix. 3) Hypertension: Uncertain about the control of hypertension at this time continue home medications    4) Diabetes mellitus: Appears to be poorly controlled has evidence of endorgan dysfunction/neuropathy with underlying gastroparesis. Defer management to primary team.    5) Tobacco abuse: Currently smokes 0.25 pack per day. Recommend discontinuing smoking. 6) History of deep vein thrombosis: Currently no evidence on echocardiogram to suggest RV failure. While her chest pain is atypical he still's appears to be consistent with cardiac in etiology rather than PE related. May have to temporarily discontinue apixaban as we are planning to catheter.       Patient Active Problem List    Diagnosis Date Noted    SOB (shortness of breath) 06/07/2019      Jermaine Farrar MD  Past Medical History:   Diagnosis Date    Arthritis     Bipolar 1 disorder ([address]     Diabetes [facility] [address]     Gastrointestinal disorder     gastroparesis    Gastroparesis     GERD (gastroesophageal reflux disease)     Heart disease     Hypertension     Psychiatric disorder     Bipolar      Past Surgical History:   Procedure Laterality Date    HX ANKLE FRACTURE TX      HX CAROTID STENT      HX GYN      BTL    HX ORTHOPAEDIC      toe     Allergies   Allergen Reactions    Voltaren [Diclofenac Sodium] Anaphylaxis    Flagyl [Metronidazole] Nausea and Vomiting    Imipramine Hives    Levaquin [Levofloxacin] Nausea and Vomiting      Family History   Problem Relation Age of Onset    Thyroid Disease Mother     Hypertension Father     Heart Disease Father     Diabetes Sister     Heart Disease Sister     Hypertension Sister     Thyroid Disease Brother       Current Facility-Administered Medications Medication Dose Route Frequency    [START ON 2019] aspirin delayed-release tablet 81 mg  81 mg Oral DAILY    divalproex DR (DEPAKOTE) tablet 500 mg  500 mg Oral TID    [START ON 2019] DULoxetine (CYMBALTA) capsule 60 mg  60 mg Oral DAILY    gabapentin (NEURONTIN) capsule 300 mg  300 mg Oral TID    rosuvastatin (CRESTOR) tablet 20 mg  20 mg Oral QHS    sodium chloride (NS) flush 5-40 mL  5-40 mL IntraVENous Q8H    sodium chloride (NS) flush 5-40 mL  5-40 mL IntraVENous PRN    furosemide (LASIX) injection 40 mg  40 mg IntraVENous BID    ondansetron (ZOFRAN) injection 4 mg  4 mg IntraVENous Q6H PRN    glucose chewable tablet 16 g  4 Tab Oral PRN    dextrose (D50W) injection syrg 12.5-25 g  25-50 mL IntraVENous PRN    glucagon (GLUCAGEN) injection 1 mg  1 mg IntraMUSCular PRN    [START ON 2019] insulin lispro (HUMALOG) injection   SubCUTAneous Q6H    [START ON 2019] metoprolol succinate (TOPROL-XL) XL tablet 25 mg  25 mg Oral DAILY    acetaminophen (TYLENOL) tablet 650 mg  650 mg Oral Q4H PRN      Prior to Admission Medications   Prescriptions Last Dose Informant Patient Reported? Taking? DULoxetine (CYMBALTA) 60 mg capsule 2019 at Unknown time  No Yes   Sig: Take 1 Cap by mouth daily. apixaban (ELIQUIS) 5 mg tablet 2019 at Unknown time  Yes Yes   Sig: Take 5 mg by mouth two (2) times a day. aspirin delayed-release 81 mg tablet 2019 at Unknown time  Yes Yes   Sig: Take 81 mg by mouth daily. divalproex DR (DEPAKOTE) 500 mg tablet 2019 at Unknown time  No Yes   Sig: Take 1 Tab by mouth three (3) times daily. gabapentin (NEURONTIN) 300 mg capsule 2019 at Unknown time  Yes Yes   Sig: Take 300 mg by mouth three (3) times daily. hydroCHLOROthiazide (HYDRODIURIL) 25 mg tablet 2019 at Unknown time  No Yes   Sig: Take 1 Tab by mouth daily.    insulin glargine (LANTUS U-100 INSULIN) 100 unit/mL injection 2019 at Unknown time  Yes Yes   Si Units by SubCUTAneous route nightly. insulin lispro (HUMALOG U-100 INSULIN) 100 unit/mL injection   Yes Yes   Sig: by SubCUTAneous route as needed. Sliding scale   insulin lispro (HUMALOG) 100 unit/mL injection   Yes No   Si Units by SubCUTAneous route Before breakfast, lunch, and dinner. losartan (COZAAR) 100 mg tablet 2019 at Unknown time  No Yes   Sig: Take 1 Tab by mouth daily. metoprolol succinate (TOPROL-XL) 50 mg XL tablet 2019 at Unknown time  Yes Yes   Sig: Take 50 mg by mouth daily. rosuvastatin (CRESTOR) 20 mg tablet 2019 at Unknown time  Yes Yes   Sig: Take 20 mg by mouth nightly.       Facility-Administered Medications: None          Labs:   Recent Results (from the past 24 hour(s))   ECHO ADULT COMPLETE    Collection Time: 19  8:38 AM   Result Value Ref Range    LA Volume 65.27 22 - 52 mL    LV E' Lateral Velocity 7.41 cm/s    LV E' Septal Velocity 12.07 cm/s    Tapse 2.63 (A) 1.5 - 2.0 cm    Ao Root D 2.73 cm    Aortic Valve Systolic Peak Velocity 134.20 cm/s    Aortic Valve Area by Continuity of Peak Velocity 2.1 cm2    AoV PG 6.1 mmHg    LVIDd 5.21 3.9 - 5.3 cm    LVPWd 1.05 (A) 0.6 - 0.9 cm    LVIDs 3.86 cm    IVSd 0.96 (A) 0.6 - 0.9 cm    LV ED Vol A2C 143.2 mL    LV ES Vol A4C 85.9 mL    LV ES Vol BP 84.6 (A) 19 - 49 mL    LVOT d 1.84 cm    LVOT Peak Velocity 97.81 cm/s    LVOT Peak Gradient 3.8 mmHg    MVA (PHT) 5.1 cm2    MV A Mauro 26.02 cm/s    MV E Mauro 114.33 cm/s    MV E/A 4.39     Left Atrium to Aortic Root Ratio 1.64     RVIDd 3.44 cm    BP EF 31.6 (A) 55 - 100 %    LV Ejection Fraction MOD 4C 16 %    LV Ejection Fraction MOD 2C 45 %    LA Vol 4C 66.33 (A) 22 - 52 mL    LA Vol 2C 56.05 (A) 22 - 52 mL    LA Area 4C 20.7 cm2    LV Mass .8 (A) 67 - 162 g    LV Mass AL Index 126.8 43 - 95 g/m2    E/E' lateral 15.43     E/E' septal 9.47     RVSP 36.2 mmHg    E/E' ratio (averaged) 12.45     LV ES Vol A2C 78.6 mL    LVES Vol Index BP 46.7 mL/m2    LV ED Vol A4C 102.0 mL LVED Vol Index BP 68.2 mL/m2    Est. RA Pressure 5.0 mmHg    Mitral Valve E Wave Deceleration Time 149.7 ms    Mitral Valve Pressure Half-time 43.4 ms    Left Atrium Major Axis 4.49 cm    Triscuspid Valve Regurgitation Peak Gradient 31.2 mmHg    Pulmonic Valve Max Velocity 86.24 cm/s    LV ED Vol .6 (A) 56 - 104 ml    TR Max Velocity 279.50 cm/s    LA Vol Index 36.03 16 - 28 ml/m2    PASP 36.2 mmHg    LA Vol Index 30.94 16 - 28 ml/m2    LA Vol Index 36.61 16 - 28 ml/m2    LVED Vol Index A4C 56.3 mL/m2    LVED Vol Index A2C 79.0 mL/m2    LVES Vol Index A4C 47.4 mL/m2    LVES Vol Index A2C 43.4 mL/m2    PV peak gradient 3.0 mmHg   CBC WITH AUTOMATED DIFF    Collection Time: 06/07/19  4:49 PM   Result Value Ref Range    WBC 18.5 (H) 3.6 - 11.0 K/uL    RBC 5.13 3.80 - 5.20 M/uL    HGB 13.6 11.5 - 16.0 g/dL    HCT 42.3 35.0 - 47.0 %    MCV 82.5 80.0 - 99.0 FL    MCH 26.5 26.0 - 34.0 PG    MCHC 32.2 30.0 - 36.5 g/dL    RDW 15.0 (H) 11.5 - 14.5 %    PLATELET 585 355 - 445 K/uL    MPV 10.4 8.9 - 12.9 FL    NRBC 0.0 0  WBC    ABSOLUTE NRBC 0.00 0.00 - 0.01 K/uL    NEUTROPHILS 85 (H) 32 - 75 %    LYMPHOCYTES 8 (L) 12 - 49 %    MONOCYTES 6 5 - 13 %    EOSINOPHILS 0 0 - 7 %    BASOPHILS 0 0 - 1 %    IMMATURE GRANULOCYTES 1 (H) 0.0 - 0.5 %    ABS. NEUTROPHILS 15.7 (H) 1.8 - 8.0 K/UL    ABS. LYMPHOCYTES 1.5 0.8 - 3.5 K/UL    ABS. MONOCYTES 1.1 (H) 0.0 - 1.0 K/UL    ABS. EOSINOPHILS 0.0 0.0 - 0.4 K/UL    ABS. BASOPHILS 0.0 0.0 - 0.1 K/UL    ABS. IMM.  GRANS. 0.1 (H) 0.00 - 0.04 K/UL    DF AUTOMATED     METABOLIC PANEL, COMPREHENSIVE    Collection Time: 06/07/19  4:49 PM   Result Value Ref Range    Sodium 135 (L) 136 - 145 mmol/L    Potassium 4.2 3.5 - 5.1 mmol/L    Chloride 105 97 - 108 mmol/L    CO2 25 21 - 32 mmol/L    Anion gap 5 5 - 15 mmol/L    Glucose 248 (H) 65 - 100 mg/dL    BUN 27 (H) 6 - 20 MG/DL    Creatinine 1.02 0.55 - 1.02 MG/DL    BUN/Creatinine ratio 26 (H) 12 - 20      GFR est AA >60 >60 ml/min/1.73m2    GFR est non-AA 59 (L) >60 ml/min/1.73m2    Calcium 9.1 8.5 - 10.1 MG/DL    Bilirubin, total 0.4 0.2 - 1.0 MG/DL    ALT (SGPT) 29 12 - 78 U/L    AST (SGOT) 38 (H) 15 - 37 U/L    Alk. phosphatase 117 45 - 117 U/L    Protein, total 6.9 6.4 - 8.2 g/dL    Albumin 3.0 (L) 3.5 - 5.0 g/dL    Globulin 3.9 2.0 - 4.0 g/dL    A-G Ratio 0.8 (L) 1.1 - 2.2     NT-PRO BNP    Collection Time: 06/07/19  4:49 PM   Result Value Ref Range    NT pro-BNP 9,863 (H) <125 PG/ML   TROPONIN I    Collection Time: 06/07/19  4:49 PM   Result Value Ref Range    Troponin-I, Qt. 7.90 (H) <0.05 ng/mL   SAMPLES BEING HELD    Collection Time: 06/07/19  4:49 PM   Result Value Ref Range    SAMPLES BEING HELD 1BLUE,1RED     COMMENT        Add-on orders for these samples will be processed based on acceptable specimen integrity and analyte stability, which may vary by analyte. PTT    Collection Time: 06/07/19  4:49 PM   Result Value Ref Range    aPTT 30.4 22.1 - 32.0 sec    aPTT, therapeutic range     58.0 - 77.0 SECS   EKG, 12 LEAD, INITIAL    Collection Time: 06/07/19  5:47 PM   Result Value Ref Range    Ventricular Rate 123 BPM    Atrial Rate 123 BPM    P-R Interval 154 ms    QRS Duration 84 ms    Q-T Interval 306 ms    QTC Calculation (Bezet) 438 ms    Calculated P Axis 72 degrees    Calculated R Axis 108 degrees    Calculated T Axis 63 degrees    Diagnosis       Sinus tachycardia  Low voltage QRS  When compared with ECG of 15-APR-2019 10:58,  Vent.  rate has increased BY  49 BPM  ST now depressed in Lateral leads  Nonspecific T wave abnormality no longer evident in Lateral leads     LACTIC ACID    Collection Time: 06/07/19  6:44 PM   Result Value Ref Range    Lactic acid 1.7 0.4 - 2.0 MMOL/L   EKG, 12 LEAD, INITIAL    Collection Time: 06/07/19  8:05 PM   Result Value Ref Range    Ventricular Rate 112 BPM    Atrial Rate 112 BPM    P-R Interval 156 ms    QRS Duration 96 ms    Q-T Interval 324 ms    QTC Calculation (Bezet) 442 ms Calculated P Axis 61 degrees    Calculated R Axis 115 degrees    Calculated T Axis 72 degrees    Diagnosis       Sinus tachycardia  Right axis deviation  Septal infarct , age undetermined  When compared with ECG of 07-JUN-2019 17:47,  No significant change was found     HEMOGLOBIN A1C WITH EAG    Collection Time: 06/07/19 10:10 PM   Result Value Ref Range    Hemoglobin A1c 8.5 (H) 4.2 - 6.3 %    Est. average glucose 197 mg/dL        Review of Symptoms:  Respiratory: Orthopnea along with dyspnea at rest noted   Cardiovascular: Reports CP, palpitations, sweating. Does not report any recent progression of leg edema. Does not report of any fevers phlegm but reports occasional cough in the last few hours. Otherwise no other pertinent positive or negative symptoms on ROS. Subjective:      Visit Vitals  /68   Pulse (!) 111   Temp 99.4 °F (37.4 °C)   Resp 30   Ht 5' 4\" (1.626 m)   Wt 167 lb (75.8 kg)   SpO2 92%   BMI 28.67 kg/m²       /68   Pulse (!) 111   Temp 99.4 °F (37.4 °C)   Resp 30   Ht 5' 4\" (1.626 m)   Wt 167 lb (75.8 kg)   SpO2 92%   BMI 28.67 kg/m²   General:    Alert, appears to be in respiratory distress. Psychiatric:   Unable to assess   Eye/ENT:      Pupils equal, No asymmetry, Conjunctival pink. Able to hear voice at normal amplitude   Lungs:      Visibly symmetric chest expansion, No palpable tenderness. Crakles to mid lung field bilaterally. Heart[de-identified]    Regular rate and rhythm, S1, S2 normal, no murmur, click, rub or gallop. No JVD, Normal palpable peripheral pulses. No cyanosis   Abdomen:     Soft, non-tender. Bowel sounds normal. No masses,  No      organomegaly. Extremities:   Extremities normal, atraumatic, no edema.    Neurologic:   Unable to assess         Renetta Paulino MD  06/07/19  10:55 PM     [facility] Office:   rGaham Vogel Office:  [address]    [place] [address] [facility]  [address]     [address], [address] [facility], [address] [facility]  P: [phone]    P: [phone]  F: [phone]    F: [phone]

## 2019-06-08 NOTE — DISCHARGE SUMMARY
Discharge Summary       PATIENT ID: [id number]  MRN: [id number]   YOB: 1975    DATE OF ADMISSION: 6/7/2019  4:35 PM    DATE OF DISCHARGE: 06/08/2019   PRIMARY CARE PROVIDER: Maren Montalvo MD     DISCHARGING PROVIDER: Adeola Kumar MD    To contact this individual call [phone] and ask the  to page. If unavailable ask to be transferred the Adult Hospitalist Department. CONSULTATIONS: IP CONSULT TO CARDIOLOGY  IP CONSULT TO CARDIOLOGY    PROCEDURES/SURGERIES: * No surgery found *    ADMITTING DIAGNOSES & HOSPITAL COURSE:   NSTEMI   Pulmonary edema   Systolic CHF     The patient is a 77-year-old female with past medical history of gastroparesis, diabetes mellitus type 2, hypertension, bipolar disorder, GERD, arthritis and coronary artery disease, who presents to the hospital with the above-mentioned symptom.  The patient reports that she had sudden onset of shortness of breath this afternoon where she was sleeping on her bed when she woke up and was coughing and had extreme shortness of breath. She does have a h/o CAD s/p multiple PCI but was done at OSH, and records are not available here. Troponin on admission was 7.9, and uptrended to most recent 11.9. Admitting physician restarted her home apixaban after speaking to cardiology, and therefore she was not initially started on heparin gtt. Discussed this with cardiology who recommended to start heparin 24 hours after apixaban dose which would be tonight 6/8 at 8pm (order placed in case transfer falls through). Cardiology unable to cath today as patient was unable to lie flat. Currently on 2L NC, which is improved from 6L on admission. BS elevated x 1 to 300's, added long acting glargine.      DISCHARGE DIAGNOSES / PLAN:      NSTEMI -   - trend Troponin   - EKG with any acute changes  - Plan for cath 6/9  - Metoprolol, ASA, Statin,   - Nitro, O2, and morphine PRN   - Cardiology following   - Received apixaban evening of 6/7, per cardiology do not start heparin until 24 hours after her dose of apixaban which would be 6/8 at 7pm, ordered. - Needs more IV access, will see if patient is agreeable to CVC here prior to transfer.      Flash pulmonary edema - likely secondary to NSTEMI  - Lasix per cardiology --> 40mg IV lasix BID   - I and O, and daily weights  - Repeat echocardiogram pending --> per cardioloogy EF 26-30%  - received x 1 doses of cefepime, azithromycin in ED, no signs of infection or PNA.      Type 2 diabetes - A1c 8.6%  - SSI, POCT glucose checks and hypoglycemia protocol  - Start glargine 20U daily      Leukocytosis - likely reactive, no further signs of infection, will monitor   Bipolar - home meds: depakote, tigistmbalata,           PENDING TEST RESULTS:   At the time of discharge the following test results are still pending: None        DIET: Cardiac Diet and Diabetic Diet    ACTIVITY: Activity as tolerated    WOUND CARE: None    EQUIPMENT needed: None      DISCHARGE MEDICATIONS:  Current Discharge Medication List      START taking these medications    Details   furosemide (LASIX) 10 mg/mL injection 4 mL by IntraVENous route two (2) times a day. Qty: 1 Vial, Refills: 0      heparin sodium,porcine/D5W (HEPARIN 25,000 UNITS IN D5W 250 ML) 25,000 unit/250 mL(100 unit/mL) infusion 933.6-1,945 Units/hr by IntraVENous route TITRATE. Qty: 1 mL, Refills: 0         CONTINUE these medications which have NOT CHANGED    Details   insulin glargine (LANTUS U-100 INSULIN) 100 unit/mL injection 28 Units by SubCUTAneous route nightly. !! insulin lispro (HUMALOG U-100 INSULIN) 100 unit/mL injection by SubCUTAneous route as needed. Sliding scale      aspirin delayed-release 81 mg tablet Take 81 mg by mouth daily. metoprolol succinate (TOPROL-XL) 50 mg XL tablet Take 50 mg by mouth daily. gabapentin (NEURONTIN) 300 mg capsule Take 300 mg by mouth three (3) times daily.       DULoxetine (CYMBALTA) 60 mg capsule Take 1 Cap by mouth daily. Qty: 90 Cap, Refills: 1    Associated Diagnoses: Fibromyalgia      divalproex DR (DEPAKOTE) 500 mg tablet Take 1 Tab by mouth three (3) times daily. Qty: 270 Tab, Refills: 1    Associated Diagnoses: Bipolar 1 disorder (HCC)      rosuvastatin (CRESTOR) 20 mg tablet Take 20 mg by mouth nightly. !! insulin lispro (HUMALOG) 100 unit/mL injection 5 Units by SubCUTAneous route Before breakfast, lunch, and dinner. !! - Potential duplicate medications found. Please discuss with provider. STOP taking these medications       losartan (COZAAR) 100 mg tablet Comments:   Reason for Stopping:         hydroCHLOROthiazide (HYDRODIURIL) 25 mg tablet Comments:   Reason for Stopping:         apixaban (ELIQUIS) 5 mg tablet Comments:   Reason for Stopping:                 NOTIFY YOUR PHYSICIAN FOR ANY OF THE FOLLOWING:   Fever over 101 degrees for 24 hours. Chest pain, shortness of breath, fever, chills, nausea, vomiting, diarrhea, change in mentation, falling, weakness, bleeding. Severe pain or pain not relieved by medications. Or, any other signs or symptoms that you may have questions about. DISPOSITION:    Home With:   OT  PT  HH  RN       Long term SNF/Inpatient Rehab    Independent/assisted living    Hospice   X Other: transfer to [address] [place] [address] St:     Functional status    Poor     Deconditioned    X Independent      Cognition    X Lucid     Forgetful     Dementia      Catheters/lines (plus indication)    Bates     PICC     PEG    X None      Code status    X Full code     DNR      PHYSICAL EXAMINATION AT DISCHARGE:  Visit Vitals  /60   Pulse 92   Temp 98.7 °F (37.1 °C)   Resp 21   Ht 5' 4\" (1.626 m)   Wt 77.8 kg (171 lb 8.3 oz)   SpO2 97%   BMI 29.44 kg/m²                                                     Constitutional:  No acute distress, cooperative, pleasant    ENT:  Oral mucous moist, oropharynx benign.  Neck supple,    Resp:  Crackles at the bases   CV:  Regular rhythm, normal rate, no murmurs, gallops, rubs     GI:  Soft, non distended, non tender. normoactive bowel sounds, no hepatosplenomegaly     Musculoskeletal:  1+ edema, warm, 2+ pulses throughout    Neurologic:  Moves all extremities.   AAOx3, CN II-XII reviewed                         Skin:  Good turgor, no rashes or ulcers            CHRONIC MEDICAL DIAGNOSES:  Problem List as of 6/8/2019 Date Reviewed: 6/8/2019          Codes Class Noted - Resolved    NSTEMI (non-ST elevated myocardial infarction) [facility]) ICD-10-CM: I21.4  ICD-9-CM: 410.70  6/8/2019 - Present        Acute exacerbation of CHF (congestive heart failure) ([facility] [address] ICD-10-CM: I50.9  ICD-9-CM: 428.0  6/8/2019 - Present        SOB (shortness of breath) ICD-10-CM: R06.02  ICD-9-CM: 786.05  6/7/2019 - Present              Greater than 30 minutes were spent with the patient on counseling and coordination of care    Signed:   Jesse Fraga MD  6/8/2019  5:10 PM

## 2019-06-08 NOTE — PROGRESS NOTES
0740 - Bedside and Verbal shift change report given to Wilfrido Braxton RN (oncoming nurse) by Olimpia Sandy RN (offgoing nurse). Report included the following information SBAR, MAR, Recent Results and Cardiac Rhythm ST . Cardiology in to see patient. Case management in to see patient. Patient would like to transfer to [address] [place] [address], as her cardiologist is there. She is employed at [address] [place] [address] and carries health insurance through her employer. Dr. Vergil Alpers updated and agrees with plan to transfer, requested we initiate the process. [address] [phone] regarding patient's request to transfer to [address] [place] [address]. Dr. Charlie Robertson updated. 1500 - Received a call from Patient [address] regarding transfer to [address] [place] [address]. Dr. Nannette Murrell (cardiology) has agreed to accept the patient at [address] [place] [address], however the patient must be made aware that the associated costs with transportation to [address] [place] [address] may not be covered by insurance, since she is not being transferred to a higher level of care. Patient is agreeable to accepting the financial responsibility for transportation to [address] [place] [address] and still would like to proceed with the transfer. Return call made to Patient [address]. 1520 - Patient is complaining of 5/10 chest pain. EKG obtained and Dr. Vergil Alpers notified and reviewed EKG. 36 - Dr. Charlie Robertson notified of patient's glucose of 396; orders received for insulin coverage (see MAR). Anesthesia consulted to place central line due to patient's extremely limited IV access and the necessity to start a heparin drip this evening. [address] [place],9D called back with a bed assignment from [address] [place] [address] = U main 10th floor [place], room 342. Telephone number for report is [phone].  2061 - Dr. Harriet Mcardle at bedside to place central line. [facility] called to notify us that [facility] will be arriving in one hour to transport patient to [facility] - Radiology at bedside for portable chest xray.   1930 - TRANSFER - OUT REPORT:    Verbal report given to [facility], RN(name) on [address]  being transferred to [facility] 10th floor main room 342A(unit) for patient initiated / requested transfer. Report consisted of patients Situation, Background, Assessment and   Recommendations(SBAR). Information from the following report(s) SBAR, MAR, Recent Results and Cardiac Rhythm SR was reviewed with the receiving nurse. Lines:   Quad Lumen 06/08/19 Right Internal jugular (Active)       Peripheral IV 06/07/19 Right Forearm (Active)   Site Assessment Clean, dry, & intact 6/8/2019 12:00 PM   Phlebitis Assessment 0 6/8/2019 12:00 PM   Infiltration Assessment 0 6/8/2019 12:00 PM   Dressing Status Clean, dry, & intact 6/8/2019 12:00 PM   Dressing Type Transparent 6/8/2019 12:00 PM   Hub Color/Line Status Blue; Infusing 6/8/2019 12:00 PM   Action Taken Open ports on tubing capped 6/8/2019 12:00 PM   Alcohol Cap Used Yes 6/8/2019 12:00 PM        Opportunity for questions and clarification was provided. Patient transported with:   Monitor  O2 @ 2 liters   [facility]    1935 - [facility] transport arriving for patient. EMTALA form completed and MAR printed, discharge summary, radiology disk sent with patient. Xavier Andujar RN at bedside to assist in completing transfer with [facility] / bedside handoff completed.

## 2019-06-08 NOTE — PROCEDURES
Central Line Placement    Start time: 6/8/2019 6:23 PM  End time: 6/8/2019 6:34 PM  Performed by: Dakotah Pratt MD  Authorized by: Dakotah Pratt MD     Indications: vascular access  Preanesthetic Checklist: patient identified, risks and benefits discussed, anesthesia consent, site marked, patient being monitored and timeout performed    Timeout Time: 18:23       Pre-procedure: All elements of maximal sterile barrier technique followed?  Yes    2% Chlorhexidine for cutaneous antisepsis, Hand hygiene performed prior to catheter insertion and Ultrasound guidance    Sterile Ultrasound Technique followed?: Yes            Procedure:   Prep:  ChloraPrep  Location:  Internal jugular  Orientation:  Right  Patient position:  Trendelenburg  Catheter type:  Quad lumen  Catheter size:  8.5 Fr  Catheter length:  16 cm  Number of attempts:  1  Successful placement: Yes      Assessment:   Post-procedure:  Catheter secured, sterile dressing applied and sterile dressing with CHG applied  Assessment:  Placement verified by x-ray, free fluid flow, blood return through all ports and guidewire removal verified  Insertion:  Uncomplicated  Patient tolerance:  Patient tolerated the procedure well with no immediate complications

## 2019-06-09 LAB
ATRIAL RATE: 112 BPM
ATRIAL RATE: 112 BPM
ATRIAL RATE: 123 BPM
ATRIAL RATE: 95 BPM
CALCULATED P AXIS, ECG09: 45 DEGREES
CALCULATED P AXIS, ECG09: 51 DEGREES
CALCULATED P AXIS, ECG09: 61 DEGREES
CALCULATED P AXIS, ECG09: 72 DEGREES
CALCULATED R AXIS, ECG10: 108 DEGREES
CALCULATED R AXIS, ECG10: 115 DEGREES
CALCULATED R AXIS, ECG10: 115 DEGREES
CALCULATED R AXIS, ECG10: 116 DEGREES
CALCULATED T AXIS, ECG11: 63 DEGREES
CALCULATED T AXIS, ECG11: 72 DEGREES
CALCULATED T AXIS, ECG11: 74 DEGREES
CALCULATED T AXIS, ECG11: 87 DEGREES
DIAGNOSIS, 93000: NORMAL
P-R INTERVAL, ECG05: 142 MS
P-R INTERVAL, ECG05: 154 MS
P-R INTERVAL, ECG05: 154 MS
P-R INTERVAL, ECG05: 156 MS
Q-T INTERVAL, ECG07: 306 MS
Q-T INTERVAL, ECG07: 324 MS
Q-T INTERVAL, ECG07: 340 MS
Q-T INTERVAL, ECG07: 376 MS
QRS DURATION, ECG06: 84 MS
QRS DURATION, ECG06: 94 MS
QRS DURATION, ECG06: 96 MS
QRS DURATION, ECG06: 96 MS
QTC CALCULATION (BEZET), ECG08: 438 MS
QTC CALCULATION (BEZET), ECG08: 442 MS
QTC CALCULATION (BEZET), ECG08: 464 MS
QTC CALCULATION (BEZET), ECG08: 472 MS
VENTRICULAR RATE, ECG03: 112 BPM
VENTRICULAR RATE, ECG03: 112 BPM
VENTRICULAR RATE, ECG03: 123 BPM
VENTRICULAR RATE, ECG03: 95 BPM

## 2019-06-09 NOTE — PROGRESS NOTES
2015 Pt left with [facility] transport team on a cardiac monitor, 2L NC and heparin gtt going. All documentation with transport. VSS prior to leaving.

## 2019-06-10 ENCOUNTER — TELEPHONE (OUTPATIENT)
Dept: CARDIAC REHAB | Age: 44
End: 2019-06-10

## 2019-06-10 NOTE — TELEPHONE ENCOUNTER
6/10/2019 Cardiac Rehab: Called Ms. Cecily Funk  to discuss participation in the Cardiac Rehab Program following NSTEMI on 6/7/2019. She was transferred to [facility] for continuing care due to insurance. I spoke with her mother and she is for a cardiac cath today. Discussed her ability to enroll in Cardiac Rehab at [facility] with the MI diagnosis and pending cath results. Jose Greene's mom verbalized understanding.   Le Bashir RN

## 2019-06-12 LAB
BACTERIA SPEC CULT: NORMAL
SERVICE CMNT-IMP: NORMAL

## 2019-06-25 LAB — CREATININE, EXTERNAL: 1.01

## 2019-07-24 ENCOUNTER — HOSPITAL ENCOUNTER (INPATIENT)
Age: 44
LOS: 2 days | Discharge: PSYCHIATRIC OR SUBSTANCE USE FACILITY | DRG: 918 | End: 2019-07-26
Attending: EMERGENCY MEDICINE | Admitting: INTERNAL MEDICINE
Payer: COMMERCIAL

## 2019-07-24 DIAGNOSIS — T65.92XA SUICIDE ATTEMPT BY SUBSTANCE OVERDOSE, INITIAL ENCOUNTER (HCC): Primary | ICD-10-CM

## 2019-07-24 DIAGNOSIS — T38.3X2A INSULIN OVERDOSE, INTENTIONAL SELF-HARM, INITIAL ENCOUNTER (HCC): ICD-10-CM

## 2019-07-24 PROBLEM — T38.3X1A INSULIN OVERDOSE: Status: ACTIVE | Noted: 2019-07-24

## 2019-07-24 LAB
ALBUMIN SERPL-MCNC: 4.1 G/DL (ref 3.5–5)
ALBUMIN/GLOB SERPL: 1 {RATIO} (ref 1.1–2.2)
ALP SERPL-CCNC: 149 U/L (ref 45–117)
ALT SERPL-CCNC: 35 U/L (ref 12–78)
AMPHET UR QL SCN: NEGATIVE
ANION GAP SERPL CALC-SCNC: 8 MMOL/L (ref 5–15)
APAP SERPL-MCNC: 6 UG/ML (ref 10–30)
APPEARANCE UR: CLEAR
AST SERPL-CCNC: 55 U/L (ref 15–37)
ATRIAL RATE: 93 BPM
BACTERIA URNS QL MICRO: NEGATIVE /HPF
BARBITURATES UR QL SCN: NEGATIVE
BASOPHILS # BLD: 0 K/UL (ref 0–0.1)
BASOPHILS NFR BLD: 0 % (ref 0–1)
BENZODIAZ UR QL: NEGATIVE
BILIRUB SERPL-MCNC: 0.6 MG/DL (ref 0.2–1)
BILIRUB UR QL: NEGATIVE
BUN SERPL-MCNC: 21 MG/DL (ref 6–20)
BUN/CREAT SERPL: 17 (ref 12–20)
CALCIUM SERPL-MCNC: 9.7 MG/DL (ref 8.5–10.1)
CALCULATED P AXIS, ECG09: 50 DEGREES
CALCULATED R AXIS, ECG10: 90 DEGREES
CALCULATED T AXIS, ECG11: 86 DEGREES
CANNABINOIDS UR QL SCN: NEGATIVE
CHLORIDE SERPL-SCNC: 107 MMOL/L (ref 97–108)
CO2 SERPL-SCNC: 23 MMOL/L (ref 21–32)
COCAINE UR QL SCN: NEGATIVE
COLOR UR: ABNORMAL
COMMENT, HOLDF: NORMAL
CREAT SERPL-MCNC: 1.24 MG/DL (ref 0.55–1.02)
DIAGNOSIS, 93000: NORMAL
DIFFERENTIAL METHOD BLD: ABNORMAL
DRUG SCRN COMMENT,DRGCM: NORMAL
EOSINOPHIL # BLD: 0.2 K/UL (ref 0–0.4)
EOSINOPHIL NFR BLD: 1 % (ref 0–7)
EPITH CASTS URNS QL MICRO: ABNORMAL /LPF
ERYTHROCYTE [DISTWIDTH] IN BLOOD BY AUTOMATED COUNT: 17.8 % (ref 11.5–14.5)
ETHANOL SERPL-MCNC: <10 MG/DL
GLOBULIN SER CALC-MCNC: 4.2 G/DL (ref 2–4)
GLUCOSE BLD STRIP.AUTO-MCNC: 104 MG/DL (ref 65–100)
GLUCOSE BLD STRIP.AUTO-MCNC: 108 MG/DL (ref 65–100)
GLUCOSE BLD STRIP.AUTO-MCNC: 121 MG/DL (ref 65–100)
GLUCOSE BLD STRIP.AUTO-MCNC: 190 MG/DL (ref 65–100)
GLUCOSE BLD STRIP.AUTO-MCNC: 194 MG/DL (ref 65–100)
GLUCOSE BLD STRIP.AUTO-MCNC: 220 MG/DL (ref 65–100)
GLUCOSE BLD STRIP.AUTO-MCNC: 231 MG/DL (ref 65–100)
GLUCOSE BLD STRIP.AUTO-MCNC: 243 MG/DL (ref 65–100)
GLUCOSE BLD STRIP.AUTO-MCNC: 272 MG/DL (ref 65–100)
GLUCOSE BLD STRIP.AUTO-MCNC: 281 MG/DL (ref 65–100)
GLUCOSE BLD STRIP.AUTO-MCNC: 318 MG/DL (ref 65–100)
GLUCOSE BLD STRIP.AUTO-MCNC: 53 MG/DL (ref 65–100)
GLUCOSE BLD STRIP.AUTO-MCNC: 58 MG/DL (ref 65–100)
GLUCOSE BLD STRIP.AUTO-MCNC: 71 MG/DL (ref 65–100)
GLUCOSE BLD STRIP.AUTO-MCNC: 78 MG/DL (ref 65–100)
GLUCOSE BLD STRIP.AUTO-MCNC: 95 MG/DL (ref 65–100)
GLUCOSE SERPL-MCNC: 107 MG/DL (ref 65–100)
GLUCOSE UR STRIP.AUTO-MCNC: 500 MG/DL
HCG UR QL: NEGATIVE
HCT VFR BLD AUTO: 49.2 % (ref 35–47)
HGB BLD-MCNC: 14.9 G/DL (ref 11.5–16)
HGB UR QL STRIP: ABNORMAL
HYALINE CASTS URNS QL MICRO: ABNORMAL /LPF (ref 0–5)
IMM GRANULOCYTES # BLD AUTO: 0.1 K/UL (ref 0–0.04)
IMM GRANULOCYTES NFR BLD AUTO: 1 % (ref 0–0.5)
KETONES UR QL STRIP.AUTO: NEGATIVE MG/DL
LEUKOCYTE ESTERASE UR QL STRIP.AUTO: NEGATIVE
LIPASE SERPL-CCNC: 197 U/L (ref 73–393)
LYMPHOCYTES # BLD: 2.7 K/UL (ref 0.8–3.5)
LYMPHOCYTES NFR BLD: 18 % (ref 12–49)
MCH RBC QN AUTO: 26.3 PG (ref 26–34)
MCHC RBC AUTO-ENTMCNC: 30.3 G/DL (ref 30–36.5)
MCV RBC AUTO: 86.8 FL (ref 80–99)
METHADONE UR QL: NEGATIVE
MONOCYTES # BLD: 0.5 K/UL (ref 0–1)
MONOCYTES NFR BLD: 3 % (ref 5–13)
NEUTS SEG # BLD: 12 K/UL (ref 1.8–8)
NEUTS SEG NFR BLD: 77 % (ref 32–75)
NITRITE UR QL STRIP.AUTO: NEGATIVE
NRBC # BLD: 0 K/UL (ref 0–0.01)
NRBC BLD-RTO: 0 PER 100 WBC
OPIATES UR QL: NEGATIVE
P-R INTERVAL, ECG05: 142 MS
PCP UR QL: NEGATIVE
PH UR STRIP: 5.5 [PH] (ref 5–8)
PLATELET # BLD AUTO: 316 K/UL (ref 150–400)
PMV BLD AUTO: 10.3 FL (ref 8.9–12.9)
POTASSIUM SERPL-SCNC: 4.2 MMOL/L (ref 3.5–5.1)
PROT SERPL-MCNC: 8.3 G/DL (ref 6.4–8.2)
PROT UR STRIP-MCNC: 100 MG/DL
Q-T INTERVAL, ECG07: 400 MS
QRS DURATION, ECG06: 100 MS
QTC CALCULATION (BEZET), ECG08: 497 MS
RBC # BLD AUTO: 5.67 M/UL (ref 3.8–5.2)
RBC #/AREA URNS HPF: ABNORMAL /HPF (ref 0–5)
SALICYLATES SERPL-MCNC: <1.7 MG/DL (ref 2.8–20)
SAMPLES BEING HELD,HOLD: NORMAL
SERVICE CMNT-IMP: ABNORMAL
SERVICE CMNT-IMP: NORMAL
SODIUM SERPL-SCNC: 138 MMOL/L (ref 136–145)
SP GR UR REFRACTOMETRY: 1.01 (ref 1–1.03)
UR CULT HOLD, URHOLD: NORMAL
UROBILINOGEN UR QL STRIP.AUTO: 0.2 EU/DL (ref 0.2–1)
VALPROATE SERPL-MCNC: 4 UG/ML (ref 50–100)
VENTRICULAR RATE, ECG03: 93 BPM
WBC # BLD AUTO: 15.5 K/UL (ref 3.6–11)
WBC URNS QL MICRO: ABNORMAL /HPF (ref 0–4)

## 2019-07-24 PROCEDURE — 74011636637 HC RX REV CODE- 636/637: Performed by: INTERNAL MEDICINE

## 2019-07-24 PROCEDURE — 80307 DRUG TEST PRSMV CHEM ANLYZR: CPT

## 2019-07-24 PROCEDURE — 74011000258 HC RX REV CODE- 258: Performed by: INTERNAL MEDICINE

## 2019-07-24 PROCEDURE — 93005 ELECTROCARDIOGRAM TRACING: CPT

## 2019-07-24 PROCEDURE — 36415 COLL VENOUS BLD VENIPUNCTURE: CPT

## 2019-07-24 PROCEDURE — 80053 COMPREHEN METABOLIC PANEL: CPT

## 2019-07-24 PROCEDURE — 82962 GLUCOSE BLOOD TEST: CPT

## 2019-07-24 PROCEDURE — 96360 HYDRATION IV INFUSION INIT: CPT

## 2019-07-24 PROCEDURE — 81025 URINE PREGNANCY TEST: CPT

## 2019-07-24 PROCEDURE — 80164 ASSAY DIPROPYLACETIC ACD TOT: CPT

## 2019-07-24 PROCEDURE — 74011250637 HC RX REV CODE- 250/637: Performed by: INTERNAL MEDICINE

## 2019-07-24 PROCEDURE — 83690 ASSAY OF LIPASE: CPT

## 2019-07-24 PROCEDURE — 74011000250 HC RX REV CODE- 250: Performed by: EMERGENCY MEDICINE

## 2019-07-24 PROCEDURE — 74011250636 HC RX REV CODE- 250/636: Performed by: INTERNAL MEDICINE

## 2019-07-24 PROCEDURE — 99285 EMERGENCY DEPT VISIT HI MDM: CPT

## 2019-07-24 PROCEDURE — 65610000006 HC RM INTENSIVE CARE

## 2019-07-24 PROCEDURE — 81001 URINALYSIS AUTO W/SCOPE: CPT

## 2019-07-24 PROCEDURE — 85025 COMPLETE CBC W/AUTO DIFF WBC: CPT

## 2019-07-24 RX ORDER — SODIUM CHLORIDE 0.9 % (FLUSH) 0.9 %
5-40 SYRINGE (ML) INJECTION EVERY 8 HOURS
Status: DISCONTINUED | OUTPATIENT
Start: 2019-07-24 | End: 2019-07-26 | Stop reason: HOSPADM

## 2019-07-24 RX ORDER — LORAZEPAM 0.5 MG/1
0.5 TABLET ORAL
Status: DISCONTINUED | OUTPATIENT
Start: 2019-07-24 | End: 2019-07-25

## 2019-07-24 RX ORDER — HEPARIN SODIUM 5000 [USP'U]/ML
5000 INJECTION, SOLUTION INTRAVENOUS; SUBCUTANEOUS EVERY 8 HOURS
Status: DISCONTINUED | OUTPATIENT
Start: 2019-07-24 | End: 2019-07-26 | Stop reason: HOSPADM

## 2019-07-24 RX ORDER — DEXTROSE MONOHYDRATE 100 MG/ML
25 INJECTION, SOLUTION INTRAVENOUS CONTINUOUS
Status: DISCONTINUED | OUTPATIENT
Start: 2019-07-24 | End: 2019-07-25

## 2019-07-24 RX ORDER — INSULIN LISPRO 100 [IU]/ML
INJECTION, SOLUTION INTRAVENOUS; SUBCUTANEOUS EVERY 4 HOURS
Status: DISCONTINUED | OUTPATIENT
Start: 2019-07-24 | End: 2019-07-25

## 2019-07-24 RX ORDER — HYDROCODONE BITARTRATE AND ACETAMINOPHEN 5; 325 MG/1; MG/1
1-2 TABLET ORAL
Status: DISCONTINUED | OUTPATIENT
Start: 2019-07-24 | End: 2019-07-26 | Stop reason: HOSPADM

## 2019-07-24 RX ORDER — DEXTROSE MONOHYDRATE AND SODIUM CHLORIDE 5; .9 G/100ML; G/100ML
50 INJECTION, SOLUTION INTRAVENOUS CONTINUOUS
Status: DISCONTINUED | OUTPATIENT
Start: 2019-07-24 | End: 2019-07-25

## 2019-07-24 RX ORDER — SODIUM CHLORIDE 0.9 % (FLUSH) 0.9 %
5-40 SYRINGE (ML) INJECTION AS NEEDED
Status: DISCONTINUED | OUTPATIENT
Start: 2019-07-24 | End: 2019-07-26 | Stop reason: HOSPADM

## 2019-07-24 RX ORDER — DEXTROSE MONOHYDRATE 100 MG/ML
500 INJECTION, SOLUTION INTRAVENOUS ONCE
Status: COMPLETED | OUTPATIENT
Start: 2019-07-24 | End: 2019-07-24

## 2019-07-24 RX ORDER — DEXTROSE 50 % IN WATER (D50W) INTRAVENOUS SYRINGE
12.5-25 AS NEEDED
Status: DISCONTINUED | OUTPATIENT
Start: 2019-07-24 | End: 2019-07-26 | Stop reason: HOSPADM

## 2019-07-24 RX ORDER — MAGNESIUM SULFATE 100 %
4 CRYSTALS MISCELLANEOUS AS NEEDED
Status: DISCONTINUED | OUTPATIENT
Start: 2019-07-24 | End: 2019-07-26 | Stop reason: HOSPADM

## 2019-07-24 RX ADMIN — DEXTROSE MONOHYDRATE AND SODIUM CHLORIDE 50 ML/HR: 5; .9 INJECTION, SOLUTION INTRAVENOUS at 15:44

## 2019-07-24 RX ADMIN — HYDROCODONE BITARTRATE AND ACETAMINOPHEN 1 TABLET: 5; 325 TABLET ORAL at 16:55

## 2019-07-24 RX ADMIN — HEPARIN SODIUM 5000 UNITS: 5000 INJECTION INTRAVENOUS; SUBCUTANEOUS at 23:04

## 2019-07-24 RX ADMIN — HYDROCODONE BITARTRATE AND ACETAMINOPHEN 2 TABLET: 5; 325 TABLET ORAL at 20:30

## 2019-07-24 RX ADMIN — HEPARIN SODIUM 5000 UNITS: 5000 INJECTION INTRAVENOUS; SUBCUTANEOUS at 15:06

## 2019-07-24 RX ADMIN — Medication 10 ML: at 15:06

## 2019-07-24 RX ADMIN — DEXTROSE MONOHYDRATE 500 ML: 10 INJECTION, SOLUTION INTRAVENOUS at 08:08

## 2019-07-24 RX ADMIN — DEXTROSE MONOHYDRATE 25 ML/HR: 10 INJECTION, SOLUTION INTRAVENOUS at 10:27

## 2019-07-24 RX ADMIN — Medication 10 ML: at 14:11

## 2019-07-24 RX ADMIN — INSULIN LISPRO 5 UNITS: 100 INJECTION, SOLUTION INTRAVENOUS; SUBCUTANEOUS at 20:30

## 2019-07-24 RX ADMIN — DEXTROSE MONOHYDRATE 10 ML/HR: 10 INJECTION, SOLUTION INTRAVENOUS at 13:45

## 2019-07-24 NOTE — ED NOTES
Pt resting in bed with eyes closed, woke easily to voice. States she is dizzy. Repeated accucheck is up to 78. Pt states she feels too full now to eat/drink anything further. Advised pt to continue to sip juices.   Dr. Jude Dow made aware and will order D10 infusion

## 2019-07-24 NOTE — PROGRESS NOTES
1345: TRANSFER - IN REPORT:    [facility] [address]  being received from ED (unit) for routine progression of care      Report consisted of patients Situation, Background, Assessment and   Recommendations(SBAR). Information from the following report(s) SBAR, Kardex, Intake/Output, MAR, Recent Results and Cardiac Rhythm NSR was reviewed with the receiving nurse. Opportunity for questions and clarification was provided. Assessment completed upon patients arrival to unit and care assumed. Primary Nurse Meggan Sanchez and Jason houston RN performed a dual skin assessment on this patient No impairment noted- midsternal chest scar, scar L calf  Eben score is 21    1930: Bedside and Verbal shift change report given to  N [facility] (oncoming nurse) by Flavia Cheung RN (offgoing nurse). Report included the following information SBAR, Kardex, Intake/Output, MAR, Recent Results and Cardiac Rhythm NSR.

## 2019-07-24 NOTE — DIABETES MGMT
[facility]    [facility]TC Progress Note    Recommendations/ Comments: Chart reviewed on [address] for hypo- and hyperglycemia following an overdose of insulin this morning. Noted that D10% is running and rate was reduced. If appropriate, please consider:   - continuing dextrose for at least 24 hours post insulin over dose - may be able to change to D5 if blood sugars remain elevated    Current hospital DM medication: Dextrose 10% running @ 25 mL/hr    Patient is a 37 y.o. female with known diabetes on insulin injections: Humalog : scale, Lantus : 28 units at home. A1c:   Lab Results   Component Value Date/Time    Hemoglobin A1c 8.5 (H) 06/07/2019 10:10 PM       Recent Glucose Results:   Lab Results   Component Value Date/Time     (H) 07/24/2019 06:58 AM    GLUCPOC 231 (H) 07/24/2019 09:40 AM    GLUCPOC 318 (H) 07/24/2019 08:49 AM    GLUCPOC 194 (H) 07/24/2019 08:31 AM        Lab Results   Component Value Date/Time    Creatinine 1.24 (H) 07/24/2019 06:58 AM     Estimated Creatinine Clearance: 57.6 mL/min (A) (based on SCr of 1.24 mg/dL (H)). Active Orders   There are no active orders of the following types: Diet. PO intake: No data found. Will continue to follow as needed.     Thank you  Lorene Macias MS, RN, CDE    Time spent: 6 minutes

## 2019-07-24 NOTE — ED TRIAGE NOTES
Arrived from home via ambulance after taking 80 Lantus and 40 Humalog in a suicide attempt. Been feeling really depressed. Had a fight with boyfriend this am. Has attempted suicide in the past with insulin. States has been going through a lot. I just don't want to be here any more. I am tired of all the medical issues and pain.  Pt. Tearful on arrival.

## 2019-07-24 NOTE — ED NOTES
Provided with frozen meal and juices to drink. Breakfast tray ordered. All pt belongings secured at [facility].   Pt within view of Nurse's Station

## 2019-07-24 NOTE — ED NOTES
Accucheck 58. Repeated test with results of 53. Dr. Stu Coburn aware and states pt is clinically stable, wants to just observe the patient closely as she has already eaten the entire frozen meal as well as a double portion breakfast tray. Pt provided with additional juices (3) and will recheck glucose in 15 minutes. Pt is alert and asymptomatic, instructed to alert staff if she has any symptoms.

## 2019-07-24 NOTE — ED NOTES
Bedside verbal shift change report received from [address] [facility] . Reviewed patient status, lab results and medical plan. Will continue to monitor patient. Patient eating, alert, resting in bed on phone, call bell in reach, will continue to monitor.

## 2019-07-24 NOTE — PROGRESS NOTES
PCCM    Chart reviewed    38 yo female with multiple med issues  Admitted from ED to ICU 7/24 for monitoring s/p intentional Insulin OD  Prior h/o suicide attempt same rx. On D10 gtt--> BG being monitored  Tox screen neg for other agents. Available as needed for acute ICU issues.

## 2019-07-24 NOTE — ED PROVIDER NOTES
37 y.o. female with past medical history significant for gastroparesis, DMT2, HTN, bipolar disorder, GERD, and CAD, s/p multiple stent placements, s/p CABG, who presents from home via EMS with chief complaint of reported drug overdose as a suicidal attempt. Patient was admitted here 6/7/19 for SOB, acute pulmonary edema and found to have an NSTEMI. The patient was unable to lay flat for a cardiac cath, she was treated with heparin and transferred to [facility], where she had a CABG. Records not available for review. Patient states each day since her surgery, her incision site \"hurts more and more\". She has been taking Tylenol at home. She reports recently, she has felt increasing depression related to her health issues and physical pain. This morning, she got into an argument with her significant other. She endorses some vomiting after the argument d/t stress. She then drank some orange juice, was getting ready to eat, however, then decided to attempt suicide by taking an overdose of her prescribed insulin. She reports taking 80 units of Lantus and 60 units of Humalog one hour ago. Patient states \"I didn't want to hurt myself, I just don't want to be here anymore\". She notes she normally takes 28 units of lantus in the morning. EMS was called, and en route her blood sugar was 296, then 20 minutes later was 324. Upon arrival to the ED, patient's blood sugar is 190. Patient reports multiple previous suicidal attempts, and EMS notes the patient has tried to use insulin in the past, at which time she went unconscious and had seizures. Patient states she has not seen a mental health profession in [place] years\". She has been out of some of her medications for the last month or so, including her Cymbalta. Patient denies any HI or current hallucinations, does note she has \"struggled with those\" in the past, referring to audible hallucinations. She denies any other pain besides around her sternotomy site, and denies any SOB.  There are no other acute medical concerns at this time. Social hx: Current smoker (0.25 packs/day); Occasional EtOH use  PCP: Jennifer Arreguin MD    Note written by Claudeen Pore, Scribe, as dictated by Mortimer Pancake, DO 6:35 AM    The history is provided by the patient and the EMS personnel. No  was used.         Past Medical History:   Diagnosis Date    Arthritis     Bipolar [facility] disorder [facility]     Diabetes ([facility]     Gastrointestinal disorder     gastroparesis    Gastroparesis     GERD (gastroesophageal reflux disease)     Heart disease     Hypertension     Psychiatric disorder     Bipolar       Past Surgical History:   Procedure Laterality Date    HX ANKLE FRACTURE TX      HX CAROTID STENT      HX GYN      BTL    HX ORTHOPAEDIC      toe         Family History:   Problem Relation Age of Onset    Thyroid Disease Mother     Hypertension Father     Heart Disease Father     Diabetes Sister     Heart Disease Sister     Hypertension Sister     Thyroid Disease Brother        Social History     Socioeconomic History    Marital status:      Spouse name: Not on file    Number of children: Not on file    Years of education: Not on file    Highest education level: Not on file   Occupational History    Not on file   Social Needs    Financial resource strain: Not on file    Food insecurity:     Worry: Not on file     Inability: Not on file    Transportation needs:     Medical: Not on file     Non-medical: Not on file   Tobacco Use    Smoking status: Current Every Day Smoker     Packs/day: 0.25    Smokeless tobacco: Never Used   Substance and Sexual Activity    Alcohol use: Yes     Comment: occasionally    Drug use: No    Sexual activity: Yes   Lifestyle    Physical activity:     Days per week: Not on file     Minutes per session: Not on file    Stress: Not on file   Relationships    Social connections:     Talks on phone: Not on file     Gets together: Not on file Attends [employer] service: Not on file     Active member of club or organization: Not on file     Attends meetings of clubs or organizations: Not on file     Relationship status: Not on file    Intimate partner violence:     Fear of current or ex partner: Not on file     Emotionally abused: Not on file     Physically abused: Not on file     Forced sexual activity: Not on file   Other Topics Concern    Not on file   Social History Narrative    Not on file         ALLERGIES: Voltaren [diclofenac sodium]; Flagyl [metronidazole]; Imipramine; and Levaquin [levofloxacin]    Review of Systems   Constitutional: Negative for activity change, appetite change, chills and fever. HENT: Negative for congestion, rhinorrhea, sinus pressure, sneezing and sore throat. Eyes: Negative for photophobia and visual disturbance. Respiratory: Negative for cough and shortness of breath. Cardiovascular: Positive for chest pain. Gastrointestinal: Negative for abdominal pain, blood in stool, constipation, diarrhea, nausea and vomiting. Genitourinary: Negative for difficulty urinating, dysuria, flank pain, frequency, hematuria, menstrual problem, urgency, vaginal bleeding and vaginal discharge. Musculoskeletal: Negative for arthralgias, back pain, myalgias and neck pain. Skin: Positive for wound. Negative for rash. Neurological: Negative for syncope, weakness, numbness and headaches. Psychiatric/Behavioral: Positive for dysphoric mood, self-injury and suicidal ideas. Negative for hallucinations. All other systems reviewed and are negative. Vitals:    07/24/19 0636   BP: 157/89   Pulse: 95   Resp: 16   Temp: 98.5 °F (36.9 °C)   SpO2: 99%   Weight: 74 kg (163 lb 2.3 oz)   Height: 5' 4\" (1.626 m)            Physical Exam   Constitutional: She is oriented to person, place, and time. She appears well-developed and well-nourished. No distress. Tearful and depressed appearing.    HENT:   Head: Normocephalic and atraumatic. Eyes: Pupils are equal, round, and reactive to light. Conjunctivae and EOM are normal.   Neck: Neck supple. Cardiovascular: Normal rate, regular rhythm and normal heart sounds. Pulmonary/Chest: Effort normal and breath sounds normal.   Healing sternotomy incision without evidence of surrounding infection. No purulent drainage. No dehiscence. Abdominal: Soft. She exhibits no distension. There is no tenderness. Musculoskeletal: She exhibits no edema or tenderness. Neurological: She is alert and oriented to person, place, and time. GCS eye subscore is 4. GCS verbal subscore is 5. GCS motor subscore is 6. Skin: Skin is warm and dry. She is not diaphoretic. Psychiatric: Her behavior is normal. She is not agitated, not aggressive and not actively hallucinating. She exhibits a depressed mood. She expresses suicidal ideation. She expresses no homicidal ideation. She expresses no homicidal plans. Depressed. Suicidal. No hallucinations. Not homicidal. Calm and cooperative. No agitation or aggression. Nursing note and vitals reviewed. Note written by Vani Barrera, as dictated by Luiz Moncada DO 6:35 AM    MDM    37 y.o. female presents with insulin overdose. Initially GCS 15, able to eat a full meal and drink juice. Procedures  Total critical care time spent exclusive of procedures:  45 minutes      6:41 AM  Spoke with Poison Control. They recommends q one hour glucose checks and 24 hour observation admission medically prior to any psych admissions. ED EKG interpretation:  Rhythm: normal sinus rhythm; and regular . Rate (approx.): 93 bpm; Evidence of prior anterior infarct; Nonspecific T wave flattening laterally; No acute ST elevation or depression; No significant change from prior EKG in June.      Note written by Vani Barrera, as dictated by Luiz Moncada DO 6:51 AM     7:43 AM  Patient has had 3 juices, 2 muffins, and a [employer] meal, is currently eating eggs. Her last blood sugar was 58. RN to recheck at this time. 7:44 AM  Recheck was 53. Patient is currently talking on the phone in NAD. Nurse to provide more juice, and I will order some D10 infusion. 8:32 AM  Patient noted to be increasingly somnolent after eating. She will respond to brief questions, but falls, asleep quickly, responding appropriately. She is protecting her airway with normal O2 sats on RA. Will continue to monitor closely, do not feel intubation is required at this time. 8:50 AM  Patient reevaluated, patient much more alert at this time. Hospitalist Delroy for Admission  8:53 AM    CONSULT NOTE:  9:01 AM Piero Chand DO communicated with Dr. Vianey Madrid, Consult for Hospitalist via [facility] Text. Discussed available diagnostic tests and clinical findings. He will see and admit the patient.

## 2019-07-24 NOTE — H&P
H and P     CC: insulin OD, intentional     HPI: pt too somnolent at time of admit to obtaion history, however good history obtained as time of initial ED presentation:   \"38 y.o. female with past medical history significant for gastroparesis, DMT2, HTN, bipolar disorder, GERD, and CAD, s/p multiple stent placements, s/p CABG, who presents from home via EMS with chief complaint of reported drug overdose as a suicidal attempt. Patient was admitted here 6/7/19 for SOB, acute pulmonary edema and found to have an NSTEMI. The patient was unable to lay flat for a cardiac cath, she was treated with heparin and transferred to [facility], where she had a CABG. Records not available for review. Patient states each day since her surgery, her incision site \"hurts more and more\". She has been taking Tylenol at home. She reports recently, she has felt increasing depression related to her health issues and physical pain. This morning, she got into an argument with her significant other. She endorses some vomiting after the argument d/t stress. She then drank some orange juice, was getting ready to eat, however, then decided to attempt suicide by taking an overdose of her prescribed insulin. She reports taking 80 units of Lantus and 60 units of Humalog one hour ago. Patient states \"I didn't want to hurt myself, I just don't want to be here anymore\". She notes she normally takes 28 units of lantus in the morning. EMS was called, and en route her blood sugar was 296, then 20 minutes later was 324. Upon arrival to the ED, patient's blood sugar is 190. Patient reports multiple previous suicidal attempts, and EMS notes the patient has tried to use insulin in the past, at which time she went unconscious and had seizures. Patient states she has not seen a mental health profession in [place] years\". She has been out of some of her medications for the last month or so, including her Cymbalta.  Patient denies any HI or current hallucinations, does note she has \"struggled with those\" in the past, referring to audible hallucinations. She denies any other pain besides around her sternotomy site, and denies any SOB. There are no other acute medical concerns at this time. + ( ED note). Prior to Admission Med List  Prior to Admission Medications   Prescriptions Last Dose Informant Patient Reported? Taking? DULoxetine (CYMBALTA) 60 mg capsule   No No   Sig: Take 1 Cap by mouth daily. aspirin delayed-release 81 mg tablet   Yes No   Sig: Take 81 mg by mouth daily. divalproex DR (DEPAKOTE) 500 mg tablet   No No   Sig: Take 1 Tab by mouth three (3) times daily. furosemide (LASIX) 10 mg/mL injection   No No   Si mL by IntraVENous route two (2) times a day.   gabapentin (NEURONTIN) 300 mg capsule   Yes No   Sig: Take 300 mg by mouth three (3) times daily. heparin sodium,porcine/D5W (HEPARIN 25,000 UNITS IN D5W 250 ML) 25,000 unit/250 mL(100 unit/mL) infusion   No No   Si.6-1,945 Units/hr by IntraVENous route TITRATE.   insulin glargine (LANTUS U-100 INSULIN) 100 unit/mL injection   Yes No   Si Units by SubCUTAneous route nightly. insulin lispro (HUMALOG U-100 INSULIN) 100 unit/mL injection   Yes No   Sig: by SubCUTAneous route as needed. Sliding scale   insulin lispro (HUMALOG) 100 unit/mL injection   Yes No   Si Units by SubCUTAneous route Before breakfast, lunch, and dinner. metoprolol succinate (TOPROL-XL) 50 mg XL tablet   Yes No   Sig: Take 50 mg by mouth daily. rosuvastatin (CRESTOR) 20 mg tablet   Yes No   Sig: Take 20 mg by mouth nightly.       Facility-Administered Medications: None       SH:  Social History     Socioeconomic History    Marital status:      Spouse name: Not on file    Number of children: Not on file    Years of education: Not on file    Highest education level: Not on file   Occupational History    Not on file   Social Needs    Financial resource strain: Not on file    Food insecurity: Worry: Not on file     Inability: Not on file    Transportation needs:     Medical: Not on file     Non-medical: Not on file   Tobacco Use    Smoking status: Current Every Day Smoker     Packs/day: 0.25    Smokeless tobacco: Never Used   Substance and Sexual Activity    Alcohol use: Yes     Comment: occasionally    Drug use: No    Sexual activity: Yes   Lifestyle    Physical activity:     Days per week: Not on file     Minutes per session: Not on file    Stress: Not on file   Relationships    Social connections:     Talks on phone: Not on file     Gets together: Not on file     Attends [employer] service: Not on file     Active member of club or organization: Not on file     Attends meetings of clubs or organizations: Not on file     Relationship status: Not on file    Intimate partner violence:     Fear of current or ex partner: Not on file     Emotionally abused: Not on file     Physically abused: Not on file     Forced sexual activity: Not on file   Other Topics Concern    Not on file   Social History Narrative    Not on file          PMH:     Past Medical History:   Diagnosis Date    Arthritis     Bipolar 1 disorder ([facility]     Diabetes ([facility]     Gastrointestinal disorder     gastroparesis    Gastroparesis     GERD (gastroesophageal reflux disease)     Heart disease     Hypertension     Psychiatric disorder     Bipolar        Medicine PTA:       (Not in a hospital admission)      ROS:   Review of Systems   Reason unable to perform ROS: too somnolent. PE:     Visit Vitals  /69   Pulse (!) 102   Temp 98.5 °F (36.9 °C)   Resp 18   Ht 5' 4\" (1.626 m)   Wt 74 kg (163 lb 2.3 oz)   SpO2 95%   BMI 28.00 kg/m²        Physical Exam   Constitutional: She appears distressed. HENT:   Head: Normocephalic and atraumatic. Eyes: Pupils are equal, round, and reactive to light. EOM are normal.   Neck: Normal range of motion. Neck supple. Cardiovascular: Normal rate.    Murmur heard.  Pulmonary/Chest: Effort normal and breath sounds normal.   Abdominal: Soft. Bowel sounds are normal.   Musculoskeletal: She exhibits no edema or deformity. Neurological:   somnolent   Skin: Skin is warm and dry. Data:   Lab Results   Component Value Date/Time    WBC 15.5 (H) 07/24/2019 06:58 AM    HGB 14.9 07/24/2019 06:58 AM    Hematocrit (POC) 47 03/02/2019 07:08 PM    HCT 49.2 (H) 07/24/2019 06:58 AM    PLATELET 649 98/06/1788 06:58 AM    MCV 86.8 07/24/2019 06:58 AM        Lab Results   Component Value Date/Time    Sodium 138 07/24/2019 06:58 AM    Potassium 4.2 07/24/2019 06:58 AM    Chloride 107 07/24/2019 06:58 AM    CO2 23 07/24/2019 06:58 AM    Anion gap 8 07/24/2019 06:58 AM    Glucose 107 (H) 07/24/2019 06:58 AM    BUN 21 (H) 07/24/2019 06:58 AM    Creatinine 1.24 (H) 07/24/2019 06:58 AM    BUN/Creatinine ratio 17 07/24/2019 06:58 AM    GFR est AA 57 (L) 07/24/2019 06:58 AM    GFR est non-AA 47 (L) 07/24/2019 06:58 AM    Calcium 9.7 07/24/2019 06:58 AM    Bilirubin, total 0.6 07/24/2019 06:58 AM    AST (SGOT) 55 (H) 07/24/2019 06:58 AM    Alk.  phosphatase 149 (H) 07/24/2019 06:58 AM    Protein, total 8.3 (H) 07/24/2019 06:58 AM    Albumin 4.1 07/24/2019 06:58 AM    Globulin 4.2 (H) 07/24/2019 06:58 AM    A-G Ratio 1.0 (L) 07/24/2019 06:58 AM    ALT (SGPT) 35 07/24/2019 06:58 AM        EKG Results     Procedure 720 Value Units Date/Time    EKG, 12 LEAD, INITIAL [[id number]] Collected:  07/24/19 0647    Order Status:  Completed Updated:  07/24/19 0851     Ventricular Rate 93 BPM      Atrial Rate 93 BPM      P-R Interval 142 ms      QRS Duration 100 ms      Q-T Interval 400 ms      QTC Calculation (Bezet) 497 ms      Calculated P Axis 50 degrees      Calculated R Axis 90 degrees      Calculated T Axis 86 degrees      Diagnosis --     Normal sinus rhythm  Anterior infarct (cited on or before 07-JUN-2019)  When compared with ECG of 08-JUN-2019 15:25,  Questionable change in initial forces of Anteroseptal leads  T wave inversion now evident in Anterior leads  Confirmed by Antionette Rosario M.D., Charlann Dance ([id number]) on 7/24/2019 8:51:03 AM      EKG 12 LEAD INITIAL [[id number]]     Order Status:  Canceled         XR Results (most recent):  Results from Hospital Encounter encounter on 06/07/19   XR CHEST PORT    Narrative INDICATION: central line placement    EXAMINATION:  AP CHEST, PORTABLE    COMPARISON: 6/7/19    FINDINGS: Single AP portable view of the chest at 1842 hours demonstrates  interval placement of a right internal jugular venous catheter with tip over the  distal superior vena cava. The cardiomediastinal silhouette is stable. No  pneumothorax. Persistent bilateral upper lobe airspace disease and improved  basilar aeration. Impression IMPRESSION:  No pneumothorax following insertion of internal jugular catheter.             Personal interpretation of ECG: nsr, post mi ant wall T inversions and loss ant forces    EKG Results     Procedure 720 Value Units Date/Time    EKG, 12 LEAD, INITIAL [[id number]] Collected:  07/24/19 0647    Order Status:  Completed Updated:  07/24/19 0851     Ventricular Rate 93 BPM      Atrial Rate 93 BPM      P-R Interval 142 ms      QRS Duration 100 ms      Q-T Interval 400 ms      QTC Calculation (Bezet) 497 ms      Calculated P Axis 50 degrees      Calculated R Axis 90 degrees      Calculated T Axis 86 degrees      Diagnosis --     Normal sinus rhythm  Anterior infarct (cited on or before 07-JUN-2019)  When compared with ECG of 08-JUN-2019 15:25,  Questionable change in initial forces of Anteroseptal leads  T wave inversion now evident in Anterior leads  Confirmed by Antionette Rosario M.D., Charlann Dance ([id number]) on 7/24/2019 8:51:03 AM      EKG 12 LEAD INITIAL [[id number]]     Order Status:  Canceled               Assessment/Plan:     · Intentional insulin overdose, suicidal; for ICU and q 1 hours glucose POC monitoring x 3 and iv dextrose and elseq 4 h SSI/monitoring, psych consult once medically stable. · Bipolar status on meds PTA for resumption on awakening sufficiently to reliably take meds. · DM 2 on insulin PTA. For now holding usual hypoglycemics and diet   · Chronic syst hf ( can not determine NYHA PTA/now 2n2 somnolent status of pt) ; last EF 25% range, ischemic CM  · Ischemic CM post  stenting, CABG( 06/2109)  · Incisional chest wall pain per notes from ED, ( post CABG 06/2019)   · CKD III, monitor and f/u lab and avoid nephrotoxic meds  · H/o GERD- H2 blockers ordered. · HTN, f/u and treat as needed.    · H/O Gastroparesis       Past Medical History:   Diagnosis Date    Arthritis     Bipolar 1 disorder ([address]     Diabetes ([address]     Gastrointestinal disorder     gastroparesis    Gastroparesis     GERD (gastroesophageal reflux disease)     Heart disease     Hypertension     Psychiatric disorder     Bipolar             Destiny Brown MD 7/24/2019

## 2019-07-25 LAB
ANION GAP SERPL CALC-SCNC: 8 MMOL/L (ref 5–15)
BUN SERPL-MCNC: 13 MG/DL (ref 6–20)
BUN/CREAT SERPL: 18 (ref 12–20)
CALCIUM SERPL-MCNC: 8.4 MG/DL (ref 8.5–10.1)
CHLORIDE SERPL-SCNC: 114 MMOL/L (ref 97–108)
CO2 SERPL-SCNC: 22 MMOL/L (ref 21–32)
CREAT SERPL-MCNC: 0.73 MG/DL (ref 0.55–1.02)
ERYTHROCYTE [DISTWIDTH] IN BLOOD BY AUTOMATED COUNT: 16.9 % (ref 11.5–14.5)
GLUCOSE BLD STRIP.AUTO-MCNC: 102 MG/DL (ref 65–100)
GLUCOSE BLD STRIP.AUTO-MCNC: 121 MG/DL (ref 65–100)
GLUCOSE BLD STRIP.AUTO-MCNC: 128 MG/DL (ref 65–100)
GLUCOSE BLD STRIP.AUTO-MCNC: 157 MG/DL (ref 65–100)
GLUCOSE BLD STRIP.AUTO-MCNC: 198 MG/DL (ref 65–100)
GLUCOSE BLD STRIP.AUTO-MCNC: 210 MG/DL (ref 65–100)
GLUCOSE BLD STRIP.AUTO-MCNC: 75 MG/DL (ref 65–100)
GLUCOSE BLD STRIP.AUTO-MCNC: 91 MG/DL (ref 65–100)
GLUCOSE SERPL-MCNC: 78 MG/DL (ref 65–100)
HCT VFR BLD AUTO: 39.1 % (ref 35–47)
HGB BLD-MCNC: 12.1 G/DL (ref 11.5–16)
MCH RBC QN AUTO: 26.8 PG (ref 26–34)
MCHC RBC AUTO-ENTMCNC: 30.9 G/DL (ref 30–36.5)
MCV RBC AUTO: 86.7 FL (ref 80–99)
NRBC # BLD: 0 K/UL (ref 0–0.01)
NRBC BLD-RTO: 0 PER 100 WBC
PLATELET # BLD AUTO: 256 K/UL (ref 150–400)
PMV BLD AUTO: 10.1 FL (ref 8.9–12.9)
POTASSIUM SERPL-SCNC: 3.5 MMOL/L (ref 3.5–5.1)
RBC # BLD AUTO: 4.51 M/UL (ref 3.8–5.2)
SERVICE CMNT-IMP: ABNORMAL
SERVICE CMNT-IMP: NORMAL
SERVICE CMNT-IMP: NORMAL
SODIUM SERPL-SCNC: 144 MMOL/L (ref 136–145)
WBC # BLD AUTO: 7.5 K/UL (ref 3.6–11)

## 2019-07-25 PROCEDURE — 74011250636 HC RX REV CODE- 250/636: Performed by: INTERNAL MEDICINE

## 2019-07-25 PROCEDURE — 74011000250 HC RX REV CODE- 250: Performed by: INTERNAL MEDICINE

## 2019-07-25 PROCEDURE — 82962 GLUCOSE BLOOD TEST: CPT

## 2019-07-25 PROCEDURE — 65270000029 HC RM PRIVATE

## 2019-07-25 PROCEDURE — 74011636637 HC RX REV CODE- 636/637: Performed by: INTERNAL MEDICINE

## 2019-07-25 PROCEDURE — 36415 COLL VENOUS BLD VENIPUNCTURE: CPT

## 2019-07-25 PROCEDURE — 80048 BASIC METABOLIC PNL TOTAL CA: CPT

## 2019-07-25 PROCEDURE — 74011250637 HC RX REV CODE- 250/637: Performed by: NURSE PRACTITIONER

## 2019-07-25 PROCEDURE — 85027 COMPLETE CBC AUTOMATED: CPT

## 2019-07-25 PROCEDURE — 74011000258 HC RX REV CODE- 258: Performed by: INTERNAL MEDICINE

## 2019-07-25 PROCEDURE — 74011250637 HC RX REV CODE- 250/637: Performed by: INTERNAL MEDICINE

## 2019-07-25 RX ORDER — ASPIRIN 81 MG/1
162 TABLET ORAL DAILY
Status: DISCONTINUED | OUTPATIENT
Start: 2019-07-25 | End: 2019-07-26 | Stop reason: HOSPADM

## 2019-07-25 RX ORDER — DIVALPROEX SODIUM 500 MG/1
500 TABLET, DELAYED RELEASE ORAL 3 TIMES DAILY
Status: DISCONTINUED | OUTPATIENT
Start: 2019-07-25 | End: 2019-07-26 | Stop reason: HOSPADM

## 2019-07-25 RX ORDER — ATORVASTATIN CALCIUM 40 MG/1
80 TABLET, FILM COATED ORAL
Status: DISCONTINUED | OUTPATIENT
Start: 2019-07-25 | End: 2019-07-26 | Stop reason: HOSPADM

## 2019-07-25 RX ORDER — METOPROLOL SUCCINATE 50 MG/1
50 TABLET, EXTENDED RELEASE ORAL DAILY
Status: DISCONTINUED | OUTPATIENT
Start: 2019-07-25 | End: 2019-07-26 | Stop reason: HOSPADM

## 2019-07-25 RX ORDER — ROSUVASTATIN CALCIUM 10 MG/1
20 TABLET, COATED ORAL
Status: DISCONTINUED | OUTPATIENT
Start: 2019-07-25 | End: 2019-07-25 | Stop reason: ALTCHOICE

## 2019-07-25 RX ORDER — INSULIN LISPRO 100 [IU]/ML
INJECTION, SOLUTION INTRAVENOUS; SUBCUTANEOUS
Status: DISCONTINUED | OUTPATIENT
Start: 2019-07-25 | End: 2019-07-26 | Stop reason: HOSPADM

## 2019-07-25 RX ORDER — GABAPENTIN 300 MG/1
300 CAPSULE ORAL 3 TIMES DAILY
Status: DISCONTINUED | OUTPATIENT
Start: 2019-07-25 | End: 2019-07-26 | Stop reason: HOSPADM

## 2019-07-25 RX ORDER — DULOXETIN HYDROCHLORIDE 60 MG/1
60 CAPSULE, DELAYED RELEASE ORAL DAILY
Status: DISCONTINUED | OUTPATIENT
Start: 2019-07-25 | End: 2019-07-26 | Stop reason: HOSPADM

## 2019-07-25 RX ORDER — INSULIN GLARGINE 100 [IU]/ML
10 INJECTION, SOLUTION SUBCUTANEOUS
Status: DISCONTINUED | OUTPATIENT
Start: 2019-07-25 | End: 2019-07-26 | Stop reason: HOSPADM

## 2019-07-25 RX ORDER — GABAPENTIN 300 MG/1
CAPSULE ORAL
Status: DISPENSED
Start: 2019-07-25 | End: 2019-07-25

## 2019-07-25 RX ORDER — GABAPENTIN 300 MG/1
300 CAPSULE ORAL ONCE
Status: COMPLETED | OUTPATIENT
Start: 2019-07-25 | End: 2019-07-25

## 2019-07-25 RX ADMIN — Medication 10 ML: at 05:51

## 2019-07-25 RX ADMIN — HYDROCODONE BITARTRATE AND ACETAMINOPHEN 2 TABLET: 5; 325 TABLET ORAL at 18:03

## 2019-07-25 RX ADMIN — ATORVASTATIN CALCIUM 80 MG: 40 TABLET, FILM COATED ORAL at 22:43

## 2019-07-25 RX ADMIN — INSULIN LISPRO 2 UNITS: 100 INJECTION, SOLUTION INTRAVENOUS; SUBCUTANEOUS at 17:11

## 2019-07-25 RX ADMIN — HYDROCODONE BITARTRATE AND ACETAMINOPHEN 2 TABLET: 5; 325 TABLET ORAL at 11:48

## 2019-07-25 RX ADMIN — INSULIN GLARGINE 10 UNITS: 100 INJECTION, SOLUTION SUBCUTANEOUS at 22:43

## 2019-07-25 RX ADMIN — HYDROCODONE BITARTRATE AND ACETAMINOPHEN 2 TABLET: 5; 325 TABLET ORAL at 05:46

## 2019-07-25 RX ADMIN — SODIUM CHLORIDE 10 MG: 9 INJECTION INTRAMUSCULAR; INTRAVENOUS; SUBCUTANEOUS at 14:27

## 2019-07-25 RX ADMIN — HEPARIN SODIUM 5000 UNITS: 5000 INJECTION INTRAVENOUS; SUBCUTANEOUS at 05:50

## 2019-07-25 RX ADMIN — DEXTROSE MONOHYDRATE 25 G: 500 INJECTION PARENTERAL at 05:51

## 2019-07-25 RX ADMIN — ASPIRIN 162 MG: 81 TABLET ORAL at 11:49

## 2019-07-25 RX ADMIN — GABAPENTIN 300 MG: 300 CAPSULE ORAL at 16:42

## 2019-07-25 RX ADMIN — METOPROLOL SUCCINATE 50 MG: 50 TABLET, EXTENDED RELEASE ORAL at 11:49

## 2019-07-25 RX ADMIN — DEXTROSE MONOHYDRATE AND SODIUM CHLORIDE 50 ML/HR: 5; .9 INJECTION, SOLUTION INTRAVENOUS at 11:24

## 2019-07-25 RX ADMIN — DIVALPROEX SODIUM 500 MG: 500 TABLET, DELAYED RELEASE ORAL at 22:43

## 2019-07-25 RX ADMIN — DIVALPROEX SODIUM 500 MG: 500 TABLET, DELAYED RELEASE ORAL at 16:42

## 2019-07-25 RX ADMIN — GABAPENTIN 300 MG: 300 CAPSULE ORAL at 01:04

## 2019-07-25 RX ADMIN — INSULIN LISPRO 2 UNITS: 100 INJECTION, SOLUTION INTRAVENOUS; SUBCUTANEOUS at 00:25

## 2019-07-25 RX ADMIN — HEPARIN SODIUM 5000 UNITS: 5000 INJECTION INTRAVENOUS; SUBCUTANEOUS at 14:39

## 2019-07-25 RX ADMIN — HEPARIN SODIUM 5000 UNITS: 5000 INJECTION INTRAVENOUS; SUBCUTANEOUS at 22:43

## 2019-07-25 RX ADMIN — HYDROCODONE BITARTRATE AND ACETAMINOPHEN 2 TABLET: 5; 325 TABLET ORAL at 00:26

## 2019-07-25 RX ADMIN — DULOXETINE HYDROCHLORIDE 60 MG: 60 CAPSULE, DELAYED RELEASE ORAL at 11:48

## 2019-07-25 RX ADMIN — GABAPENTIN 300 MG: 300 CAPSULE ORAL at 22:43

## 2019-07-25 NOTE — PROGRESS NOTES
Bedside and Verbal shift change report given to Hasmukh Hampton RN (oncoming nurse) by Koffi Anderson RN (offgoing nurse). Report included the following information SBAR, Kardex, Intake/Output, MAR, Accordion, Recent Results and Med Rec Status.      Primary Nurse Logan Mcdowell and Deonte Mann RN performed a dual skin assessment on this patient Impairment noted- see wound doc flow sheet - Old Scar down chest from heart surgery and three old sites from chest tubes on her chest.   Eben score is 21

## 2019-07-25 NOTE — PROGRESS NOTES
1930:  Bedside and Verbal shift change report given to Lety Lake RN (oncoming nurse) by Flor Miller RN (offgoing nurse). Report included the following information SBAR, Kardex, Procedure Summary, Intake/Output, MAR, Accordion, Recent Results, Med Rec Status and Cardiac Rhythm sinus rhythm. 2225:  Poison control called for update. Discussed patient's BS levels, IVF, and vital signs. Advised DTC recommended patient received dextrose in her IVF for 24 hours post exposure. No recommendations received. Poison control will call back in the morning for another update. 1039:  Patient states she takes 300 mg TID gabapentin. States her feet were \"itchy\" earlier and she could deal with it but now they are on fire and she just wants to sleep. Paged hospitalist.  Order received for one time dose of 300 mg. Shift summary:  Uneventful shift. Patient's BS ranging from 285-75, requiring insulin coverage and D50. Chest pain from recent surgery relieved with 2 [place].     0730: Bedside and Verbal shift change report given to Flor Miller RN (oncoming nurse) by Lety Lake RN (offgoing nurse). Report included the following information SBAR, Kardex, Procedure Summary, Intake/Output, MAR, Accordion, Recent Results, Med Rec Status and Cardiac Rhythm sinus rhythm.

## 2019-07-25 NOTE — PROGRESS NOTES
0730: Bedside and Verbal shift change report given to Nikkie DE LA FUENTE (oncoming nurse) by Jude Gonzales RN  (offgoing nurse). Report included the following information SBAR, Kardex, Intake/Output, MAR, Recent Results and Cardiac Rhythm NSR.

## 2019-07-26 ENCOUNTER — HOSPITAL ENCOUNTER (INPATIENT)
Age: 44
LOS: 4 days | Discharge: HOME | DRG: 885 | End: 2019-07-30
Attending: PSYCHIATRY & NEUROLOGY | Admitting: PSYCHIATRY & NEUROLOGY
Payer: COMMERCIAL

## 2019-07-26 VITALS
OXYGEN SATURATION: 98 % | HEIGHT: 64 IN | TEMPERATURE: 98.7 F | DIASTOLIC BLOOD PRESSURE: 88 MMHG | HEART RATE: 84 BPM | RESPIRATION RATE: 18 BRPM | BODY MASS INDEX: 30.27 KG/M2 | WEIGHT: 177.3 LBS | SYSTOLIC BLOOD PRESSURE: 147 MMHG

## 2019-07-26 DIAGNOSIS — I21.4 NSTEMI (NON-ST ELEVATED MYOCARDIAL INFARCTION) (HCC): Primary | ICD-10-CM

## 2019-07-26 LAB
GLUCOSE BLD STRIP.AUTO-MCNC: 188 MG/DL (ref 65–100)
GLUCOSE BLD STRIP.AUTO-MCNC: 264 MG/DL (ref 65–100)
GLUCOSE BLD STRIP.AUTO-MCNC: 291 MG/DL (ref 65–100)
GLUCOSE BLD STRIP.AUTO-MCNC: 83 MG/DL (ref 65–100)
SERVICE CMNT-IMP: ABNORMAL
SERVICE CMNT-IMP: NORMAL

## 2019-07-26 PROCEDURE — 74011250636 HC RX REV CODE- 250/636: Performed by: INTERNAL MEDICINE

## 2019-07-26 PROCEDURE — 74011636637 HC RX REV CODE- 636/637: Performed by: PSYCHIATRY & NEUROLOGY

## 2019-07-26 PROCEDURE — 65220000003 HC RM SEMIPRIVATE PSYCH

## 2019-07-26 PROCEDURE — 74011250637 HC RX REV CODE- 250/637: Performed by: INTERNAL MEDICINE

## 2019-07-26 PROCEDURE — 82962 GLUCOSE BLOOD TEST: CPT

## 2019-07-26 PROCEDURE — 74011636637 HC RX REV CODE- 636/637: Performed by: INTERNAL MEDICINE

## 2019-07-26 PROCEDURE — 74011250637 HC RX REV CODE- 250/637: Performed by: PSYCHIATRY & NEUROLOGY

## 2019-07-26 RX ORDER — MAGNESIUM SULFATE 100 %
4 CRYSTALS MISCELLANEOUS AS NEEDED
Status: CANCELLED | OUTPATIENT
Start: 2019-07-26

## 2019-07-26 RX ORDER — DOCUSATE SODIUM 100 MG/1
100 CAPSULE, LIQUID FILLED ORAL 2 TIMES DAILY
Status: DISCONTINUED | OUTPATIENT
Start: 2019-07-26 | End: 2019-07-26 | Stop reason: HOSPADM

## 2019-07-26 RX ORDER — INSULIN LISPRO 100 [IU]/ML
INJECTION, SOLUTION INTRAVENOUS; SUBCUTANEOUS
Status: DISCONTINUED | OUTPATIENT
Start: 2019-07-26 | End: 2019-07-30 | Stop reason: HOSPADM

## 2019-07-26 RX ORDER — ASPIRIN 81 MG/1
162 TABLET ORAL DAILY
Status: CANCELLED | OUTPATIENT
Start: 2019-07-27

## 2019-07-26 RX ORDER — INSULIN LISPRO 100 [IU]/ML
INJECTION, SOLUTION INTRAVENOUS; SUBCUTANEOUS
Status: CANCELLED | OUTPATIENT
Start: 2019-07-26

## 2019-07-26 RX ORDER — GABAPENTIN 300 MG/1
300 CAPSULE ORAL 3 TIMES DAILY
Status: CANCELLED | OUTPATIENT
Start: 2019-07-26

## 2019-07-26 RX ORDER — TRAZODONE HYDROCHLORIDE 100 MG/1
100 TABLET ORAL
Status: DISCONTINUED | OUTPATIENT
Start: 2019-07-26 | End: 2019-07-27

## 2019-07-26 RX ORDER — DEXTROSE 50 % IN WATER (D50W) INTRAVENOUS SYRINGE
12.5-25 AS NEEDED
Status: CANCELLED | OUTPATIENT
Start: 2019-07-26

## 2019-07-26 RX ORDER — OLANZAPINE 5 MG/1
5 TABLET ORAL
Status: DISCONTINUED | OUTPATIENT
Start: 2019-07-26 | End: 2019-07-30 | Stop reason: HOSPADM

## 2019-07-26 RX ORDER — INSULIN GLARGINE 100 [IU]/ML
10 INJECTION, SOLUTION SUBCUTANEOUS
Status: DISCONTINUED | OUTPATIENT
Start: 2019-07-26 | End: 2019-07-27

## 2019-07-26 RX ORDER — DOCUSATE SODIUM 100 MG/1
100 CAPSULE, LIQUID FILLED ORAL 2 TIMES DAILY
Status: CANCELLED | OUTPATIENT
Start: 2019-07-26

## 2019-07-26 RX ORDER — HYDROXYZINE 50 MG/1
50 TABLET, FILM COATED ORAL
Status: DISCONTINUED | OUTPATIENT
Start: 2019-07-26 | End: 2019-07-29

## 2019-07-26 RX ORDER — BENZTROPINE MESYLATE 1 MG/ML
2 INJECTION INTRAMUSCULAR; INTRAVENOUS
Status: DISCONTINUED | OUTPATIENT
Start: 2019-07-26 | End: 2019-07-30 | Stop reason: HOSPADM

## 2019-07-26 RX ORDER — BENZTROPINE MESYLATE 2 MG/1
2 TABLET ORAL
Status: DISCONTINUED | OUTPATIENT
Start: 2019-07-26 | End: 2019-07-30 | Stop reason: HOSPADM

## 2019-07-26 RX ORDER — ADHESIVE BANDAGE
30 BANDAGE TOPICAL DAILY PRN
Status: DISCONTINUED | OUTPATIENT
Start: 2019-07-26 | End: 2019-07-30 | Stop reason: HOSPADM

## 2019-07-26 RX ORDER — HYDROCODONE BITARTRATE AND ACETAMINOPHEN 5; 325 MG/1; MG/1
1-2 TABLET ORAL
Status: CANCELLED | OUTPATIENT
Start: 2019-07-26

## 2019-07-26 RX ORDER — DULOXETIN HYDROCHLORIDE 60 MG/1
60 CAPSULE, DELAYED RELEASE ORAL DAILY
Status: CANCELLED | OUTPATIENT
Start: 2019-07-27

## 2019-07-26 RX ORDER — ACETAMINOPHEN 325 MG/1
650 TABLET ORAL
Status: DISCONTINUED | OUTPATIENT
Start: 2019-07-26 | End: 2019-07-30 | Stop reason: HOSPADM

## 2019-07-26 RX ORDER — ATORVASTATIN CALCIUM 40 MG/1
80 TABLET, FILM COATED ORAL
Status: CANCELLED | OUTPATIENT
Start: 2019-07-26

## 2019-07-26 RX ORDER — INSULIN GLARGINE 100 [IU]/ML
10 INJECTION, SOLUTION SUBCUTANEOUS
Status: CANCELLED | OUTPATIENT
Start: 2019-07-26

## 2019-07-26 RX ORDER — DIVALPROEX SODIUM 500 MG/1
500 TABLET, DELAYED RELEASE ORAL 3 TIMES DAILY
Status: CANCELLED | OUTPATIENT
Start: 2019-07-26

## 2019-07-26 RX ORDER — IBUPROFEN 200 MG
1 TABLET ORAL
Status: DISCONTINUED | OUTPATIENT
Start: 2019-07-26 | End: 2019-07-30 | Stop reason: HOSPADM

## 2019-07-26 RX ORDER — METOPROLOL SUCCINATE 50 MG/1
50 TABLET, EXTENDED RELEASE ORAL DAILY
Status: CANCELLED | OUTPATIENT
Start: 2019-07-27

## 2019-07-26 RX ADMIN — METOPROLOL SUCCINATE 50 MG: 50 TABLET, EXTENDED RELEASE ORAL at 09:30

## 2019-07-26 RX ADMIN — DULOXETINE HYDROCHLORIDE 60 MG: 60 CAPSULE, DELAYED RELEASE ORAL at 09:30

## 2019-07-26 RX ADMIN — INSULIN LISPRO 5 UNITS: 100 INJECTION, SOLUTION INTRAVENOUS; SUBCUTANEOUS at 12:52

## 2019-07-26 RX ADMIN — HYDROXYZINE HYDROCHLORIDE 50 MG: 50 TABLET, FILM COATED ORAL at 21:58

## 2019-07-26 RX ADMIN — HYDROCODONE BITARTRATE AND ACETAMINOPHEN 2 TABLET: 5; 325 TABLET ORAL at 12:52

## 2019-07-26 RX ADMIN — ASPIRIN 162 MG: 81 TABLET ORAL at 09:30

## 2019-07-26 RX ADMIN — HYDROCODONE BITARTRATE AND ACETAMINOPHEN 1 TABLET: 5; 325 TABLET ORAL at 00:23

## 2019-07-26 RX ADMIN — HYDROCODONE BITARTRATE AND ACETAMINOPHEN 1 TABLET: 5; 325 TABLET ORAL at 00:29

## 2019-07-26 RX ADMIN — INSULIN LISPRO 2 UNITS: 100 INJECTION, SOLUTION INTRAVENOUS; SUBCUTANEOUS at 16:58

## 2019-07-26 RX ADMIN — GABAPENTIN 300 MG: 300 CAPSULE ORAL at 09:30

## 2019-07-26 RX ADMIN — HYDROCODONE BITARTRATE AND ACETAMINOPHEN 2 TABLET: 5; 325 TABLET ORAL at 19:02

## 2019-07-26 RX ADMIN — HYDROCODONE BITARTRATE AND ACETAMINOPHEN 2 TABLET: 5; 325 TABLET ORAL at 06:26

## 2019-07-26 RX ADMIN — Medication 10 ML: at 14:24

## 2019-07-26 RX ADMIN — DIVALPROEX SODIUM 500 MG: 500 TABLET, DELAYED RELEASE ORAL at 09:30

## 2019-07-26 RX ADMIN — INSULIN LISPRO 3 UNITS: 100 INJECTION, SOLUTION INTRAVENOUS; SUBCUTANEOUS at 21:52

## 2019-07-26 RX ADMIN — HEPARIN SODIUM 5000 UNITS: 5000 INJECTION INTRAVENOUS; SUBCUTANEOUS at 06:26

## 2019-07-26 RX ADMIN — GABAPENTIN 300 MG: 300 CAPSULE ORAL at 16:58

## 2019-07-26 RX ADMIN — Medication 10 ML: at 06:26

## 2019-07-26 RX ADMIN — DIVALPROEX SODIUM 500 MG: 500 TABLET, DELAYED RELEASE ORAL at 16:57

## 2019-07-26 RX ADMIN — DOCUSATE SODIUM 100 MG: 100 CAPSULE, LIQUID FILLED ORAL at 17:01

## 2019-07-26 RX ADMIN — INSULIN GLARGINE 10 UNITS: 100 INJECTION, SOLUTION SUBCUTANEOUS at 22:38

## 2019-07-26 NOTE — PROGRESS NOTES
TRANSFER - OUT REPORT:    Verbal report given to LEISA Rodriguez(name) on [address]  being transferred to [place] [address] (unit) for routine progression of care       Report consisted of patients Situation, Background, Assessment and   Recommendations(SBAR). Information from the following report(s) SBAR and Kardex was reviewed with the receiving nurse. Lines:       Opportunity for questions and clarification was provided.       Patient transported with:   Registered Nurse

## 2019-07-26 NOTE — CONSULTS
PSYCHIATRY CONSULT NOTE:    REASON FOR CONSULT:  S/p insulin overdose    HISTORY OF PRESENTING COMPLAINT:  Tita Rajput is a 37 y.o. BLACK OR  female who is currently admitted to the medical floor at [address]. Reports that she was diagnosed with Bipolar disorder when she was 16. She has been feeling increasingly depressed, overwhelmed with her medical issues, she also has been off her meds for 2 1/2 weeks, admission note says a month. Valproic acid level on admission is only 4. She recently had CABG a month ago and her incision site has been painful. She states she had argument with her boyfriend. She reports recently, she has felt increasing depression related to her health issues and physical pain. This morning, she got into an argument with her significant other. She endorses some vomiting after the argument d/t stress. She then drank some orange juice, was getting ready to eat, however, then decided to attempt suicide by taking an overdose of her prescribed insulin. She reports taking 80 units of Lantus and 60 units of Humalog one hour ago. Patient states \"I didn't want to hurt myself, I just don't want to be here anymore\". She notes she normally takes 28 units of lantus in the morning. Patient reports multiple previous suicidal attempts, and EMS notes the patient has tried to use insulin in the past, at which time she went unconscious and had seizures. She states that she is typically not depressed when she is on her meds. She currently denies si hi or avh. Does not think she needs to be admitted. PAST PSYCHIATRIC HISTORY and SUBSTANCE ABUSE HISTORY:  She was previously hospitalized here at [facility], also at [facility], and [facility]. She is not seeing a psychiatrist or a therapist. Renu Kareem is currently being prescribed by neuro and depakote by pcp. PAST MEDICAL HISTORY:  Please see H&P for details.    Past Medical History:   Diagnosis Date    Arthritis     B[facility] 1 disorder ([facility]  Diabetes ([facility]     Gastrointestinal disorder     gastroparesis    Gastroparesis     GERD (gastroesophageal reflux disease)     Heart disease     Hypertension     Psychiatric disorder     Bipolar           Lab Results   Component Value Date/Time    WBC 7.5 07/25/2019 05:45 AM    HGB 12.1 07/25/2019 05:45 AM    Hematocrit (POC) 47 03/02/2019 07:08 PM    HCT 39.1 07/25/2019 05:45 AM    PLATELET 825 75/98/3204 05:45 AM    MCV 86.7 07/25/2019 05:45 AM      Lab Results   Component Value Date/Time    Sodium 144 07/25/2019 05:45 AM    Potassium 3.5 07/25/2019 05:45 AM    Chloride 114 (H) 07/25/2019 05:45 AM    CO2 22 07/25/2019 05:45 AM    Anion gap 8 07/25/2019 05:45 AM    Glucose 78 07/25/2019 05:45 AM    BUN 13 07/25/2019 05:45 AM    Creatinine 0.73 07/25/2019 05:45 AM    BUN/Creatinine ratio 18 07/25/2019 05:45 AM    GFR est AA >60 07/25/2019 05:45 AM    GFR est non-AA >60 07/25/2019 05:45 AM    Calcium 8.4 (L) 07/25/2019 05:45 AM    Bilirubin, total 0.6 07/24/2019 06:58 AM    AST (SGOT) 55 (H) 07/24/2019 06:58 AM    Alk. phosphatase 149 (H) 07/24/2019 06:58 AM    Protein, total 8.3 (H) 07/24/2019 06:58 AM    Albumin 4.1 07/24/2019 06:58 AM    Globulin 4.2 (H) 07/24/2019 06:58 AM    A-G Ratio 1.0 (L) 07/24/2019 06:58 AM    ALT (SGPT) 35 07/24/2019 06:58 AM          PSYCHOSOCIAL HISTORY:  She is , now in a relationship. Has 1 daughter      MENTAL STATUS EXAM:    General appearance: Dressed in hospital apparel. Eye contact: Good eye contact  Speech: Spontaneous  Affect : Blunt  Mood: \"ok\"  Thought Process: Logical, goal directed  Perception: Denies AH or VH. Thought Content: No SI or Plan  Insight: Poor  Judgement: Poor  Cognition: Intact grossly. ASSESSMENT AND PLAN:  Armendariz Gonzalez has been diagnosed with Bipolar 2 disorder, depressed type. Given her lantus overdose and prior history of suicidal attempts, I would be inclined to admit her at [facility].  She however is refusing voluntary admission, please call [address] for tdo eval. Continue cymbalta and depakote.  Repeat va level in am.

## 2019-07-26 NOTE — PROGRESS NOTES
Bedside shift change report given to Omar Hudson (oncoming nurse) by Flower Renee (offgoing nurse). Report included the following information SBAR, Kardex, Intake/Output and MAR.

## 2019-07-26 NOTE — PROGRESS NOTES
Patient transferred to [address] [place] [address] with all belongings & IV discontinued. Patient given discharge instructions.

## 2019-07-26 NOTE — PROGRESS NOTES
Problem: Falls - Risk of  Goal: *Absence of Falls  Description  Document Bear Avilaalli Fall Risk and appropriate interventions in the flowsheet.   Outcome: Progressing Towards Goal  Note:   Fall Risk Interventions:  Mobility Interventions: Assess mobility with egress test, Communicate number of staff needed for ambulation/transfer, PT Consult for mobility concerns, PT Consult for assist device competence, Strengthening exercises (ROM-active/passive)         Medication Interventions: Patient to call before getting OOB    Elimination Interventions: Patient to call for help with toileting needs

## 2019-07-26 NOTE — DISCHARGE SUMMARY
Discharge Summary       PATIENT ID: Clemente Benitez  MRN: [id number]   YOB: 1975    DATE OF ADMISSION: 7/24/2019  6:24 AM    DATE OF DISCHARGE: 7/26/2019    PRIMARY CARE PROVIDER: Steffen Marin MD     ATTENDING PHYSICIAN: Neeraj Cox MD   DISCHARGING PROVIDER: Neeraj Cox MD    To contact this individual call [phone] and ask the  to page. If unavailable ask to be transferred the Adult Hospitalist Department. CONSULTATIONS: IP CONSULT TO HOSPITALIST  IP CONSULT TO PSYCHIATRY    PROCEDURES/SURGERIES: * No surgery found *    ADMITTING [address] [place] [address] COURSE:      Admission Summary:   Admitted with intensional insulin overdose      Interval history / Subjective:       7/25/2019 :  Pt attributes her actions to being out of her bipolar meds.          Assessment & Plan:      intensional insulin overdose, now improved and for psych eval=for acute admit to Psych unit after seen by [facility]   Psych history per pt: bipolar, and out of her meds on presentation.    DM on insulins , for SSI and as of this pm, lantus   Recent CABG with incisional pain   Code status: full   DVT prophylaxis: Heparin      Care Plan discussed with: Patient/Family and Nurse  Disposition: Home w/Family and TBD            DISCHARGE DIAGNOSES / PLAN:      1.  as above      ADDITIONAL CARE RECOMMENDATIONS: na    PENDING TEST RESULTS:   At the time of discharge the following test results are still pending: na    FOLLOW UP APPOINTMENTS:    Follow-up Information     Follow up With Specialties Details Why Contact Info    Steffen Marin, [address]   [address]  [phone]               DIET: Diabetic Diet     ACTIVITY: Activity as tolerated    WOUND CARE: warm soapy water and pat dry     EQUIPMENT needed: na      DISCHARGE MEDICATIONS:  Current Discharge Medication List            NOTIFY YOUR PHYSICIAN FOR ANY OF THE FOLLOWING:   Fever over 101 degrees for 24 hours. Chest pain, shortness of breath, fever, chills, nausea, vomiting, diarrhea, change in mentation, falling, weakness, bleeding. Severe pain or pain not relieved by medications. Or, any other signs or symptoms that you may have questions about.     DISPOSITION:    Home With:   OT  PT  HH  RN       Long term SNF/Inpatient Rehab   x Independent/assisted living    Hospice    Other:       PATIENT CONDITION AT DISCHARGE:     Functional status    Poor     Deconditioned    x Independent      Cognition    x Lucid     Forgetful     Dementia      Catheters/lines (plus indication)    Bates     PICC     PEG    x None      Code status   x  Full code     DNR      PHYSICAL EXAMINATION AT DISCHARGE:   Refer to Progress Note*  Visit Vitals  /88   Pulse 84   Temp 98.7 °F (37.1 °C)   Resp 18   Ht 5' 4\" (1.626 m)   Wt 80.4 kg (177 lb 4.8 oz)   SpO2 98%   BMI 30.43 kg/m²          CHRONIC MEDICAL DIAGNOSES:  Problem List as of 7/26/2019 Date Reviewed: 7/24/2019          Codes Class Noted - Resolved    * (Principal) Insulin overdose ICD-10-CM: T38.3X1A  ICD-9-CM: 962.3, E980.4  7/24/2019 - Present        NSTEMI (non-ST elevated myocardial infarction) ([facility] ICD-10-CM: I21.4  ICD-9-CM: 410.70  6/8/2019 - Present        Acute exacerbation of CHF (congestive heart failure) ([facility] ICD-10-CM: I50.9  ICD-9-CM: 428.0  6/8/2019 - Present        SOB (shortness of breath) ICD-10-CM: R06.02  ICD-9-CM: 786.05  6/7/2019 - Present              Greater than 20  minutes were spent with the patient on counseling and coordination of care    Signed:   Mary Alice Fowler MD  7/26/2019  4:29 PM

## 2019-07-26 NOTE — PROGRESS NOTES
Patient is still under suicide precautions, sitter at the bedside, assessment completed, patient complains of constipation, MD paged for orders, & no other needs at this time.

## 2019-07-26 NOTE — BSMART NOTE
Writer accessed this chart to fax face sheet to [facility]. Per Pillo Mcadams, they were contacted by an internist to request a prescreening. When [facility] requested a facesheet, the caller was unable to fax the information needed. Writer printed facesheet and sent it to [facility] crisis. Aminah Baeza is en route to see the patient.         KRISS Banerjee, Supervisee in Social Work

## 2019-07-26 NOTE — PROGRESS NOTES
Hospitalist Progress Note  Beena Gutierrez MD  Answering service: [phone] from in house phone        Date of Service:  2019  NAME:  Amy Ortega  :  1975  MRN:  [id number]      Admission Summary:   Admitted with intensional insulin overdose     Interval history / Subjective:       2019 :  Pt attributes her actions to being out of her bipolar meds. Assessment & Plan:     intensional insulin overdose, now improved and for psych eval  Psych history per pt: bipolar, and out of her meds on presentation. DM on insulins , for SSI and as of this pm, lantus   Recent CABG with incisional pain   Code status: full   DVT prophylaxis: Heparin     Care Plan discussed with: Patient/Family and Nurse  Disposition: Home w/Family and TBD     Hospital Problems  Date Reviewed: 2019          Codes Class Noted POA    * (Principal) Insulin overdose ICD-10-CM: T38.3X1A  ICD-9-CM: 962.3, E980.4  2019 Unknown                Review of Systems:   A comprehensive review of systems was negative except for that written in the HPI. Vital Signs:    Last 24hrs VS reviewed since prior progress note. Most recent are:  Visit Vitals  BP (!) 156/91 (BP 1 Location: Left arm, BP Patient Position: At rest)   Pulse 78   Temp 98 °F (36.7 °C)   Resp 18   Ht 5' 4\" (1.626 m)   Wt 75.2 kg (165 lb 12.6 oz)   SpO2 97%   BMI 28.46 kg/m²         Intake/Output Summary (Last 24 hours) at 2019 2157  Last data filed at 2019 1000  Gross per 24 hour   Intake 650 ml   Output    Net 650 ml        Physical Examination:             Constitutional:  No acute distress, cooperative, pleasant    ENT:  Oral mucous moist, oropharynx benign. Neck supple,    Resp:  CTA bilaterally. No wheezing/rhonchi/rales. No accessory muscle use   CV:  Regular rhythm, normal rate, no murmurs, gallops, rubs    GI:  Soft, non distended, non tender.  normoactive bowel sounds, no hepatosplenomegaly     Musculoskeletal:  No edema, warm, 2+ pulses throughout    Neurologic:  Moves all extremities. AAOx3, CN II-XII reviewed     Psych:  Good insight, Not anxious nor agitated. Skin:  Good turgor, no rashes or ulcers       Data Review:    Review and/or order of clinical lab test      Labs:     Recent Labs     07/25/19  0545 07/24/19 0658   WBC 7.5 15.5*   HGB 12.1 14.9   HCT 39.1 49.2*    316     Recent Labs     07/25/19  0545 07/24/19 0658    138   K 3.5 4.2   * 107   CO2 22 23   BUN 13 21*   CREA 0.73 1.24*   GLU 78 107*   CA 8.4* 9.7     Recent Labs     07/24/19 0658   SGOT 55*   ALT 35   *   TBILI 0.6   TP 8.3*   ALB 4.1   GLOB 4.2*   LPSE 197     No results for input(s): INR, PTP, APTT in the last 72 hours. No lab exists for component: INREXT   No results for input(s): FE, TIBC, PSAT, FERR in the last 72 hours. No results found for: FOL, RBCF   No results for input(s): PH, PCO2, PO2 in the last 72 hours. No results for input(s): CPK, CKNDX, TROIQ in the last 72 hours.     No lab exists for component: CPKMB  Lab Results   Component Value Date/Time    Cholesterol, total 115 06/08/2019 04:57 AM    HDL Cholesterol 64 06/08/2019 04:57 AM    LDL, calculated 35.2 06/08/2019 04:57 AM    Triglyceride 79 06/08/2019 04:57 AM    CHOL/HDL Ratio 1.8 06/08/2019 04:57 AM     Lab Results   Component Value Date/Time    Glucose (POC) 198 (H) 07/25/2019 04:29 PM    Glucose (POC) 210 (H) 07/25/2019 01:56 PM    Glucose (POC) 121 (H) 07/25/2019 12:07 PM    Glucose (POC) 102 (H) 07/25/2019 09:11 AM    Glucose (POC) 128 (H) 07/25/2019 06:19 AM     Lab Results   Component Value Date/Time    Color YELLOW/STRAW 07/24/2019 12:28 PM    Appearance CLEAR 07/24/2019 12:28 PM    Specific gravity 1.012 07/24/2019 12:28 PM    pH (UA) 5.5 07/24/2019 12:28 PM    Protein 100 (A) 07/24/2019 12:28 PM    Glucose 500 (A) 07/24/2019 12:28 PM    Ketone NEGATIVE  07/24/2019 12:28 PM Bilirubin NEGATIVE  07/24/2019 12:28 PM    Urobilinogen 0.2 07/24/2019 12:28 PM    Nitrites NEGATIVE  07/24/2019 12:28 PM    Leukocyte Esterase NEGATIVE  07/24/2019 12:28 PM    Epithelial cells FEW 07/24/2019 12:28 PM    Bacteria NEGATIVE  07/24/2019 12:28 PM    WBC 0-4 07/24/2019 12:28 PM    RBC 0-5 07/24/2019 12:28 PM         Medications Reviewed:     Current Facility-Administered Medications   Medication Dose Route Frequency    aspirin delayed-release tablet 162 mg  162 mg Oral DAILY    divalproex DR (DEPAKOTE) tablet 500 mg  500 mg Oral TID    DULoxetine (CYMBALTA) capsule 60 mg  60 mg Oral DAILY    metoprolol succinate (TOPROL-XL) XL tablet 50 mg  50 mg Oral DAILY    gabapentin (NEURONTIN) capsule 300 mg  300 mg Oral TID    atorvastatin (LIPITOR) tablet 80 mg  80 mg Oral QHS    insulin lispro (HUMALOG) injection   SubCUTAneous AC&HS    prochlorperazine (COMPAZINE) with saline injection 10 mg  10 mg IntraVENous Q6H PRN    sodium chloride (NS) flush 5-40 mL  5-40 mL IntraVENous Q8H    sodium chloride (NS) flush 5-40 mL  5-40 mL IntraVENous PRN    heparin (porcine) injection 5,000 Units  5,000 Units SubCUTAneous Q8H    glucose chewable tablet 16 g  4 Tab Oral PRN    dextrose (D50W) injection syrg 12.5-25 g  12.5-25 g IntraVENous PRN    glucagon (GLUCAGEN) injection 1 mg  1 mg IntraMUSCular PRN    HYDROcodone-acetaminophen (NORCO) 5-325 mg per tablet 1-2 Tab  1-2 Tab Oral Q6H PRN     ______________________________________________________________________  EXPECTED LENGTH OF STAY: 2d 7h  ACTUAL LENGTH OF STAY:          1                 Dann Garcias MD

## 2019-07-26 NOTE — PROGRESS NOTES
7/26/19; 10:00 -   CM rounded on patient with attending. Patient was transferred to step down unit from ICU on 7/25. Psych was consulted 7/25, and per nursing the consult was called 7/25 at approximately 12:00 prior to patient's transfer. Per attending, patient is appropriate for likely behavioral health admission due to intentional suicide attempt, second suicide attempt, depression, and bipolar disorder. Attending reconsulted psych today, 7/26, and unit secretary called for the consult. Psych consult has been assigned to ALEXIA Norman, who plans to see patient today. CM to hold on initial assessment at this time due to ongoing medical concerns and 1:1 sitter status for suicide prevention watch. CM to follow. CRM: R[facility] Nichole[facility] M[facility], [facility]; Z: [phone]    11:45 -   CM rounded on patient with psych. Patient's recommendation is for inpatient psych admission, but patient has refused admission. [facility] the patient to clear for discharge vs TDO.   CRM: Rama Nichole, MPH, [address] [place] [address]; Z: [phone]

## 2019-07-27 PROBLEM — F31.9 BIPOLAR 1 DISORDER (HCC): Status: ACTIVE | Noted: 2019-07-27

## 2019-07-27 LAB
CHOLEST SERPL-MCNC: 110 MG/DL
GLUCOSE BLD STRIP.AUTO-MCNC: 103 MG/DL (ref 65–100)
GLUCOSE BLD STRIP.AUTO-MCNC: 229 MG/DL (ref 65–100)
GLUCOSE BLD STRIP.AUTO-MCNC: 235 MG/DL (ref 65–100)
GLUCOSE BLD STRIP.AUTO-MCNC: 360 MG/DL (ref 65–100)
GLUCOSE BLD STRIP.AUTO-MCNC: 433 MG/DL (ref 65–100)
GLUCOSE P FAST SERPL-MCNC: 118 MG/DL (ref 65–100)
HDLC SERPL-MCNC: 49 MG/DL
HDLC SERPL: 2.2 {RATIO} (ref 0–5)
LDLC SERPL CALC-MCNC: 46.6 MG/DL (ref 0–100)
LIPID PROFILE,FLP: NORMAL
SERVICE CMNT-IMP: ABNORMAL
TRIGL SERPL-MCNC: 72 MG/DL (ref ?–150)
TSH SERPL DL<=0.05 MIU/L-ACNC: 0.74 UIU/ML (ref 0.36–3.74)
VLDLC SERPL CALC-MCNC: 14.4 MG/DL

## 2019-07-27 PROCEDURE — 82962 GLUCOSE BLOOD TEST: CPT

## 2019-07-27 PROCEDURE — 74011250637 HC RX REV CODE- 250/637: Performed by: NURSE PRACTITIONER

## 2019-07-27 PROCEDURE — 74011636637 HC RX REV CODE- 636/637: Performed by: NURSE PRACTITIONER

## 2019-07-27 PROCEDURE — 82947 ASSAY GLUCOSE BLOOD QUANT: CPT

## 2019-07-27 PROCEDURE — 36415 COLL VENOUS BLD VENIPUNCTURE: CPT

## 2019-07-27 PROCEDURE — 84443 ASSAY THYROID STIM HORMONE: CPT

## 2019-07-27 PROCEDURE — 74011250637 HC RX REV CODE- 250/637: Performed by: PSYCHIATRY & NEUROLOGY

## 2019-07-27 PROCEDURE — 74011636637 HC RX REV CODE- 636/637: Performed by: PSYCHIATRY & NEUROLOGY

## 2019-07-27 PROCEDURE — 80061 LIPID PANEL: CPT

## 2019-07-27 PROCEDURE — 74011636637 HC RX REV CODE- 636/637: Performed by: INTERNAL MEDICINE

## 2019-07-27 PROCEDURE — 65220000003 HC RM SEMIPRIVATE PSYCH

## 2019-07-27 RX ORDER — FUROSEMIDE 80 MG/1
80 TABLET ORAL DAILY
COMMUNITY
End: 2019-12-06

## 2019-07-27 RX ORDER — ISOSORBIDE MONONITRATE 120 MG/1
60 TABLET, EXTENDED RELEASE ORAL DAILY
COMMUNITY
End: 2019-08-05

## 2019-07-27 RX ORDER — LANOLIN ALCOHOL/MO/W.PET/CERES
1 CREAM (GRAM) TOPICAL
Status: DISCONTINUED | OUTPATIENT
Start: 2019-07-27 | End: 2019-07-30 | Stop reason: HOSPADM

## 2019-07-27 RX ORDER — HYDROCHLOROTHIAZIDE 25 MG/1
25 TABLET ORAL DAILY
COMMUNITY
End: 2019-07-30

## 2019-07-27 RX ORDER — GABAPENTIN 300 MG/1
300 CAPSULE ORAL 3 TIMES DAILY
Status: DISCONTINUED | OUTPATIENT
Start: 2019-07-27 | End: 2019-07-30 | Stop reason: HOSPADM

## 2019-07-27 RX ORDER — NITROGLYCERIN 0.4 MG/1
0.4 TABLET SUBLINGUAL
COMMUNITY
End: 2021-06-07 | Stop reason: SDUPTHER

## 2019-07-27 RX ORDER — DULOXETIN HYDROCHLORIDE 60 MG/1
60 CAPSULE, DELAYED RELEASE ORAL DAILY
Status: DISCONTINUED | OUTPATIENT
Start: 2019-07-27 | End: 2019-07-30 | Stop reason: HOSPADM

## 2019-07-27 RX ORDER — ASCORBIC ACID 500 MG
250 TABLET ORAL 3 TIMES DAILY
Status: DISCONTINUED | OUTPATIENT
Start: 2019-07-27 | End: 2019-07-30 | Stop reason: HOSPADM

## 2019-07-27 RX ORDER — ATORVASTATIN CALCIUM 80 MG/1
80 TABLET, FILM COATED ORAL DAILY
COMMUNITY
End: 2021-04-30 | Stop reason: SDUPTHER

## 2019-07-27 RX ORDER — HYDROCODONE BITARTRATE AND ACETAMINOPHEN 10; 325 MG/1; MG/1
1 TABLET ORAL
Status: DISCONTINUED | OUTPATIENT
Start: 2019-07-27 | End: 2019-07-28

## 2019-07-27 RX ORDER — INSULIN LISPRO 100 [IU]/ML
15 INJECTION, SOLUTION INTRAVENOUS; SUBCUTANEOUS ONCE
Status: COMPLETED | OUTPATIENT
Start: 2019-07-27 | End: 2019-07-27

## 2019-07-27 RX ORDER — QUETIAPINE FUMARATE 25 MG/1
50 TABLET, FILM COATED ORAL
Status: DISCONTINUED | OUTPATIENT
Start: 2019-07-27 | End: 2019-07-29

## 2019-07-27 RX ORDER — METOPROLOL SUCCINATE 25 MG/1
50 TABLET, EXTENDED RELEASE ORAL DAILY
Status: DISCONTINUED | OUTPATIENT
Start: 2019-07-27 | End: 2019-07-30 | Stop reason: HOSPADM

## 2019-07-27 RX ORDER — VITAMIN A 3000 MCG
10000 CAPSULE ORAL DAILY
Status: DISCONTINUED | OUTPATIENT
Start: 2019-07-27 | End: 2019-07-30 | Stop reason: HOSPADM

## 2019-07-27 RX ORDER — THERA TABS 400 MCG
1 TAB ORAL DAILY
Status: DISCONTINUED | OUTPATIENT
Start: 2019-07-27 | End: 2019-07-30 | Stop reason: HOSPADM

## 2019-07-27 RX ORDER — LOSARTAN POTASSIUM 100 MG/1
100 TABLET ORAL DAILY
COMMUNITY
End: 2019-07-30

## 2019-07-27 RX ORDER — INSULIN LISPRO 100 [IU]/ML
8 INJECTION, SOLUTION INTRAVENOUS; SUBCUTANEOUS
COMMUNITY
End: 2019-07-30

## 2019-07-27 RX ORDER — INSULIN GLARGINE 100 [IU]/ML
38 INJECTION, SOLUTION SUBCUTANEOUS DAILY
Status: ON HOLD | COMMUNITY
End: 2019-07-30 | Stop reason: SDUPTHER

## 2019-07-27 RX ORDER — PANTOPRAZOLE SODIUM 40 MG/1
40 TABLET, DELAYED RELEASE ORAL
COMMUNITY
End: 2021-04-30 | Stop reason: SDUPTHER

## 2019-07-27 RX ORDER — DIVALPROEX SODIUM 500 MG/1
500 TABLET, DELAYED RELEASE ORAL 3 TIMES DAILY
Status: DISCONTINUED | OUTPATIENT
Start: 2019-07-27 | End: 2019-07-30 | Stop reason: HOSPADM

## 2019-07-27 RX ORDER — ATORVASTATIN CALCIUM 40 MG/1
80 TABLET, FILM COATED ORAL DAILY
Status: DISCONTINUED | OUTPATIENT
Start: 2019-07-27 | End: 2019-07-30 | Stop reason: HOSPADM

## 2019-07-27 RX ORDER — INSULIN GLARGINE 100 [IU]/ML
14 INJECTION, SOLUTION SUBCUTANEOUS
Status: DISCONTINUED | OUTPATIENT
Start: 2019-07-27 | End: 2019-07-28

## 2019-07-27 RX ADMIN — INSULIN LISPRO 3 UNITS: 100 INJECTION, SOLUTION INTRAVENOUS; SUBCUTANEOUS at 17:22

## 2019-07-27 RX ADMIN — ATORVASTATIN CALCIUM 80 MG: 40 TABLET, FILM COATED ORAL at 11:56

## 2019-07-27 RX ADMIN — OXYCODONE HYDROCHLORIDE AND ACETAMINOPHEN 250 MG: 500 TABLET ORAL at 21:19

## 2019-07-27 RX ADMIN — OXYCODONE HYDROCHLORIDE AND ACETAMINOPHEN 250 MG: 500 TABLET ORAL at 16:01

## 2019-07-27 RX ADMIN — INSULIN LISPRO 15 UNITS: 100 INJECTION, SOLUTION INTRAVENOUS; SUBCUTANEOUS at 11:59

## 2019-07-27 RX ADMIN — GABAPENTIN 300 MG: 300 CAPSULE ORAL at 21:19

## 2019-07-27 RX ADMIN — APIXABAN 5 MG: 5 TABLET, FILM COATED ORAL at 11:57

## 2019-07-27 RX ADMIN — INSULIN GLARGINE 14 UNITS: 100 INJECTION, SOLUTION SUBCUTANEOUS at 21:20

## 2019-07-27 RX ADMIN — QUETIAPINE FUMARATE 50 MG: 25 TABLET ORAL at 21:19

## 2019-07-27 RX ADMIN — INSULIN LISPRO 2 UNITS: 100 INJECTION, SOLUTION INTRAVENOUS; SUBCUTANEOUS at 21:20

## 2019-07-27 RX ADMIN — DULOXETINE HYDROCHLORIDE 60 MG: 60 CAPSULE, DELAYED RELEASE ORAL at 11:57

## 2019-07-27 RX ADMIN — FERROUS SULFATE TAB 325 MG (65 MG ELEMENTAL FE) 325 MG: 325 (65 FE) TAB at 12:00

## 2019-07-27 RX ADMIN — Medication 10000 UNITS: at 11:52

## 2019-07-27 RX ADMIN — HYDROCODONE BITARTRATE AND ACETAMINOPHEN 1 TABLET: 10; 325 TABLET ORAL at 17:24

## 2019-07-27 RX ADMIN — HYDROXYZINE HYDROCHLORIDE 50 MG: 50 TABLET, FILM COATED ORAL at 15:12

## 2019-07-27 RX ADMIN — ACETAMINOPHEN 650 MG: 325 TABLET ORAL at 09:16

## 2019-07-27 RX ADMIN — THERA TABS 1 TABLET: TAB at 11:56

## 2019-07-27 RX ADMIN — DIVALPROEX SODIUM 500 MG: 500 TABLET, DELAYED RELEASE ORAL at 21:19

## 2019-07-27 RX ADMIN — DIVALPROEX SODIUM 500 MG: 500 TABLET, DELAYED RELEASE ORAL at 16:01

## 2019-07-27 RX ADMIN — APIXABAN 5 MG: 5 TABLET, FILM COATED ORAL at 17:24

## 2019-07-27 RX ADMIN — GABAPENTIN 300 MG: 300 CAPSULE ORAL at 16:01

## 2019-07-27 RX ADMIN — FERROUS SULFATE TAB 325 MG (65 MG ELEMENTAL FE) 325 MG: 325 (65 FE) TAB at 17:24

## 2019-07-27 NOTE — PROGRESS NOTES
Problem: Discharge Planning  Goal: *Discharge to safe environment  Outcome: Progressing Towards Goal  Note:   Pt plans to return home to live with her daughter  Goal: *Knowledge of medication management  Outcome: Progressing Towards Goal  Note:   Pt will take all meds as prescribed and know their usage  Goal: *Knowledge of discharge instructions  Outcome: Progressing Towards Goal     Problem: Patient Education: Go to Patient Education Activity  Goal: Patient/Family Education  Note:   Pt will engage Tx team to develop d/c plans for recovery

## 2019-07-27 NOTE — BH NOTES
Psychiatry Initial Evaluation      I. Identification:        A. Name: Lisa Novak Age:     37 y.o.      C.   Ethnic Background: NON-       D. Sex:      female      E.   Marital Status:        F. Occupation:            G.   Place of Residence:       H. [employer]: NO [employer]       I. Source/Reliability: pt    II. Chief Complaint: suicide attempt    III. History of Present Illness: Onset: several weeks ago         A. Chronological evaluation of symptoms or behavior changes: 37 y.o. female with past medical history significant for gastroparesis, DMT2, HTN, bipolar disorder, GERD, and CAD, s/p multiple stent placements, s/p CABG, who presents from home via EMS with chief complaint of reported drug overdose as a suicidal attempt. She reports her mood has been worsening for several weeks, she ran out of Depakote for a few weeks but continued to take Cymbalta. She had heart surgery in June, and chronic pain, which worsens her mood. She had taken insulin in a suicide attempt. She was admitted to medical unit and moderate. Feels overwhelmed. Still with suicidal thoughts but able to contract for safety. 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 VII. Family History:           A.     Family History   Problem Relation Age of Onset    Thyroid Disease Mother     Hypertension Father     Heart Disease Father     Diabetes Sister     Heart Disease Sister     Hypertension Sister     Thyroid Disease Brother         Mental Status Exam:      Sensorium  oriented to time, place and person   Orientation person, place, time/date and situation   Relations cooperative   Eye Contact appropriate   Appearance:  age appropriate and casually dressed   Motor Behavior:  hypoactive and within normal limits   Speech:  hypoverbal   Vocabulary average   Thought Process: logical   Thought Content free of delusions and free of hallucinations Suicidal ideations no plan    Homicidal ideations none   Mood:  depressed   Affect:  constricted   Memory recent  adequate   Memory remote:  adequate   Concentration:  adequate   Abstraction:  abstract   Insight:  limited   Reliability fair   Judgment:  fair                             Current Facility-Administered Medications:     QUEtiapine (SEROquel) tablet 50 mg, 50 mg, Oral, QHS, Tima Terry MD, 50 mg at 07/27/19 2119    DULoxetine (CYMBALTA) capsule 60 mg, 60 mg, Oral, DAILY, Tima Terry MD, 60 mg at 07/27/19 1157    gabapentin (NEURONTIN) capsule 300 mg, 300 mg, Oral, TID, Tima Terry MD, 300 mg at 07/27/19 2119    divalproex DR (DEPAKOTE) tablet 500 mg, 500 mg, Oral, TID, Tima Terry MD, 500 mg at 07/27/19 2119    atorvastatin (LIPITOR) tablet 80 mg, 80 mg, Oral, DAILY, Tima Terry MD, 80 mg at 07/27/19 1156    metoprolol succinate (TOPROL-XL) XL tablet 50 mg, 50 mg, Oral, DAILY, Tima Terry MD, Stopped at 07/27/19 1156    apixaban (ELIQUIS) tablet 5 mg, 5 mg, Oral, BID, Tima Terry MD, 5 mg at 07/27/19 1724    ascorbic acid (vitamin C) (VITAMIN C) tablet 250 mg, 250 mg, Oral, TID, Tima Terry MD, 250 mg at 07/27/19 2119    ferrous sulfate tablet 325 mg, 1 Tab, Oral, TID WITH MEALS, Tima Terry MD, 325 mg at 07/27/19 1724    vitamin A (AQUASOL A) capsule 10,000 Units, 10,000 Units, Oral, DAILY, Tima Terry MD, 10,000 Units at 07/27/19 1152    therapeutic multivitamin (THERAGRAN) tablet 1 Tab, 1 Tab, Oral, DAILY, Tima Terry MD, 1 Tab at 07/27/19 1156    HYDROcodone-acetaminophen (NORCO)  mg tablet 1 Tab, 1 Tab, Oral, Q6H PRN, Cuca Salazar NP, 1 Tab at 07/27/19 1724    insulin glargine (LANTUS) injection 14 Units, 14 Units, SubCUTAneous, QHS, Isaacs, Marylen Mutton, NP, 14 Units at 07/27/19 2120    ziprasidone (GEODON) 20 mg in sterile water (preservative free) 1 mL injection, 20 mg, IntraMUSCular, BID PRN, Low Michelle MD    OLANZapine (ZyPREXA) tablet 5 mg, 5 mg, Oral, Q6H PRN, Low Michelle MD    benztropine (COGENTIN) tablet 2 mg, 2 mg, Oral, BID PRN, Low Michelle MD    benztropine (COGENTIN) injection 2 mg, 2 mg, IntraMUSCular, BID PRN, Low Michelle MD    acetaminophen (TYLENOL) tablet 650 mg, 650 mg, Oral, Q4H PRN, Low Michelle MD, 650 mg at 07/27/19 0345    magnesium hydroxide (MILK OF MAGNESIA) 400 mg/5 mL oral suspension 30 mL, 30 mL, Oral, DAILY PRN, Low Michelle MD    nicotine (NICODERM CQ) 21 mg/24 hr patch 1 Patch, 1 Patch, TransDERmal, DAILY PRN, Low Michelle MD    hydrOXYzine HCl (ATARAX) tablet 50 mg, 50 mg, Oral, Q6H PRN, Low Michelle MD, 50 mg at 07/27/19 1512    insulin lispro (HUMALOG) injection, , SubCUTAneous, AC&HS, Low Michelle MD, 2 Units at 07/27/19 2120    Plan:  Ad[facility] to [facility] for safety and treatment  Restart home medications  Evaluate daily for progress and treatment

## 2019-07-27 NOTE — BH NOTES
1726- PRN Medication Documentation    Specific patient behavior that led to need for PRN medication: c/o 10/10 aching pain to surgical  Staff interventions attempted prior to PRN being given: education  PRN medication given: po norco 10 mg/325 mg  Patient response/effectiveness of PRN medication: alert oriented smiling talking visible on the unit

## 2019-07-27 NOTE — PROGRESS NOTES
Goals will be met by 08/03/19  Problem: Falls - Risk of  Goal: *Absence of Falls  Description  Document Fco Santana Fall Risk and appropriate interventions in the flowsheet. Outcome: Progressing Towards Goal  Note:   Fall Risk Interventions:     Patient is absent of falls. Medication Interventions: Teach patient to arise slowly        Problem: Depressed Mood (Adult/Pediatric)  Goal: *STG: Participates in treatment plan  Outcome: Progressing Towards Goal  Note:   Patient participates in treatment plan. Patient's history and medications discussed, medications ordered. Patient has fair insight, thoughts organized, out on the unit, med and meal compliant. Goal: *STG: Attends activities and groups  Outcome: Progressing Towards Goal  Note:   Patient attends activities and groups.   Goal: *STG: Remains safe in hospital  Outcome: [address]  Master Treatment Plan for [address]    Date Treatment Plan Initiated: 07/27/19    Treatment Plan Modalities:  Type of Modality Amount  (x minutes) Frequency (x/week) Duration (x days) Name of Responsible Staff   [address]  meetings to encourage peer interactions 15 7 1 JEAN-CLAUDE Rogers     Group psychotherapy to assist in building coping skills and internal controls 60 7 1 Kenton Che   Therapeutic activity groups to build coping skills 60 7 1 Kenton Che   Psychoeducation in group setting to address:   Medication education   [address], RN   Coping skills         Relaxation techniques         Symptom management         Discharge planning   60 2 255 [address]   60 1 1 volunteer   Recovery/AA/NA   61 3 1 volunteer   Physician medication management   15 7 1 Dr. Katie Tee   15 2 [facility]

## 2019-07-27 NOTE — CONSULTS
Hospitalist Consult Note  Joey Lin NP  Answering service: [phone] OR [phone] from in house phone  Cell: [phone]      Date of Service:  2019  NAME:  Severo Brock  :  1975  MRN:  [id number]    Admission Summary:   Pt initially presented to the hospital with intentional insulin overdose. She has a pmhx of gastroparesis, DM type 2, hypertension, bipolar disorder, GERD, arthritis and coronary artery disease with multiple PCI. She was medically stabilized and transferred up to psychiatry services 2019 for further evaluation and treatment. Hospitalists consulted for surgical pain and to prescribe narcotics as psychiatry does not do this. Pt indicates she had double vessel bypass on 2019 at [facility], she was discharged to home and had [facility] for a few weeks then on her own. She was instructed to take tylenol for pain but has gone back to the clinical x 2 to seek additional pain relief. Says first time she received tramadol which did not help her much and then the second time received dilaudid. Her pain has persisted. On recent admit, she was given 1-2 tabs of Norco and indicated the 2 tabs relived her pain and she was comfortable with that. She was agreeable to starting Norco 10/325 every 6 hours as needed for pain and verbalized understanding this would not be prescribed on discharge. Tobacco: 1/2 PPD  Alcohol: occasional  Illicit Drug Use: none    Interval history / Subjective:      Pt in day room, agreeable to interview and assessment. Assessment & Plan:     Hx Bipolar Disorder with intentional insulin overdose:  - treatment as per primary care team    Hx on Diabetes Mellitus:   - on insulin at home, lantus 28 units daily and Novolog.  Sees Dr. Anthony Crespo at [facility] (endocrinology)  - Currently on 10 units at bedtime with Sliding Scale ordered, will increase to 14 units.   - HgbA1c 8.5 (2019) Blood glucose range 103-433  - change diet to carb consistent/AHA (pt is aware of this change)  - takes gabapentin for diabetic neuropathy    Hx of CAD/NSTEMI and CABG and incisional pain:   - Last Echo: Dilated left ventricle. Severe segmental systolic dysfunction. Est L ventricular ejection fraction is 26-30%. Segmental wall motion abnormality consistent with LAD/RCA territory though takotsubo's cardiomyopathy is a possibility.  - on statin, lipid profile reviewed: Chol 110 Hdl 49 Ldl 46.6  - pt on eliquis for hx of DVT (2017)  - has not yet started cardiac rehab  - having incisional/Sternotomy pain, worse with movement or palpation. Hx Hypertension:   - toprol XL ordered  - pt reported she used to take multiple medications prior to her CABG but at present time only takes the Toprol XL. Code status: Full  DVT prophylaxis: none indicated, up ad viola  Care Plan discussed with: patient, nurse  Disposition: as per primary care team     Hospital Problems  Date Reviewed: 7/24/2019          Codes Class Noted POA    [facility] ICD-10-CM: F31.9  ICD-9-CM: 296.7  7/27/2019 Unknown            Review of Systems:   Denies HA. No chest pressure, has sternal pain from surgery . No respiratory or GI complaints. Vital Signs:    Last 24hrs VS reviewed since prior progress note. Most recent are:  Visit Vitals  /76 (BP 1 Location: Right arm)   Pulse 83   Temp 97.8 °F (36.6 °C)   Resp 18   SpO2 100%   Breastfeeding? No     No intake or output data in the 24 hours ending 07/27/19 1555     Physical Examination:         Constitutional:  No acute distress, cooperative, pleasant    ENT:  Oral MM moist    Resp:  CTA bilaterally. No wheezing/rhonchi/rales. On RA   Chest Wall:  Midline scabbed incision with three chest tube site, no erythema or drainage   CV:  Regular rhythm, normal rate, no murmurs    GI:  Soft, non distended, non tender.  normoactive bowel sounds +BM today    Musculoskeletal:  No edema, warm, 2+ pulses throughout    Neurologic:  Moves all extremities. AAOx3, CN II-XII reviewed   Psych: Calm, cooperative. Flat affect   Skin: Multiple tattoos       Data Review:   Review and/or order of clinical lab test  Review and/or order of tests in the radiology section of CPT  Review and/or order of tests in the medicine section of CPT    Labs:     Recent Labs     07/25/19  0545   WBC 7.5   HGB 12.1   HCT 39.1        Recent Labs     07/27/19  0344 07/25/19  0545   NA  --  144   K  --  3.5   CL  --  114*   CO2  --  22   BUN  --  13   CREA  --  0.73   * 78   CA  --  8.4*     No results for input(s): SGOT, GPT, ALT, AP, TBIL, TBILI, TP, ALB, GLOB, GGT, AML, LPSE in the last 72 hours. No lab exists for component: AMYP, HLPSE  No results for input(s): INR, PTP, APTT in the last 72 hours. No lab exists for component: INREXT   No results for input(s): FE, TIBC, PSAT, FERR in the last 72 hours. No results found for: FOL, RBCF   No results for input(s): PH, PCO2, PO2 in the last 72 hours. No results for input(s): CPK, CKNDX, TROIQ in the last 72 hours.     No lab exists for component: CPKMB  Lab Results   Component Value Date/Time    Cholesterol, total 110 07/27/2019 03:44 AM    HDL Cholesterol 49 07/27/2019 03:44 AM    LDL, calculated 46.6 07/27/2019 03:44 AM    Triglyceride 72 07/27/2019 03:44 AM    CHOL/HDL Ratio 2.2 07/27/2019 03:44 AM     Lab Results   Component Value Date/Time    Glucose (POC) 360 (H) 07/27/2019 11:39 AM    Glucose (POC) 433 (H) 07/27/2019 11:36 AM    Glucose (POC) 103 (H) 07/27/2019 07:26 AM    Glucose (POC) 264 (H) 07/26/2019 08:48 PM    Glucose (POC) 188 (H) 07/26/2019 04:37 PM     Lab Results   Component Value Date/Time    Color YELLOW/STRAW 07/24/2019 12:28 PM    Appearance CLEAR 07/24/2019 12:28 PM    Specific gravity 1.012 07/24/2019 12:28 PM    pH (UA) 5.5 07/24/2019 12:28 PM    Protein 100 (A) 07/24/2019 12:28 PM    Glucose 500 (A) 07/24/2019 12:28 PM    Ketone NEGATIVE  07/24/2019 12:28 PM    Bilirubin NEGATIVE  07/24/2019 12:28 PM    Urobilinogen 0.2 07/24/2019 12:28 PM    Nitrites NEGATIVE  07/24/2019 12:28 PM    Leukocyte Esterase NEGATIVE  07/24/2019 12:28 PM    Epithelial cells FEW 07/24/2019 12:28 PM    Bacteria NEGATIVE  07/24/2019 12:28 PM    WBC 0-4 07/24/2019 12:28 PM    RBC 0-5 07/24/2019 12:28 PM     Medications Reviewed:     Current Facility-Administered Medications   Medication Dose Route Frequency    QUEtiapine (SEROquel) tablet 50 mg  50 mg Oral QHS    DULoxetine (CYMBALTA) capsule 60 mg  60 mg Oral DAILY    gabapentin (NEURONTIN) capsule 300 mg  300 mg Oral TID    divalproex DR (DEPAKOTE) tablet 500 mg  500 mg Oral TID    atorvastatin (LIPITOR) tablet 80 mg  80 mg Oral DAILY    metoprolol succinate (TOPROL-XL) XL tablet 50 mg  50 mg Oral DAILY    apixaban (ELIQUIS) tablet 5 mg  5 mg Oral BID    ascorbic acid (vitamin C) (VITAMIN C) tablet 250 mg  250 mg Oral TID    ferrous sulfate tablet 325 mg  1 Tab Oral TID WITH MEALS    vitamin A (AQUASOL A) capsule 10,000 Units  10,000 Units Oral DAILY    therapeutic multivitamin (THERAGRAN) tablet 1 Tab  1 Tab Oral DAILY    ziprasidone (GEODON) 20 mg in sterile water (preservative free) 1 mL injection  20 mg IntraMUSCular BID PRN    OLANZapine (ZyPREXA) tablet 5 mg  5 mg Oral Q6H PRN    benztropine (COGENTIN) tablet 2 mg  2 mg Oral BID PRN    benztropine (COGENTIN) injection 2 mg  2 mg IntraMUSCular BID PRN    acetaminophen (TYLENOL) tablet 650 mg  650 mg Oral Q4H PRN    magnesium hydroxide (MILK OF MAGNESIA) 400 mg/5 mL oral suspension 30 mL  30 mL Oral DAILY PRN    nicotine (NICODERM CQ) 21 mg/24 hr patch 1 Patch  1 Patch TransDERmal DAILY PRN    hydrOXYzine HCl (ATARAX) tablet 50 mg  50 mg Oral Q6H PRN    insulin lispro (HUMALOG) injection   SubCUTAneous AC&HS    insulin glargine (LANTUS) injection 10 Units  10 Units SubCUTAneous QHS ______________________________________________________________________  EXPECTED LENGTH OF STAY: - - -  ACTUAL LENGTH OF STAY:          [address], NP

## 2019-07-27 NOTE — BH NOTES
GROUP THERAPY PROGRESS NOTE    Jose MIRANDA 1983 Shantell Ayoub is participating in [place].      Group time: 1 hour    Personal goal for participation: \"find out whats going on w me\"    Goal orientation: personal    Group therapy participation: active    Therapeutic interventions reviewed and discussed:     Impression of participation:

## 2019-07-27 NOTE — PROGRESS NOTES
Problem: Depressed Mood (Adult/Pediatric)  Goal: *STG: Participates in treatment plan  Outcome: Progressing Towards Goal  Pt is demonstrating mood lability. Pt has attempted to rally peers in a negative fashion.   Pt ceases this behavior after she is seen by the Hospitalist, who prescribed Archer.

## 2019-07-27 NOTE — BH NOTES
Admission Note:    Patient admitted to [address] [place] [address] General unit    Admission Status: Voluntary    Reason for Admission: Pt came to [facility] under medical to the ICU and then 5E d/t SI with intentional overdose on insulin. Pt had psychiatric consult and then game to behavioral unit once medically cleared. UDS neg BAL neg    Pt's Condition on Arrival: Pt is calm and cooperative. She seems interested in getting better. Her affect is flat and she admits to feelings of anxiety and difficulty sleeping at night. She currently denies any SI/HI and pt verbally contracts for safety to not cause harm to self or others during this admission. Pt does not c/o of feeling depressed at the moment. Pt says at night she can tend to become manic and anxious. Pt's Statements/Questions/Concerns: PT reports regularly having chest pain at night d/t CHF and requests to have nitroglycerin to help chest pain at night. Primary Nurse Apoorva Long and [facility] ASHLEY IVEY RN performed a dual skin assessment on this patient Impairment noted- see wound doc flow sheet  - Pt has healed surgical scar to midline chest d/t recent heart surgery (June 27th, 2019) per pt report. Pt has scattered tattoos to upper torso (front and back, arms and legs bilaterally, and one to left side of neck. Eben score is 23    Belongings searched by Kyle Hunter secured properly. See orange slip in paper chart and flowsheets.

## 2019-07-27 NOTE — BH NOTES
1139 blood sugar 360. Called dr Merline Patel who discharged her from [address] [place] [address] yesterday.  He gave telephone order for 15 units humolog        1200 paged hospitalist for consult cassandra price  Will see patient

## 2019-07-27 NOTE — BH NOTES
1313 PRN Medication Documentation    Specific patient behavior that led to need for PRN medication: Pt requesting medication due to uncontrolled pain and waiting for hospitalist  Staff interventions attempted prior to PRN being given: Tylenol this morning  PRN medication given: ATrax 50 mg  Patient response/effectiveness of PRN medication: will assess

## 2019-07-27 NOTE — PROGRESS NOTES
2310: Patient lying in bed resting quietly with eyes closed. No distress noted. Respirations are even and unlabored. Staff will continue to monitor q15 throughout the shift. Problem: Falls - Risk of  Goal: *Absence of Falls  Description  Document Candida Lightning Fall Risk and appropriate interventions in the flowsheet.   Outcome: Progressing Towards Goal  Note:   Fall Risk Interventions:            Medication Interventions: Teach patient to arise slowly
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
## 2019-07-27 NOTE — INTERDISCIPLINARY ROUNDS
Behavioral Health Interdisciplinary Rounds     Patient Name: Sanjeev Wynn  Age: 37 y.o. Room/Bed:  727/02  Primary Diagnosis: <principal problem not specified>   Admission Status: Voluntary     Readmission within 30 days: no  Power of  in place: no  Patient requires a blocked bed: no          Reason for blocked bed:     VTE Prophylaxis: No    Mobility needs/Fall risk: no  Flu Vaccine : no   Nutritional Plan: no  Consults:          Labs/Testing due today?: yes    Sleep hours:  9      Participation in Care/Groups:  no  Medication Compliant?: Yes  PRNS (last 24 hours):  Antianxiety and Pain    Restraints (last 24 hours):  no     CIWA (range last 24 hours):     COWS (range last 24 hours):      Alcohol screening (AUDIT) completed -   AUDIT Score: 2     If applicable, date SBIRT discussed in treatment team AND documented:   AUDIT Screen Score: AUDIT Score: 2    Tobacco - patient is a smoker: Have You Used Tobacco in the Past 30 Days: Yes  Illegal Drugs use: Have You Used Any Illegal Substances Over the Past 12 Months: No    24 hour chart check complete: yes     Patient goal(s) for today: Meet w/Tx - develop course of care and pre-d/c planing  Treatment team focus/goals: Complete Psych Social Assessment  Progress note : Irritable mood, pain recent medical surgery, sad depressed , tired of living with medical problems denied SI some hope     LOS:  1  Expected LOS: TBD     Financial concerns/prescription coverage:   [employer]   Date of last family contact:  Keri Rhodes - Mother - [phone]     Family requesting physician contact today:  No   Discharge plan: Return home   Guns in the home:  N/S        Outpatient provider(s): [facility] to Psych & Therapist    Participating treatment team members: Dr Jordan Samson RN and Steven TYSON

## 2019-07-28 LAB
GLUCOSE BLD STRIP.AUTO-MCNC: 169 MG/DL (ref 65–100)
GLUCOSE BLD STRIP.AUTO-MCNC: 171 MG/DL (ref 65–100)
GLUCOSE BLD STRIP.AUTO-MCNC: 251 MG/DL (ref 65–100)
GLUCOSE BLD STRIP.AUTO-MCNC: 288 MG/DL (ref 65–100)
SERVICE CMNT-IMP: ABNORMAL

## 2019-07-28 PROCEDURE — 82962 GLUCOSE BLOOD TEST: CPT

## 2019-07-28 PROCEDURE — 74011636637 HC RX REV CODE- 636/637: Performed by: NURSE PRACTITIONER

## 2019-07-28 PROCEDURE — 74011250637 HC RX REV CODE- 250/637: Performed by: NURSE PRACTITIONER

## 2019-07-28 PROCEDURE — 74011250637 HC RX REV CODE- 250/637: Performed by: PSYCHIATRY & NEUROLOGY

## 2019-07-28 PROCEDURE — 74011636637 HC RX REV CODE- 636/637: Performed by: PSYCHIATRY & NEUROLOGY

## 2019-07-28 PROCEDURE — 65220000003 HC RM SEMIPRIVATE PSYCH

## 2019-07-28 RX ORDER — GUAIFENESIN 100 MG/5ML
162 LIQUID (ML) ORAL DAILY
Status: DISCONTINUED | OUTPATIENT
Start: 2019-07-29 | End: 2019-07-30 | Stop reason: HOSPADM

## 2019-07-28 RX ORDER — HYDROCODONE BITARTRATE AND ACETAMINOPHEN 10; 325 MG/1; MG/1
1 TABLET ORAL
Status: DISCONTINUED | OUTPATIENT
Start: 2019-07-28 | End: 2019-07-30 | Stop reason: HOSPADM

## 2019-07-28 RX ORDER — INSULIN GLARGINE 100 [IU]/ML
20 INJECTION, SOLUTION SUBCUTANEOUS
Status: DISCONTINUED | OUTPATIENT
Start: 2019-07-28 | End: 2019-07-30 | Stop reason: HOSPADM

## 2019-07-28 RX ADMIN — QUETIAPINE FUMARATE 50 MG: 25 TABLET ORAL at 21:48

## 2019-07-28 RX ADMIN — DULOXETINE HYDROCHLORIDE 60 MG: 60 CAPSULE, DELAYED RELEASE ORAL at 08:17

## 2019-07-28 RX ADMIN — DIVALPROEX SODIUM 500 MG: 500 TABLET, DELAYED RELEASE ORAL at 17:03

## 2019-07-28 RX ADMIN — APIXABAN 5 MG: 5 TABLET, FILM COATED ORAL at 17:03

## 2019-07-28 RX ADMIN — INSULIN LISPRO 3 UNITS: 100 INJECTION, SOLUTION INTRAVENOUS; SUBCUTANEOUS at 21:46

## 2019-07-28 RX ADMIN — THERA TABS 1 TABLET: TAB at 08:18

## 2019-07-28 RX ADMIN — FERROUS SULFATE TAB 325 MG (65 MG ELEMENTAL FE) 325 MG: 325 (65 FE) TAB at 08:17

## 2019-07-28 RX ADMIN — INSULIN LISPRO 5 UNITS: 100 INJECTION, SOLUTION INTRAVENOUS; SUBCUTANEOUS at 11:56

## 2019-07-28 RX ADMIN — ATORVASTATIN CALCIUM 80 MG: 40 TABLET, FILM COATED ORAL at 08:17

## 2019-07-28 RX ADMIN — DIVALPROEX SODIUM 500 MG: 500 TABLET, DELAYED RELEASE ORAL at 21:47

## 2019-07-28 RX ADMIN — FERROUS SULFATE TAB 325 MG (65 MG ELEMENTAL FE) 325 MG: 325 (65 FE) TAB at 11:56

## 2019-07-28 RX ADMIN — OXYCODONE HYDROCHLORIDE AND ACETAMINOPHEN 250 MG: 500 TABLET ORAL at 21:47

## 2019-07-28 RX ADMIN — GABAPENTIN 300 MG: 300 CAPSULE ORAL at 08:17

## 2019-07-28 RX ADMIN — INSULIN LISPRO 2 UNITS: 100 INJECTION, SOLUTION INTRAVENOUS; SUBCUTANEOUS at 17:03

## 2019-07-28 RX ADMIN — FERROUS SULFATE TAB 325 MG (65 MG ELEMENTAL FE) 325 MG: 325 (65 FE) TAB at 17:33

## 2019-07-28 RX ADMIN — HYDROCODONE BITARTRATE AND ACETAMINOPHEN 1 TABLET: 10; 325 TABLET ORAL at 14:28

## 2019-07-28 RX ADMIN — HYDROCODONE BITARTRATE AND ACETAMINOPHEN 1 TABLET: 10; 325 TABLET ORAL at 18:34

## 2019-07-28 RX ADMIN — INSULIN LISPRO 2 UNITS: 100 INJECTION, SOLUTION INTRAVENOUS; SUBCUTANEOUS at 08:18

## 2019-07-28 RX ADMIN — HYDROCODONE BITARTRATE AND ACETAMINOPHEN 1 TABLET: 10; 325 TABLET ORAL at 22:42

## 2019-07-28 RX ADMIN — APIXABAN 5 MG: 5 TABLET, FILM COATED ORAL at 08:18

## 2019-07-28 RX ADMIN — INSULIN GLARGINE 20 UNITS: 100 INJECTION, SOLUTION SUBCUTANEOUS at 21:47

## 2019-07-28 RX ADMIN — OXYCODONE HYDROCHLORIDE AND ACETAMINOPHEN 250 MG: 500 TABLET ORAL at 08:18

## 2019-07-28 RX ADMIN — DIVALPROEX SODIUM 500 MG: 500 TABLET, DELAYED RELEASE ORAL at 08:17

## 2019-07-28 RX ADMIN — HYDROXYZINE HYDROCHLORIDE 50 MG: 50 TABLET, FILM COATED ORAL at 17:05

## 2019-07-28 RX ADMIN — HYDROCODONE BITARTRATE AND ACETAMINOPHEN 1 TABLET: 10; 325 TABLET ORAL at 08:21

## 2019-07-28 RX ADMIN — HYDROCODONE BITARTRATE AND ACETAMINOPHEN 1 TABLET: 10; 325 TABLET ORAL at 02:33

## 2019-07-28 RX ADMIN — Medication 10000 UNITS: at 08:19

## 2019-07-28 RX ADMIN — GABAPENTIN 300 MG: 300 CAPSULE ORAL at 21:48

## 2019-07-28 RX ADMIN — OXYCODONE HYDROCHLORIDE AND ACETAMINOPHEN 250 MG: 500 TABLET ORAL at 17:03

## 2019-07-28 RX ADMIN — GABAPENTIN 300 MG: 300 CAPSULE ORAL at 17:03

## 2019-07-28 NOTE — BH NOTES
PRN Medication Documentation    Specific patient behavior that led to need for PRN medication: Chronic chest pain  Staff interventions attempted prior to PRN being given: Relaxation.    PRN medication given: Norco  mg tab at 0821  Patient response/effectiveness of PRN medication:0855 Symptom relief

## 2019-07-28 NOTE — PROGRESS NOTES
Problem: Falls - Risk of  Goal: *Absence of Falls  Description  Document Maddy Reed Fall Risk and appropriate interventions in the flowsheet.   Outcome: Progressing Towards Goal  Note:   Fall Risk Interventions:            Medication Interventions: Teach patient to arise slowly

## 2019-07-28 NOTE — BH NOTES
1705: Alert. Vital stable, wnl.   Medication compliant. Prn atarax 50 mg given for anxiety. Will continue to monitor. 1830: Prn norco given for 7/10 chronic chest pain. Will continue to monitor. Patient reports a reduction in symptoms. Will continue to monitor.

## 2019-07-28 NOTE — BH NOTES
Adult Progress Note    Date: 7/28/2019  Account Number:  [de-identified]  Name: Gaston Jonas  Diagnosis: bipolar disorder  Length of session: 15 minutes    Subjective:   Pt reports feeling better today. Reports feeling more hopeful. Patient Active Problem List    Diagnosis Date Noted    Bipolar 1 disorder ([facility] 07/27/2019    Insulin overdose 07/24/2019    NSTEMI (non-ST elevated myocardial infarction) ([facility] 06/08/2019    Acute exacerbation of CHF (congestive heart failure) ([facility] 06/08/2019    SOB (shortness of breath) 06/07/2019     Past Surgical History:   Procedure Laterality Date    HX ANKLE FRACTURE TX      HX CAROTID STENT      HX GYN      BTL    HX ORTHOPAEDIC      toe      Allergies   Allergen Reactions    Voltaren [Diclofenac Sodium] Anaphylaxis    Diclofenac Unknown (comments)    Flagyl [Metronidazole] Nausea and Vomiting    Imipramine Hives    Levaquin [Levofloxacin] Nausea and Vomiting      Social History     Tobacco Use    Smoking status: Current Every Day Smoker     Packs/day: 0.25    Smokeless tobacco: Never Used   Substance Use Topics    Alcohol use: Yes     Comment: occasionally      Family History   Problem Relation Age of Onset    Thyroid Disease Mother     Hypertension Father     Heart Disease Father     Diabetes Sister     Heart Disease Sister     Hypertension Sister     Thyroid Disease Brother         Review of Systems  A comprehensive review of systems was negative except for that written in the HPI. Objective:         Patient Vitals for the past 8 hrs:   BP Temp Pulse Resp SpO2   07/28/19 0837 105/72 97.9 °F (36.6 °C) (!) 103 18 100 %   07/28/19 0817 105/79 -- -- -- --       Lab/Data Review: All lab results for the last 24 hours reviewed.     Mental Status exam: WNL     Assessment/Plan:   Active Problems:    Bipolar 1 disorder ([facility] (7/27/2019)        Medications:    Current Facility-Administered Medications   Medication Dose Route Frequency    QUEtiapine (SEROquel) tablet 50 mg  50 mg Oral QHS    DULoxetine (CYMBALTA) capsule 60 mg  60 mg Oral DAILY    gabapentin (NEURONTIN) capsule 300 mg  300 mg Oral TID    divalproex DR (DEPAKOTE) tablet 500 mg  500 mg Oral TID    atorvastatin (LIPITOR) tablet 80 mg  80 mg Oral DAILY    metoprolol succinate (TOPROL-XL) XL tablet 50 mg  50 mg Oral DAILY    apixaban (ELIQUIS) tablet 5 mg  5 mg Oral BID    ascorbic acid (vitamin C) (VITAMIN C) tablet 250 mg  250 mg Oral TID    ferrous sulfate tablet 325 mg  1 Tab Oral TID WITH MEALS    vitamin A (AQUASOL A) capsule 10,000 Units  10,000 Units Oral DAILY    therapeutic multivitamin (THERAGRAN) tablet 1 Tab  1 Tab Oral DAILY    HYDROcodone-acetaminophen (NORCO)  mg tablet 1 Tab  1 Tab Oral Q6H PRN    insulin glargine (LANTUS) injection 14 Units  14 Units SubCUTAneous QHS    ziprasidone (GEODON) 20 mg in sterile water (preservative free) 1 mL injection  20 mg IntraMUSCular BID PRN    OLANZapine (ZyPREXA) tablet 5 mg  5 mg Oral Q6H PRN    benztropine (COGENTIN) tablet 2 mg  2 mg Oral BID PRN    benztropine (COGENTIN) injection 2 mg  2 mg IntraMUSCular BID PRN    acetaminophen (TYLENOL) tablet 650 mg  650 mg Oral Q4H PRN    magnesium hydroxide (MILK OF MAGNESIA) 400 mg/5 mL oral suspension 30 mL  30 mL Oral DAILY PRN    nicotine (NICODERM CQ) 21 mg/24 hr patch 1 Patch  1 Patch TransDERmal DAILY PRN    hydrOXYzine HCl (ATARAX) tablet 50 mg  50 mg Oral Q6H PRN    insulin lispro (HUMALOG) injection   SubCUTAneous AC&HS       Side Effects:  none    The following information was reviewed and discussed:  patient given opportunity to ask questions

## 2019-07-28 NOTE — PROGRESS NOTES
Problem: Depressed Mood (Adult/Pediatric)  Goal: *STG: Participates in treatment plan  Note:   Patient participates in treatment plan. Patient's medications discussed and adjusted. Patient has flat affect, states she rarely smiles. Patient stated she \"is more hopeful than I has been for awhile. \"  Patient has been out on the unit, med and meal compliant, pleasant, engaged with peers.

## 2019-07-28 NOTE — BH NOTES
Behavioral Health Interdisciplinary Rounds     Patient Name: Hilaria Kilgore  Age: 37 y.o. Room/Bed:  727/  Primary Diagnosis: <principal problem not specified>   Admission Status: Voluntary     Readmission within 30 days: no  Power of  in place: no  Patient requires a blocked bed: no          Reason for blocked bed:     VTE Prophylaxis: No    Mobility needs/Fall risk: no  Flu Vaccine : no   Nutritional Plan: no  Consults:          Labs/Testing due today?: no    Sleep hours:  3      Participation in Care/Groups:  yes  Medication Compliant?: Yes  PRNS (last 24 hours): Antianxiety and Pain    Restraints (last 24 hours):  no     CIWA (range last 24 hours):     COWS (range last 24 hours):      Alcohol screening (AUDIT) completed -   AUDIT Score: 2     If applicable, date SBIRT discussed in treatment team AND documented:   AUDIT Screen Score: AUDIT Score: 2      Document Brief Intervention (corresponds directly with the 5 A's, Ask, Advise, Assess, Assist, and Arrange): At- Risk Patients (Score 7-15 for women; 8-15 for men)  Discuss concern patient is drinking at unhealthy levels known to increase risk of alcohol-related health problems. Is Patient ready to commit to change? If No:   Encourage reflection   Discuss short term and long term health risks of consuming alcohol   Barriers to change   Reaffirm willingness to help / Educational materials provided  If Yes:   Set goal  [employer] provided    Harmful use or Dependence (Score 16 or greater)   Discuss short term and long term health risks of consuming alcohol   Recommendations   Negotiate drinking goal   Recommend addiction specialist/center   Arrange follow-up appointments.     Tobacco - patient is a smoker: Have You Used Tobacco in the Past 30 Days: Yes  Illegal Drugs use: Have You Used Any Illegal Substances Over the Past 12 Months: No    24 hour chart check complete: yes     Patient goal(s) for today: Treatment team focus/goals:   Progress note     LOS:  2  Expected LOS:     Financial concerns/prescription coverage:    Date of last family contact:       Family requesting physician contact today:    Discharge plan:   Guns in the home:         Outpatient provider(s):     Participating treatment team members: [employer], * (assigned SW),

## 2019-07-28 NOTE — BH NOTES
GROUP THERAPY PROGRESS NOTE    Carmelo Rolon is participated in Reflection Group. Group time: 45 minutes    Personal goal for participation: Goal for today was to feel better and was able to achieve it by getting pain medication. Goal for tomorrow is to work towards going home. Goal orientation: personal    Group therapy participation: active    Therapeutic interventions reviewed and discussed: Reflected on today's goals and set goals for tomorrow.     Impression of participation: active

## 2019-07-28 NOTE — BH NOTES
0230:     PRN Medication Documentation    Specific patient behavior that led to need for PRN medication: pt request for chest incision pain   7/10    Staff interventions attempted prior to PRN being given: repositioning, med education, fluids given    PRN medication given: Norco  mg PO    Patient response/effectiveness of PRN medication: 0315: pt appears to be sleeping

## 2019-07-28 NOTE — PROGRESS NOTES
Hospitalist Progress Note  Isabel Ingram NP  Answering service: [phone] OR 36 from in house phone  Cell: [phone]      Date of Service:  2019  NAME:  Jethro Zaman  :  1975  MRN:  [id number]    Admission Summary:   Pt initially presented to the hospital with intentional insulin overdose. She has a pmhx of gastroparesis, DM type 2, hypertension, bipolar disorder, GERD, arthritis and coronary artery disease with multiple PCI. She was medically stabilized and transferred up to psychiatry services 2019 for further evaluation and treatment. Hospitalists consulted for surgical pain and to prescribe narcotics as psychiatry does not do this. Pt indicates she had double vessel bypass on 2019 at [facility], she was discharged to home and had [facility] for a few weeks then on her own. She was instructed to take tylenol for pain but has gone back to the clinical x 2 to seek additional pain relief. Says first time she received tramadol which did not help her much and then the second time received dilaudid. Her pain has persisted. On recent admit, she was given 1-2 tabs of Norco and indicated the 2 tabs relived her pain and she was comfortable with that. She was agreeable to starting Norco 10/325 every 6 hours as needed for pain and verbalized understanding this would not be prescribed on discharge. Interval history / Subjective:      Pt reports her pain is better managed, wears off ~ 5 hours and would like to know if she may have it more frequently. Pain level was ~ 8/10 and the Norco dropped it to ~4/10. Her blood glucose was discussed and pt agreeable to increased Lantus this evening. Assessment & Plan:     Hx Bipolar Disorder with intentional insulin overdose:  - treatment as per primary care team    Hx on Diabetes Mellitus:   - on insulin at home, lantus 28 units daily and Novolog with meals (6-8 units with SSI).  Sees  Vesna Rdz at [facility] (endocrinology)  - Increase to 20 units Lantus  - HgbA1c 8.5 (6/7/2019) Blood glucose range 169-288  - diet is carb consistent/AHA   - takes gabapentin for diabetic neuropathy    Hx of CAD/NSTEMI and CABG and incisional pain:   - Last Echo: Dilated left ventricle. Severe segmental systolic dysfunction. Est L ventricular ejection fraction is 26-30%. Segmental wall motion abnormality consistent with LAD/RCA territory though takotsubo's cardiomyopathy is a possibility.  - on statin, lipid profile reviewed: Chol 110 Hdl 49 Ldl 46.6  - pt on eliquis for hx of DVT (2017)  - has not yet started cardiac rehab  - having incisional/Sternotomy pain, worse with movement or palpation, started Norco yesterday, may increase frequency    Hx Hypertension: BP Controlled  - toprol XL ordered  - pt reported she used to take multiple medications prior to her CABG but at present time only takes the Toprol XL. Code status: Full  DVT prophylaxis: none indicated, up ad viola  Care Plan discussed with: patient  Disposition: as per primary care team    Thank you for giving us opportunity to participate in this patients care. Will sign off at this time, please re-consult if there are any further medical management needs or questions       Hospital Problems  Date Reviewed: 7/24/2019          Codes Class Noted POA    B[facility] disorder ([facility] ICD-10-CM: F31.9  ICD-9-CM: 296.7  7/27/2019 Unknown            Review of Systems:   Denies HA. No chest pressure, has sternal pain from surgery. No respiratory or GI complaints. Vital Signs:    Last 24hrs VS reviewed since prior progress note. Most recent are:  Visit Vitals  /72 (BP 1 Location: Right arm)   Pulse (!) 103   Temp 97.9 °F (36.6 °C)   Resp 18   SpO2 100%   Breastfeeding?  No     No intake or output data in the 24 hours ending 07/28/19 1422     Physical Examination:         Constitutional:  No acute distress, cooperative, pleasant    ENT:  Oral MM moist    Resp: No accessory muscle use On RA   Chest Wall:  Midline scabbed incision with three chest tube site, no erythema or drainage (seen 7/27)    GI:  No N/V    Musculoskeletal:  No edema, warm, 2+ pulses throughout    Neurologic:  Moves all extremities. AAOx3, CN II-XII reviewed   Psych: Calm, cooperative. Flat affect   Skin:  Multiple tattoos       Data Review:   Review and/or order of clinical lab test  Review and/or order of tests in the radiology section of CPT  Review and/or order of tests in the medicine section of CPT    Labs:     No results for input(s): WBC, HGB, HCT, PLT, HGBEXT, HCTEXT, PLTEXT, HGBEXT, HCTEXT, PLTEXT in the last 72 hours. Recent Labs     07/27/19  0344   *     No results for input(s): SGOT, GPT, ALT, AP, TBIL, TBILI, TP, ALB, GLOB, GGT, AML, LPSE in the last 72 hours. No lab exists for component: AMYP, HLPSE  No results for input(s): INR, PTP, APTT in the last 72 hours. No lab exists for component: INREXT, INREXT   No results for input(s): FE, TIBC, PSAT, FERR in the last 72 hours. No results found for: FOL, RBCF   No results for input(s): PH, PCO2, PO2 in the last 72 hours. No results for input(s): CPK, CKNDX, TROIQ in the last 72 hours.     No lab exists for component: CPKMB  Lab Results   Component Value Date/Time    Cholesterol, total 110 07/27/2019 03:44 AM    HDL Cholesterol 49 07/27/2019 03:44 AM    LDL, calculated 46.6 07/27/2019 03:44 AM    Triglyceride 72 07/27/2019 03:44 AM    CHOL/HDL Ratio 2.2 07/27/2019 03:44 AM     Lab Results   Component Value Date/Time    Glucose (POC) 288 (H) 07/28/2019 11:14 AM    Glucose (POC) 169 (H) 07/28/2019 07:20 AM    Glucose (POC) 235 (H) 07/27/2019 08:46 PM    Glucose (POC) 229 (H) 07/27/2019 04:55 PM    Glucose (POC) 360 (H) 07/27/2019 11:39 AM     Lab Results   Component Value Date/Time    Color YELLOW/STRAW 07/24/2019 12:28 PM    Appearance CLEAR 07/24/2019 12:28 PM    Specific gravity 1.012 07/24/2019 12:28 PM    pH (UA) 5.5 07/24/2019 12:28 PM    Protein 100 (A) 07/24/2019 12:28 PM    Glucose 500 (A) 07/24/2019 12:28 PM    Ketone NEGATIVE  07/24/2019 12:28 PM    Bilirubin NEGATIVE  07/24/2019 12:28 PM    Urobilinogen 0.2 07/24/2019 12:28 PM    Nitrites NEGATIVE  07/24/2019 12:28 PM    Leukocyte Esterase NEGATIVE  07/24/2019 12:28 PM    Epithelial cells FEW 07/24/2019 12:28 PM    Bacteria NEGATIVE  07/24/2019 12:28 PM    WBC 0-4 07/24/2019 12:28 PM    RBC 0-5 07/24/2019 12:28 PM     Medications Reviewed:     Current Facility-Administered Medications   Medication Dose Route Frequency    insulin glargine (LANTUS) injection 20 Units  20 Units SubCUTAneous QHS    QUEtiapine (SEROquel) tablet 50 mg  50 mg Oral QHS    DULoxetine (CYMBALTA) capsule 60 mg  60 mg Oral DAILY    gabapentin (NEURONTIN) capsule 300 mg  300 mg Oral TID    divalproex DR (DEPAKOTE) tablet 500 mg  500 mg Oral TID    atorvastatin (LIPITOR) tablet 80 mg  80 mg Oral DAILY    metoprolol succinate (TOPROL-XL) XL tablet 50 mg  50 mg Oral DAILY    apixaban (ELIQUIS) tablet 5 mg  5 mg Oral BID    ascorbic acid (vitamin C) (VITAMIN C) tablet 250 mg  250 mg Oral TID    ferrous sulfate tablet 325 mg  1 Tab Oral TID WITH MEALS    vitamin A (AQUASOL A) capsule 10,000 Units  10,000 Units Oral DAILY    therapeutic multivitamin (THERAGRAN) tablet 1 Tab  1 Tab Oral DAILY    HYDROcodone-acetaminophen (NORCO)  mg tablet 1 Tab  1 Tab Oral Q6H PRN    ziprasidone (GEODON) 20 mg in sterile water (preservative free) 1 mL injection  20 mg IntraMUSCular BID PRN    OLANZapine (ZyPREXA) tablet 5 mg  5 mg Oral Q6H PRN    benztropine (COGENTIN) tablet 2 mg  2 mg Oral BID PRN    benztropine (COGENTIN) injection 2 mg  2 mg IntraMUSCular BID PRN    acetaminophen (TYLENOL) tablet 650 mg  650 mg Oral Q4H PRN    magnesium hydroxide (MILK OF MAGNESIA) 400 mg/5 mL oral suspension 30 mL  30 mL Oral DAILY PRN    nicotine (NICODERM CQ) 21 mg/24 hr patch 1 Patch  1 Patch TransDERmal DAILY PRN    hydrOXYzine HCl (ATARAX) tablet 50 mg  50 mg Oral Q6H PRN    insulin lispro (HUMALOG) injection   SubCUTAneous AC&HS   ______________________________________________________________________  EXPECTED LENGTH OF STAY: - - -  ACTUAL LENGTH OF STAY:          2               Russ Preciado NP

## 2019-07-29 LAB
GLUCOSE BLD STRIP.AUTO-MCNC: 174 MG/DL (ref 65–100)
GLUCOSE BLD STRIP.AUTO-MCNC: 330 MG/DL (ref 65–100)
GLUCOSE BLD STRIP.AUTO-MCNC: 343 MG/DL (ref 65–100)
GLUCOSE BLD STRIP.AUTO-MCNC: 95 MG/DL (ref 65–100)
SERVICE CMNT-IMP: ABNORMAL
SERVICE CMNT-IMP: NORMAL

## 2019-07-29 PROCEDURE — 74011250637 HC RX REV CODE- 250/637: Performed by: NURSE PRACTITIONER

## 2019-07-29 PROCEDURE — 82962 GLUCOSE BLOOD TEST: CPT

## 2019-07-29 PROCEDURE — 74011250637 HC RX REV CODE- 250/637: Performed by: PSYCHIATRY & NEUROLOGY

## 2019-07-29 PROCEDURE — 74011636637 HC RX REV CODE- 636/637: Performed by: NURSE PRACTITIONER

## 2019-07-29 PROCEDURE — 74011636637 HC RX REV CODE- 636/637: Performed by: PSYCHIATRY & NEUROLOGY

## 2019-07-29 PROCEDURE — 65220000003 HC RM SEMIPRIVATE PSYCH

## 2019-07-29 RX ORDER — HYDROXYZINE 50 MG/1
50 TABLET, FILM COATED ORAL
Status: DISCONTINUED | OUTPATIENT
Start: 2019-07-29 | End: 2019-07-30 | Stop reason: HOSPADM

## 2019-07-29 RX ORDER — CLONAZEPAM 0.5 MG/1
0.5 TABLET ORAL 2 TIMES DAILY
Status: DISCONTINUED | OUTPATIENT
Start: 2019-07-29 | End: 2019-07-30 | Stop reason: HOSPADM

## 2019-07-29 RX ORDER — DOXEPIN HYDROCHLORIDE 25 MG/1
50 CAPSULE ORAL EVERY EVENING
Status: DISCONTINUED | OUTPATIENT
Start: 2019-07-29 | End: 2019-07-30 | Stop reason: HOSPADM

## 2019-07-29 RX ADMIN — CLONAZEPAM 0.5 MG: 0.5 TABLET ORAL at 12:12

## 2019-07-29 RX ADMIN — INSULIN GLARGINE 20 UNITS: 100 INJECTION, SOLUTION SUBCUTANEOUS at 21:31

## 2019-07-29 RX ADMIN — Medication 10000 UNITS: at 08:23

## 2019-07-29 RX ADMIN — DIVALPROEX SODIUM 500 MG: 500 TABLET, DELAYED RELEASE ORAL at 17:38

## 2019-07-29 RX ADMIN — DULOXETINE HYDROCHLORIDE 60 MG: 60 CAPSULE, DELAYED RELEASE ORAL at 08:12

## 2019-07-29 RX ADMIN — METOPROLOL SUCCINATE 50 MG: 25 TABLET, EXTENDED RELEASE ORAL at 08:14

## 2019-07-29 RX ADMIN — APIXABAN 5 MG: 5 TABLET, FILM COATED ORAL at 17:38

## 2019-07-29 RX ADMIN — CLONAZEPAM 0.5 MG: 0.5 TABLET ORAL at 17:38

## 2019-07-29 RX ADMIN — FERROUS SULFATE TAB 325 MG (65 MG ELEMENTAL FE) 325 MG: 325 (65 FE) TAB at 17:38

## 2019-07-29 RX ADMIN — APIXABAN 5 MG: 5 TABLET, FILM COATED ORAL at 08:12

## 2019-07-29 RX ADMIN — FERROUS SULFATE TAB 325 MG (65 MG ELEMENTAL FE) 325 MG: 325 (65 FE) TAB at 12:12

## 2019-07-29 RX ADMIN — GABAPENTIN 300 MG: 300 CAPSULE ORAL at 17:38

## 2019-07-29 RX ADMIN — ATORVASTATIN CALCIUM 80 MG: 40 TABLET, FILM COATED ORAL at 08:15

## 2019-07-29 RX ADMIN — INSULIN LISPRO 7 UNITS: 100 INJECTION, SOLUTION INTRAVENOUS; SUBCUTANEOUS at 14:00

## 2019-07-29 RX ADMIN — THERA TABS 1 TABLET: TAB at 08:12

## 2019-07-29 RX ADMIN — ASPIRIN 81 MG 162 MG: 81 TABLET ORAL at 08:13

## 2019-07-29 RX ADMIN — DIVALPROEX SODIUM 500 MG: 500 TABLET, DELAYED RELEASE ORAL at 08:13

## 2019-07-29 RX ADMIN — OXYCODONE HYDROCHLORIDE AND ACETAMINOPHEN 250 MG: 500 TABLET ORAL at 08:14

## 2019-07-29 RX ADMIN — OXYCODONE HYDROCHLORIDE AND ACETAMINOPHEN 250 MG: 500 TABLET ORAL at 17:38

## 2019-07-29 RX ADMIN — DIVALPROEX SODIUM 500 MG: 500 TABLET, DELAYED RELEASE ORAL at 21:30

## 2019-07-29 RX ADMIN — GABAPENTIN 300 MG: 300 CAPSULE ORAL at 08:13

## 2019-07-29 RX ADMIN — HYDROCODONE BITARTRATE AND ACETAMINOPHEN 1 TABLET: 10; 325 TABLET ORAL at 19:31

## 2019-07-29 RX ADMIN — INSULIN LISPRO 7 UNITS: 100 INJECTION, SOLUTION INTRAVENOUS; SUBCUTANEOUS at 08:20

## 2019-07-29 RX ADMIN — HYDROCODONE BITARTRATE AND ACETAMINOPHEN 1 TABLET: 10; 325 TABLET ORAL at 14:03

## 2019-07-29 RX ADMIN — FERROUS SULFATE TAB 325 MG (65 MG ELEMENTAL FE) 325 MG: 325 (65 FE) TAB at 08:13

## 2019-07-29 RX ADMIN — DOXEPIN HYDROCHLORIDE 50 MG: 25 CAPSULE ORAL at 17:38

## 2019-07-29 RX ADMIN — GABAPENTIN 300 MG: 300 CAPSULE ORAL at 21:30

## 2019-07-29 RX ADMIN — HYDROCODONE BITARTRATE AND ACETAMINOPHEN 1 TABLET: 10; 325 TABLET ORAL at 08:17

## 2019-07-29 RX ADMIN — HYDROCODONE BITARTRATE AND ACETAMINOPHEN 1 TABLET: 10; 325 TABLET ORAL at 02:51

## 2019-07-29 RX ADMIN — OXYCODONE HYDROCHLORIDE AND ACETAMINOPHEN 250 MG: 500 TABLET ORAL at 21:31

## 2019-07-29 NOTE — PROGRESS NOTES
GROUP THERAPY PROGRESS NOTE    Julia Giang is participating in Self-care issues. Group time: 30 minutes    Personal goal for participation: discuss healthy and non healthy sleeping habits    Goal orientation: relaxation    Group therapy participation: active    Therapeutic interventions reviewed and discussed: Staff provided worksheet discussing the \"do's and don'ts\" of getting a good nights sleep. Staff discussed examples provided on worksheet and encouraged group to talk about examples they relate to. Staff opened discussion for group to discuss what helps them at night, including things that may not be listed as an example. To wrap up group, staff provided snacks and discussed meeting with treatment team in the morning and addressed any questions and concerns about the unit.     Impression of participation: Ksenia Hunting most to group conversation, actively engaged with peers and encouraged conversation

## 2019-07-29 NOTE — DIABETES MGMT
[facility]    [facility]TC Progress Note    Recommendations/ Comments: Chart review for variable BG's including extreme hyperglycemia over the weekend. Results ranged from 103 mg/dL - 433 mg/dL. FBG today is 343 mg/dL  Noted Lantus 20 units added starting last night (7/28/2019)    If appropriate please consider  Increase Lantus to  25 units at bedtime  Add Humalog 5 units AC  Document po intake    Current hospital DM medication:   Lantus 20 units at bedtime  Lispro normal sensitivity correction scale      Patient is a 37 y.o. female with known diabetes on insulin injections: Humalog 5-8 units AC, Lantus 38 units daily  Admitted with intentional overdose of insulin on 7/26/2019    A1c:   Lab Results   Component Value Date/Time    Hemoglobin A1c 8.5 (H) 06/07/2019 10:10 PM       Recent Glucose Results:   Lab Results   Component Value Date/Time    GLUCPOC 343 (H) 07/29/2019 07:45 AM    GLUCPOC 251 (H) 07/28/2019 08:51 PM    GLUCPOC 171 (H) 07/28/2019 04:38 PM        Lab Results   Component Value Date/Time    Creatinine 0.73 07/25/2019 05:45 AM     Estimated Creatinine Clearance: 97.7 mL/min (based on SCr of 0.73 mg/dL). Active Orders   Diet    DIET DIABETIC CONSISTENT CARB Regular; 3-4 GM (PHILLIP); AHA-LOW-CHOL FAT        PO intake: No data found. Will continue to follow as needed.     Thank you  Elfego Wang RN, CDE  Pager [phone]    Time spent: 10 minutes

## 2019-07-29 NOTE — PROGRESS NOTES
Problem: Depressed Mood (Adult/Pediatric)  Goal: *STG: Participates in treatment plan  Outcome: Progressing Towards Goal  Note:   Out on unit social w peers and staff. Mood and affect sad to mildly anxious. Daily goal is to practice healthy coping skills and attend groups. Staff focus is on coping skills education.    Goal: *STG: Attends activities and groups  Outcome: Progressing Towards Goal  Goal: *STG: Demonstrates reduction in symptoms and increase in insight into coping skills/future focused  Outcome: Progressing Towards Goal  Goal: *STG: Remains safe in hospital  Outcome: Progressing Towards Goal  Goal: *STG: Complies with medication therapy  Outcome: Progressing Towards Goal  Goal: Interventions  Outcome: Progressing Towards Goal

## 2019-07-29 NOTE — PROGRESS NOTES
Resting in bed with eyes closed, no complaints, no distress noted. Safety measures in place, will continue to monitor.       PRN Medication Documentation    Specific patient behavior that led to need for PRN medication: pain in chest  Staff interventions attempted prior to PRN being given: none  PRN medication given: Norco  Patient response/effectiveness of PRN medication: will continue to monitor

## 2019-07-29 NOTE — BH NOTES
Adult Progress Note    Date: 7/29/2019  Account Number:  [de-identified]  Name: Benita Middleton  Diagnosis: bipolar disorder  Length of session: 15 minutes    Subjective:   Ms. Mara Carrillo reports feeling slightly better today. She slept poorly again last night and her mood remains depressed. She has been isolative to her room and encouraged to participate in the milieu. Passive SI remains but denies any active plan. Discussed risk of metabolic syndrome and worsening diabetes with Seroquel and she agrees to stop it. Patient Active Problem List    Diagnosis Date Noted    Bipolar 1 disorder ([facility] 07/27/2019    Insulin overdose 07/24/2019    NSTEMI (non-ST elevated myocardial infarction) ([facility] 06/08/2019    Acute exacerbation of CHF (congestive heart failure) ([facility] 06/08/2019    SOB (shortness of breath) 06/07/2019     Past Surgical History:   Procedure Laterality Date    HX ANKLE FRACTURE TX      HX CAROTID STENT      HX GYN      BTL    HX ORTHOPAEDIC      toe      Allergies   Allergen Reactions    Voltaren [Diclofenac Sodium] Anaphylaxis    Diclofenac Unknown (comments)    Flagyl [Metronidazole] Nausea and Vomiting    Imipramine Hives    Levaquin [Levofloxacin] Nausea and Vomiting      Social History     Tobacco Use    Smoking status: Current Every Day Smoker     Packs/day: 0.25    Smokeless tobacco: Never Used   Substance Use Topics    Alcohol use: Yes     Comment: occasionally      Family History   Problem Relation Age of Onset    Thyroid Disease Mother     Hypertension Father     Heart Disease Father     Diabetes Sister     Heart Disease Sister     Hypertension Sister     Thyroid Disease Brother         Review of Systems  A comprehensive review of systems was negative except for that written in the HPI.     Objective:         Patient Vitals for the past 8 hrs:   BP Temp Pulse Resp SpO2 Weight   07/29/19 0933 -- -- -- -- -- 73.6 kg (162 lb 4.8 oz)   07/29/19 0830 133/84 97.5 °F (36.4 °C) 93 18 98 % --   07/29/19 0812 133/84 -- 93 -- -- --       Lab/Data Review: All lab results for the last 24 hours reviewed.     Mental Status exam:   Depressed affect  Passive SI +  No plan    Assessment/Plan:   Active Problems:    Bipolar 1 disorder (HCC) (7/27/2019)    Switch to Doxepin for sleep  Klonopin for anxiety in the hospital.     Medications:    Current Facility-Administered Medications   Medication Dose Route Frequency    insulin glargine (LANTUS) injection 20 Units  20 Units SubCUTAneous QHS    HYDROcodone-acetaminophen (NORCO)  mg tablet 1 Tab  1 Tab Oral Q4H PRN    aspirin chewable tablet 162 mg  162 mg Oral DAILY    QUEtiapine (SEROquel) tablet 50 mg  50 mg Oral QHS    DULoxetine (CYMBALTA) capsule 60 mg  60 mg Oral DAILY    gabapentin (NEURONTIN) capsule 300 mg  300 mg Oral TID    divalproex DR (DEPAKOTE) tablet 500 mg  500 mg Oral TID    atorvastatin (LIPITOR) tablet 80 mg  80 mg Oral DAILY    metoprolol succinate (TOPROL-XL) XL tablet 50 mg  50 mg Oral DAILY    apixaban (ELIQUIS) tablet 5 mg  5 mg Oral BID    ascorbic acid (vitamin C) (VITAMIN C) tablet 250 mg  250 mg Oral TID    ferrous sulfate tablet 325 mg  1 Tab Oral TID WITH MEALS    vitamin A (AQUASOL A) capsule 10,000 Units  10,000 Units Oral DAILY    therapeutic multivitamin (THERAGRAN) tablet 1 Tab  1 Tab Oral DAILY    ziprasidone (GEODON) 20 mg in sterile water (preservative free) 1 mL injection  20 mg IntraMUSCular BID PRN    OLANZapine (ZyPREXA) tablet 5 mg  5 mg Oral Q6H PRN    benztropine (COGENTIN) tablet 2 mg  2 mg Oral BID PRN    benztropine (COGENTIN) injection 2 mg  2 mg IntraMUSCular BID PRN    acetaminophen (TYLENOL) tablet 650 mg  650 mg Oral Q4H PRN    magnesium hydroxide (MILK OF MAGNESIA) 400 mg/5 mL oral suspension 30 mL  30 mL Oral DAILY PRN    nicotine (NICODERM CQ) 21 mg/24 hr patch 1 Patch  1 Patch TransDERmal DAILY PRN    hydrOXYzine HCl (ATARAX) tablet 50 mg  50 mg Oral Q6H PRN    insulin lispro (HUMALOG) injection   SubCUTAneous AC&HS       Side Effects:  none    The following information was reviewed and discussed:  patient given opportunity to ask questions

## 2019-07-29 NOTE — BH NOTES
GROUP THERAPY PROGRESS NOTE    Nathan Castro is participating in Substance abuse group. Group time: 45 minutes    Personal goal for participation: To understand addiction, criteria for diagnosis, and identify triggers and coping skills. Goal orientation: personal    Group therapy participation: active    Therapeutic interventions reviewed and discussed: Group discussion of substance use, abuse, and dependence and the DSM 5 criteria for a substance use disorder. Patients were able to self-rate themselves based on the 11 criteria for a substance use disorder and explore their own level of addiction for cigarettes, alcohol, heroin, and other substances. Group discussed how they feel when they are unable to use and ways substance use has hindered their lives. Triggers for use and coping skills to avoid use or manage symptoms until craving subsides were discussed. Impression of participation: Matti Griffin shared her thoughts on social vs abuse vs dependence. She shared that using causes multiple issues and that family and friends can be helpful but also part of the problem.      Mitch [facility]

## 2019-07-29 NOTE — PROGRESS NOTES
Problem: Depressed Mood (Adult/Pediatric)  Goal: *STG: Participates in treatment plan  Outcome: Progressing Towards Goal  Pt participates in therapeutic activities and verbalizes her needs appropriately, with no manipulative behaviors.

## 2019-07-29 NOTE — BH NOTES
GROUP THERAPY PROGRESS NOTE    Jose MIRANDA [place]ino participated in a morning Process Group on the General Unit with a focus identifying feelings, planning for the day, and learning more about DBT concepts on \"Emotion Regulation. \"    .  Group time: 90 minutes. Personal goal for participation: To increase the capacity to improve ones mood, set personal goals, and understand more about basic activities to help regulate emotions. Goal orientation: The patients will be able to identify their feelings and develop a plan for structuring   their day. They were also presented with a summary sheet on emotional regulation, in regards to   focusing on one goal per day, taking physical care of oneself, and recognizing and/or building positive   experiences. The didactic portion of the session focused on these three concepts:   1) defining and focusing on one goal per day;   2) taking care of ones physical maintenance and basic needs -  sleep, nutrition, and exercise; and   3) finding and building on positive experiences. Group therapy participation: This patient actively participated in the group. Therapeutic interventions reviewed and discussed: The group members were asked to identify an   emotion they are having and/or let the group know what they want to focus on for the day as they  continue to make discharge plans. The group members reviewed three DBT suggestions regarding   emotional regulation, in regards to focusing on one goal per day, taking physical care of oneself,  and recognizing and/or building positive experiences. It was suggested that these three concepts can be   seen as headings for their list of coping skills. The group members were also provided worksheets on the   topic discussed for their review and use on their own time. Impression of participation: The patient initially said she was feeling, \"Pretty good. \" She added that she wanted to focus on being discharged and that she planned to be followed by [facility]. She also admitted to having \"depression and medical problems. ..and anxiety. ..including recent open heart surgery. ..have not yet started rehab. \" She was alert, generally oriented, and cooperative. She did not mention any SI or HI in this group. She displayed no overt psychotic symptoms in this group. Her affect was depressed with some anxiety. Her mood reflected her affect. This was the patient's first process group with the undersigned.

## 2019-07-29 NOTE — INTERDISCIPLINARY ROUNDS
Behavioral Health Interdisciplinary Rounds     Patient Name: Severo Brock  Age: 37 y.o. Room/Bed:  727/02  Primary Diagnosis: <principal problem not specified>   Admission Status: Voluntary     Readmission within 30 days: no  Power of  in place: no  Patient requires a blocked bed: no          Reason for blocked bed:     VTE Prophylaxis: No    Mobility needs/Fall risk: no  Flu Vaccine : no   Nutritional Plan: no  Consults:          Labs/Testing due today?: no    Sleep hours:  3.5      Participation in Care/Groups:  yes  Medication Compliant?: Yes  PRNS (last 24 hours): Antianxiety and Pain    Restraints (last 24 hours):  no     CIWA (range last 24 hours):     COWS (range last 24 hours):      Alcohol screening (AUDIT) completed -   AUDIT Score: 2     If applicable, date SBIRT discussed in treatment team AND documented:   AUDIT Screen Score: AUDIT Score: 2    Tobacco - patient is a smoker: Have You Used Tobacco in the Past 30 Days: Yes  Illegal Drugs use: Have You Used Any Illegal Substances Over the Past 12 Months: No    24 hour chart check complete: yes     Patient goal(s) for today: Help with not sleeping  Treatment team focus/goals: Patient is alert and oriented  Progress note Patient is attending groups and interacting well with staff and peers    LOS:  3  Expected LOS: 0    Financial concerns/prescription coverage:  [employer]  Date of last family contact:     Family requesting physician contact today:  No  Discharge plan: Return to home  Guns in the home: No       Outpatient provider(s): [address]    Participating treatment team members: Severo Brock, Dr. Providence Haymaker; Geovanna Hurt;  Yessica Guzman

## 2019-07-30 VITALS
RESPIRATION RATE: 16 BRPM | HEART RATE: 80 BPM | SYSTOLIC BLOOD PRESSURE: 118 MMHG | OXYGEN SATURATION: 97 % | TEMPERATURE: 98.2 F | WEIGHT: 162.3 LBS | DIASTOLIC BLOOD PRESSURE: 78 MMHG | BODY MASS INDEX: 27.86 KG/M2

## 2019-07-30 LAB
GLUCOSE BLD STRIP.AUTO-MCNC: 224 MG/DL (ref 65–100)
GLUCOSE BLD STRIP.AUTO-MCNC: 346 MG/DL (ref 65–100)
SERVICE CMNT-IMP: ABNORMAL
SERVICE CMNT-IMP: ABNORMAL

## 2019-07-30 PROCEDURE — 74011636637 HC RX REV CODE- 636/637: Performed by: PSYCHIATRY & NEUROLOGY

## 2019-07-30 PROCEDURE — 74011250637 HC RX REV CODE- 250/637: Performed by: PSYCHIATRY & NEUROLOGY

## 2019-07-30 PROCEDURE — 74011250637 HC RX REV CODE- 250/637: Performed by: NURSE PRACTITIONER

## 2019-07-30 PROCEDURE — 82962 GLUCOSE BLOOD TEST: CPT

## 2019-07-30 RX ORDER — HYDROCODONE BITARTRATE AND ACETAMINOPHEN 10; 325 MG/1; MG/1
1 TABLET ORAL
Qty: 9 TAB | Refills: 0 | Status: SHIPPED
Start: 2019-07-30 | End: 2019-08-02

## 2019-07-30 RX ORDER — LANOLIN ALCOHOL/MO/W.PET/CERES
325 CREAM (GRAM) TOPICAL
Qty: 90 TAB | Refills: 0 | Status: SHIPPED
Start: 2019-07-30 | End: 2021-12-08

## 2019-07-30 RX ORDER — VITAMIN A 3000 MCG
10000 CAPSULE ORAL DAILY
Qty: 30 CAP | Refills: 0 | Status: SHIPPED
Start: 2019-07-31 | End: 2019-08-05

## 2019-07-30 RX ORDER — THERA TABS 400 MCG
1 TAB ORAL DAILY
Qty: 30 TAB | Refills: 0 | Status: SHIPPED
Start: 2019-07-31 | End: 2019-12-06

## 2019-07-30 RX ORDER — METOPROLOL SUCCINATE 50 MG/1
50 TABLET, EXTENDED RELEASE ORAL DAILY
Qty: 30 TAB | Refills: 0 | Status: SHIPPED | OUTPATIENT
Start: 2019-07-30 | End: 2021-06-07 | Stop reason: SDUPTHER

## 2019-07-30 RX ORDER — ASPIRIN 81 MG/1
162 TABLET ORAL DAILY
Qty: 30 TAB | Refills: 0 | Status: SHIPPED
Start: 2019-07-30 | End: 2019-12-06

## 2019-07-30 RX ORDER — DIVALPROEX SODIUM 500 MG/1
1500 TABLET, EXTENDED RELEASE ORAL
Qty: 90 TAB | Refills: 0 | Status: SHIPPED | OUTPATIENT
Start: 2019-07-30 | End: 2019-12-06

## 2019-07-30 RX ORDER — DOXEPIN HYDROCHLORIDE 50 MG/1
50 CAPSULE ORAL
Qty: 30 CAP | Refills: 0 | Status: SHIPPED | OUTPATIENT
Start: 2019-07-30 | End: 2019-12-06

## 2019-07-30 RX ORDER — INSULIN GLARGINE 100 [IU]/ML
28 INJECTION, SOLUTION SUBCUTANEOUS DAILY
Qty: 1 PEN | Refills: 0 | Status: SHIPPED | OUTPATIENT
Start: 2019-07-30 | End: 2019-08-05

## 2019-07-30 RX ADMIN — ASPIRIN 81 MG 162 MG: 81 TABLET ORAL at 08:37

## 2019-07-30 RX ADMIN — GABAPENTIN 300 MG: 300 CAPSULE ORAL at 08:37

## 2019-07-30 RX ADMIN — HYDROXYZINE HYDROCHLORIDE 50 MG: 50 TABLET, FILM COATED ORAL at 03:04

## 2019-07-30 RX ADMIN — METOPROLOL SUCCINATE 50 MG: 25 TABLET, EXTENDED RELEASE ORAL at 08:37

## 2019-07-30 RX ADMIN — FERROUS SULFATE TAB 325 MG (65 MG ELEMENTAL FE) 325 MG: 325 (65 FE) TAB at 12:59

## 2019-07-30 RX ADMIN — APIXABAN 5 MG: 5 TABLET, FILM COATED ORAL at 08:37

## 2019-07-30 RX ADMIN — DIVALPROEX SODIUM 500 MG: 500 TABLET, DELAYED RELEASE ORAL at 08:37

## 2019-07-30 RX ADMIN — Medication 10000 UNITS: at 08:38

## 2019-07-30 RX ADMIN — INSULIN LISPRO 7 UNITS: 100 INJECTION, SOLUTION INTRAVENOUS; SUBCUTANEOUS at 08:48

## 2019-07-30 RX ADMIN — CLONAZEPAM 0.5 MG: 0.5 TABLET ORAL at 08:37

## 2019-07-30 RX ADMIN — THERA TABS 1 TABLET: TAB at 08:37

## 2019-07-30 RX ADMIN — FERROUS SULFATE TAB 325 MG (65 MG ELEMENTAL FE) 325 MG: 325 (65 FE) TAB at 08:37

## 2019-07-30 RX ADMIN — ATORVASTATIN CALCIUM 80 MG: 40 TABLET, FILM COATED ORAL at 08:37

## 2019-07-30 RX ADMIN — OXYCODONE HYDROCHLORIDE AND ACETAMINOPHEN 250 MG: 500 TABLET ORAL at 08:38

## 2019-07-30 RX ADMIN — HYDROCODONE BITARTRATE AND ACETAMINOPHEN 1 TABLET: 10; 325 TABLET ORAL at 14:28

## 2019-07-30 RX ADMIN — INSULIN LISPRO 3 UNITS: 100 INJECTION, SOLUTION INTRAVENOUS; SUBCUTANEOUS at 12:58

## 2019-07-30 RX ADMIN — HYDROCODONE BITARTRATE AND ACETAMINOPHEN 1 TABLET: 10; 325 TABLET ORAL at 08:43

## 2019-07-30 RX ADMIN — DULOXETINE HYDROCHLORIDE 60 MG: 60 CAPSULE, DELAYED RELEASE ORAL at 08:37

## 2019-07-30 NOTE — PROGRESS NOTES
Problem: Depressed Mood (Adult/Pediatric)  Goal: *STG: Participates in treatment plan  Note:   Patient participates in treatment plan. Discharge and medications discussed, goals and instructions for discharge discussed. Patient describes mood as \"good\". Fair insight, flat affect, euythmic. Patient has been out on the unit, attends groups, med and meal compliant.

## 2019-07-30 NOTE — DISCHARGE SUMMARY
Some parts of the discharge summary are from the initial Psychiatric interview that was done on admission by the admitting psychiatrist.     Date of Admission: 7/26/2019    Date of Discharge: 7/30/2019     TYPE OF DISCHARGE:   REGULAR -  YES  AMA  RELEASED BY THE [facility] COURT    Identification:         HAILE Name: Renata Pollard Age:     37 y.o.      C.   Ethnic Background: NON-       D. Sex:      female      E.   Marital Status:        F. Occupation:            G.   Place of Residence:       H. Buddhism: NO Anabaptist       I. Source/Reliability: pt     II. Chief Complaint: suicide attempt     III. History of Present Illness: Onset: several weeks ago          A. Chronological evaluation of symptoms or behavior changes: 37 y.o. female with past medical history significant for gastroparesis, DMT2, HTN, bipolar disorder, GERD, and CAD, s/p multiple stent placements, s/p CABG, who presents from home via EMS with chief complaint of reported drug overdose as a suicidal attempt. She reports her mood has been worsening for several weeks, she ran out of Depakote for a few weeks but continued to take Cymbalta. She had heart surgery in June, and chronic pain, which worsens her mood. She had taken insulin in a suicide attempt. She was admitted to medical unit and moderate. Feels overwhelmed. Still with suicidal thoughts but able to contract for safety.     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             VII. Family History:           A.           Family History   Problem Relation Age of Onset   William Drain Thyroid Disease Mother      Hypertension Father      Heart Disease Father      Diabetes Sister      Heart Disease Sister      Hypertension Sister      Thyroid Disease Brother           Mental Status Exam:        Sensorium  oriented to time, place and person   Orientation person, place, time/date and situation   Relations cooperative   Eye Contact appropriate   Appearance:  age appropriate and casually dressed   Motor Behavior:  hypoactive and within normal limits   Speech:  hypoverbal   Vocabulary average   Thought Process: logical   Thought Content free of delusions and free of hallucinations   Suicidal ideations no plan    Homicidal ideations none   Mood:  depressed   Affect:  constricted   Memory recent  adequate   Memory remote:  adequate   Concentration:  adequate   Abstraction:  abstract   Insight:  limited   Reliability fair   Judgment:  fair                               Current Facility-Administered Medications:     QUEtiapine (SEROquel) tablet 50 mg, 50 mg, Oral, QHS, Sarah Sow MD, 50 mg at 07/27/19 2119    DULoxetine (CYMBALTA) capsule 60 mg, 60 mg, Oral, DAILY, Sarah Sow MD, 60 mg at 07/27/19 1157    gabapentin (NEURONTIN) capsule 300 mg, 300 mg, Oral, TID, Sarah Sow MD, 300 mg at 07/27/19 2119    divalproex DR (DEPAKOTE) tablet 500 mg, 500 mg, Oral, TID, Sarah Sow MD, 500 mg at 07/27/19 2119    atorvastatin (LIPITOR) tablet 80 mg, 80 mg, Oral, DAILY, Sarah Sow MD, 80 mg at 07/27/19 1156    metoprolol succinate (TOPROL-XL) XL tablet 50 mg, 50 mg, Oral, DAILY, Sarah Sow MD, Stopped at 07/27/19 1156    apixaban (ELIQUIS) tablet 5 mg, 5 mg, Oral, BID, Sarah Sow MD, 5 mg at 07/27/19 1724    ascorbic acid (vitamin C) (VITAMIN C) tablet 250 mg, 250 mg, Oral, TID, Sarah Sow MD, 250 mg at 07/27/19 2119    ferrous sulfate tablet 325 mg, 1 Tab, Oral, TID WITH MEALS, Sarah Sow MD, 325 mg at 07/27/19 1724    vitamin A (AQUASOL A) capsule 10,000 Units, 10,000 Units, Oral, DAILY, Sarah Sow MD, 10,000 Units at 07/27/19 1152    therapeutic multivitamin (THERAGRAN) tablet 1 Tab, 1 Tab, Oral, DAILY, Sarah Sow MD, 1 Tab at 07/27/19 1156    HYDROcodone-acetaminophen (NORCO)  mg tablet 1 Tab, 1 Tab, Oral, Q6H PRN, Norm Peterson, ALEXIA, 1 Tab at 07/27/19 1724    insulin glargine (LANTUS) injection 14 Units, 14 Units, SubCUTAneous, QHS, Mason Abbott, NP, 14 Units at 07/27/19 2120    ziprasidone (GEODON) 20 mg in sterile water (preservative free) 1 mL injection, 20 mg, IntraMUSCular, BID PRN, Rose Arroyo MD    OLANZapine (ZyPREXA) tablet 5 mg, 5 mg, Oral, Q6H PRN, Rose Arroyo MD    benztropine (COGENTIN) tablet 2 mg, 2 mg, Oral, BID PRN, Rose Arroyo MD    benztropine (COGENTIN) injection 2 mg, 2 mg, IntraMUSCular, BID PRNRose MD    acetaminophen (TYLENOL) tablet 650 mg, 650 mg, Oral, Q4H PRN, Rose Arroyo MD, 650 mg at 07/27/19 3501    magnesium hydroxide (MILK OF MAGNESIA) 400 mg/5 mL oral suspension 30 mL, 30 mL, Oral, DAILY PRNRose MD    nicotine (NICODERM CQ) 21 mg/24 hr patch 1 Patch, 1 Patch, TransDERmal, DAILY PRN, Rose Arroyo MD    hydrOXYzine HCl (ATARAX) tablet 50 mg, 50 mg, Oral, Q6H PRN, Rose Arroyo MD, 50 mg at 07/27/19 1512    insulin lispro (HUMALOG) injection, , SubCUTAneous, AC&HS, Rose Arroyo MD, 2 Units at 07/27/19 2120     Plan:  Admit to [facility] for safety and treatment  Restart home medications  Evaluate daily for progress and treatment     [address]:    Patient was admitted to the inpatient psychiatry unit for acute psychiatric stabilization in regards to symptomatology as described in the HPI above and placed on Q15 minute checks and withdrawal precautions. While on the unit [address] was involved in individual, group, occupational and milieu therapy. She was started back on her usual medication regimen as well as PRN medications including Depakote for mood control, Insulin for diabetes management, Cymbalta for depression, and other medications as needed. She improved gradually and was able to integrate into the milieu with help from the nursing staff.  Patients symptoms improved gradually including depressed mood, low energy, poor motivation, suicidal ideation, and poor motivation. She was switched to Doxepin for sleep with good results. She was quite on the unit, appropriate in her interactions, and cooperative with medications and the unit routine. Please see individual progress notes for more specific details regarding patient's hospitalization course. Patient was discharged as per the plan. She had been doing well on the unit as per the report of the nursing staff and my observations. No PRN medication for agitation, seclusion or restraints were required during the last 48 hours of her stay. Cecily Funk had improved progressively to the point of being stable for discharge and outpatient FU. At this time she did not offer any complaints. Patient denied any SI or HI. Denied any AH or VH. She denied any delusions. Was not considered a danger to self or to others and is safe for discharge. Will FU with her appointments and remains motivated to be in treatment. The patient verbalized understanding of her discharge instructions. DISCHARGE DIAGNOSIS:  Bipolar 1 Disorder    MENTAL STATUS EXAM ON DISCHARGE:    General appearance:   Cecily Funk is a 37 y.o. BLACK OR  female who is well groomed, psychomotor activity is WNL  Eye contact: makes good eye contact  Speech: Spontaneous and coherent  Affect : Euthymic  Mood: \"OK\"  Thought Process: Logical, goal directed  Perception: Denies any AH or VH. Thought Content: Denies any SI or Plan  Insight: Partial  Judgement: Fair  Cognition: Intact grossly. Current Discharge Medication List      START taking these medications    Details   divalproex ER (DEPAKOTE ER) 500 mg ER tablet Take 3 Tabs by mouth nightly. Indications: mood  Qty: 90 Tab, Refills: 0      apixaban (ELIQUIS) 5 mg tablet Take 1 Tab by mouth two (2) times a day. Indications: Deep Vein Thrombosis Prevention  Qty: 30 Tab, Refills: 0      doxepin (SINEQUAN) 50 mg capsule Take 1 Cap by mouth nightly.  Indications: sleep  Qty: 30 Cap, Refills: 0      vitamin A (AQUASOL A) 10,000 unit capsule Take 1 Cap by mouth daily. Indications: lack of vitamin A  Qty: 30 Cap, Refills: 0      ferrous sulfate 325 mg (65 mg iron) tablet Take 1 Tab by mouth three (3) times daily (with meals). Indications: anemia from inadequate iron  Qty: 90 Tab, Refills: 0      HYDROcodone-acetaminophen (NORCO)  mg tablet Take 1 Tab by mouth every four (4) hours as needed for Pain for up to 3 days. Max Daily Amount: 6 Tabs. Indications: Pain  Qty: 9 Tab, Refills: 0    Associated Diagnoses: NSTEMI (non-ST elevated myocardial infarction) ([facility] [address]      therapeutic multivitamin (THERAGRAN) tablet Take 1 Tab by mouth daily. Indications: Treatment To Prevent Vitamin Deficiency  Qty: 30 Tab, Refills: 0         CONTINUE these medications which have CHANGED    Details   aspirin delayed-release 81 mg tablet Take 2 Tabs by mouth daily. Indications: Myocardial Reinfarction Prevention  Qty: 30 Tab, Refills: 0      insulin glargine (LANTUS SOLOSTAR U-100 INSULIN) 100 unit/mL (3 mL) inpn 28 Units by SubCUTAneous route daily. Indications: type 1 diabetes mellitus  Qty: 1 Pen, Refills: 0      metoprolol succinate (TOPROL-XL) 50 mg XL tablet Take 1 Tab by mouth daily. Indications: high blood pressure  Qty: 30 Tab, Refills: 0         CONTINUE these medications which have NOT CHANGED    Details   atorvastatin (LIPITOR) 80 mg tablet Take 80 mg by mouth daily. nitroglycerin (NITROSTAT) 0.4 mg SL tablet 0.4 mg by SubLINGual route every five (5) minutes as needed for Chest Pain. Up to 3 doses. pantoprazole (PROTONIX) 40 mg tablet Take 40 mg by mouth Daily (before breakfast). isosorbide mononitrate ER (IMDUR) 120 mg CR tablet Take 60 mg by mouth daily. gabapentin (NEURONTIN) 300 mg capsule Take 300 mg by mouth three (3) times daily. DULoxetine (CYMBALTA) 60 mg capsule Take 1 Cap by mouth daily.   Qty: 90 Cap, Refills: 1    Associated Diagnoses: Fibromyalgia furosemide (LASIX) 80 mg tablet Take 80 mg by mouth daily. insulin lispro (HUMALOG) 100 unit/mL injection 5 Units by SubCUTAneous route Before breakfast, lunch, and dinner. STOP taking these medications       losartan (COZAAR) 100 mg tablet Comments:   Reason for Stopping:         hydroCHLOROthiazide (HYDRODIURIL) 25 mg tablet Comments:   Reason for Stopping:         heparin sodium,porcine/D5W (HEPARIN 25,000 UNITS IN D5W 250 ML) 25,000 unit/250 mL(100 unit/mL) infusion Comments:   Reason for Stopping:         insulin glargine (LANTUS U-100 INSULIN) 100 unit/mL injection Comments:   Reason for Stopping:         divalproex DR (DEPAKOTE) 500 mg tablet Comments:   Reason for Stopping: Follow-up Information     Follow up With Specialties Details Why Contact Jovani Aquino 77 [phone]          WOUND CARE: none needed. PROGNOSIS:   Good / Fair based on nature of patient's pathology/ies and treatment compliance issues. Prognosis is greatly dependent upon patient's ability to  follow up on psychiatric/psychotherapy appointments as well as to comply with psychiatric medications as prescribed.

## 2019-07-30 NOTE — PROGRESS NOTES
Pt. actively participated in Spirituality Group about Letting Things Go that we can't change, on [address] [facility].       Genaro Downing,  Intern, MDiv  [phone]

## 2019-07-30 NOTE — PROGRESS NOTES
Problem: Depressed Mood (Adult/Pediatric)  Goal: *STG: Participates in treatment plan  Outcome: Progressing Towards Goal  Pt participates in unit activities and refrains from manipulative behaviors.

## 2019-07-30 NOTE — DIABETES MGMT
[facility]    [facility] Progress Note    Recommendations/ Comments: If appropriate, please consider increasing Lantus to 25 units. POC glucoses variable from  mg/dl. Pt is usually on meal time insulin as well at home and may benefit from a low dose with meals to prevent extremes. Current hospital DM medication: Lantus 20 units daily; lispro insulin correction scale    Chart reviewed on [address]. Patient is a 37 y.o. female with known diabetes on insulin injections: Humalog 5-8 units AC, Lantus 38 units daily  Admitted with intentional overdose of insulin on 7/26/2019       A1c:   Lab Results   Component Value Date/Time    Hemoglobin A1c 8.5 (H) 06/07/2019 10:10 PM       Recent Glucose Results:   Lab Results   Component Value Date/Time    GLUCPOC 346 (H) 07/30/2019 07:33 AM    GLUCPOC 174 (H) 07/29/2019 08:43 PM    GLUCPOC 95 07/29/2019 04:29 PM        Lab Results   Component Value Date/Time    Creatinine 0.73 07/25/2019 05:45 AM     Estimated Creatinine Clearance: 97.7 mL/min (based on SCr of 0.73 mg/dL). Active Orders   Diet    DIET DIABETIC CONSISTENT CARB Regular; 3-4 GM (PHILLIP); AHA-LOW-CHOL FAT        PO intake: No data found. Will continue to follow as needed.     Thank you          Time spent: 4 min

## 2019-07-30 NOTE — INTERDISCIPLINARY ROUNDS
Behavioral Health Interdisciplinary Rounds     Patient Name: Sanjeev Wynn  Age: 37 y.o. Room/Bed:  727/02  Primary Diagnosis: <principal problem not specified>   Admission Status: Voluntary     Readmission within 30 days: no  Power of  in place: no  Patient requires a blocked bed: no          Reason for blocked bed:     VTE Prophylaxis: No    Mobility needs/Fall risk: no  Flu Vaccine : no   Nutritional Plan: no  Consults:          Labs/Testing due today?: no    Sleep hours:  4.5      Participation in Care/Groups:  yes  Medication Compliant?: Yes  PRNS (last 24 hours): Pain    Restraints (last 24 hours):  no     CIWA (range last 24 hours):     COWS (range last 24 hours):      Alcohol screening (AUDIT) completed -   AUDIT Score: 2     If applicable, date SBIRT discussed in treatment team AND documented:   AUDIT Screen Score: AUDIT Score: 2    Tobacco - patient is a smoker: Have You Used Tobacco in the Past 30 Days: Yes  Illegal Drugs use: Have You Used Any Illegal Substances Over the Past 12 Months: No    24 hour chart check complete: yes     Patient goal(s) for today: Asking to be discharged  Treatment team focus/goals: Taking medication as prescribed  Progress note Patient is alert and oriented and interacting well with staff and peers. LOS:  3  Expected LOS: 0    Financial concerns/prescription coverage:  [employer]  Date of last family contact:     Family requesting physician contact today:  No  Discharge plan: Return to home  Guns in the home: No       Outpatient provider([facility]): [facility]    Participating treatment team members: Dr. Fabian Samson;  Hugo Barry

## 2019-07-30 NOTE — BH NOTES
Behavioral Health Transition Record to Provider    Patient Name: Adela Giles  YOB: 1975  Medical Record Number: [id number]  Date of Admission: 7/26/2019  Date of Discharge: 7/30/19    Attending Provider: No att. providers found  Discharging Provider: Dr. Nii Lizama  To contact this individual call [phone] and ask the  to page. If unavailable, ask to be transferred to [facility] Provider on call. [facility] Provider will be available on call 24/7 and during holidays.     Primary Care Provider: Guzman Aguilar MD    Allergies   Allergen Reactions    Voltaren [Diclofenac Sodium] Anaphylaxis    Dramamine Ii [Meclizine] Unknown (comments)     Auditory and Visual hallucinations    Diclofenac Unknown (comments)    Flagyl [Metronidazole] Nausea and Vomiting    Imipramine Hives    Levaquin [Levofloxacin] Nausea and Vomiting       Reason for Admission: Insulin OD, intentional    Admission Diagnosis: Bipolar 1 disorder ([facility] [address] [F31.9]    * No surgery found *    Results for orders placed or performed during the hospital encounter of 07/26/19   GLUCOSE, FASTING   Result Value Ref Range    Glucose 118 (H) 65 - 100 MG/DL   TSH 3RD GENERATION   Result Value Ref Range    TSH 0.74 0.36 - 3.74 uIU/mL   LIPID PANEL   Result Value Ref Range    LIPID PROFILE          Cholesterol, total 110 <200 MG/DL    Triglyceride 72 <150 MG/DL    HDL Cholesterol 49 MG/DL    LDL, calculated 46.6 0 - 100 MG/DL    VLDL, calculated 14.4 MG/DL    CHOL/HDL Ratio 2.2 0.0 - 5.0     GLUCOSE, POC   Result Value Ref Range    Glucose (POC) 264 (H) 65 - 100 mg/dL    Performed by Urmila Montiel    GLUCOSE, POC   Result Value Ref Range    Glucose (POC) 103 (H) 65 - 100 mg/dL    Performed by [facility] Rickia    GLUCOSE, POC   Result Value Ref Range    Glucose (POC) 433 (H) 65 - 100 mg/dL    Performed by [facility] Rickia    GLUCOSE, POC   Result Value Ref Range    Glucose (POC) 360 (H) 65 - 100 mg/dL    Performed by BRYANT Rogers    GLUCOSE, POC   Result Value Ref Range    Glucose (POC) 229 (H) 65 - 100 mg/dL    Performed by Chidi Nicholas    GLUCOSE, POC   Result Value Ref Range    Glucose (POC) 235 (H) 65 - 100 mg/dL    Performed by Gumaro Navarrete    GLUCOSE, POC   Result Value Ref Range    Glucose (POC) 169 (H) 65 - 100 mg/dL    Performed by [facility] Ken    GLUCOSE, POC   Result Value Ref Range    Glucose (POC) 288 (H) 65 - 100 mg/dL    Performed by [facility] Ken    GLUCOSE, POC   Result Value Ref Range    Glucose (POC) 171 (H) 65 - 100 mg/dL    Performed by Ledy Zuleta    GLUCOSE, POC   Result Value Ref Range    Glucose (POC) 251 (H) 65 - 100 mg/dL    Performed by Ledy Zuleta    GLUCOSE, POC   Result Value Ref Range    Glucose (POC) 343 (H) 65 - 100 mg/dL    Performed by Barbara Horne    GLUCOSE, POC   Result Value Ref Range    Glucose (POC) 330 (H) 65 - 100 mg/dL    Performed by DAISY HOOD (PCT)    GLUCOSE, POC   Result Value Ref Range    Glucose (POC) 95 65 - 100 mg/dL    Performed by Gretel Villatoro    GLUCOSE, POC   Result Value Ref Range    Glucose (POC) 174 (H) 65 - 100 mg/dL    Performed by Gretel Villatoro    GLUCOSE, POC   Result Value Ref Range    Glucose (POC) 346 (H) 65 - 100 mg/dL    Performed by Sameer Valiente    GLUCOSE, POC   Result Value Ref Range    Glucose (POC) 224 (H) 65 - 100 mg/dL    Performed by Sameer Valiente        Immunizations administered during this encounter: There is no immunization history on file for this patient. Screening for Metabolic Disorders for Patients on Antipsychotic Medications  (Data obtained from the EMR)    Estimated Body Mass Index  Estimated body mass index is 27.86 kg/m² as calculated from the following:    Height as of 7/24/19: 5' 4\" (1.626 m). Weight as of this encounter: 73.6 kg (162 lb 4.8 oz).      Vital Signs/Blood Pressure  Visit Vitals  /78   Pulse 80   Temp 98.2 °F (36.8 °C)   Resp 16   Wt 73.6 kg (162 lb 4.8 oz)   SpO2 97% Breastfeeding? No   BMI 27.86 kg/m²       Blood Glucose/Hemoglobin A1c  Lab Results   Component Value Date/Time    Glucose 118 (H) 07/27/2019 03:44 AM    Glucose (POC) 224 (H) 07/30/2019 11:31 AM       Lab Results   Component Value Date/Time    Hemoglobin A1c 8.5 (H) 06/07/2019 10:10 PM        Lipid Panel  Lab Results   Component Value Date/Time    Cholesterol, total 110 07/27/2019 03:44 AM    HDL Cholesterol 49 07/27/2019 03:44 AM    LDL, calculated 46.6 07/27/2019 03:44 AM    Triglyceride 72 07/27/2019 03:44 AM    CHOL/HDL Ratio 2.2 07/27/2019 03:44 AM        Discharge Diagnosis: BiPolar 1 Disorder    Discharge Plan:     The patient Logan Daly exhibits the ability to control behavior in a less restrictive environment. Patient's level of functioning is improving. No assaultive/destructive behavior has been observed for the past 24 hours. No suicidal/homicidal threat or behavior has been observed for the past 24 hours. There is no evidence of serious medication side effects. Patient has not been in physical or protective restraints for at least the past 24 hours. If weapons involved, how are they secured? N/A    Is patient aware of and in agreement with discharge plan? Yes    Arrangements for medication:  Prescriptions given to patient. Copy of discharge instructions to provider?:  Fax: [phone] for transportation home:   will transport    Keep all follow up appointments as scheduled, continue to take prescribed medications per physician instructions. Mental health crisis number:  [phone] or your local mental health crisis line number at [address] [facility] [phone]    Discharge Medication List and Instructions:   Discharge Medication List as of 7/30/2019  2:05 PM      START taking these medications    Details   divalproex ER (DEPAKOTE ER) 500 mg ER tablet Take 3 Tabs by mouth nightly.  Indications: mood, Normal, Disp-90 Tab, R-0      apixaban (ELIQUIS) 5 mg tablet Take 1 Tab by mouth two (2) times a day. Indications: Deep Vein Thrombosis Prevention, Normal, Disp-30 Tab, R-0      doxepin (SINEQUAN) 50 mg capsule Take 1 Cap by mouth nightly. Indications: sleep, Normal, Disp-30 Cap, R-0      vitamin A (AQUASOL A) 10,000 unit capsule Take 1 Cap by mouth daily. Indications: lack of vitamin A, No Print, Disp-30 Cap, R-0      ferrous sulfate 325 mg (65 mg iron) tablet Take 1 Tab by mouth three (3) times daily (with meals). Indications: anemia from inadequate iron, No Print, Disp-90 Tab, R-0      HYDROcodone-acetaminophen (NORCO)  mg tablet Take 1 Tab by mouth every four (4) hours as needed for Pain for up to 3 days. Max Daily Amount: 6 Tabs. Indications: Pain, No Print, Disp-9 Tab, R-0      therapeutic multivitamin (THERAGRAN) tablet Take 1 Tab by mouth daily. Indications: Treatment To Prevent Vitamin Deficiency, No Print, Disp-30 Tab, R-0         CONTINUE these medications which have CHANGED    Details   aspirin delayed-release 81 mg tablet Take 2 Tabs by mouth daily. Indications: Myocardial Reinfarction Prevention, No Print, Disp-30 Tab, R-0      insulin glargine (LANTUS SOLOSTAR U-100 INSULIN) 100 unit/mL (3 mL) inpn 28 Units by SubCUTAneous route daily. Indications: type 1 diabetes mellitus, Normal, Disp-1 Pen, R-0      metoprolol succinate (TOPROL-XL) 50 mg XL tablet Take 1 Tab by mouth daily. Indications: high blood pressure, Normal, Disp-30 Tab, R-0         CONTINUE these medications which have NOT CHANGED    Details   atorvastatin (LIPITOR) 80 mg tablet Take 80 mg by mouth daily. , Historical Med      nitroglycerin (NITROSTAT) 0.4 mg SL tablet 0.4 mg by SubLINGual route every five (5) minutes as needed for Chest Pain. Up to 3 doses. , Historical Med      pantoprazole (PROTONIX) 40 mg tablet Take 40 mg by mouth Daily (before breakfast). , Historical Med      isosorbide mononitrate ER (IMDUR) 120 mg CR tablet Take 60 mg by mouth daily. , Historical Med      gabapentin (NEURONTIN) 300 mg capsule Take 300 mg by mouth three (3) times daily. , Historical Med      DULoxetine (CYMBALTA) 60 mg capsule Take 1 Cap by mouth daily. , Normal, Disp-90 Cap, R-1      furosemide (LASIX) 80 mg tablet Take 80 mg by mouth daily. , Historical Med      insulin lispro (HUMALOG) 100 unit/mL injection 5 Units by SubCUTAneous route Before breakfast, lunch, and dinner., Historical Med         STOP taking these medications       losartan (COZAAR) 100 mg tablet Comments:   Reason for Stopping:         hydroCHLOROthiazide (HYDRODIURIL) 25 mg tablet Comments:   Reason for Stopping:         heparin sodium,porcine/D5W (HEPARIN 25,000 UNITS IN D5W 250 ML) 25,000 unit/250 mL(100 unit/mL) infusion Comments:   Reason for Stopping:         insulin glargine (LANTUS U-100 INSULIN) 100 unit/mL injection Comments:   Reason for Stopping:         divalproex DR (DEPAKOTE) 500 mg tablet Comments:   Reason for Stopping:               Unresulted Labs (24h ago, onward)    None        To obtain results of studies pending at discharge, please contact [phone]    Follow-up Information     Follow up With Specialties Details Why Contact Jessica Donahue, [address] [address]  [phone]      [address] [place] [address]  On 8/1/2019 Walk In Assessment: Monday-Thursday 8:30am-5pm, Friday 8:30-2pm [address]  [phone]          Advanced Directive:   Does the patient have an appointed surrogate decision maker? No  Does the patient have a Medical Advance Directive? No  Does the patient have a Psychiatric Advance Directive? No  If the patient does not have a surrogate or Medical Advance Directive AND Psychiatric Advance Directive, the patient was offered information on these advance directives Patient declined to complete    Patient Instructions: Please continue all medications until otherwise directed by physician.       Tobacco Cessation Discharge Plan:   Is the patient a smoker and needs referral for smoking cessation? No  Patient referred to the following for smoking cessation with an appointment? No     Patient was offered medication to assist with smoking cessation at discharge? Not applicable  Was education for smoking cessation added to the discharge instructions? Not applicable    Alcohol/Substance Abuse Discharge Plan:   Does the patient have a history of substance/alcohol abuse and requires a referral for treatment? No  Patient referred to the following for substance/alcohol abuse treatment with an appointment? No  Patient was offered medication to assist with alcohol cessation at discharge? Not applicable  Was education for substance/alcohol abuse added to discharge instructions? Not applicable    Patient discharged to Home; provided to the patient/caregiver either in hard copy or electronically.

## 2019-07-30 NOTE — PROGRESS NOTES
2310: Patient in bed resting quietly with eyes closed. No distress noted. Respirations are even and unlabored. Staff will continue to monitor q15 throughout the shift. Problem: Falls - Risk of  Goal: *Absence of Falls  Description  Document Juliet Lau Fall Risk and appropriate interventions in the flowsheet.   Outcome: Progressing Towards Goal  Note:   Fall Risk Interventions:  Mobility Interventions: Assess mobility with egress test    Mentation Interventions: Adequate sleep, hydration, pain control    Medication Interventions: Teach patient to arise slowly         History of Falls Interventions: Door open when patient unattended

## 2019-07-30 NOTE — BH NOTES
GROUP THERAPY PROGRESS NOTE    Sergio Castleman A [address] participated in a Process Group on the General Unit with a focus identifying feelings, planning for the day, and learning about using the [address] as a long-term personal treatment plan. .   Group time: 65 minutes. Personal goal for participation: To increase the capacity to improve ones mood, set personal goals, and understand more about basic activities to successfully state and get ones needs met through personal treatment goal setting. Goal orientation: The patients will be able to identify their feelings  and develop a goal for themselves for their day. The didactic  portion of the session covered developing a personal short-term  and long-term treatment plan for oneself. The group members  were asked to consider filling in on their own after group. Below are  the elements of a DBT house drawing with multiple levels:    1) foundation - values that govern your life;   2) first floor with a door - behaviors would like to manage and feel more control over or areas you want to change; the door represents an opportunity to list or draw things that you keep hidden from others;   3) second floor - list or draw emotions you want to experience more often, more fully, or in a more healthy fashion;   4) third floor - a list of things that make you happy or want to feel happy about;   5) attic - list or draw what  a Life Early Missael would look like. There is also a roof, where people and things that protect you can be listed. The chimney provides an opportunity to list ways in which you blow off steam. The billboard allows one to post those things in one's life they are proud of and the walls of the house provide an opportunity to list those people and things that provide support. Group therapy participation: With prompting, this patient actively participated in the group. Therapeutic interventions reviewed and discussed:  The group  members were asked to identify an emotion they are having and/or  let the group know what they want to focus on for the day as they  continue to make discharge plans. The group were informed of the  elements of the [address] for them to complete in their free time. Impression of participation: The patient said she was feeling, \"Pretty good. ..but a little groggy. ..feel rested. \" She was alert, generally oriented, cooperative, pleasant, and indicated that she intended to be discharged later in the day. She expressed no current SI/HI, although she mentioned wanting to smoke a cigarette when she is off the unit. She agreed that this contradicted with her desire to [facility] care of myself first\" and that she is considering asking for some help with smoking cessation, since she has had such a difficult time staying away from cigarettes. She also displayed no overt psychotic symptoms in this group. She also indicated that she plans to go to her local [facility] early tomorrow morning to follow through with the initial contact for her behavioral health aftercare. Her affect was not as depressed or as anxious as reported early in her hospital treatment. Her mood matched her affect. She continued to work on finalizing her aftercare and discharge plans with the help of her treatment team, especially nursing.

## 2019-07-30 NOTE — BH NOTES
GROUP THERAPY PROGRESS NOTE    Milka Correa [facility] is participating in Cognitive Behavior Therapy. Group time: 30 minutes    Personal goal for participation:  The purpose of the group was to explain the Cognitive Behavior Therapy model and use examples to practice reframing negative thoughts. Goal orientation: personal    Group therapy participation: passive    Therapeutic interventions reviewed and discussed: Cognitive Behavior Therapy Model/ Event-Thoughts-Feeling-Behavior, Reframing    Impression of participation: Milka Horne attended group. She was oriented but fell asleep in group and did not verbally participate.

## 2019-07-30 NOTE — BH NOTES
GROUP THERAPY PROGRESS NOTE    Herbert Pearson is participating in Reflections Group/     Group time: 30 minutes    Personal goal for participation: Relaxation    Goal orientation: relaxation    Group therapy participation: active    Therapeutic interventions reviewed and discussed: Discussed about today's events, how to use coping skills, and reflected on how the unit is run and how staff manages the unit. Impression of participation: Throughout the shift, staff has had to use limit-setting. Patient verbalized that she is happy the \"rowdy\" people are gone because it was testing her anxiety. Pt was positive mood/affect. She seemed more optimistic and less pessimistic about the unit and staff.

## 2019-07-30 NOTE — DISCHARGE INSTRUCTIONS
DISCHARGE SUMMARY    [id number] [facility]  : 1975  MRN: [id number]    The patient Harmeet Win exhibits the ability to control behavior in a less restrictive environment. Patient's level of functioning is improving. No assaultive/destructive behavior has been observed for the past 24 hours. No suicidal/homicidal threat or behavior has been observed for the past 24 hours. There is no evidence of serious medication side effects. Patient has not been in physical or protective restraints for at least the past 24 hours. If weapons involved, how are they secured? N/A    Is patient aware of and in agreement with discharge plan? Yes    Arrangements for medication:  Prescriptions given to patient. Copy of discharge instructions to provider?:  Yes. Fax: [phone]    Arrangements for transportation home:  Friend to transport    Keep all follow up appointments as scheduled, continue to take prescribed medications per physician instructions. Mental health crisis number:  [phone] or your local mental health crisis line number at [address] [place] [address] [phone]      DISCHARGE SUMMARY from Nurse    PATIENT INSTRUCTIONS:      What to do at Home:  Recommended activity: Activity as tolerated. If you experience any of the following symptoms:  Overwhelming anxiety or depression, thoughts of hurting yourself or others, please follow up with 911 or your local mental health crisis line number at [address] [place] [address] [phone]. *  Please give a list of your current medications to your Primary Care Provider. *  Please update this list whenever your medications are discontinued, doses are      changed, or new medications (including over-the-counter products) are added. *  Please carry medication information at all times in case of emergency situations.     These are general instructions for a healthy lifestyle:    No smoking/ No tobacco products/ Avoid exposure to second hand smoke  Surgeon General's Warning:  Quitting smoking now greatly reduces serious risk to your health. Obesity, smoking, and sedentary lifestyle greatly increases your risk for illness    A healthy diet, regular physical exercise & weight monitoring are important for maintaining a healthy lifestyle    You may be retaining fluid if you have a history of heart failure or if you experience any of the following symptoms:  Weight gain of 3 pounds or more overnight or 5 pounds in a week, increased swelling in our hands or feet or shortness of breath while lying flat in bed. Please call your doctor as soon as you notice any of these symptoms; do not wait until your next office visit. The discharge information has been reviewed with the patient. The patient verbalized understanding. Discharge medications reviewed with the patient and appropriate educational materials and side effects teaching were provided.   ___________________________________________________________________________________________________________________________________

## 2019-07-30 NOTE — BH NOTES
PRN Medication Documentation    Specific patient behavior that led to need for PRN medication: feeling anxious, unable to sleep  Staff interventions attempted prior to PRN being given: evaluation  PRN medication given: Atarax 50 mg po @0304  Patient response/effectiveness of PRN medication:

## 2019-08-05 ENCOUNTER — HOSPITAL ENCOUNTER (INPATIENT)
Age: 44
LOS: 2 days | Discharge: HOME | DRG: 280 | End: 2019-08-07
Attending: EMERGENCY MEDICINE | Admitting: INTERNAL MEDICINE
Payer: COMMERCIAL

## 2019-08-05 ENCOUNTER — APPOINTMENT (OUTPATIENT)
Dept: NON INVASIVE DIAGNOSTICS | Age: 44
DRG: 280 | End: 2019-08-05
Attending: INTERNAL MEDICINE
Payer: COMMERCIAL

## 2019-08-05 ENCOUNTER — APPOINTMENT (OUTPATIENT)
Dept: GENERAL RADIOLOGY | Age: 44
DRG: 280 | End: 2019-08-05
Attending: EMERGENCY MEDICINE
Payer: COMMERCIAL

## 2019-08-05 DIAGNOSIS — R77.8 ELEVATED TROPONIN: ICD-10-CM

## 2019-08-05 DIAGNOSIS — R63.5 WEIGHT GAIN: ICD-10-CM

## 2019-08-05 DIAGNOSIS — R07.9 ACUTE CHEST PAIN: Primary | ICD-10-CM

## 2019-08-05 DIAGNOSIS — I20.0 UNSTABLE ANGINA (HCC): ICD-10-CM

## 2019-08-05 LAB
ALBUMIN SERPL-MCNC: 2.6 G/DL (ref 3.5–5)
ALBUMIN/GLOB SERPL: 0.8 {RATIO} (ref 1.1–2.2)
ALP SERPL-CCNC: 95 U/L (ref 45–117)
ALT SERPL-CCNC: 19 U/L (ref 12–78)
ANION GAP SERPL CALC-SCNC: 5 MMOL/L (ref 5–15)
APPEARANCE UR: CLEAR
APTT PPP: 35.3 SEC (ref 22.1–32)
AST SERPL-CCNC: 28 U/L (ref 15–37)
BACTERIA URNS QL MICRO: NEGATIVE /HPF
BASOPHILS # BLD: 0 K/UL (ref 0–0.1)
BASOPHILS NFR BLD: 1 % (ref 0–1)
BILIRUB SERPL-MCNC: 0.2 MG/DL (ref 0.2–1)
BILIRUB UR QL: NEGATIVE
BNP SERPL-MCNC: 3051 PG/ML
BUN SERPL-MCNC: 16 MG/DL (ref 6–20)
BUN/CREAT SERPL: 17 (ref 12–20)
CALCIUM SERPL-MCNC: 8.6 MG/DL (ref 8.5–10.1)
CHLORIDE SERPL-SCNC: 101 MMOL/L (ref 97–108)
CO2 SERPL-SCNC: 30 MMOL/L (ref 21–32)
COLOR UR: ABNORMAL
COMMENT, HOLDF: NORMAL
CREAT SERPL-MCNC: 0.93 MG/DL (ref 0.55–1.02)
DIFFERENTIAL METHOD BLD: ABNORMAL
EOSINOPHIL # BLD: 0.3 K/UL (ref 0–0.4)
EOSINOPHIL NFR BLD: 4 % (ref 0–7)
EPITH CASTS URNS QL MICRO: ABNORMAL /LPF
ERYTHROCYTE [DISTWIDTH] IN BLOOD BY AUTOMATED COUNT: 16.6 % (ref 11.5–14.5)
GLOBULIN SER CALC-MCNC: 3.2 G/DL (ref 2–4)
GLUCOSE BLD STRIP.AUTO-MCNC: 153 MG/DL (ref 65–100)
GLUCOSE SERPL-MCNC: 209 MG/DL (ref 65–100)
GLUCOSE UR STRIP.AUTO-MCNC: NEGATIVE MG/DL
HCT VFR BLD AUTO: 36.5 % (ref 35–47)
HGB BLD-MCNC: 11.4 G/DL (ref 11.5–16)
HGB UR QL STRIP: ABNORMAL
HYALINE CASTS URNS QL MICRO: ABNORMAL /LPF (ref 0–5)
IMM GRANULOCYTES # BLD AUTO: 0.1 K/UL (ref 0–0.04)
IMM GRANULOCYTES NFR BLD AUTO: 1 % (ref 0–0.5)
KETONES UR QL STRIP.AUTO: NEGATIVE MG/DL
LEUKOCYTE ESTERASE UR QL STRIP.AUTO: ABNORMAL
LYMPHOCYTES # BLD: 2.2 K/UL (ref 0.8–3.5)
LYMPHOCYTES NFR BLD: 28 % (ref 12–49)
MCH RBC QN AUTO: 26.6 PG (ref 26–34)
MCHC RBC AUTO-ENTMCNC: 31.2 G/DL (ref 30–36.5)
MCV RBC AUTO: 85.3 FL (ref 80–99)
MONOCYTES # BLD: 0.6 K/UL (ref 0–1)
MONOCYTES NFR BLD: 8 % (ref 5–13)
NEUTS SEG # BLD: 4.6 K/UL (ref 1.8–8)
NEUTS SEG NFR BLD: 58 % (ref 32–75)
NITRITE UR QL STRIP.AUTO: POSITIVE
NRBC # BLD: 0 K/UL (ref 0–0.01)
NRBC BLD-RTO: 0 PER 100 WBC
PH UR STRIP: 7.5 [PH] (ref 5–8)
PLATELET # BLD AUTO: 316 K/UL (ref 150–400)
PMV BLD AUTO: 10.9 FL (ref 8.9–12.9)
POTASSIUM SERPL-SCNC: 4.2 MMOL/L (ref 3.5–5.1)
PROT SERPL-MCNC: 5.8 G/DL (ref 6.4–8.2)
PROT UR STRIP-MCNC: NEGATIVE MG/DL
RBC # BLD AUTO: 4.28 M/UL (ref 3.8–5.2)
RBC #/AREA URNS HPF: ABNORMAL /HPF (ref 0–5)
SAMPLES BEING HELD,HOLD: NORMAL
SERVICE CMNT-IMP: ABNORMAL
SODIUM SERPL-SCNC: 136 MMOL/L (ref 136–145)
SP GR UR REFRACTOMETRY: <1.005 (ref 1–1.03)
THERAPEUTIC RANGE,PTTT: ABNORMAL SECS (ref 58–77)
TROPONIN I BLD-MCNC: 1.14 NG/ML (ref 0–0.08)
TROPONIN I SERPL-MCNC: 8.97 NG/ML
UA: UC IF INDICATED,UAUC: ABNORMAL
UROBILINOGEN UR QL STRIP.AUTO: 0.2 EU/DL (ref 0.2–1)
WBC # BLD AUTO: 7.8 K/UL (ref 3.6–11)
WBC URNS QL MICRO: ABNORMAL /HPF (ref 0–4)

## 2019-08-05 PROCEDURE — 81001 URINALYSIS AUTO W/SCOPE: CPT

## 2019-08-05 PROCEDURE — 65610000003 HC RM ICU SURGICAL

## 2019-08-05 PROCEDURE — 85025 COMPLETE CBC W/AUTO DIFF WBC: CPT

## 2019-08-05 PROCEDURE — 80053 COMPREHEN METABOLIC PANEL: CPT

## 2019-08-05 PROCEDURE — 84484 ASSAY OF TROPONIN QUANT: CPT

## 2019-08-05 PROCEDURE — 82962 GLUCOSE BLOOD TEST: CPT

## 2019-08-05 PROCEDURE — 87086 URINE CULTURE/COLONY COUNT: CPT

## 2019-08-05 PROCEDURE — 74011636637 HC RX REV CODE- 636/637: Performed by: INTERNAL MEDICINE

## 2019-08-05 PROCEDURE — 71045 X-RAY EXAM CHEST 1 VIEW: CPT

## 2019-08-05 PROCEDURE — 74011250637 HC RX REV CODE- 250/637: Performed by: INTERNAL MEDICINE

## 2019-08-05 PROCEDURE — 87186 SC STD MICRODIL/AGAR DIL: CPT

## 2019-08-05 PROCEDURE — 93306 TTE W/DOPPLER COMPLETE: CPT

## 2019-08-05 PROCEDURE — 87077 CULTURE AEROBIC IDENTIFY: CPT

## 2019-08-05 PROCEDURE — 83880 ASSAY OF NATRIURETIC PEPTIDE: CPT

## 2019-08-05 PROCEDURE — 85730 THROMBOPLASTIN TIME PARTIAL: CPT

## 2019-08-05 PROCEDURE — 93005 ELECTROCARDIOGRAM TRACING: CPT

## 2019-08-05 PROCEDURE — 36415 COLL VENOUS BLD VENIPUNCTURE: CPT

## 2019-08-05 PROCEDURE — 74011250636 HC RX REV CODE- 250/636: Performed by: INTERNAL MEDICINE

## 2019-08-05 PROCEDURE — 99285 EMERGENCY DEPT VISIT HI MDM: CPT

## 2019-08-05 RX ORDER — HYDROCODONE BITARTRATE AND ACETAMINOPHEN 10; 325 MG/1; MG/1
1 TABLET ORAL
COMMUNITY
End: 2019-12-06

## 2019-08-05 RX ORDER — ASCORBIC ACID 500 MG
500 TABLET ORAL 3 TIMES DAILY
COMMUNITY
End: 2019-12-06

## 2019-08-05 RX ORDER — DOXEPIN HYDROCHLORIDE 25 MG/1
50 CAPSULE ORAL
Status: DISCONTINUED | OUTPATIENT
Start: 2019-08-05 | End: 2019-08-07 | Stop reason: HOSPADM

## 2019-08-05 RX ORDER — DEXTROSE 50 % IN WATER (D50W) INTRAVENOUS SYRINGE
12.5-25 AS NEEDED
Status: DISCONTINUED | OUTPATIENT
Start: 2019-08-05 | End: 2019-08-07 | Stop reason: HOSPADM

## 2019-08-05 RX ORDER — INSULIN GLARGINE 100 [IU]/ML
36 INJECTION, SOLUTION SUBCUTANEOUS
COMMUNITY
End: 2021-06-07 | Stop reason: SDUPTHER

## 2019-08-05 RX ORDER — FUROSEMIDE 40 MG/1
80 TABLET ORAL DAILY
Status: DISCONTINUED | OUTPATIENT
Start: 2019-08-06 | End: 2019-08-05

## 2019-08-05 RX ORDER — ASPIRIN 81 MG/1
162 TABLET ORAL DAILY
Status: DISCONTINUED | OUTPATIENT
Start: 2019-08-06 | End: 2019-08-05 | Stop reason: SDUPTHER

## 2019-08-05 RX ORDER — DULOXETIN HYDROCHLORIDE 60 MG/1
60 CAPSULE, DELAYED RELEASE ORAL DAILY
Status: DISCONTINUED | OUTPATIENT
Start: 2019-08-06 | End: 2019-08-07 | Stop reason: HOSPADM

## 2019-08-05 RX ORDER — VITAMIN A 3000 MCG
10000 CAPSULE ORAL DAILY
Status: DISCONTINUED | OUTPATIENT
Start: 2019-08-06 | End: 2019-08-05

## 2019-08-05 RX ORDER — GUAIFENESIN 100 MG/5ML
81 LIQUID (ML) ORAL DAILY
Status: DISCONTINUED | OUTPATIENT
Start: 2019-08-06 | End: 2019-08-05

## 2019-08-05 RX ORDER — CALCIUM CARBONATE 500(1250)
1 TABLET ORAL DAILY
COMMUNITY
End: 2019-12-06

## 2019-08-05 RX ORDER — THERA TABS 400 MCG
1 TAB ORAL DAILY
Status: DISCONTINUED | OUTPATIENT
Start: 2019-08-06 | End: 2019-08-07 | Stop reason: HOSPADM

## 2019-08-05 RX ORDER — ATORVASTATIN CALCIUM 40 MG/1
80 TABLET, FILM COATED ORAL DAILY
Status: DISCONTINUED | OUTPATIENT
Start: 2019-08-06 | End: 2019-08-07 | Stop reason: HOSPADM

## 2019-08-05 RX ORDER — LANOLIN ALCOHOL/MO/W.PET/CERES
325 CREAM (GRAM) TOPICAL
Status: DISCONTINUED | OUTPATIENT
Start: 2019-08-05 | End: 2019-08-07 | Stop reason: HOSPADM

## 2019-08-05 RX ORDER — INSULIN LISPRO 100 [IU]/ML
INJECTION, SOLUTION INTRAVENOUS; SUBCUTANEOUS
Status: DISCONTINUED | OUTPATIENT
Start: 2019-08-05 | End: 2019-08-07 | Stop reason: HOSPADM

## 2019-08-05 RX ORDER — MAGNESIUM SULFATE 100 %
4 CRYSTALS MISCELLANEOUS AS NEEDED
Status: DISCONTINUED | OUTPATIENT
Start: 2019-08-05 | End: 2019-08-07 | Stop reason: HOSPADM

## 2019-08-05 RX ORDER — SODIUM CHLORIDE 0.9 % (FLUSH) 0.9 %
5-40 SYRINGE (ML) INJECTION AS NEEDED
Status: DISCONTINUED | OUTPATIENT
Start: 2019-08-05 | End: 2019-08-07 | Stop reason: HOSPADM

## 2019-08-05 RX ORDER — GABAPENTIN 300 MG/1
300 CAPSULE ORAL 3 TIMES DAILY
Status: DISCONTINUED | OUTPATIENT
Start: 2019-08-05 | End: 2019-08-07 | Stop reason: HOSPADM

## 2019-08-05 RX ORDER — METOPROLOL SUCCINATE 50 MG/1
50 TABLET, EXTENDED RELEASE ORAL DAILY
Status: DISCONTINUED | OUTPATIENT
Start: 2019-08-06 | End: 2019-08-07 | Stop reason: HOSPADM

## 2019-08-05 RX ORDER — NITROGLYCERIN 20 MG/100ML
5 INJECTION INTRAVENOUS
Status: DISCONTINUED | OUTPATIENT
Start: 2019-08-05 | End: 2019-08-07 | Stop reason: HOSPADM

## 2019-08-05 RX ORDER — GUAIFENESIN 100 MG/5ML
162 LIQUID (ML) ORAL DAILY
Status: DISCONTINUED | OUTPATIENT
Start: 2019-08-06 | End: 2019-08-07 | Stop reason: HOSPADM

## 2019-08-05 RX ORDER — HEPARIN SODIUM 10000 [USP'U]/100ML
12-25 INJECTION, SOLUTION INTRAVENOUS
Status: DISPENSED | OUTPATIENT
Start: 2019-08-05 | End: 2019-08-06

## 2019-08-05 RX ORDER — CHOLECALCIFEROL (VITAMIN D3) 125 MCG
1 CAPSULE ORAL DAILY
COMMUNITY
End: 2019-12-06

## 2019-08-05 RX ORDER — HEPARIN SODIUM 5000 [USP'U]/ML
4000 INJECTION, SOLUTION INTRAVENOUS; SUBCUTANEOUS ONCE
Status: COMPLETED | OUTPATIENT
Start: 2019-08-05 | End: 2019-08-05

## 2019-08-05 RX ORDER — PANTOPRAZOLE SODIUM 40 MG/1
40 TABLET, DELAYED RELEASE ORAL
Status: DISCONTINUED | OUTPATIENT
Start: 2019-08-06 | End: 2019-08-07 | Stop reason: HOSPADM

## 2019-08-05 RX ORDER — FUROSEMIDE 10 MG/ML
40 INJECTION INTRAMUSCULAR; INTRAVENOUS 2 TIMES DAILY
Status: DISCONTINUED | OUTPATIENT
Start: 2019-08-05 | End: 2019-08-07 | Stop reason: HOSPADM

## 2019-08-05 RX ORDER — DIVALPROEX SODIUM 500 MG/1
1500 TABLET, EXTENDED RELEASE ORAL
Status: DISCONTINUED | OUTPATIENT
Start: 2019-08-05 | End: 2019-08-07 | Stop reason: HOSPADM

## 2019-08-05 RX ORDER — LANOLIN ALCOHOL/MO/W.PET/CERES
400 CREAM (GRAM) TOPICAL DAILY
COMMUNITY
End: 2019-12-06

## 2019-08-05 RX ORDER — INSULIN GLARGINE 100 [IU]/ML
28 INJECTION, SOLUTION SUBCUTANEOUS
Status: DISCONTINUED | OUTPATIENT
Start: 2019-08-05 | End: 2019-08-07 | Stop reason: HOSPADM

## 2019-08-05 RX ORDER — MORPHINE SULFATE 2 MG/ML
2 INJECTION, SOLUTION INTRAMUSCULAR; INTRAVENOUS
Status: DISCONTINUED | OUTPATIENT
Start: 2019-08-05 | End: 2019-08-07 | Stop reason: HOSPADM

## 2019-08-05 RX ORDER — HYDROCODONE BITARTRATE AND ACETAMINOPHEN 10; 325 MG/1; MG/1
1 TABLET ORAL
Status: DISCONTINUED | OUTPATIENT
Start: 2019-08-05 | End: 2019-08-07 | Stop reason: HOSPADM

## 2019-08-05 RX ORDER — SODIUM CHLORIDE 0.9 % (FLUSH) 0.9 %
5-40 SYRINGE (ML) INJECTION EVERY 8 HOURS
Status: DISCONTINUED | OUTPATIENT
Start: 2019-08-05 | End: 2019-08-07 | Stop reason: HOSPADM

## 2019-08-05 RX ORDER — ISOSORBIDE MONONITRATE 60 MG/1
60 TABLET, EXTENDED RELEASE ORAL DAILY
Status: DISCONTINUED | OUTPATIENT
Start: 2019-08-06 | End: 2019-08-05

## 2019-08-05 RX ORDER — LANOLIN ALCOHOL/MO/W.PET/CERES
100 CREAM (GRAM) TOPICAL DAILY
COMMUNITY
End: 2019-12-06

## 2019-08-05 RX ADMIN — HYDROCODONE BITARTRATE AND ACETAMINOPHEN 1 TABLET: 10; 325 TABLET ORAL at 21:35

## 2019-08-05 RX ADMIN — INSULIN GLARGINE 28 UNITS: 100 INJECTION, SOLUTION SUBCUTANEOUS at 21:35

## 2019-08-05 RX ADMIN — HEPARIN SODIUM 12 UNITS/KG/HR: 10000 INJECTION, SOLUTION INTRAVENOUS at 21:27

## 2019-08-05 RX ADMIN — HEPARIN SODIUM 4000 UNITS: 5000 INJECTION INTRAVENOUS; SUBCUTANEOUS at 21:27

## 2019-08-05 RX ADMIN — FUROSEMIDE 40 MG: 10 INJECTION, SOLUTION INTRAMUSCULAR; INTRAVENOUS at 19:01

## 2019-08-05 RX ADMIN — Medication 10 ML: at 21:33

## 2019-08-05 RX ADMIN — DOXEPIN HYDROCHLORIDE 50 MG: 25 CAPSULE ORAL at 21:35

## 2019-08-05 RX ADMIN — GABAPENTIN 300 MG: 300 CAPSULE ORAL at 21:14

## 2019-08-05 RX ADMIN — DIVALPROEX SODIUM 1500 MG: 500 TABLET, FILM COATED, EXTENDED RELEASE ORAL at 21:30

## 2019-08-05 NOTE — PROGRESS NOTES
Troponin 8.97 from initial Point of care at 4 pm of 1.14. No Chest pain, will repeat EKG    Will put patient on heparin.

## 2019-08-05 NOTE — ED NOTES
Cardiology at bedside with [address]. Pt tolerating procedure well.

## 2019-08-05 NOTE — ED NOTES
Triage Note: Patient is coming in with chest pain that started last night. Patient denies any pain at this time. CAV cardiologist at the bedside. Cath lab team here now.

## 2019-08-05 NOTE — Clinical Note
Dressed using 4 X 4 and transparent dressing. Site: clean, dry, & intact, no bleeding and no hematoma.

## 2019-08-05 NOTE — H&P
Hospitalist Admission Note    NAME: Morena Grover   :  1975   MRN:  [id number]     Date/Time:  2019 6:01 PM    Patient PCP: Sharyn Hannah MD  ________________________________________________________________________    My assessment of this patient's clinical condition and my plan of care is as follows. Assessment / Plan:    1) CP/Leg swelling: Hx CABG 19 at [facility], troponin today 1.14, cardiology (Dr Heather Blood)  already saw the patient in the ED, they will follow in am. No CP at the time of this encounter. Recommendation for medical management, diuresis, Echocardiogram, serial troponins diuresis. 2) DM: Continue home meds, will add RISS, adjust as needed    3) Bipolar : Continue home meds    4) HTN:  Controlled, continue home meds and adjust as needed    5) Hx Suicidal attempt: recently in 2019. No suicidal thoughts at the time of this enconter    Code Status: full  Surrogate Decision Maker:    DVT Prophylaxis: yes  GI Prophylaxis: not indicated    Baseline: lives at home    Functional status before hospitalization: Able to do all ADL without assistance        Subjective:   CHIEF COMPLAINT: leg swelling and CP    HISTORY OF PRESENT ILLNESS:     Thuy Samson is a 37 y.o. Female PMH gastroparesis, DM, HTN, bipolar disorder, GERD, and CAD, s/p multiple stent placements, abd CABG 19 at [facility] presenting to the ED because swelling in her lower extremities and ocasional CP. She was recently d/c 19 from here after a suicidal attempt    We were asked to admit for work up and evaluation of the above problems.      Past Medical History:   Diagnosis Date    Arthritis     B[address] 1 disorder ([address]     Diabetes ([facility] [address]     Gastrointestinal disorder     gastroparesis    Gastroparesis     GERD (gastroesophageal reflux disease)     Heart disease     Hypertension     Psychiatric disorder     Bipolar        Past Surgical History:   Procedure Laterality Date    HX ANKLE FRACTURE TX      HX CAROTID STENT      HX GYN      BTL    HX ORTHOPAEDIC      toe       Social History     Tobacco Use    Smoking status: Current Every Day Smoker     Packs/day: 0.25    Smokeless tobacco: Never Used   Substance Use Topics    Alcohol use: Yes     Comment: occasionally        Family History   Problem Relation Age of Onset    Thyroid Disease Mother     Hypertension Father     Heart Disease Father     Diabetes Sister     Heart Disease Sister     Hypertension Sister     Thyroid Disease Brother      Allergies   Allergen Reactions    Voltaren [Diclofenac Sodium] Anaphylaxis    Dramamine Ii [Meclizine] Unknown (comments)     Auditory and Visual hallucinations    Diclofenac Unknown (comments)    Flagyl [Metronidazole] Nausea and Vomiting    Imipramine Hives    Levaquin [Levofloxacin] Nausea and Vomiting        Prior to Admission medications    Medication Sig Start Date End Date Taking? Authorizing Provider   insulin glargine (LANTUS) 100 unit/mL injection 28 Units by SubCUTAneous route nightly. Yes Provider, Historical   ascorbic acid, vitamin C, (VITAMIN C) 500 mg tablet Take 500 mg by mouth three (3) times daily. Yes Provider, Historical   thiamine HCL (B-1) 100 mg tablet Take 100 mg by mouth daily. Yes Provider, Historical   magnesium oxide (MAG-OX) 400 mg tablet Take 400 mg by mouth daily. Yes Provider, Historical   aspirin delayed-release 81 mg tablet Take 2 Tabs by mouth daily. Indications: Myocardial Reinfarction Prevention 7/30/19   Apoorva Avila MD   divalproex ER (DEPAKOTE ER) 500 mg ER tablet Take 3 Tabs by mouth nightly. Indications: mood 7/30/19   Apoorva Avila MD   metoprolol succinate (TOPROL-XL) 50 mg XL tablet Take 1 Tab by mouth daily. Indications: high blood pressure 7/30/19   Apoorva Avila MD   apixaban (ELIQUIS) 5 mg tablet Take 1 Tab by mouth two (2) times a day.  Indications: Deep Vein Thrombosis Prevention 7/30/19   Apoorva Avila MD   doxepin (SINEQUAN) 50 mg capsule Take 1 Cap by mouth nightly. Indications: sleep 7/30/19   Dillon Garrett MD   vitamin A (AQUASOL A) 10,000 unit capsule Take 1 Cap by mouth daily. Indications: lack of vitamin A 7/31/19   Dillon Garrett MD   ferrous sulfate 325 mg (65 mg iron) tablet Take 1 Tab by mouth three (3) times daily (with meals). Indications: anemia from inadequate iron 7/30/19   Dillon Garrett MD   therapeutic multivitamin [facility]) tablet Take 1 Tab by mouth daily. Indications: Treatment To Prevent Vitamin Deficiency 7/31/19   Dillon Garrett MD   atorvastatin (LIPITOR) 80 mg tablet Take 80 mg by mouth daily. Provider, Historical   nitroglycerin (NITROSTAT) 0.4 mg SL tablet 0.4 mg by SubLINGual route every five (5) minutes as needed for Chest Pain. Up to 3 doses. Provider, Historical   furosemide (LASIX) 80 mg tablet Take 80 mg by mouth daily. Provider, Historical   pantoprazole (PROTONIX) 40 mg tablet Take 40 mg by mouth Daily (before breakfast). Provider, Historical   isosorbide mononitrate ER (IMDUR) 120 mg CR tablet Take 60 mg by mouth daily. Provider, Historical   gabapentin (NEURONTIN) 300 mg capsule Take 300 mg by mouth three (3) times daily. Provider, Historical   DULoxetine (CYMBALTA) 60 mg capsule Take 1 Cap by mouth daily. 1/21/19   Rainer Dobbs MD   insulin lispro (HUMALOG) 100 unit/mL injection 5 Units by SubCUTAneous route Before breakfast, lunch, and dinner. Plus sliding scale if needed  Indications: type 1 diabetes mellitus    Other, MD Lupe       REVIEW OF SYSTEMS:     I am not able to complete the review of systems because:    The patient is intubated and sedated    The patient has altered mental status due to his acute medical problems    The patient has baseline aphasia from prior stroke(s)    The patient has baseline dementia and is not reliable historian    The patient is in acute medical distress and unable to provide information           Total of 12 systems reviewed as follows:       POSITIVE= underlined text  Negative = text not underlined  General:  fever, chills, sweats, generalized weakness, weight loss/gain,      loss of appetite   Eyes:    blurred vision, eye pain, loss of vision, double vision  ENT:    rhinorrhea, pharyngitis   Respiratory:   cough, sputum production, SOB, HUERTA, wheezing, pleuritic pain   Cardiology:   chest pain, palpitations, orthopnea, PND, BLE edema, syncope   Gastrointestinal:  abdominal pain , N/V, diarrhea, dysphagia, constipation, bleeding   Genitourinary:  frequency, urgency, dysuria, hematuria, incontinence   Muskuloskeletal :  arthralgia, myalgia, back pain  Hematology:  easy bruising, nose or gum bleeding, lymphadenopathy   Dermatological: rash, ulceration, pruritis, color change / jaundice  Endocrine:   hot flashes or polydipsia   Neurological:  headache, dizziness, confusion, focal weakness, paresthesia,     Speech difficulties, memory loss, gait difficulty  Psychological: Feelings of anxiety, depression, agitation    Objective:   VITALS:    Visit Vitals  /72 (BP Patient Position: Sitting)   Pulse 78   Temp 98 °F (36.7 °C)   Resp 14   Ht 5' 3\" (1.6 m)   Wt 79 kg (174 lb 2.6 oz)   SpO2 97%   BMI 30.85 kg/m²       PHYSICAL EXAM:    General:    Alert, cooperative, no distress, appears stated age. HEENT: Atraumatic, anicteric sclerae, pink conjunctivae     No oral ulcers, mucosa moist, throat clear, dentition fair  Neck:  Supple, symmetrical,  thyroid: non tender  Lungs:   Clear to auscultation bilaterally. No Wheezing or Rhonchi. No rales. Chest wall:  Surgical incision from CABG looks ok, No tenderness  No Accessory muscle use. Heart:   Regular  rhythm,  No  murmur   No edema  Abdomen:   Soft, non-tender. Not distended. Bowel sounds normal  Extremities: BLE edema 2+, No cyanosis. No clubbing,      Skin turgor normal, Capillary refill normal, Radial dial pulse 2+  Skin:     Not pale. Not Jaundiced  No rashes   Psych:  Good insight. Not depressed. Not anxious or agitated. Neurologic: EOMs intact. No facial asymmetry. No aphasia or slurred speech. Symmetrical strength, Sensation grossly intact. Alert and oriented X 4.     _______________________________________________________________________  Care Plan discussed with:    Comments   Patient x    Family  x    RN x    Care Manager                    Consultant:      _______________________________________________________________________  Expected  Disposition:   Home with Family x   HH/PT/OT/RN    SNF/LTC    ARNULFO    ________________________________________________________________________  TOTAL TIME:  27  Minutes    Critical Care Provided     Minutes non procedure based      Comments     Reviewed previous records   >50% of visit spent in counseling and coordination of care  Discussion with patient and/or family and questions answered       ________________________________________________________________________  Signed: Gerard Wynn MD    Procedures: see electronic medical records for all procedures/Xrays and details which were not copied into this note but were reviewed prior to creation of Plan.     LAB DATA REVIEWED:    Recent Results (from the past 24 hour(s))   EKG, 12 LEAD, INITIAL    Collection Time: 08/05/19  3:31 PM   Result Value Ref Range    Ventricular Rate 81 BPM    Atrial Rate 81 BPM    P-R Interval 148 ms    QRS Duration 96 ms    Q-T Interval 410 ms    QTC Calculation (Bezet) 476 ms    Calculated P Axis 30 degrees    Calculated R Axis 102 degrees    Calculated T Axis 106 degrees    Diagnosis       Normal sinus rhythm  Low voltage QRS  When compared with ECG of 24-JUL-2019 06:47,  QRS voltage has decreased  Criteria for Anterior infarct are no longer present  T wave inversion less evident in Anterior leads     SAMPLES BEING HELD    Collection Time: 08/05/19  3:42 PM   Result Value Ref Range    SAMPLES BEING HELD 1RED,1BLU     COMMENT        Add-on orders for these samples will be processed based on acceptable specimen integrity and analyte stability, which may vary by analyte. CBC WITH AUTOMATED DIFF    Collection Time: 08/05/19  3:42 PM   Result Value Ref Range    WBC 7.8 3.6 - 11.0 K/uL    RBC 4.28 3.80 - 5.20 M/uL    HGB 11.4 (L) 11.5 - 16.0 g/dL    HCT 36.5 35.0 - 47.0 %    MCV 85.3 80.0 - 99.0 FL    MCH 26.6 26.0 - 34.0 PG    MCHC 31.2 30.0 - 36.5 g/dL    RDW 16.6 (H) 11.5 - 14.5 %    PLATELET 862 965 - 200 K/uL    MPV 10.9 8.9 - 12.9 FL    NRBC 0.0 0  WBC    ABSOLUTE NRBC 0.00 0.00 - 0.01 K/uL    NEUTROPHILS 58 32 - 75 %    LYMPHOCYTES 28 12 - 49 %    MONOCYTES 8 5 - 13 %    EOSINOPHILS 4 0 - 7 %    BASOPHILS 1 0 - 1 %    IMMATURE GRANULOCYTES 1 (H) 0.0 - 0.5 %    ABS. NEUTROPHILS 4.6 1.8 - 8.0 K/UL    ABS. LYMPHOCYTES 2.2 0.8 - 3.5 K/UL    ABS. MONOCYTES 0.6 0.0 - 1.0 K/UL    ABS. EOSINOPHILS 0.3 0.0 - 0.4 K/UL    ABS. BASOPHILS 0.0 0.0 - 0.1 K/UL    ABS. IMM. GRANS. 0.1 (H) 0.00 - 0.04 K/UL    DF AUTOMATED     METABOLIC PANEL, COMPREHENSIVE    Collection Time: 08/05/19  3:42 PM   Result Value Ref Range    Sodium 136 136 - 145 mmol/L    Potassium 4.2 3.5 - 5.1 mmol/L    Chloride 101 97 - 108 mmol/L    CO2 30 21 - 32 mmol/L    Anion gap 5 5 - 15 mmol/L    Glucose 209 (H) 65 - 100 mg/dL    BUN 16 6 - 20 MG/DL    Creatinine 0.93 0.55 - 1.02 MG/DL    BUN/Creatinine ratio 17 12 - 20      GFR est AA >60 >60 ml/min/1.73m2    GFR est non-AA >60 >60 ml/min/1.73m2    Calcium 8.6 8.5 - 10.1 MG/DL    Bilirubin, total 0.2 0.2 - 1.0 MG/DL    ALT (SGPT) 19 12 - 78 U/L    AST (SGOT) 28 15 - 37 U/L    Alk.  phosphatase 95 45 - 117 U/L    Protein, total 5.8 (L) 6.4 - 8.2 g/dL    Albumin 2.6 (L) 3.5 - 5.0 g/dL    Globulin 3.2 2.0 - 4.0 g/dL    A-G Ratio 0.8 (L) 1.1 - 2.2     POC TROPONIN-I    Collection Time: 08/05/19  4:11 PM   Result Value Ref Range    Troponin-I (POC) 1.14 (H) 0.00 - 0.08 ng/mL   EKG, 12 LEAD, INITIAL    Collection Time: 08/05/19  4:13 PM   Result Value Ref Range    Ventricular Rate 75 BPM    Atrial Rate 75 BPM    P-R Interval 156 ms    QRS Duration 100 ms    Q-T Interval 430 ms    QTC Calculation (Bezet) 480 ms    Calculated P Axis 30 degrees    Calculated R Axis 109 degrees    Calculated T Axis 103 degrees    Diagnosis       Normal sinus rhythm  Inferior infarct , age undetermined  Anterior infarct , age undetermined  When compared with ECG of 05-AUG-2019 15:31,  MANUAL COMPARISON REQUIRED, DATA IS UNCONFIRMED     EKG, 12 LEAD, INITIAL    Collection Time: 08/05/19  5:25 PM   Result Value Ref Range    Ventricular Rate 74 BPM    Atrial Rate 74 BPM    P-R Interval 136 ms    QRS Duration 88 ms    Q-T Interval 422 ms    QTC Calculation (Bezet) 468 ms    Calculated P Axis 34 degrees    Calculated R Axis 85 degrees    Calculated T Axis 99 degrees    Diagnosis       Normal sinus rhythm  Inferior infarct , age undetermined  Anterolateral infarct , age undetermined  When compared with ECG of 05-AUG-2019 16:13,  MANUAL COMPARISON REQUIRED, DATA IS UNCONFIRMED

## 2019-08-05 NOTE — PROGRESS NOTES
Admission Medication Reconciliation:    Information obtained from:  patient  RxQuery data available**:  YES    Comments/Recommendations: Updated PTA meds/reviewed patient's allergies. 1)  patient is a good historian and had prescription bottles with her    2)  Medication changes (since last review): Added  - calcium, Vit D, Vit C, thiamine, Magnesium,     Adjusted  - glargine to qhs and pantoprazole to prn    Removed  - isosorbide, vit A       **RxQuery pharmacy benefit data reflects medications filled and processed through the patient's insurance, however   this data does NOT capture whether the medication was picked up or is currently being taken by the patient. Allergies:  Voltaren [diclofenac sodium]; Dramamine ii [meclizine]; Imipramine; and Levaquin [levofloxacin]    Significant PMH/Disease States:   Past Medical History:   Diagnosis Date    Arthritis     B[facility] 1 disorder ([facility] [address]     Diabetes ([facility] [address]     Gastrointestinal disorder     gastroparesis    Gastroparesis     GERD (gastroesophageal reflux disease)     Heart disease     Hypertension     Psychiatric disorder     Bipolar       Chief Complaint for this Admission:    Chief Complaint   Patient presents with    Ankle swelling    Epigastric Pain       Prior to Admission Medications:   Prior to Admission Medications   Prescriptions Last Dose Informant Patient Reported? Taking? DULoxetine (CYMBALTA) 60 mg capsule 8/5/2019 at Unknown time  No Yes   Sig: Take 1 Cap by mouth daily. apixaban (ELIQUIS) 5 mg tablet 8/5/2019 at am  No Yes   Sig: Take 1 Tab by mouth two (2) times a day. Indications: Deep Vein Thrombosis Prevention   ascorbic acid, vitamin C, (VITAMIN C) 500 mg tablet 8/5/2019 at Unknown time  Yes Yes   Sig: Take 500 mg by mouth three (3) times daily. aspirin delayed-release 81 mg tablet 8/5/2019 at Unknown time  No Yes   Sig: Take 2 Tabs by mouth daily.  Indications: Myocardial Reinfarction Prevention   atorvastatin (LIPITOR) 80 mg tablet 2019 at Unknown time  Yes Yes   Sig: Take 80 mg by mouth daily. calcium carbonate (OS-GUANAKO) 500 mg calcium (1,250 mg) tablet 2019 at Unknown time  Yes Yes   Sig: Take 1 Tab by mouth daily. cholecalciferol, vitamin D3, 2,000 unit tab 2019 at Unknown time  Yes Yes   Sig: Take 1 Tab by mouth daily. divalproex ER (DEPAKOTE ER) 500 mg ER tablet 2019 at Unknown time  No Yes   Sig: Take 3 Tabs by mouth nightly. Indications: mood   doxepin (SINEQUAN) 50 mg capsule 2019 at Unknown time  No Yes   Sig: Take 1 Cap by mouth nightly. Indications: sleep   ferrous sulfate 325 mg (65 mg iron) tablet 2019 at Unknown time  No Yes   Sig: Take 1 Tab by mouth three (3) times daily (with meals). Indications: anemia from inadequate iron   furosemide (LASIX) 80 mg tablet 2019 at Unknown time  Yes Yes   Sig: Take 80 mg by mouth daily. gabapentin (NEURONTIN) 300 mg capsule 2019 at Unknown time  Yes Yes   Sig: Take 300 mg by mouth three (3) times daily. insulin glargine (LANTUS) 100 unit/mL injection 2019 at Unknown time  Yes Yes   Si Units by SubCUTAneous route nightly. insulin lispro (HUMALOG) 100 unit/mL injection 2019 at Unknown time  Yes Yes   Si Units by SubCUTAneous route Before breakfast, lunch, and dinner. Plus sliding scale if needed  Indications: type 1 diabetes mellitus   magnesium oxide (MAG-OX) 400 mg tablet 2019 at Unknown time  Yes Yes   Sig: Take 400 mg by mouth daily. metoprolol succinate (TOPROL-XL) 50 mg XL tablet 2019 at Unknown time  No Yes   Sig: Take 1 Tab by mouth daily. Indications: high blood pressure   nitroglycerin (NITROSTAT) 0.4 mg SL tablet   Yes Yes   Si.4 mg by SubLINGual route every five (5) minutes as needed for Chest Pain. Up to 3 doses. pantoprazole (PROTONIX) 40 mg tablet   Yes Yes   Sig: Take 40 mg by mouth daily as needed.  Indications: gastroesophageal reflux disease   therapeutic multivitamin (THERAGRAN) tablet 2019 at Unknown time  No Yes   Sig: Take 1 Tab by mouth daily. Indications: Treatment To Prevent Vitamin Deficiency   thiamine HCL (B-1) 100 mg tablet 8/5/2019 at Unknown time  Yes Yes   Sig: Take 100 mg by mouth daily.       Facility-Administered Medications: None

## 2019-08-05 NOTE — Clinical Note
Single view of the internal mammary artery to the left anterior descending obtained using power injection.

## 2019-08-05 NOTE — ED PROVIDER NOTES
37 y.o. female with past medical history significant for gastroparesis, DM, HTN, bipolar, GERD, arthritis, and heart disease who presents from home via personal vehicle with chief complaint of chest pain. Patient states she is currently having indigestion localizing in her upper sternal region. Patient states the pain is inconsistent, with episodes lasting \"3-4\" minutes. Patient states her pain can occasionally worsen with food and fluid intake. Patient also notes she has gained 12 lbs over the past 2 days. Patient states she is currently on lasix but with no relief. Patient notes she has SOB when ambulatory. Patient affirms chest pain, SOB, and recent weight gain. There are no other acute medical concerns at this time. Social hx: Current smoker, occasional EtOH  PCP: Yonathan Bass MD    Note written by Vani Worrell, as dictated by Anna Raines MD 3:48 PM       The history is provided by the patient. No  was used.         Past Medical History:   Diagnosis Date    Arthritis     [facility] [address]     Diabetes ([facility] [address]     Gastrointestinal disorder     gastroparesis    Gastroparesis     GERD (gastroesophageal reflux disease)     Heart disease     Hypertension     Psychiatric disorder     Bipolar       Past Surgical History:   Procedure Laterality Date    HX ANKLE FRACTURE TX      HX CAROTID STENT      HX GYN      BTL    HX ORTHOPAEDIC      toe         Family History:   Problem Relation Age of Onset    Thyroid Disease Mother     Hypertension Father     Heart Disease Father     Diabetes Sister     Heart Disease Sister     Hypertension Sister     Thyroid Disease Brother        Social History     Socioeconomic History    Marital status:      Spouse name: Not on file    Number of children: Not on file    Years of education: Not on file    Highest education level: Not on file   Occupational History    Not on file   Social Needs    Financial resource strain: Not on file    Food insecurity:     Worry: Not on file     Inability: Not on file    Transportation needs:     Medical: Not on file     Non-medical: Not on file   Tobacco Use    Smoking status: Current Every Day Smoker     Packs/day: 0.25    Smokeless tobacco: Never Used   Substance and Sexual Activity    Alcohol use: Yes     Comment: occasionally    Drug use: No    Sexual activity: Yes   Lifestyle    Physical activity:     Days per week: Not on file     Minutes per session: Not on file    Stress: Not on file   Relationships    Social connections:     Talks on phone: Not on file     Gets together: Not on file     Attends [employer] service: Not on file     Active member of club or organization: Not on file     Attends meetings of clubs or organizations: Not on file     Relationship status: Not on file    Intimate partner violence:     Fear of current or ex partner: Not on file     Emotionally abused: Not on file     Physically abused: Not on file     Forced sexual activity: Not on file   Other Topics Concern    Not on file   Social History Narrative    Not on file         ALLERGIES: Voltaren [diclofenac sodium]; Dramamine ii [meclizine]; Diclofenac; Flagyl [metronidazole]; Imipramine; and Levaquin [levofloxacin]    Review of Systems   Constitutional: Positive for unexpected weight change (\"12 lbs in 2 days\"). Negative for appetite change, chills and fever. HENT: Negative for rhinorrhea, sore throat and trouble swallowing. Eyes: Negative for photophobia. Respiratory: Positive for shortness of breath. Negative for cough. Cardiovascular: Positive for chest pain. Negative for palpitations. Gastrointestinal: Negative for abdominal pain, nausea and vomiting. Genitourinary: Negative for dysuria, frequency and hematuria. Musculoskeletal: Negative for arthralgias. Neurological: Negative for dizziness, syncope and weakness.    Psychiatric/Behavioral: Negative for behavioral problems. The patient is not nervous/anxious. Vitals:    08/05/19 1517 08/05/19 1558   BP:  107/62   Pulse: 78 80   Resp: 15 15   SpO2: 100% 96%   Weight:  79 kg (174 lb 2.6 oz)   Height:  5' 3\" (1.6 m)            Physical Exam   Constitutional: She appears well-developed and well-nourished. HENT:   Head: Normocephalic and atraumatic. Mouth/Throat: Oropharynx is clear and moist.   Eyes: Pupils are equal, round, and reactive to light. EOM are normal.   Neck: Normal range of motion. Neck supple. Cardiovascular: Normal rate, regular rhythm, normal heart sounds and intact distal pulses. Exam reveals no gallop and no friction rub. No murmur heard. Pulmonary/Chest: Effort normal. No respiratory distress. She has no wheezes. She has no rales. Healing sternotomy scar   Abdominal: Soft. There is no tenderness. There is no rebound. Musculoskeletal: Normal range of motion. She exhibits no tenderness. Neurological: She is alert. No cranial nerve deficit. Motor; symmetric   Skin: No erythema. Psychiatric: She has a normal mood and affect. Her behavior is normal.   Nursing note and vitals reviewed. Note written by Vani Heller, as dictated by Cathi Rogers MD 3:48 PM       MDM  Number of Diagnoses or Management Options  Acute chest pain:   Elevated troponin:   Weight gain:   Total critical care time spent exclusive of procedures:  40 minutes         Procedures    PROGRESS NOTE:  4:01 PM  Dr. Natalee Castanon is currently at bedside evaluating the patient. Patient's EKG is similar to previous one recorded on 7/24/19. PROGRESS NOTE:  4:32 PM  Dr. Natalee Castanon notes the patient has elevated troponin at 1. Dr. Natalee Castanon states cath lab is not indicated at present moment and recommends IV lasix. Will order IV nitro if chest pain/indigestion reoccurs. Note: Patient's blood pressure is running about 922 systolic. She is not  short of breath at present.   She has some nondiagnostic ST elevation in the inferior leads. There is some possibility of an RV infarct; I will not order diuretics at present. Cecil Landin MD  5:26 PM      Hospitalist Delroy for Admission  5:27 PM    ED Room Number: UG17/30  Patient Name and age:  Osvaldo Martinez 37 y.o.  female  Working Diagnosis:   1. Acute chest pain    2. Elevated troponin    3. Weight gain      Readmission: no  Isolation Requirements:  no  Recommended Level of Care:  ICU  Code Status:  Full Code  Department:[facility] Adult ED - [phone]  Other:  Dr Ed Mijares has seen her in ER  ED EKG interpretation:  Rhythm: normal sinus rhythm; and regular . Rate (approx.): 75; Axis: normal; P wave: normal; QRS interval: normal ; ST/T wave: elevated ; in  Lead: III  and aVF ; Other findings:< 1 mm ST depression 1 and aVL. This EKG was interpreted by Cecil Landin MD,ED Provider.

## 2019-08-05 NOTE — ED NOTES
Cardiology returned call, Nitro drip only to be started with chest pain lasting longer than 20 minutes or persistent.

## 2019-08-05 NOTE — ED TRIAGE NOTES
Patient history post bypass a few weeks ago. She reports increased ankle swelling and epigastric pains.

## 2019-08-06 LAB
ANION GAP SERPL CALC-SCNC: 4 MMOL/L (ref 5–15)
APTT PPP: 39.3 SEC (ref 22.1–32)
ATRIAL RATE: 74 BPM
ATRIAL RATE: 75 BPM
ATRIAL RATE: 81 BPM
BASOPHILS # BLD: 0 K/UL (ref 0–0.1)
BASOPHILS NFR BLD: 0 % (ref 0–1)
BUN SERPL-MCNC: 14 MG/DL (ref 6–20)
BUN/CREAT SERPL: 16 (ref 12–20)
CALCIUM SERPL-MCNC: 8.5 MG/DL (ref 8.5–10.1)
CALCULATED P AXIS, ECG09: 30 DEGREES
CALCULATED P AXIS, ECG09: 30 DEGREES
CALCULATED P AXIS, ECG09: 34 DEGREES
CALCULATED R AXIS, ECG10: 102 DEGREES
CALCULATED R AXIS, ECG10: 109 DEGREES
CALCULATED R AXIS, ECG10: 85 DEGREES
CALCULATED T AXIS, ECG11: 103 DEGREES
CALCULATED T AXIS, ECG11: 106 DEGREES
CALCULATED T AXIS, ECG11: 99 DEGREES
CHLORIDE SERPL-SCNC: 100 MMOL/L (ref 97–108)
CO2 SERPL-SCNC: 34 MMOL/L (ref 21–32)
CREAT SERPL-MCNC: 0.89 MG/DL (ref 0.55–1.02)
DIAGNOSIS, 93000: NORMAL
DIFFERENTIAL METHOD BLD: ABNORMAL
ECHO AO ROOT DIAM: 2.79 CM
ECHO AV AREA PEAK VELOCITY: 1.8 CM2
ECHO AV PEAK GRADIENT: 7.3 MMHG
ECHO AV PEAK VELOCITY: 134.96 CM/S
ECHO LA MAJOR AXIS: 4.19 CM
ECHO LA TO AORTIC ROOT RATIO: 1.5
ECHO LV E' LATERAL VELOCITY: 7.68 CM/S
ECHO LV E' SEPTAL VELOCITY: 5.48 CM/S
ECHO LV INTERNAL DIMENSION DIASTOLIC: 5.08 CM (ref 3.9–5.3)
ECHO LV INTERNAL DIMENSION SYSTOLIC: 4.4 CM
ECHO LV IVSD: 0.91 CM (ref 0.6–0.9)
ECHO LV MASS 2D: 216.8 G (ref 67–162)
ECHO LV MASS INDEX 2D: 118.9 G/M2 (ref 43–95)
ECHO LV POSTERIOR WALL DIASTOLIC: 1.07 CM (ref 0.6–0.9)
ECHO LVOT DIAM: 1.87 CM
ECHO LVOT PEAK GRADIENT: 3.1 MMHG
ECHO LVOT PEAK VELOCITY: 88.36 CM/S
ECHO MV A VELOCITY: 84.08 CM/S
ECHO MV AREA PHT: 4.3 CM2
ECHO MV E DECELERATION TIME (DT): 174.6 MS
ECHO MV E VELOCITY: 65.8 CM/S
ECHO MV E/A RATIO: 0.78
ECHO MV E/E' LATERAL: 8.57
ECHO MV E/E' RATIO (AVERAGED): 10.29
ECHO MV E/E' SEPTAL: 12.01
ECHO MV PRESSURE HALF TIME (PHT): 50.6 MS
ECHO PV MAX VELOCITY: 101.91 CM/S
ECHO PV PEAK GRADIENT: 4.2 MMHG
ECHO RV INTERNAL DIMENSION: 1.65 CM
ECHO RV TAPSE: 1.5 CM (ref 1.5–2)
ECHO TV REGURGITANT MAX VELOCITY: 233.05 CM/S
ECHO TV REGURGITANT PEAK GRADIENT: 21.7 MMHG
END DIASTOLIC PRESSURE: 18
EOSINOPHIL # BLD: 0.3 K/UL (ref 0–0.4)
EOSINOPHIL NFR BLD: 4 % (ref 0–7)
ERYTHROCYTE [DISTWIDTH] IN BLOOD BY AUTOMATED COUNT: 15.9 % (ref 11.5–14.5)
GLUCOSE BLD STRIP.AUTO-MCNC: 191 MG/DL (ref 65–100)
GLUCOSE BLD STRIP.AUTO-MCNC: 205 MG/DL (ref 65–100)
GLUCOSE BLD STRIP.AUTO-MCNC: 223 MG/DL (ref 65–100)
GLUCOSE BLD STRIP.AUTO-MCNC: 440 MG/DL (ref 65–100)
GLUCOSE BLD STRIP.AUTO-MCNC: 67 MG/DL (ref 65–100)
GLUCOSE BLD STRIP.AUTO-MCNC: 82 MG/DL (ref 65–100)
GLUCOSE SERPL-MCNC: 199 MG/DL (ref 65–100)
HCT VFR BLD AUTO: 40 % (ref 35–47)
HGB BLD-MCNC: 12.4 G/DL (ref 11.5–16)
IMM GRANULOCYTES # BLD AUTO: 0 K/UL (ref 0–0.04)
IMM GRANULOCYTES NFR BLD AUTO: 0 % (ref 0–0.5)
LYMPHOCYTES # BLD: 2.5 K/UL (ref 0.8–3.5)
LYMPHOCYTES NFR BLD: 33 % (ref 12–49)
MAGNESIUM SERPL-MCNC: 2.2 MG/DL (ref 1.6–2.4)
MCH RBC QN AUTO: 26.3 PG (ref 26–34)
MCHC RBC AUTO-ENTMCNC: 31 G/DL (ref 30–36.5)
MCV RBC AUTO: 84.9 FL (ref 80–99)
MONOCYTES # BLD: 0.5 K/UL (ref 0–1)
MONOCYTES NFR BLD: 6 % (ref 5–13)
NEUTS SEG # BLD: 4.2 K/UL (ref 1.8–8)
NEUTS SEG NFR BLD: 57 % (ref 32–75)
NRBC # BLD: 0 K/UL (ref 0–0.01)
NRBC BLD-RTO: 0 PER 100 WBC
P-R INTERVAL, ECG05: 136 MS
P-R INTERVAL, ECG05: 148 MS
P-R INTERVAL, ECG05: 156 MS
PLATELET # BLD AUTO: 299 K/UL (ref 150–400)
PMV BLD AUTO: 10.7 FL (ref 8.9–12.9)
POTASSIUM SERPL-SCNC: 3.2 MMOL/L (ref 3.5–5.1)
Q-T INTERVAL, ECG07: 410 MS
Q-T INTERVAL, ECG07: 422 MS
Q-T INTERVAL, ECG07: 430 MS
QRS DURATION, ECG06: 100 MS
QRS DURATION, ECG06: 88 MS
QRS DURATION, ECG06: 96 MS
QTC CALCULATION (BEZET), ECG08: 468 MS
QTC CALCULATION (BEZET), ECG08: 476 MS
QTC CALCULATION (BEZET), ECG08: 480 MS
RBC # BLD AUTO: 4.71 M/UL (ref 3.8–5.2)
SERVICE CMNT-IMP: ABNORMAL
SERVICE CMNT-IMP: NORMAL
SERVICE CMNT-IMP: NORMAL
SODIUM SERPL-SCNC: 138 MMOL/L (ref 136–145)
THERAPEUTIC RANGE,PTTT: ABNORMAL SECS (ref 58–77)
TROPONIN I SERPL-MCNC: 8.34 NG/ML
VENTRICULAR RATE, ECG03: 74 BPM
VENTRICULAR RATE, ECG03: 75 BPM
VENTRICULAR RATE, ECG03: 81 BPM
WBC # BLD AUTO: 7.5 K/UL (ref 3.6–11)

## 2019-08-06 PROCEDURE — 74011250636 HC RX REV CODE- 250/636: Performed by: STUDENT IN AN ORGANIZED HEALTH CARE EDUCATION/TRAINING PROGRAM

## 2019-08-06 PROCEDURE — 77030013744: Performed by: STUDENT IN AN ORGANIZED HEALTH CARE EDUCATION/TRAINING PROGRAM

## 2019-08-06 PROCEDURE — 80048 BASIC METABOLIC PNL TOTAL CA: CPT

## 2019-08-06 PROCEDURE — 77030004532 HC CATH ANGI DX IMP BSC -A: Performed by: STUDENT IN AN ORGANIZED HEALTH CARE EDUCATION/TRAINING PROGRAM

## 2019-08-06 PROCEDURE — C1769 GUIDE WIRE: HCPCS | Performed by: STUDENT IN AN ORGANIZED HEALTH CARE EDUCATION/TRAINING PROGRAM

## 2019-08-06 PROCEDURE — 74011636320 HC RX REV CODE- 636/320: Performed by: STUDENT IN AN ORGANIZED HEALTH CARE EDUCATION/TRAINING PROGRAM

## 2019-08-06 PROCEDURE — 99153 MOD SED SAME PHYS/QHP EA: CPT | Performed by: STUDENT IN AN ORGANIZED HEALTH CARE EDUCATION/TRAINING PROGRAM

## 2019-08-06 PROCEDURE — B2151ZZ FLUOROSCOPY OF LEFT HEART USING LOW OSMOLAR CONTRAST: ICD-10-PCS | Performed by: STUDENT IN AN ORGANIZED HEALTH CARE EDUCATION/TRAINING PROGRAM

## 2019-08-06 PROCEDURE — 93459 L HRT ART/GRFT ANGIO: CPT | Performed by: STUDENT IN AN ORGANIZED HEALTH CARE EDUCATION/TRAINING PROGRAM

## 2019-08-06 PROCEDURE — 74011250636 HC RX REV CODE- 250/636

## 2019-08-06 PROCEDURE — 36415 COLL VENOUS BLD VENIPUNCTURE: CPT

## 2019-08-06 PROCEDURE — 99152 MOD SED SAME PHYS/QHP 5/>YRS: CPT | Performed by: STUDENT IN AN ORGANIZED HEALTH CARE EDUCATION/TRAINING PROGRAM

## 2019-08-06 PROCEDURE — 4A023N7 MEASUREMENT OF CARDIAC SAMPLING AND PRESSURE, LEFT HEART, PERCUTANEOUS APPROACH: ICD-10-PCS | Performed by: STUDENT IN AN ORGANIZED HEALTH CARE EDUCATION/TRAINING PROGRAM

## 2019-08-06 PROCEDURE — 74011250637 HC RX REV CODE- 250/637: Performed by: INTERNAL MEDICINE

## 2019-08-06 PROCEDURE — 74011250636 HC RX REV CODE- 250/636: Performed by: INTERNAL MEDICINE

## 2019-08-06 PROCEDURE — 77030010221 HC SPLNT WR POS TELE -B: Performed by: STUDENT IN AN ORGANIZED HEALTH CARE EDUCATION/TRAINING PROGRAM

## 2019-08-06 PROCEDURE — C1894 INTRO/SHEATH, NON-LASER: HCPCS | Performed by: STUDENT IN AN ORGANIZED HEALTH CARE EDUCATION/TRAINING PROGRAM

## 2019-08-06 PROCEDURE — 83735 ASSAY OF MAGNESIUM: CPT

## 2019-08-06 PROCEDURE — 74011250637 HC RX REV CODE- 250/637: Performed by: NURSE PRACTITIONER

## 2019-08-06 PROCEDURE — 65660000000 HC RM CCU STEPDOWN

## 2019-08-06 PROCEDURE — 85730 THROMBOPLASTIN TIME PARTIAL: CPT

## 2019-08-06 PROCEDURE — 74011636637 HC RX REV CODE- 636/637: Performed by: INTERNAL MEDICINE

## 2019-08-06 PROCEDURE — 82962 GLUCOSE BLOOD TEST: CPT

## 2019-08-06 PROCEDURE — 85025 COMPLETE CBC W/AUTO DIFF WBC: CPT

## 2019-08-06 PROCEDURE — B2111ZZ FLUOROSCOPY OF MULTIPLE CORONARY ARTERIES USING LOW OSMOLAR CONTRAST: ICD-10-PCS | Performed by: STUDENT IN AN ORGANIZED HEALTH CARE EDUCATION/TRAINING PROGRAM

## 2019-08-06 PROCEDURE — 84484 ASSAY OF TROPONIN QUANT: CPT

## 2019-08-06 RX ORDER — MIDAZOLAM HYDROCHLORIDE 1 MG/ML
INJECTION, SOLUTION INTRAMUSCULAR; INTRAVENOUS AS NEEDED
Status: DISCONTINUED | OUTPATIENT
Start: 2019-08-06 | End: 2019-08-06 | Stop reason: HOSPADM

## 2019-08-06 RX ORDER — FENTANYL CITRATE 50 UG/ML
INJECTION, SOLUTION INTRAMUSCULAR; INTRAVENOUS AS NEEDED
Status: DISCONTINUED | OUTPATIENT
Start: 2019-08-06 | End: 2019-08-06 | Stop reason: HOSPADM

## 2019-08-06 RX ORDER — ENOXAPARIN SODIUM 100 MG/ML
80 INJECTION SUBCUTANEOUS EVERY 12 HOURS
Status: DISCONTINUED | OUTPATIENT
Start: 2019-08-06 | End: 2019-08-07 | Stop reason: HOSPADM

## 2019-08-06 RX ORDER — LIDOCAINE HYDROCHLORIDE 10 MG/ML
INJECTION INFILTRATION; PERINEURAL AS NEEDED
Status: DISCONTINUED | OUTPATIENT
Start: 2019-08-06 | End: 2019-08-06 | Stop reason: HOSPADM

## 2019-08-06 RX ORDER — FONDAPARINUX SODIUM 2.5 MG/.5ML
2.5 INJECTION SUBCUTANEOUS DAILY
Status: DISCONTINUED | OUTPATIENT
Start: 2019-08-07 | End: 2019-08-06 | Stop reason: CLARIF

## 2019-08-06 RX ORDER — SODIUM CHLORIDE 0.9 % (FLUSH) 0.9 %
5-40 SYRINGE (ML) INJECTION AS NEEDED
Status: CANCELLED | OUTPATIENT
Start: 2019-08-06

## 2019-08-06 RX ORDER — HEPARIN SODIUM 1000 [USP'U]/ML
4000 INJECTION, SOLUTION INTRAVENOUS; SUBCUTANEOUS ONCE
Status: COMPLETED | OUTPATIENT
Start: 2019-08-06 | End: 2019-08-06

## 2019-08-06 RX ORDER — SODIUM CHLORIDE 0.9 % (FLUSH) 0.9 %
5-40 SYRINGE (ML) INJECTION EVERY 8 HOURS
Status: CANCELLED | OUTPATIENT
Start: 2019-08-06

## 2019-08-06 RX ORDER — POTASSIUM CHLORIDE 750 MG/1
20 TABLET, FILM COATED, EXTENDED RELEASE ORAL
Status: COMPLETED | OUTPATIENT
Start: 2019-08-06 | End: 2019-08-06

## 2019-08-06 RX ADMIN — INSULIN LISPRO 4 UNITS: 100 INJECTION, SOLUTION INTRAVENOUS; SUBCUTANEOUS at 22:01

## 2019-08-06 RX ADMIN — INSULIN GLARGINE 28 UNITS: 100 INJECTION, SOLUTION SUBCUTANEOUS at 21:37

## 2019-08-06 RX ADMIN — INSULIN LISPRO 3 UNITS: 100 INJECTION, SOLUTION INTRAVENOUS; SUBCUTANEOUS at 08:43

## 2019-08-06 RX ADMIN — DULOXETINE HYDROCHLORIDE 60 MG: 60 CAPSULE, DELAYED RELEASE ORAL at 08:43

## 2019-08-06 RX ADMIN — Medication 10 ML: at 13:51

## 2019-08-06 RX ADMIN — INSULIN LISPRO 2 UNITS: 100 INJECTION, SOLUTION INTRAVENOUS; SUBCUTANEOUS at 12:09

## 2019-08-06 RX ADMIN — GABAPENTIN 300 MG: 300 CAPSULE ORAL at 08:43

## 2019-08-06 RX ADMIN — Medication 10 ML: at 06:51

## 2019-08-06 RX ADMIN — FUROSEMIDE 40 MG: 10 INJECTION, SOLUTION INTRAMUSCULAR; INTRAVENOUS at 08:43

## 2019-08-06 RX ADMIN — PANTOPRAZOLE SODIUM 40 MG: 40 TABLET, DELAYED RELEASE ORAL at 06:51

## 2019-08-06 RX ADMIN — GABAPENTIN 300 MG: 300 CAPSULE ORAL at 22:06

## 2019-08-06 RX ADMIN — HYDROCODONE BITARTRATE AND ACETAMINOPHEN 1 TABLET: 10; 325 TABLET ORAL at 04:34

## 2019-08-06 RX ADMIN — HYDROCODONE BITARTRATE AND ACETAMINOPHEN 1 TABLET: 10; 325 TABLET ORAL at 17:59

## 2019-08-06 RX ADMIN — HEPARIN SODIUM 4000 UNITS: 1000 INJECTION INTRAVENOUS; SUBCUTANEOUS at 06:50

## 2019-08-06 RX ADMIN — THERA TABS 1 TABLET: TAB at 08:43

## 2019-08-06 RX ADMIN — Medication 10 ML: at 22:09

## 2019-08-06 RX ADMIN — HYDROCODONE BITARTRATE AND ACETAMINOPHEN 1 TABLET: 10; 325 TABLET ORAL at 23:09

## 2019-08-06 RX ADMIN — DOXEPIN HYDROCHLORIDE 50 MG: 25 CAPSULE ORAL at 22:06

## 2019-08-06 RX ADMIN — ATORVASTATIN CALCIUM 80 MG: 40 TABLET, FILM COATED ORAL at 08:42

## 2019-08-06 RX ADMIN — METOPROLOL SUCCINATE 50 MG: 50 TABLET, EXTENDED RELEASE ORAL at 08:43

## 2019-08-06 RX ADMIN — GABAPENTIN 300 MG: 300 CAPSULE ORAL at 17:59

## 2019-08-06 RX ADMIN — DIVALPROEX SODIUM 1500 MG: 500 TABLET, FILM COATED, EXTENDED RELEASE ORAL at 22:06

## 2019-08-06 RX ADMIN — HYDROCODONE BITARTRATE AND ACETAMINOPHEN 1 TABLET: 10; 325 TABLET ORAL at 08:43

## 2019-08-06 RX ADMIN — FERROUS SULFATE TAB 325 MG (65 MG ELEMENTAL FE) 325 MG: 325 (65 FE) TAB at 17:59

## 2019-08-06 RX ADMIN — ASPIRIN 81 MG 162 MG: 81 TABLET ORAL at 08:43

## 2019-08-06 RX ADMIN — HYDROCODONE BITARTRATE AND ACETAMINOPHEN 1 TABLET: 10; 325 TABLET ORAL at 13:12

## 2019-08-06 RX ADMIN — POTASSIUM CHLORIDE 20 MEQ: 750 TABLET, EXTENDED RELEASE ORAL at 12:01

## 2019-08-06 RX ADMIN — FERROUS SULFATE TAB 325 MG (65 MG ELEMENTAL FE) 325 MG: 325 (65 FE) TAB at 08:43

## 2019-08-06 NOTE — CONSULTS
Cardiology Consult/Progress Note           8/5/2019  3:47 PM   Chest pain [R07.9]    Subjective:     Reason for visit/consult: Chest pain    Sagar Pineda is a 37 y.o. female admitted for Chest pain [R07.9]. Mrs. Sunny Ayoub is known to have multivessel coronary artery disease. She was also known to have severe LV systolic dysfunction in fact that was admitted to [facility] with cardiogenic pulmonary edema and acute respiratory failure 2 months ago. At that time she was transferred to [facility] for further care. She has been previously seen at [facility] for coronary artery disease and peripheral vascular disease. She has had previous PCI to her circumflex and left anterior descending vessels with failed PCI to right coronary artery which was small and chronically occluded. After her recent transferred to [facility] she was identified to have significant multivessel disease and underwent a coronary artery bypass grafting with LIMA to LAD and vein graft to circumflex/ramus. RCA was not bypassable being a small vessel. Subsequently her EF improved from 20% to about 40%. Since her discharge from the hospital she has not noticed any further chest pain until yesterday. Over the past 12 to 24 hours she has been having intermittent burning discomfort in the chest.  This is noted both at exertion and at rest and associated with some shortness of breath. She also reports significant pedal edema bilaterally which has been particularly prominent over the past 1 week. She is compliant with her medications. She also history of peripheral vascular disease with prior percutaneous interventions on her femoral arteries. She is a brittle diabetic. She has history of bipolar disorder and's depression with recent suicidal attempt about 3 weeks ago. At the time of evaluation she was not reporting any chest pain.   Initial ECG performed in the ED was concerning for possible ST elevation. I reviewed that ECG and performed serial ECGs in the ER with no evolutionary changes consistent with true ST elevation MI. However she had ST depression which could suggest potential ischemia with ongoing non-ST elevation myocardial infarction. .    Investigations:  EC serial ECGs were performed which showed poor progression of R waves in anterior precordial leads. Sinus rhythm, possible inferior myocardial infarction with Q waves inferior leads and downsloping ST segments in lateral limb leads. Review of records: Records from [facility] were reviewed and showed recent coronary bypass grafting with LIMA to LAD, vein graft to ramus. EF at the time of hospitalization post CABG-40%. Assessment and PLAN   1. History of multivessel coronary artery disease status post coronary artery bypass grafting with LIMA to LAD, vein graft to ramus now presenting with ongoing chest pain suggestive of possible unstable angina/non-ST elevation myocardial function. Residual on revascularized disease in the RCA not amenable to PCI. 2.  Hyperlipidemia  3. Ischemic cardiomyopathy secondary to 1.  4.  Acute on chronic decompensated congestive heart failure with reduced LV systolic function(heart failure reduced EF/systolic heart failure)  5. Bipolar disorder with depression and repeated suicidal ideations. Patient was seen urgently in the emergency department due to concern for ST elevation myocardial infarction. More than 30 minutes were spent on urgent critical care aspects. This included performance of bedside serial ECGs as well as bedside echocardiogram to assess myocardial function. Initial evaluation is not consistent with a true ST elevation myocardial infarction although patient may have ongoing ischemia given recent chest pain and ST depressions.   The ST elevation found on her ECG may represent distant artery ischemia in the RCA territory which is not amenable to PCI. Hence we will manage the patient with medical therapy at this point of time with serial troponins, use of anticoagulant/heparin drip. For ongoing chest pain which is protracted, nitroglycerin drip can be used. If troponins are significantly elevated we may consider performing cardiac catheterization with coronary and graft angiography for further delineation of coronary anatomy. At this point of time she also demonstrates some features of acute on chronic congestive heart failure with lower extremity edema. Hence we will recommend IV diuretics as well. Home medication should be continued patient would be closely monitored in CCU with telemetry. [x]    High complexity decision making was performed  [x]    Patient is at high-risk of decompensation with multiple organ involvement  Investigation  Telemetry:  ECG: Subtle upsloping less than half millimeter ST elevation in inferior leads not typical for acute MI. ST depressions in lateral limb leads. Poor progression of R wave in anterior precordial leads.   Echocardiogram: Post CABG June 2019 EF 40%  Social History     Socioeconomic History    Marital status:      Spouse name: Not on file    Number of children: Not on file    Years of education: Not on file    Highest education level: Not on file   Tobacco Use    Smoking status: Current Every Day Smoker     Packs/day: 0.25    Smokeless tobacco: Never Used   Substance and Sexual Activity    Alcohol use: Yes     Comment: occasionally    Drug use: No    Sexual activity: Yes      Patient Active Problem List    Diagnosis Date Noted    Chest pain 08/05/2019    Bipolar 1 disorder ([facility] 07/27/2019    Insulin overdose 07/24/2019    [facility]STEM[facility] (non-ST elevated myocardial infarction) [facility] 06/08/2019    Acute exacerbation of CHF (congestive heart failure) ([facility] 06/08/2019    SOB (shortness of breath) 06/07/2019      Jerardo Tee MD  Past Medical History:   Diagnosis Date    Arthritis     B[facility] disorder ([facility]     Diabetes ([facility]     Gastrointestinal disorder     gastroparesis    Gastroparesis     GERD (gastroesophageal reflux disease)     Heart disease     Hypertension     Psychiatric disorder     Bipolar      Past Surgical History:   Procedure Laterality Date    HX ANKLE FRACTURE TX      HX CAROTID STENT      HX GYN      BTL    HX ORTHOPAEDIC      toe     Allergies   Allergen Reactions    Voltaren [Diclofenac Sodium] Anaphylaxis    Dramamine Ii [Meclizine] Itching     Auditory and Visual hallucinations    Imipramine Hives    Levaquin [Levofloxacin] Itching and Nausea and Vomiting      Family History   Problem Relation Age of Onset    Thyroid Disease Mother     Hypertension Father     Heart Disease Father     Diabetes Sister     Heart Disease Sister     Hypertension Sister     Thyroid Disease Brother       Current Facility-Administered Medications   Medication Dose Route Frequency    [START ON 8/6/2019] atorvastatin (LIPITOR) tablet 80 mg  80 mg Oral DAILY    nitroglycerin (Tridil) 200 mcg/ml infusion  5 mcg/min IntraVENous TITRATE    [START ON 8/6/2019] aspirin chewable tablet 162 mg  162 mg Oral DAILY    divalproex ER (DEPAKOTE ER) 24 hour tablet 1,500 mg  1,500 mg Oral QHS    doxepin (SINEquan) capsule 50 mg  50 mg Oral QHS    [START ON 8/6/2019] DULoxetine (CYMBALTA) capsule 60 mg  60 mg Oral DAILY    ferrous sulfate tablet 325 mg  325 mg Oral TID WITH MEALS    gabapentin (NEURONTIN) capsule 300 mg  300 mg Oral TID    insulin glargine (LANTUS) injection 28 Units  28 Units SubCUTAneous QHS    [START ON 8/6/2019] isosorbide mononitrate ER (IMDUR) tablet 60 mg  60 mg Oral DAILY    [START ON 8/6/2019] metoprolol succinate (TOPROL-XL) XL tablet 50 mg  50 mg Oral DAILY    [START ON 8/6/2019] pantoprazole (PROTONIX) tablet 40 mg  40 mg Oral ACB    [START ON 8/6/2019] therapeutic multivitamin (THERAGRAN) tablet 1 Tab  1 Tab Oral DAILY    [START ON 8/6/2019] vitamin A (AQUASOL A) capsule 10,000 Units  10,000 Units Oral DAILY    sodium chloride (NS) flush 5-40 mL  5-40 mL IntraVENous Q8H    sodium chloride (NS) flush 5-40 mL  5-40 mL IntraVENous PRN    morphine injection 2 mg  2 mg IntraVENous Q4H PRN    furosemide (LASIX) injection 40 mg  40 mg IntraVENous BID    insulin lispro (HUMALOG) injection   SubCUTAneous AC&HS    glucose chewable tablet 16 g  4 Tab Oral PRN    dextrose (D50W) injection syrg 12.5-25 g  12.5-25 g IntraVENous PRN    glucagon (GLUCAGEN) injection 1 mg  1 mg IntraMUSCular PRN    heparin (porcine) injection 4,000 Units  4,000 Units IntraVENous ONCE    heparin 25,000 units in D5W 250 ml infusion  12-25 Units/kg/hr IntraVENous TITRATE     Current Outpatient Medications   Medication Sig    insulin glargine (LANTUS) 100 unit/mL injection 28 Units by SubCUTAneous route nightly.  ascorbic acid, vitamin C, (VITAMIN C) 500 mg tablet Take 500 mg by mouth three (3) times daily.  thiamine HCL (B-1) 100 mg tablet Take 100 mg by mouth daily.  magnesium oxide (MAG-OX) 400 mg tablet Take 400 mg by mouth daily.  calcium carbonate (OS-GUANAKO) 500 mg calcium (1,250 mg) tablet Take 1 Tab by mouth daily.  cholecalciferol, vitamin D3, 2,000 unit tab Take 1 Tab by mouth daily.  aspirin delayed-release 81 mg tablet Take 2 Tabs by mouth daily. Indications: Myocardial Reinfarction Prevention    divalproex ER (DEPAKOTE ER) 500 mg ER tablet Take 3 Tabs by mouth nightly. Indications: mood    metoprolol succinate (TOPROL-XL) 50 mg XL tablet Take 1 Tab by mouth daily. Indications: high blood pressure    apixaban (ELIQUIS) 5 mg tablet Take 1 Tab by mouth two (2) times a day. Indications: Deep Vein Thrombosis Prevention    doxepin (SINEQUAN) 50 mg capsule Take 1 Cap by mouth nightly. Indications: sleep    ferrous sulfate 325 mg (65 mg iron) tablet Take 1 Tab by mouth three (3) times daily (with meals).  Indications: anemia from inadequate iron  therapeutic multivitamin (THERAGRAN) tablet Take 1 Tab by mouth daily. Indications: Treatment To Prevent Vitamin Deficiency    atorvastatin (LIPITOR) 80 mg tablet Take 80 mg by mouth daily.  nitroglycerin (NITROSTAT) 0.4 mg SL tablet 0.4 mg by SubLINGual route every five (5) minutes as needed for Chest Pain. Up to 3 doses.  furosemide (LASIX) 80 mg tablet Take 80 mg by mouth daily.  pantoprazole (PROTONIX) 40 mg tablet Take 40 mg by mouth daily as needed. Indications: gastroesophageal reflux disease    gabapentin (NEURONTIN) 300 mg capsule Take 300 mg by mouth three (3) times daily.  DULoxetine (CYMBALTA) 60 mg capsule Take 1 Cap by mouth daily.  insulin lispro (HUMALOG) 100 unit/mL injection 5 Units by SubCUTAneous route Before breakfast, lunch, and dinner. Plus sliding scale if needed  Indications: type 1 diabetes mellitus      Prior to Admission Medications   Prescriptions Last Dose Informant Patient Reported? Taking? DULoxetine (CYMBALTA) 60 mg capsule 8/5/2019 at Unknown time  No Yes   Sig: Take 1 Cap by mouth daily. apixaban (ELIQUIS) 5 mg tablet 8/5/2019 at am  No Yes   Sig: Take 1 Tab by mouth two (2) times a day. Indications: Deep Vein Thrombosis Prevention   ascorbic acid, vitamin C, (VITAMIN C) 500 mg tablet 8/5/2019 at Unknown time  Yes Yes   Sig: Take 500 mg by mouth three (3) times daily. aspirin delayed-release 81 mg tablet 8/5/2019 at Unknown time  No Yes   Sig: Take 2 Tabs by mouth daily. Indications: Myocardial Reinfarction Prevention   atorvastatin (LIPITOR) 80 mg tablet 8/5/2019 at Unknown time  Yes Yes   Sig: Take 80 mg by mouth daily. calcium carbonate (OS-GUANAKO) 500 mg calcium (1,250 mg) tablet 8/5/2019 at Unknown time  Yes Yes   Sig: Take 1 Tab by mouth daily. cholecalciferol, vitamin D3, 2,000 unit tab 8/5/2019 at Unknown time  Yes Yes   Sig: Take 1 Tab by mouth daily.    divalproex ER (DEPAKOTE ER) 500 mg ER tablet 8/4/2019 at Unknown time  No Yes   Sig: Take 3 Tabs by mouth nightly. Indications: mood   doxepin (SINEQUAN) 50 mg capsule 2019 at Unknown time  No Yes   Sig: Take 1 Cap by mouth nightly. Indications: sleep   ferrous sulfate 325 mg (65 mg iron) tablet 2019 at Unknown time  No Yes   Sig: Take 1 Tab by mouth three (3) times daily (with meals). Indications: anemia from inadequate iron   furosemide (LASIX) 80 mg tablet 2019 at Unknown time  Yes Yes   Sig: Take 80 mg by mouth daily. gabapentin (NEURONTIN) 300 mg capsule 2019 at Unknown time  Yes Yes   Sig: Take 300 mg by mouth three (3) times daily. insulin glargine (LANTUS) 100 unit/mL injection 2019 at Unknown time  Yes Yes   Si Units by SubCUTAneous route nightly. insulin lispro (HUMALOG) 100 unit/mL injection 2019 at Unknown time  Yes Yes   Si Units by SubCUTAneous route Before breakfast, lunch, and dinner. Plus sliding scale if needed  Indications: type 1 diabetes mellitus   magnesium oxide (MAG-OX) 400 mg tablet 2019 at Unknown time  Yes Yes   Sig: Take 400 mg by mouth daily. metoprolol succinate (TOPROL-XL) 50 mg XL tablet 2019 at Unknown time  No Yes   Sig: Take 1 Tab by mouth daily. Indications: high blood pressure   nitroglycerin (NITROSTAT) 0.4 mg SL tablet   Yes Yes   Si.4 mg by SubLINGual route every five (5) minutes as needed for Chest Pain. Up to 3 doses. pantoprazole (PROTONIX) 40 mg tablet   Yes Yes   Sig: Take 40 mg by mouth daily as needed. Indications: gastroesophageal reflux disease   therapeutic multivitamin (THERAGRAN) tablet 2019 at Unknown time  No Yes   Sig: Take 1 Tab by mouth daily. Indications: Treatment To Prevent Vitamin Deficiency   thiamine HCL (B-1) 100 mg tablet 2019 at Unknown time  Yes Yes   Sig: Take 100 mg by mouth daily.       Facility-Administered Medications: None          Labs:   Recent Results (from the past 24 hour(s))   EKG, 12 LEAD, INITIAL    Collection Time: 19  3:31 PM   Result Value Ref Range Ventricular Rate 81 BPM    Atrial Rate 81 BPM    P-R Interval 148 ms    QRS Duration 96 ms    Q-T Interval 410 ms    QTC Calculation (Bezet) 476 ms    Calculated P Axis 30 degrees    Calculated R Axis 102 degrees    Calculated T Axis 106 degrees    Diagnosis       Normal sinus rhythm  Low voltage QRS  When compared with ECG of 24-JUL-2019 06:47,  QRS voltage has decreased  Criteria for Anterior infarct are no longer present  T wave inversion less evident in Anterior leads     SAMPLES BEING HELD    Collection Time: 08/05/19  3:42 PM   Result Value Ref Range    SAMPLES BEING HELD 1RED,1BLU     COMMENT        Add-on orders for these samples will be processed based on acceptable specimen integrity and analyte stability, which may vary by analyte. CBC WITH AUTOMATED DIFF    Collection Time: 08/05/19  3:42 PM   Result Value Ref Range    WBC 7.8 3.6 - 11.0 K/uL    RBC 4.28 3.80 - 5.20 M/uL    HGB 11.4 (L) 11.5 - 16.0 g/dL    HCT 36.5 35.0 - 47.0 %    MCV 85.3 80.0 - 99.0 FL    MCH 26.6 26.0 - 34.0 PG    MCHC 31.2 30.0 - 36.5 g/dL    RDW 16.6 (H) 11.5 - 14.5 %    PLATELET 451 167 - 122 K/uL    MPV 10.9 8.9 - 12.9 FL    NRBC 0.0 0  WBC    ABSOLUTE NRBC 0.00 0.00 - 0.01 K/uL    NEUTROPHILS 58 32 - 75 %    LYMPHOCYTES 28 12 - 49 %    MONOCYTES 8 5 - 13 %    EOSINOPHILS 4 0 - 7 %    BASOPHILS 1 0 - 1 %    IMMATURE GRANULOCYTES 1 (H) 0.0 - 0.5 %    ABS. NEUTROPHILS 4.6 1.8 - 8.0 K/UL    ABS. LYMPHOCYTES 2.2 0.8 - 3.5 K/UL    ABS. MONOCYTES 0.6 0.0 - 1.0 K/UL    ABS. EOSINOPHILS 0.3 0.0 - 0.4 K/UL    ABS. BASOPHILS 0.0 0.0 - 0.1 K/UL    ABS. IMM.  GRANS. 0.1 (H) 0.00 - 0.04 K/UL    DF AUTOMATED     METABOLIC PANEL, COMPREHENSIVE    Collection Time: 08/05/19  3:42 PM   Result Value Ref Range    Sodium 136 136 - 145 mmol/L    Potassium 4.2 3.5 - 5.1 mmol/L    Chloride 101 97 - 108 mmol/L    CO2 30 21 - 32 mmol/L    Anion gap 5 5 - 15 mmol/L    Glucose 209 (H) 65 - 100 mg/dL    BUN 16 6 - 20 MG/DL    Creatinine 0.93 0.55 - 1.02 MG/DL    BUN/Creatinine ratio 17 12 - 20      GFR est AA >60 >60 ml/min/1.73m2    GFR est non-AA >60 >60 ml/min/1.73m2    Calcium 8.6 8.5 - 10.1 MG/DL    Bilirubin, total 0.2 0.2 - 1.0 MG/DL    ALT (SGPT) 19 12 - 78 U/L    AST (SGOT) 28 15 - 37 U/L    Alk.  phosphatase 95 45 - 117 U/L    Protein, total 5.8 (L) 6.4 - 8.2 g/dL    Albumin 2.6 (L) 3.5 - 5.0 g/dL    Globulin 3.2 2.0 - 4.0 g/dL    A-G Ratio 0.8 (L) 1.1 - 2.2     NT-PRO BNP    Collection Time: 08/05/19  3:42 PM   Result Value Ref Range    NT pro-BNP 3,051 (H) <125 PG/ML   POC TROPONIN-I    Collection Time: 08/05/19  4:11 PM   Result Value Ref Range    Troponin-I (POC) 1.14 (H) 0.00 - 0.08 ng/mL   EKG, 12 LEAD, INITIAL    Collection Time: 08/05/19  4:13 PM   Result Value Ref Range    Ventricular Rate 75 BPM    Atrial Rate 75 BPM    P-R Interval 156 ms    QRS Duration 100 ms    Q-T Interval 430 ms    QTC Calculation (Bezet) 480 ms    Calculated P Axis 30 degrees    Calculated R Axis 109 degrees    Calculated T Axis 103 degrees    Diagnosis       Normal sinus rhythm  Inferior infarct , age undetermined  Anterior infarct , age undetermined  When compared with ECG of 05-AUG-2019 15:31,  MANUAL COMPARISON REQUIRED, DATA IS UNCONFIRMED     EKG, 12 LEAD, INITIAL    Collection Time: 08/05/19  5:25 PM   Result Value Ref Range    Ventricular Rate 74 BPM    Atrial Rate 74 BPM    P-R Interval 136 ms    QRS Duration 88 ms    Q-T Interval 422 ms    QTC Calculation (Bezet) 468 ms    Calculated P Axis 34 degrees    Calculated R Axis 85 degrees    Calculated T Axis 99 degrees    Diagnosis       Normal sinus rhythm  Inferior infarct , age undetermined  Anterolateral infarct , age undetermined  When compared with ECG of 05-AUG-2019 16:13,  MANUAL COMPARISON REQUIRED, DATA IS UNCONFIRMED     TROPONIN I    Collection Time: 08/05/19  7:04 PM   Result Value Ref Range    Troponin-I, Qt. 8.97 (H) <0.05 ng/mL        Review of Symptoms:  Respiratory: No  orthopnea, PND, cough, hemoptysis, URI. Cardiovascular: No  palpitations, sweating, lightheadedness, dizziness, syncope, presyncope. Reports progressive lower extremity swelling. Otherwise no other pertinent positive or negative symptoms on ROS. Subjective:   No intake or output data in the 24 hours ending 08/05/19 2040   Patient Vitals for the past 24 hrs:   Temp Pulse Resp BP SpO2   08/05/19 1930  77 12 126/62 96 %   08/05/19 1900  76 12 127/65 97 %   08/05/19 1830  74 12 128/68 98 %   08/05/19 1800  78 22 114/69 99 %   08/05/19 1730  78 16 127/70 99 %   08/05/19 1700  81 14 121/68 99 %   08/05/19 1630  77 15 104/59 96 %   08/05/19 1615  78 14 105/72 97 %   08/05/19 1558 98 °F (36.7 °C) 80 15 107/62 96 %   08/05/19 1517  78 15  100 %      General:    Alert, cooperative, no distress. Psychiatric:    Normal Mood and affect    Eye/ENT:      Pupils equal, No asymmetry, Conjunctival pink. Able to hear voice at normal amplitude   Lungs:      Visibly symmetric chest expansion, No palpable tenderness. Clear to auscultation bilaterally. Heart[de-identified]    Regular rate and rhythm, S1, S2 normal, no murmur, click, rub or gallop. No JVD, Normal palpable peripheral pulses. No cyanosis. Midline sternotomy scar   Abdomen:     Soft, non-tender. Bowel sounds normal. No masses,  No      organomegaly. Extremities:   Extremities normal, atraumatic; bilateral lower extremity edema. Neurologic:   CN II-XII grossly intact.  No gross focal deficits         Chaparrita Lindsey MD  8/5/2019   8:40 PM     [facility] Office:   [address] [facility] Office:  [address] Herman Crespo [address] [facility]  [address]     [address] [place] [address]  P: [phone]    P: [phone]  F: [phone]    F: [phone]

## 2019-08-06 NOTE — PROGRESS NOTES
Reason for Readmission:     Chest pain         RRAT Score and Risk Level:     6     6/7/09 to 6/8/19 for SOB    7/24/26 to 7/26/19 for insluing Overdose then tx to psyc from 7/26/19 to 7/30/19 for intended self harm     Level of Readmission:    1      Care Conference scheduled:   no       Resources/supports as identified by patient/family:   Mother and daughter       Top Challenges facing patient (as identified by patient/family and CM): Finances/Medication cost?     [employer]     family  Support system or lack thereof? Mother and daughter  Living arrangements? Lives in own home       Self-care/ADLs/Cognition? Independent with self care and driving - works at [employer] as a           Current [facility] [address]:  None - referral made to pastoral care to bring patient info and patient may decide to complete           Plan for utilizing home health:  None indicated              Transition of Care Plan:    Based on readmission, the patient's previous Plan of Care   has been evaluated and/or modified. The current Transition of Care Plan is:             Home with PCP or cardiology follow-up      Patient is connected with [facility] - has info for [facility] and other resources and will follow-up and declined further counseling/psyc resources.  Works as a  at [employer] and Dr. Gissel Beltran is cardiologist. KRISS Alexander

## 2019-08-06 NOTE — PROGRESS NOTES
TRANSFER - IN REPORT:    Verbal report received from Philippe Son on Benita Middleton  being received from procedure room for routine progression of care. Report consisted of patients Situation, Background, Assessment and Recommendations(SBAR). Information from the following report(s) Procedure Summary, Intake/Output, MAR and Recent Results was reviewed with the receiving clinician. Opportunity for questions and clarification was provided. Assessment completed upon patients arrival to [address] [place] [address] and care assumed. Cardiac Cath Lab Recovery Arrival Note:    Benita Middleton arrived to [facility] recovery area. Patient procedure= left heart cath. Patient on cardiac monitor, non-invasive blood pressure, SPO2 monitor. On room air . IV  of normal saline on pump. Patient status doing well without problems. Patient is A&Ox 4. Patient reports no pain. PROCEDURE SITE CHECK:    Procedure site:without any bleeding and redness, no pain/discomfort reported at procedure site. No change in patient status. Continue to monitor patient and status.

## 2019-08-06 NOTE — ACP (ADVANCE CARE PLANNING)
requested for Advance Directive (AMD) consult. Patient out of room for Cardiac Catheterization at the time of this visit. Visited by Rev. Collette Rodriguez MDiv, [facility], [address] [facility]   paging service: [phone]

## 2019-08-06 NOTE — PROGRESS NOTES
Hospitalist Progress Note  Solange Wu MD  Answering service: [phone] from in house phone      Date of Service:  2019  NAME:  Tita Rajput  :  1975  MRN:  [id number]      Pt in procedure most of day, for f/u in am         Hospital Problems  Date Reviewed: 2019          Codes Class Noted POA    Chest pain ICD-10-CM: R07.9  ICD-9-CM: 786.50  2019 Unknown                Review of Systems:        Vital Signs:    Last 24hrs VS reviewed since prior progress note. Most recent are:  Visit Vitals  /76   Pulse 65   Temp 97.8 °F (36.6 °C)   Resp 14   Ht 5' 3\" (1.6 m)   Wt 77.2 kg (170 lb 1.6 oz)   SpO2 97%   Breastfeeding? No   BMI 30.13 kg/m²         Intake/Output Summary (Last 24 hours) at 2019 1627  Last data filed at 2019 1222  Gross per 24 hour   Intake 638.81 ml   Output 3145 ml   Net -2506.19 ml        Physical Examination:                Data Review:        Labs:     Recent Labs     19  0203 19  1542   WBC 7.5 7.8   HGB 12.4 11.4*   HCT 40.0 36.5    316     Recent Labs     19  0203 19  1542    136   K 3.2* 4.2    101   CO2 34* 30   BUN 14 16   CREA 0.89 0.93   * 209*   CA 8.5 8.6   MG 2.2  --      Recent Labs     19  1542   SGOT 28   ALT 19   AP 95   TBILI 0.2   TP 5.8*   ALB 2.6*   GLOB 3.2     Recent Labs     19  0436 19  2052   APTT 39.3* 35.3*      No results for input(s): FE, TIBC, PSAT, FERR in the last 72 hours. No results found for: FOL, RBCF   No results for input(s): PH, PCO2, PO2 in the last 72 hours.   Recent Labs     19  0203 19  1904   TROIQ 8.34* 8.97*     Lab Results   Component Value Date/Time    Cholesterol, total 110 2019 03:44 AM    HDL Cholesterol 49 2019 03:44 AM    LDL, calculated 46.6 2019 03:44 AM    Triglyceride 72 2019 03:44 AM    CHOL/HDL Ratio 2.2 2019 03:44 AM Lab Results   Component Value Date/Time    Glucose (POC) 191 (H) 08/06/2019 12:04 PM    Glucose (POC) 205 (H) 08/06/2019 08:36 AM    Glucose (POC) 223 (H) 08/06/2019 02:29 AM    Glucose (POC) 153 (H) 08/05/2019 09:32 PM    Glucose (POC) 224 (H) 07/30/2019 11:31 AM     Lab Results   Component Value Date/Time    Color YELLOW/STRAW 08/05/2019 08:52 PM    Appearance CLEAR 08/05/2019 08:52 PM    Specific gravity <1.005 08/05/2019 08:52 PM    pH (UA) 7.5 08/05/2019 08:52 PM    Protein NEGATIVE  08/05/2019 08:52 PM    Glucose NEGATIVE  08/05/2019 08:52 PM    Ketone NEGATIVE  08/05/2019 08:52 PM    Bilirubin NEGATIVE  08/05/2019 08:52 PM    Urobilinogen 0.2 08/05/2019 08:52 PM    Nitrites POSITIVE (A) 08/05/2019 08:52 PM    Leukocyte Esterase MODERATE (A) 08/05/2019 08:52 PM    Epithelial cells FEW 08/05/2019 08:52 PM    Bacteria NEGATIVE  08/05/2019 08:52 PM    WBC 20-50 08/05/2019 08:52 PM    RBC 0-5 08/05/2019 08:52 PM         Medications Reviewed:     Current Facility-Administered Medications   Medication Dose Route Frequency    [START ON 8/7/2019] fondaparinux (ARIXTRA) injection 2.5 mg  2.5 mg SubCUTAneous DAILY    atorvastatin (LIPITOR) tablet 80 mg  80 mg Oral DAILY    nitroglycerin (Tridil) 200 mcg/ml infusion  5 mcg/min IntraVENous TITRATE    aspirin chewable tablet 162 mg  162 mg Oral DAILY    divalproex ER (DEPAKOTE ER) 24 hour tablet 1,500 mg  1,500 mg Oral QHS    doxepin (SINEquan) capsule 50 mg  50 mg Oral QHS    DULoxetine (CYMBALTA) capsule 60 mg  60 mg Oral DAILY    ferrous sulfate tablet 325 mg  325 mg Oral TID WITH MEALS    gabapentin (NEURONTIN) capsule 300 mg  300 mg Oral TID    insulin glargine (LANTUS) injection 28 Units  28 Units SubCUTAneous QHS    metoprolol succinate (TOPROL-XL) XL tablet 50 mg  50 mg Oral DAILY    pantoprazole (PROTONIX) tablet 40 mg  40 mg Oral ACB    therapeutic multivitamin (THERAGRAN) tablet 1 Tab  1 Tab Oral DAILY    sodium chloride (NS) flush 5-40 mL 5-40 mL IntraVENous Q8H    sodium chloride (NS) flush 5-40 mL  5-40 mL IntraVENous PRN    morphine injection 2 mg  2 mg IntraVENous Q4H PRN    furosemide (LASIX) injection 40 mg  40 mg IntraVENous BID    insulin lispro (HUMALOG) injection   SubCUTAneous AC&HS    glucose chewable tablet 16 g  4 Tab Oral PRN    dextrose (D50W) injection syrg 12.5-25 g  12.5-25 g IntraVENous PRN    glucagon (GLUCAGEN) injection 1 mg  1 mg IntraMUSCular PRN    HYDROcodone-acetaminophen (NORCO)  mg tablet 1 Tab  1 Tab Oral Q4H PRN     ______________________________________________________________________  EXPECTED LENGTH OF STAY: 4d 4h  ACTUAL LENGTH OF STAY:          1                 Marcy Mckeon MD

## 2019-08-06 NOTE — CARDIO/PULMONARY
Cardiac Rehab: Per EMR, patient is a current smoker.  Smoking Cessation Program information placed on the AVS.   
Michaela Oro RN

## 2019-08-06 NOTE — NURSE NAVIGATOR
Chart reviewed by Heart Failure Nurse Navigator. Heart Failure database completed. EF:  Prior ef 26/30 (6/2019); repeat echo pending    ACEi/ARB/ARNi: need documentation as to specific contraindication    BB: toprol xl 50 mg daily    Aldosterone Antagonist: need documentation as to specific contraindication    Obstructive Sleep Apnea Screening:   STOP-BANG score:   Referred to Sleep Medicine:     CRT **. NYHA Functional Class documentation requested      Heart Failure Teach Back in Patient Education. Heart Failure Avoiding Triggers on Discharge Instructions. Cardiologist: Dr. Sorto Serum (CAV)      Post discharge follow up phone call to be made within 48-72 hours of discharge.

## 2019-08-06 NOTE — PROCEDURES
BRIEF PROCEDURE NOTE    Date of Procedure: 8/6/2019   Preoperative Diagnosis: nstemi  Postoperative Diagnosis: same    Procedure: Left heart cath, coronary angiography  Interventional Cardiologist: Dinorah Noyola DO  Assistant: None  Anesthesia: local + IV moderate sedation   Estimated Blood Loss: Minimal    Access: right common femoral artery, 6F  Catheters:  Left coronary: JL 4, 5F  Right coronary: JR4, 5F  LIMA: JR4, 5F    Findings:   L Main: large caliber, 80% distal left main stenosis involving LAD, Ramus and ostial Lcx trifurcation  LAD: moderate caliber proximal, distal vessel small and atretic. 90% ostial LAD stenosis  LCx: small caliber, supplies small marginal branch  Ramus; small caliber vessel, previously placed ramus stent with severe ostial ISR  RCA: small caliber vessel, distal RCA after 2nd acute marginal bifurcation is atretic through PL branch and PDA  LIMA: distal vessel atretic, very faint filling of distal LAD    LVEDP:  18-20 mmhg        Specimens Removed: None    Implants: None    Closure Device: manual compression    See full cath note. Complications: none    Findings:  1. Severe distal left main trifurcation disease involving ostium of lad and ISR of ostial ramus  2.   Mildly elevated lvedp      DO Myles Hull DO  [facility] [address], [address]                                       Office [phone],[facility] [phone]

## 2019-08-06 NOTE — PROGRESS NOTES
Farida Aguilera DO  [facility] [address] [place] [address], [address] [place] [address], [address] [place] [address] [address]                                       Office [phone],[facility] [phone]    Pre- Cardiac Catheterization Procedure Note    Procedure:  [facility]  Preprocedure diagnosis:  nstemi  Preprocedure testin19   ECHO ADULT COMPLETE 2019    Narrative · Left Ventricle: Normal wall thickness. Mildly dilated left ventricle. Moderate systolic dysfunction. Estimated left ventricular ejection   fraction is 26 - 30%. Left ventricular global hypokinesis. · Left Atrium: Mildly dilated left atrium. · Right Ventricle: Borderline low systolic function. · Mitral Valve: Mild to moderate mitral valve regurgitation. Signed by: Jeremy Avila MD         History of Presenting Illness:  36 yo female with aggressive CAD s/p CABG at [facility] admitted with nstemi. Chest pain and indigestion resolved this am.      Review of System:  Constitutional: neg  Resp: neg  Card:  Resolved angina  Neuro: neg      No current facility-administered medications on file prior to encounter. Current Outpatient Medications on File Prior to Encounter   Medication Sig Dispense Refill    insulin glargine (LANTUS) 100 unit/mL injection 28 Units by SubCUTAneous route nightly.  ascorbic acid, vitamin C, (VITAMIN C) 500 mg tablet Take 500 mg by mouth three (3) times daily.  thiamine HCL (B-1) 100 mg tablet Take 100 mg by mouth daily.  magnesium oxide (MAG-OX) 400 mg tablet Take 400 mg by mouth daily.  calcium carbonate (OS-GUANAKO) 500 mg calcium (1,250 mg) tablet Take 1 Tab by mouth daily.  cholecalciferol, vitamin D3, 2,000 unit tab Take 1 Tab by mouth daily.  HYDROcodone-acetaminophen (NORCO)  mg tablet Take 1 Tab by mouth every six (6) hours as needed for Pain.  aspirin delayed-release 81 mg tablet Take 2 Tabs by mouth daily.  Indications: Myocardial Reinfarction Prevention 30 Tab 0    divalproex ER (DEPAKOTE ER) 500 mg ER tablet Take 3 Tabs by mouth nightly. Indications: mood 90 Tab 0    metoprolol succinate (TOPROL-XL) 50 mg XL tablet Take 1 Tab by mouth daily. Indications: high blood pressure 30 Tab 0    apixaban (ELIQUIS) 5 mg tablet Take 1 Tab by mouth two (2) times a day. Indications: Deep Vein Thrombosis Prevention 30 Tab 0    doxepin (SINEQUAN) 50 mg capsule Take 1 Cap by mouth nightly. Indications: sleep 30 Cap 0    ferrous sulfate 325 mg (65 mg iron) tablet Take 1 Tab by mouth three (3) times daily (with meals). Indications: anemia from inadequate iron 90 Tab 0    therapeutic multivitamin (THERAGRAN) tablet Take 1 Tab by mouth daily. Indications: Treatment To Prevent Vitamin Deficiency 30 Tab 0    atorvastatin (LIPITOR) 80 mg tablet Take 80 mg by mouth daily.  nitroglycerin (NITROSTAT) 0.4 mg SL tablet 0.4 mg by SubLINGual route every five (5) minutes as needed for Chest Pain. Up to 3 doses.  furosemide (LASIX) 80 mg tablet Take 80 mg by mouth daily.  pantoprazole (PROTONIX) 40 mg tablet Take 40 mg by mouth daily as needed. Indications: gastroesophageal reflux disease      gabapentin (NEURONTIN) 300 mg capsule Take 300 mg by mouth three (3) times daily.  DULoxetine (CYMBALTA) 60 mg capsule Take 1 Cap by mouth daily. 90 Cap 1    insulin lispro (HUMALOG) 100 unit/mL injection 5 Units by SubCUTAneous route Before breakfast, lunch, and dinner. Plus sliding scale if needed  Indications: type 1 diabetes mellitus         Allergies   Allergen Reactions    Voltaren [Diclofenac Sodium] Anaphylaxis    Dramamine Ii [Meclizine] Itching     Auditory and Visual hallucinations    Imipramine Hives    Levaquin [Levofloxacin] Itching and Nausea and Vomiting       Physican Exam:    Blood pressure (!) 86/60, pulse 74, temperature 97.8 °F (36.6 °C), resp.  rate 16, height 5' 3\" (1.6 m), weight 170 lb 1.6 oz (77.2 kg), SpO2 97 %, not currently breastfeeding. Constitutional:  well-developed and well-nourished. No distress. Pulmonary/Chest: Effort normal and breath sounds normal. No respiratory distress, wheezes or rales. Cardiovascular: Normal rate, regular rhythm, S1 S2 .   Neurological:  Alert and oriented. Coordination seems grossly normal.   Psychiatric:  Good insight into underlying medical condition. ASA: 3  Mallampati: 2    Plan:    Risk and benefit of cardiac catheterization/PCI was described in detail to patient.  (risk of vascular access complication with potential surgical intervention for management, stroke, myocardial infarction, emergent open heart surgery and death)    Informed consent was obtained. Patient wishes to proceed with invasive study with potential percutaneous treatment.     Left radial

## 2019-08-06 NOTE — PROGRESS NOTES
Problem: Heart Failure: Day 1  Goal: Activity/Safety  Outcome: Resolved/Met  Goal: Diagnostic Test/Procedures  Outcome: Progressing Towards Goal  Goal: Nutrition/Diet  Outcome: Resolved/Met  Goal: Medications  Outcome: Progressing Towards Goal  Goal: Respiratory  Outcome: Resolved/Met  Goal: *Oxygen saturation within defined limits  Outcome: Resolved/Met  Goal: *Hemodynamically stable  Outcome: Resolved/Met  Goal: *Optimal pain control at patient's stated goal  Outcome: Progressing Towards Goal

## 2019-08-06 NOTE — PROGRESS NOTES
Cardiology Consult/Progress Note           2019  3:47 PM   Chest pain [R07.9]    Subjective:   Feels better this AM. NO chest pain after 8 pm.     Cath performed. : Occluded grafts. Native LAD< Ramus with severe disease and native RCA . Investigations:  EC serial ECGs were performed which showed poor progression of R waves in anterior precordial leads. Sinus rhythm, possible inferior myocardial infarction with Q waves inferior leads and downsloping ST segments in lateral limb leads. Review of records: Records from [facility] were reviewed and showed recent coronary bypass grafting with LIMA to LAD, vein graft to ramus. EF at the time of hospitalization post CABG-40%. Assessment and PLAN   1. History of multivessel coronary artery disease status post recent coronary artery bypass grafting with LIMA to LAD, vein graft to ramus now presenting with ongoing chest pain now with occluded grafts and severe native multivessel disease. 2.  Hyperlipidemia  3. Ischemic cardiomyopathy secondary to 1.  4.  Acute on chronic decompensated congestive heart failure with reduced LV systolic function(heart failure reduced EF/systolic heart failure)  5. Bipolar disorder with depression and repeated suicidal ideations. She will need revascularization. She is tentative if she wants to return back to [facility] for care but due to financial coverage of her insurance plan, she is contemplating transfer to [facility] vs discharge and then readmission. At this point of time she also demonstrates some features of acute on chronic congestive heart failure with lower extremity edema. Hence we will recommend IV diuretics as well. Home medication should be continued patient would be closely monitored in CCU with telemetry. Continue diuretics.     [x]    High complexity decision making was performed  []    Patient is at high-risk of decompensation with multiple organ involvement    ----------------------------------------------------------------------------------------------------------------------------  Reason for consult: Chest pain    Irina Daily is a 37 y.o. female admitted for Chest pain [R07.9]. Mrs. Alfonso Pritchard is known to have multivessel coronary artery disease. She was also known to have severe LV systolic dysfunction in fact that was admitted to [facility] with cardiogenic pulmonary edema and acute respiratory failure 2 months ago. At that time she was transferred to [facility] for further care. She has been previously seen at [facility] for coronary artery disease and peripheral vascular disease. She has had previous PCI to her circumflex and left anterior descending vessels with failed PCI to right coronary artery which was small and chronically occluded. After her recent transferred to [facility] she was identified to have significant multivessel disease and underwent a coronary artery bypass grafting with LIMA to LAD and vein graft to circumflex/ramus. RCA was not bypassable being a small vessel. Subsequently her EF improved from 20% to about 40%. Since her discharge from the hospital she has not noticed any further chest pain until yesterday. Over the past 12 to 24 hours she has been having intermittent burning discomfort in the chest.  This is noted both at exertion and at rest and associated with some shortness of breath. She also reports significant pedal edema bilaterally which has been particularly prominent over the past 1 week. She is compliant with her medications. She also history of peripheral vascular disease with prior percutaneous interventions on her femoral arteries. She is a brittle diabetic. She has history of bipolar disorder and's depression with recent suicidal attempt about 3 weeks ago. At the time of evaluation she was not reporting any chest pain.   Initial ECG performed in the ED was concerning for possible ST elevation. I reviewed that ECG and performed serial ECGs in the ER with no evolutionary changes consistent with true ST elevation MI. However she had ST depression which could suggest potential ischemia with ongoing non-ST elevation myocardial infarction. .      Investigation  Telemetry:  ECG: Subtle upsloping less than half millimeter ST elevation in inferior leads not typical for acute MI. ST depressions in lateral limb leads. Poor progression of R wave in anterior precordial leads.   Echocardiogram: Post CABG June 2019 EF 40%  Social History     Socioeconomic History    Marital status:      Spouse name: Not on file    Number of children: Not on file    Years of education: Not on file    Highest education level: Not on file   Tobacco Use    Smoking status: Current Every Day Smoker     Packs/day: 0.25    Smokeless tobacco: Never Used   Substance and Sexual Activity    Alcohol use: Yes     Comment: occasionally    Drug use: No    Sexual activity: Yes      Patient Active Problem List    Diagnosis Date Noted    Chest pain 08/05/2019    Bipolar 1 disorder ([facility] 07/27/2019    Insulin overdose 07/24/2019    NSTEMI (non-ST elevated myocardial infarction) ([facility] 06/08/2019    Acute exacerbation of CHF (congestive heart failure) ([facility] 06/08/2019    SOB (shortness of breath) 06/07/2019      David Zhu MD  Past Medical History:   Diagnosis Date    Arthritis     Bipolar 1 disorder ([facility]     Diabetes ([facility]     Gastrointestinal disorder     gastroparesis    Gastroparesis     GERD (gastroesophageal reflux disease)     Heart disease     Hypertension     Psychiatric disorder     Bipolar      Past Surgical History:   Procedure Laterality Date    HX ANKLE FRACTURE TX      HX CAROTID STENT      HX GYN      BTL    HX ORTHOPAEDIC      toe     Allergies   Allergen Reactions    Voltaren [Diclofenac Sodium] Anaphylaxis    Dramamine Ii [Meclizine] Itching Auditory and Visual hallucinations    Imipramine Hives    Levaquin [Levofloxacin] Itching and Nausea and Vomiting      Family History   Problem Relation Age of Onset    Thyroid Disease Mother     Hypertension Father     Heart Disease Father     Diabetes Sister     Heart Disease Sister     Hypertension Sister     Thyroid Disease Brother       Current Facility-Administered Medications   Medication Dose Route Frequency    enoxaparin (LOVENOX) injection 80 mg  80 mg SubCUTAneous Q12H    atorvastatin (LIPITOR) tablet 80 mg  80 mg Oral DAILY    nitroglycerin (Tridil) 200 mcg/ml infusion  5 mcg/min IntraVENous TITRATE    aspirin chewable tablet 162 mg  162 mg Oral DAILY    divalproex ER (DEPAKOTE ER) 24 hour tablet 1,500 mg  1,500 mg Oral QHS    doxepin (SINEquan) capsule 50 mg  50 mg Oral QHS    DULoxetine (CYMBALTA) capsule 60 mg  60 mg Oral DAILY    ferrous sulfate tablet 325 mg  325 mg Oral TID WITH MEALS    gabapentin (NEURONTIN) capsule 300 mg  300 mg Oral TID    insulin glargine (LANTUS) injection 28 Units  28 Units SubCUTAneous QHS    metoprolol succinate (TOPROL-XL) XL tablet 50 mg  50 mg Oral DAILY    pantoprazole (PROTONIX) tablet 40 mg  40 mg Oral ACB    therapeutic multivitamin (THERAGRAN) tablet 1 Tab  1 Tab Oral DAILY    sodium chloride (NS) flush 5-40 mL  5-40 mL IntraVENous Q8H    sodium chloride (NS) flush 5-40 mL  5-40 mL IntraVENous PRN    morphine injection 2 mg  2 mg IntraVENous Q4H PRN    furosemide (LASIX) injection 40 mg  40 mg IntraVENous BID    insulin lispro (HUMALOG) injection   SubCUTAneous AC&HS    glucose chewable tablet 16 g  4 Tab Oral PRN    dextrose (D50W) injection syrg 12.5-25 g  12.5-25 g IntraVENous PRN    glucagon (GLUCAGEN) injection 1 mg  1 mg IntraMUSCular PRN    HYDROcodone-acetaminophen (NORCO)  mg tablet 1 Tab  1 Tab Oral Q4H PRN      Prior to Admission Medications   Prescriptions Last Dose Informant Patient Reported? Taking?    DULoxetine (CYMBALTA) 60 mg capsule 2019 at Unknown time  No Yes   Sig: Take 1 Cap by mouth daily. apixaban (ELIQUIS) 5 mg tablet 2019 at am  No Yes   Sig: Take 1 Tab by mouth two (2) times a day. Indications: Deep Vein Thrombosis Prevention   ascorbic acid, vitamin C, (VITAMIN C) 500 mg tablet 2019 at Unknown time  Yes Yes   Sig: Take 500 mg by mouth three (3) times daily. aspirin delayed-release 81 mg tablet 2019 at Unknown time  No Yes   Sig: Take 2 Tabs by mouth daily. Indications: Myocardial Reinfarction Prevention   atorvastatin (LIPITOR) 80 mg tablet 2019 at Unknown time  Yes Yes   Sig: Take 80 mg by mouth daily. calcium carbonate (OS-GUANAKO) 500 mg calcium (1,250 mg) tablet 2019 at Unknown time  Yes Yes   Sig: Take 1 Tab by mouth daily. cholecalciferol, vitamin D3, 2,000 unit tab 2019 at Unknown time  Yes Yes   Sig: Take 1 Tab by mouth daily. divalproex ER (DEPAKOTE ER) 500 mg ER tablet 2019 at Unknown time  No Yes   Sig: Take 3 Tabs by mouth nightly. Indications: mood   doxepin (SINEQUAN) 50 mg capsule 2019 at Unknown time  No Yes   Sig: Take 1 Cap by mouth nightly. Indications: sleep   ferrous sulfate 325 mg (65 mg iron) tablet 2019 at Unknown time  No Yes   Sig: Take 1 Tab by mouth three (3) times daily (with meals). Indications: anemia from inadequate iron   furosemide (LASIX) 80 mg tablet 2019 at Unknown time  Yes Yes   Sig: Take 80 mg by mouth daily. gabapentin (NEURONTIN) 300 mg capsule 2019 at Unknown time  Yes Yes   Sig: Take 300 mg by mouth three (3) times daily. insulin glargine (LANTUS) 100 unit/mL injection 2019 at Unknown time  Yes Yes   Si Units by SubCUTAneous route nightly. insulin lispro (HUMALOG) 100 unit/mL injection 2019 at Unknown time  Yes Yes   Si Units by SubCUTAneous route Before breakfast, lunch, and dinner.  Plus sliding scale if needed  Indications: type 1 diabetes mellitus   magnesium oxide (MAG-OX) 400 mg tablet 2019 at Unknown time  Yes Yes   Sig: Take 400 mg by mouth daily. metoprolol succinate (TOPROL-XL) 50 mg XL tablet 2019 at Unknown time  No Yes   Sig: Take 1 Tab by mouth daily. Indications: high blood pressure   nitroglycerin (NITROSTAT) 0.4 mg SL tablet   Yes Yes   Si.4 mg by SubLINGual route every five (5) minutes as needed for Chest Pain. Up to 3 doses. pantoprazole (PROTONIX) 40 mg tablet   Yes Yes   Sig: Take 40 mg by mouth daily as needed. Indications: gastroesophageal reflux disease   therapeutic multivitamin (THERAGRAN) tablet 2019 at Unknown time  No Yes   Sig: Take 1 Tab by mouth daily. Indications: Treatment To Prevent Vitamin Deficiency   thiamine HCL (B-1) 100 mg tablet 2019 at Unknown time  Yes Yes   Sig: Take 100 mg by mouth daily.       Facility-Administered Medications: None          Labs:   Recent Results (from the past 24 hour(s))   TROPONIN I    Collection Time: 19  7:04 PM   Result Value Ref Range    Troponin-I, Qt. 8.97 (H) <0.05 ng/mL   PTT    Collection Time: 19  8:52 PM   Result Value Ref Range    aPTT 35.3 (H) 22.1 - 32.0 sec    aPTT, therapeutic range     58.0 - 77.0 SECS   URINALYSIS W/ REFLEX CULTURE    Collection Time: 19  8:52 PM   Result Value Ref Range    Color YELLOW/STRAW      Appearance CLEAR CLEAR      Specific gravity <1.005 1.003 - 1.030    pH (UA) 7.5 5.0 - 8.0      Protein NEGATIVE  NEG mg/dL    Glucose NEGATIVE  NEG mg/dL    Ketone NEGATIVE  NEG mg/dL    Bilirubin NEGATIVE  NEG      Blood TRACE (A) NEG      Urobilinogen 0.2 0.2 - 1.0 EU/dL    Nitrites POSITIVE (A) NEG      Leukocyte Esterase MODERATE (A) NEG      WBC 20-50 0 - 4 /hpf    RBC 0-5 0 - 5 /hpf    Epithelial cells FEW FEW /lpf    Bacteria NEGATIVE  NEG /hpf    UA:UC IF INDICATED URINE CULTURE ORDERED (A) CNI      Hyaline cast 0-2 0 - 5 /lpf   CULTURE, URINE    Collection Time: 19  8:52 PM   Result Value Ref Range    Special Requests: NO SPECIAL REQUESTS  Reflexed from [id number]        [place] Count >100,000  COLONIES/mL        Culture result: GRAM NEGATIVE RODS (A)     ECHO ADULT COMPLETE    Collection Time: 08/05/19  9:04 PM   Result Value Ref Range    LV E' Lateral Velocity 7.68 cm/s    LV E' Septal Velocity 5.48 cm/s    Tapse 1.50 1.5 - 2.0 cm    Ao Root D 2.79 cm    Aortic Valve Systolic Peak Velocity 176.22 cm/s    Aortic Valve Area by Continuity of Peak Velocity 1.8 cm2    AoV PG 7.3 mmHg    LVIDd 5.08 3.9 - 5.3 cm    LVPWd 1.07 (A) 0.6 - 0.9 cm    LVIDs 4.40 cm    IVSd 0.91 (A) 0.6 - 0.9 cm    LVOT d 1.87 cm    LVOT Peak Velocity 88.36 cm/s    LVOT Peak Gradient 3.1 mmHg    MVA (PHT) 4.3 cm2    MV A Mauro 84.08 cm/s    MV E Mauro 65.80 cm/s    MV E/A 0.78     Left Atrium to Aortic Root Ratio 1.50     RVIDd 1.65 cm    LV Mass .8 (A) 67 - 162 g    LV Mass AL Index 118.9 43 - 95 g/m2    E/E' lateral 8.57     E/E' septal 12.01     E/E' ratio (averaged) 10.29     Mitral Valve E Wave Deceleration Time 174.6 ms    Mitral Valve Pressure Half-time 50.6 ms    Left Atrium Major Axis 4.19 cm    Triscuspid Valve Regurgitation Peak Gradient 21.7 mmHg    Pulmonic Valve Max Velocity 101.91 cm/s    TR Max Velocity 233.05 cm/s    PV peak gradient 4.2 mmHg   GLUCOSE, POC    Collection Time: 08/05/19  9:32 PM   Result Value Ref Range    Glucose (POC) 153 (H) 65 - 100 mg/dL    Performed by MUSA TABARES    TROPONIN I    Collection Time: 08/06/19  2:03 AM   Result Value Ref Range    Troponin-I, Qt. 8.34 (H) <0.05 ng/mL   CBC WITH AUTOMATED DIFF    Collection Time: 08/06/19  2:03 AM   Result Value Ref Range    WBC 7.5 3.6 - 11.0 K/uL    RBC 4.71 3.80 - 5.20 M/uL    HGB 12.4 11.5 - 16.0 g/dL    HCT 40.0 35.0 - 47.0 %    MCV 84.9 80.0 - 99.0 FL    MCH 26.3 26.0 - 34.0 PG    MCHC 31.0 30.0 - 36.5 g/dL    RDW 15.9 (H) 11.5 - 14.5 %    PLATELET 151 302 - 763 K/uL    MPV 10.7 8.9 - 12.9 FL    NRBC 0.0 0  WBC    ABSOLUTE NRBC 0.00 0.00 - 0.01 K/uL    NEUTROPHILS 57 32 - 75 %    LYMPHOCYTES 33 12 - 49 %    MONOCYTES 6 5 - 13 %    EOSINOPHILS 4 0 - 7 %    BASOPHILS 0 0 - 1 %    IMMATURE GRANULOCYTES 0 0.0 - 0.5 %    ABS. NEUTROPHILS 4.2 1.8 - 8.0 K/UL    ABS. LYMPHOCYTES 2.5 0.8 - 3.5 K/UL    ABS. MONOCYTES 0.5 0.0 - 1.0 K/UL    ABS. EOSINOPHILS 0.3 0.0 - 0.4 K/UL    ABS. BASOPHILS 0.0 0.0 - 0.1 K/UL    ABS. IMM.  GRANS. 0.0 0.00 - 0.04 K/UL    DF AUTOMATED     METABOLIC PANEL, BASIC    Collection Time: 08/06/19  2:03 AM   Result Value Ref Range    Sodium 138 136 - 145 mmol/L    Potassium 3.2 (L) 3.5 - 5.1 mmol/L    Chloride 100 97 - 108 mmol/L    CO2 34 (H) 21 - 32 mmol/L    Anion gap 4 (L) 5 - 15 mmol/L    Glucose 199 (H) 65 - 100 mg/dL    BUN 14 6 - 20 MG/DL    Creatinine 0.89 0.55 - 1.02 MG/DL    BUN/Creatinine ratio 16 12 - 20      GFR est AA >60 >60 ml/min/1.73m2    GFR est non-AA >60 >60 ml/min/1.73m2    Calcium 8.5 8.5 - 10.1 MG/DL   MAGNESIUM    Collection Time: 08/06/19  2:03 AM   Result Value Ref Range    Magnesium 2.2 1.6 - 2.4 mg/dL   GLUCOSE, POC    Collection Time: 08/06/19  2:29 AM   Result Value Ref Range    Glucose (POC) 223 (H) 65 - 100 mg/dL    Performed by Sherry Dwyer    PTT    Collection Time: 08/06/19  4:36 AM   Result Value Ref Range    aPTT 39.3 (H) 22.1 - 32.0 sec    aPTT, therapeutic range     58.0 - 77.0 SECS   GLUCOSE, POC    Collection Time: 08/06/19  8:36 AM   Result Value Ref Range    Glucose (POC) 205 (H) 65 - 100 mg/dL    Performed by Rocio Chin    GLUCOSE, POC    Collection Time: 08/06/19 12:04 PM   Result Value Ref Range    Glucose (POC) 191 (H) 65 - 100 mg/dL    Performed by Joie Mantilla    CARDIAC PROCEDURE    Collection Time: 08/06/19  3:16 PM   Result Value Ref Range    EDP 18    GLUCOSE, POC    Collection Time: 08/06/19  4:53 PM   Result Value Ref Range    Glucose (POC) 67 65 - 100 mg/dL    Performed by RUBINA HARPER, POC    Collection Time: 08/06/19  5:18 PM   Result Value Ref Range    Glucose (POC) 82 65 - 100 mg/dL    Performed by Juju Dorman Review of Symptoms:  Respiratory: No  orthopnea, PND, cough, hemoptysis, URI. Cardiovascular: No  palpitations, sweating, lightheadedness, dizziness, syncope, presyncope. Reports progressive lower extremity swelling. Otherwise no other pertinent positive or negative symptoms on ROS. Subjective: Intake/Output Summary (Last 24 hours) at 8/6/2019 1813  Last data filed at 8/6/2019 1222  Gross per 24 hour   Intake 638.81 ml   Output 2925 ml   Net -2286.19 ml      Patient Vitals for the past 24 hrs:   Temp Pulse Resp BP SpO2   08/06/19 1756  72  102/52 100 %   08/06/19 1730  72  104/41 95 %   08/06/19 1700  72  133/74 100 %   08/06/19 1644 98.1 °F (36.7 °C) 69 16 120/86 99 %   08/06/19 1615  65 14 119/76 97 %   08/06/19 1600   14 111/61    08/06/19 1545  65 14 96/49 97 %   08/06/19 1530  67 16 107/58 91 %   08/06/19 1525  66 12 97/60 92 %   08/06/19 1512  63 16 97/60 99 %   08/06/19 1222  74 16 (!) 86/60 97 %   08/06/19 1100  73 15 119/82 97 %   08/06/19 1000  73 (!) 7 96/58 97 %   08/06/19 0900  84 9 119/71 98 %   08/06/19 0800 97.8 °F (36.6 °C) 76 19 124/72 95 %   08/06/19 0700  75 10 98/57 98 %   08/06/19 0600  78 12 93/67 98 %   08/06/19 0500  76 14 110/61 100 %   08/06/19 0400 98.1 °F (36.7 °C) 75 11 98/61 97 %   08/06/19 0300  75 9 99/66 97 %   08/06/19 0200  75 12 103/63 96 %   08/06/19 0100  74 12 94/63 96 %   08/06/19 0000 98.2 °F (36.8 °C) 78 12 95/61 92 %   08/05/19 2300  75 10 102/60 96 %   08/05/19 2200  71 15 137/79 99 %   08/05/19 2104    135/83    08/05/19 2100  71 10 135/64 99 %   08/05/19 2045 98.2 °F (36.8 °C) 73 12 135/83 98 %   08/05/19 1930  77 12 126/62 96 %   08/05/19 1900  76 12 127/65 97 %   08/05/19 1830  74 12 128/68 98 %      General:    Alert, cooperative, no distress. Psychiatric:    Normal Mood and affect    Eye/ENT:      Pupils equal, No asymmetry, Conjunctival pink.  Able to hear voice at normal amplitude   Lungs:      Visibly symmetric chest expansion, No palpable tenderness. Clear to auscultation bilaterally. Heart[de-identified]    Regular rate and rhythm, S1, S2 normal, no murmur, click, rub or gallop. No JVD, Normal palpable peripheral pulses. No cyanosis. Midline sternotomy scar   Abdomen:     Soft, non-tender. Bowel sounds normal. No masses,  No      organomegaly. Extremities:   Extremities normal, atraumatic; bilateral lower extremity edema. Neurologic:   CN II-XII grossly intact.  No gross focal deficits         Elena Eaton MD  8/6/2019   8:40 PM     [facility] Office:   [place] Office:  [address]    [facility] [address] [facility]  [address]     [address] [place] [address] [place]  P: [phone]    P: [phone]  F: [phone]    F: [phone]

## 2019-08-06 NOTE — PROGRESS NOTES
responded to request for Advance Directive (AMD) consult. Patient out of room for Cardiac Catheterization at the time of this visit. Care Manager contacted  saying that the patient would like information concerning AMD.  Left a blank copy of the form and booklet, \"Your Right to Decide\" at the patient's bedside along with information asking the patient to have her nurse contact the pastoral care office should the patient wish to complete AMD or has questions concerning AMD.     Visited by Rev. Karlo Smith MDiv, [facility], [facility] paging service: [phone])

## 2019-08-06 NOTE — ROUTINE PROCESS
TRANSFER - OUT REPORT: 
 
Verbal report given to LEISA Stewart(name) on [address]  being transferred to [facility](unit) for routine progression of care Report consisted of patients Situation, Background, Assessment and  
Recommendations(SBAR). Information from the following report(s) SBAR, Kardex, [facility] and Recent Results was reviewed with the receiving nurse. Lines:  
Peripheral IV 08/05/19 Right Hand (Active) Site Assessment Clean, dry, & intact 8/5/2019  3:55 PM  
Phlebitis Assessment 0 8/5/2019  3:55 PM  
Infiltration Assessment 0 8/5/2019  3:55 PM  
Dressing Status Clean, dry, & intact 8/5/2019  3:55 PM  
Dressing Type Transparent 8/5/2019  3:55 PM  
Hub Color/Line Status Patent; Flushed;Capped;Blue 8/5/2019  3:55 PM  
Action Taken Blood drawn 8/5/2019  3:55 PM  
   
Peripheral IV 08/05/19 Left Forearm (Active) Site Assessment Clean, dry, & intact 8/5/2019  4:01 PM  
Phlebitis Assessment 0 8/5/2019  4:01 PM  
Infiltration Assessment 0 8/5/2019  4:01 PM  
Dressing Status Clean, dry, & intact 8/5/2019  4:01 PM  
Dressing Type Transparent 8/5/2019  4:01 PM  
Hub Color/Line Status Pink 8/5/2019  4:01 PM  
  
 
Opportunity for questions and clarification was provided. Patient transported with: 
 Registered Nurse

## 2019-08-06 NOTE — PROGRESS NOTES
2045: TRANSFER - IN REPORT:    Verbal report received from [facility] (name) on [address]  being received from ER (unit) for routine progression of care      Report consisted of patients Situation, Background, Assessment and   Recommendations(SBAR). Information from the following report(s) SBAR, ED Summary, Intake/Output, MAR, Recent Results and Cardiac Rhythm NSR was reviewed with the receiving nurse. Opportunity for questions and clarification was provided. Assessment completed upon patients arrival to unit and care assumed. 2100: Patient A/o x4 with complaints of incisional (from 6/19 CABG) chest pain. Patient states it's \"not the chest pain heart attack kind. \" Patient states she is still taking frequent Norco at home for this. Will notify Aylin Erickson MD regarding a PRN order for this. Echo tech at bedside to complete echo. 0430:  called to report difficulties in running aPTT. Initially machine was not able to obtain a reading, per tech, but on the second run it resulted >130. Heparin gtt had been paused while obtaining labwork but patient is a very difficult stick with poor access so lab was drawn near PIV with heparin. Lab result canceled and will redraw. 0745: Bedside shift change report given to [facility] (oncoming nurse) by Frances Perez (offgoing nurse). Report included the following information SBAR, Intake/Output, MAR and Recent Results. Problem: Falls - Risk of  Goal: *Absence of Falls  Description  Document Diamond Aburto Fall Risk and appropriate interventions in the flowsheet.   Outcome: Progressing Towards Goal  Note:   Fall Risk Interventions:  Mobility Interventions: Assess mobility with egress test     Problem: Unstable angina/NSTEMI: Day of Admission/Day 1  Goal: Activity/Safety  Outcome: Resolved/Met  Goal: Consults, if ordered  Outcome: Resolved/Met  Goal: Diagnostic Test/Procedures  Outcome: Progressing Towards Goal  Goal: Medications  Outcome: Progressing Towards Goal  Goal: Respiratory  Outcome: Resolved/Met  Goal: Treatments/Interventions/Procedures  Outcome: Progressing Towards Goal  Goal: *Hemodynamically stable  Outcome: Progressing Towards Goal

## 2019-08-06 NOTE — PROGRESS NOTES
0800 Bedside report from Poly Giang. RN. Dual verification of Heparin. Pt currently resting, no needs at this time. Per report, pt a difficult stick - refusing any additional blood draws. Will discuss PICC placement if continued labs needed. 200 Dr. Nick Ashraf at bedside. Plans for cath today @1302. Pt currently eating breakfast. Will keep NPO after. Verbal orders to stop Heparin gtt at 1300; no CAROLANN protocol ordered. Ok to transfer to stepdown    1100 Pt resting comfortably in bed. No needs. 1155 TRANSFER - OUT REPORT:    Verbal report given to Halle Lockhart RN (name) on [address]  being transferred to [facility] (unit) for routine progression of care       Report consisted of patients Situation, Background, Assessment and   Recommendations(SBAR). Information from the following report(s) SBAR, Kardex, ED Summary, Intake/Output, MAR, Recent Results and Cardiac Rhythm NSR was reviewed with the receiving nurse. Lines:   Peripheral IV 08/05/19 Right Hand (Active)   Site Assessment Clean, dry, & intact 8/6/2019  8:00 AM   Phlebitis Assessment 0 8/6/2019  8:00 AM   Infiltration Assessment 0 8/6/2019  8:00 AM   Dressing Status Clean, dry, & intact 8/6/2019  8:00 AM   Dressing Type Tape;Transparent 8/6/2019  8:00 AM   Hub Color/Line Status Blue 8/6/2019  8:00 AM   Action Taken Open ports on tubing capped 8/6/2019  8:00 AM   Alcohol Cap Used Yes 8/6/2019  8:00 AM       Peripheral IV 08/05/19 Left Forearm (Active)   Site Assessment Clean, dry, & intact 8/6/2019  8:00 AM   Phlebitis Assessment 0 8/6/2019  8:00 AM   Infiltration Assessment 0 8/6/2019  8:00 AM   Dressing Status Clean, dry, & intact 8/6/2019  8:00 AM   Dressing Type Tape;Transparent 8/6/2019  8:00 AM   Hub Color/Line Status Pink 8/6/2019  8:00 AM   Action Taken Open ports on tubing capped 8/6/2019  8:00 AM   Alcohol Cap Used Yes 8/6/2019  8:00 AM        Opportunity for questions and clarification was provided.       Patient transported with:   Monitor  Registered Nurse

## 2019-08-06 NOTE — ED NOTES
RN assisted pt with bedpan, foul smelling urine obtained. Provider notified, new order for UA received.

## 2019-08-06 NOTE — PROGRESS NOTES
1205:  TRANSFER - IN REPORT:    Verbal report received from Lea(name) on [address]  being received from [facility] (unit) for routine progression of care      Report consisted of patients Situation, Background, Assessment and   Recommendations(SBAR). Information from the following report(s) SBAR, Kardex, [facility] and Recent Results was reviewed with the receiving nurse. Opportunity for questions and clarification was provided. Assessment completed upon patients arrival to unit and care assumed at 1222. Vitals obtained, tele applied, call bell in reach. No needs at this time. 1355:  Pt off unit to cath lab. 1644:  Pt back from cath lab.    99 [id number]:  [facility]    Patient with hypoglycemic episode(s) at [phone] (time) on 8/6/19 (date). BG value(s) pre-treatment 79    Was patient symptomatic? [] yes, [x] no  Patient was treated with the following rescue medications/treatments: [] D50                [] Glucose tablets                [] Glucagon                [] 4oz juice                [x] 6oz reg soda                [] 8oz low fat milk    Pt currently beginning to eat dinner after being NPO all day. BG value post-treatment: 86  Once BG treated and value greater than 80mg/dl, pt was provided with the following:  [] snack  [x] meal    1930:  Bedside shift change report given to [address] [place] [address] Valentin (oncoming nurse) by Louise Villafana (offgoing nurse). Report included the following information SBAR, Kardex, MAR and Recent Results.

## 2019-08-06 NOTE — PROGRESS NOTES
1400 - TRANSFER - IN REPORT:    Verbal report received from Simon Davis RN(name) on [address]  being received from [facility](unit) for ordered procedure      Report consisted of patients Situation, Background, Assessment and   Recommendations(SBAR). Information from the following report(s) MAR, Cardiac Rhythm NSR and Pre Procedure Checklist was reviewed with the receiving nurse. Opportunity for questions and clarification was provided. Assessment completed upon patients arrival to unit and care assumed. 1410 -   Cardiac Cath Lab Procedure Area Arrival Note:    [address] arrived to Cardiac Cath Lab, Procedure Area. Patient identifiers verified with NAME and DATE OF BIRTH. Procedure verified with patient. Consent forms verified. Allergies verified. Patient informed of procedure and plan of care. Questions answered with review. Patient voiced understanding of procedure and plan of care. Patient on cardiac monitor, non-invasive blood pressure, SPO2 monitor. Placed on O2 @ 2 lpm via NC.  IV of NS on pump at 25 ml/hr. Patient status doing well with some problems : Trop 8 s/p CABG. Patient is A&Ox 4. Patient reports no discomfort at this time. Patient medicated during procedure with orders obtained and verified by Dr. Sandie Le. Refer to patients Cardiac Cath Lab PROCEDURE REPORT for vital signs, assessment, status, and response during procedure, printed at end of case. Printed report on chart or scanned into chart.

## 2019-08-07 VITALS
BODY MASS INDEX: 29.96 KG/M2 | HEIGHT: 63 IN | DIASTOLIC BLOOD PRESSURE: 70 MMHG | HEART RATE: 72 BPM | RESPIRATION RATE: 16 BRPM | SYSTOLIC BLOOD PRESSURE: 124 MMHG | WEIGHT: 169.09 LBS | TEMPERATURE: 97.4 F | OXYGEN SATURATION: 97 %

## 2019-08-07 PROBLEM — R07.9 CHEST PAIN: Status: RESOLVED | Noted: 2019-08-05 | Resolved: 2019-08-07

## 2019-08-07 LAB
ANION GAP SERPL CALC-SCNC: 6 MMOL/L (ref 5–15)
BACTERIA SPEC CULT: ABNORMAL
BUN SERPL-MCNC: 15 MG/DL (ref 6–20)
BUN/CREAT SERPL: 14 (ref 12–20)
CALCIUM SERPL-MCNC: 8.7 MG/DL (ref 8.5–10.1)
CC UR VC: ABNORMAL
CHLORIDE SERPL-SCNC: 102 MMOL/L (ref 97–108)
CO2 SERPL-SCNC: 29 MMOL/L (ref 21–32)
CREAT SERPL-MCNC: 1.07 MG/DL (ref 0.55–1.02)
GLUCOSE BLD STRIP.AUTO-MCNC: 303 MG/DL (ref 65–100)
GLUCOSE SERPL-MCNC: 373 MG/DL (ref 65–100)
POTASSIUM SERPL-SCNC: 4.4 MMOL/L (ref 3.5–5.1)
SERVICE CMNT-IMP: ABNORMAL
SERVICE CMNT-IMP: ABNORMAL
SODIUM SERPL-SCNC: 137 MMOL/L (ref 136–145)

## 2019-08-07 PROCEDURE — 74011250637 HC RX REV CODE- 250/637: Performed by: INTERNAL MEDICINE

## 2019-08-07 PROCEDURE — 74011636637 HC RX REV CODE- 636/637: Performed by: INTERNAL MEDICINE

## 2019-08-07 PROCEDURE — 80048 BASIC METABOLIC PNL TOTAL CA: CPT

## 2019-08-07 PROCEDURE — 74011250636 HC RX REV CODE- 250/636: Performed by: INTERNAL MEDICINE

## 2019-08-07 PROCEDURE — 82962 GLUCOSE BLOOD TEST: CPT

## 2019-08-07 PROCEDURE — 36415 COLL VENOUS BLD VENIPUNCTURE: CPT

## 2019-08-07 RX ADMIN — GABAPENTIN 300 MG: 300 CAPSULE ORAL at 09:00

## 2019-08-07 RX ADMIN — HYDROCODONE BITARTRATE AND ACETAMINOPHEN 1 TABLET: 10; 325 TABLET ORAL at 09:10

## 2019-08-07 RX ADMIN — ATORVASTATIN CALCIUM 80 MG: 40 TABLET, FILM COATED ORAL at 09:00

## 2019-08-07 RX ADMIN — PANTOPRAZOLE SODIUM 40 MG: 40 TABLET, DELAYED RELEASE ORAL at 07:21

## 2019-08-07 RX ADMIN — FERROUS SULFATE TAB 325 MG (65 MG ELEMENTAL FE) 325 MG: 325 (65 FE) TAB at 09:00

## 2019-08-07 RX ADMIN — ASPIRIN 81 MG 162 MG: 81 TABLET ORAL at 09:00

## 2019-08-07 RX ADMIN — HYDROCODONE BITARTRATE AND ACETAMINOPHEN 1 TABLET: 10; 325 TABLET ORAL at 03:20

## 2019-08-07 RX ADMIN — METOPROLOL SUCCINATE 50 MG: 50 TABLET, EXTENDED RELEASE ORAL at 09:00

## 2019-08-07 RX ADMIN — DULOXETINE HYDROCHLORIDE 60 MG: 60 CAPSULE, DELAYED RELEASE ORAL at 09:00

## 2019-08-07 RX ADMIN — FUROSEMIDE 40 MG: 10 INJECTION, SOLUTION INTRAMUSCULAR; INTRAVENOUS at 09:01

## 2019-08-07 RX ADMIN — THERA TABS 1 TABLET: TAB at 09:00

## 2019-08-07 RX ADMIN — Medication 10 ML: at 06:24

## 2019-08-07 RX ADMIN — INSULIN LISPRO 7 UNITS: 100 INJECTION, SOLUTION INTRAVENOUS; SUBCUTANEOUS at 07:21

## 2019-08-07 NOTE — PROGRESS NOTES
0745 Bedside and Verbal shift change report given to [facility], RN and Christina Johnson (oncoming nurse) by Corina Carranza RN (offgoing nurse). Report included the following information SBAR, Kardex, Intake/Output, MAR, Recent Results, Med Rec Status and Cardiac Rhythm NSR.

## 2019-08-07 NOTE — DISCHARGE INSTRUCTIONS
Smoking Cessation Program: This is a free, phone/text/email based, smoking cessation program. The program is individualized to meet each patient's needs. To enroll use the link - [email or web address] or text Roselyn Mendoza to [phone] from any smart phone. Discharge Instructions       PATIENT ID: Benita Middleton  MRN: [id number]   YOB: 1975    DATE OF ADMISSION: 8/5/2019  3:47 PM    DATE OF DISCHARGE: 8/7/2019    PRIMARY CARE PROVIDER: Saima Evangelista MD     ATTENDING PHYSICIAN: Eliu Vazquez MD  DISCHARGING PROVIDER: Mary Alice Fowler MD    To contact this individual call [phone] and ask the  to page. If unavailable ask to be transferred the Adult Hospitalist Department. DISCHARGE DIAGNOSES CP    Cath results:\    Date of Procedure: 8/6/2019   Preoperative Diagnosis: nstemi  Postoperative Diagnosis: same    Procedure: Left heart cath, coronary angiography  Interventional Cardiologist: Earnest Jenkins DO  Assistant: None  Anesthesia: local + IV moderate sedation   Estimated Blood Loss: Minimal     Access: right common femoral artery, 6F  Catheters:  Left coronary: JL 4, 5F  Right coronary: JR4, 5F  LIMA: JR4, 5F     Findings:   L Main: large caliber, 80% distal left main stenosis involving LAD, Ramus and ostial Lcx trifurcation  LAD: moderate caliber proximal, distal vessel small and atretic. 90% ostial LAD stenosis  LCx: small caliber, supplies small marginal branch  Ramus; small caliber vessel, previously placed ramus stent with severe ostial ISR  RCA: small caliber vessel, distal RCA after 2nd acute marginal bifurcation is atretic through PL branch and PDA  LIMA: distal vessel atretic, very faint filling of distal LAD     LVEDP:  18-20 mmhg           Specimens Removed: None     Implants: None     Closure Device: manual compression     See full cath note.     Complications: none     Findings:  1.   Severe distal left main trifurcation disease involving ostium of lad and ISR of ostial ramus  2. Mildly elevated lvedp    CONSULTATIONS: IP CONSULT TO CARDIOLOGY    PROCEDURES/SURGERIES: Procedure(s):  Left Heart Cath / Coronary Angiography W Grafts    PENDING TEST RESULTS:   At the time of discharge the following test results are still pending: na    FOLLOW UP APPOINTMENTS:   Follow-up Information     Follow up With Specialties Details Why Contact Info       [facility] cardiologist of record as directed             ADDITIONAL CARE RECOMMENDATIONS:     DO NOT START YOUR ELIQUIS TIL THIS EVENING. DIET: Cardiac Diet and Diabetic Diet       ACTIVITY: Activity as tolerated    WOUND CARE: na    EQUIPMENT needed: na      DISCHARGE MEDICATIONS:   See Medication Reconciliation Form    · It is important that you take the medication exactly as they are prescribed. · Keep your medication in the bottles provided by the pharmacist and keep a list of the medication names, dosages, and times to be taken in your wallet. · Do not take other medications without consulting your doctor. NOTIFY YOUR PHYSICIAN FOR ANY OF THE FOLLOWING:   Fever over 101 degrees for 24 hours. Chest pain, shortness of breath, fever, chills, nausea, vomiting, diarrhea, change in mentation, falling, weakness, bleeding. Severe pain or pain not relieved by medications. Or, any other signs or symptoms that you may have questions about.       DISPOSITION:    Home With:   OT  PT  HH  RN       SNF/Inpatient Rehab/LTAC   x Independent/assisted living    Hospice    Other:          Signed:   Marcy Mckeon MD  8/7/2019  8:58 AM  Restart the Eliquis this evening (8/7/19) unless otherwise instructed by your cardiac surgeon at [place]

## 2019-08-07 NOTE — DISCHARGE SUMMARY
Discharge Summary       PATIENT ID: Kelsy Clarke  MRN: [id number]   YOB: 1975    DATE OF ADMISSION: 8/5/2019  3:47 PM    DATE OF DISCHARGE: 8/7/2019    PRIMARY CARE PROVIDER: Nigel Cruz MD     ATTENDING PHYSICIAN: Lavelle Henderson MD   DISCHARGING PROVIDER: Lavelle Henderson MD    To contact this individual call [phone] and ask the  to page. If unavailable ask to be transferred the Adult Hospitalist Department. CONSULTATIONS: IP CONSULT TO CARDIOLOGY    PROCEDURES/SURGERIES: Procedure(s):  Left Heart Cath / Coronary Angiography W Grafts    ADMITTING [address] [place] [address] COURSE:     Rt heart cath 8/6:  Date of Procedure: 8/6/2019   Preoperative Diagnosis: nstemi  Postoperative Diagnosis: same    Procedure: Left heart cath, coronary angiography  Interventional Cardiologist: Deangelo Gamboa DO  Assistant: None  Anesthesia: local + IV moderate sedation   Estimated Blood Loss: Minimal     Access: right common femoral artery, 6F  Catheters:  Left coronary: JL 4, 5F  Right coronary: JR4, 5F  LIMA: JR4, 5F     Findings:   L Main: large caliber, 80% distal left main stenosis involving LAD, Ramus and ostial Lcx trifurcation  LAD: moderate caliber proximal, distal vessel small and atretic. 90% ostial LAD stenosis  LCx: small caliber, supplies small marginal branch  Ramus; small caliber vessel, previously placed ramus stent with severe ostial ISR  RCA: small caliber vessel, distal RCA after 2nd acute marginal bifurcation is atretic through PL branch and PDA  LIMA: distal vessel atretic, very faint filling of distal LAD     LVEDP:  18-20 mmhg           Specimens Removed: None     Implants: None     Closure Device: manual compression     See full cath note.     Complications: none     Findings:  1. Severe distal left main trifurcation disease involving ostium of lad and ISR of ostial ramus  2.   Mildly elevated lvedp        Taylor Jackson, NP   Nurse Practitioner   Cardiology   Advanced Practice Nurse   Signed   Date of Service:  08/07/19 0847                    []Hidnadine copied text    []Juan Pablo for details  Dr. Andre Crum spoke with pt this a.m.- pt is adament she does not want to be directly transferred to [facility]. She prefers to be discharged and go to her 6 AM appointment with her CT surgeon at [facility]. Ok per Dr. Andre Crum to discharge- mother to  (she is not to drive herself to appointment)- she will go to her 6 AM appointment today. She reports she ran out of her statin prescription and needs script at discharge. She has a CD of her imagining on chart to go with her to appointment. Full progress note to follow.            Reason for visit/consult: Chest pain     Jethro Zaman is a 37 y.o. female admitted for Chest pain [R07.9]. Mrs. Gopi Wu is known to have multivessel coronary artery disease. She was also known to have severe LV systolic dysfunction in fact that was admitted to [facility] with cardiogenic pulmonary edema and acute respiratory failure 2 months ago. At that time she was transferred to [facility] for further care. She has been previously seen at [facility] for coronary artery disease and peripheral vascular disease. She has had previous PCI to her circumflex and left anterior descending vessels with failed PCI to right coronary artery which was small and chronically occluded. After her recent transferred to [facility] she was identified to have significant multivessel disease and underwent a coronary artery bypass grafting with LIMA to LAD and vein graft to circumflex/ramus. RCA was not bypassable being a small vessel. Subsequently her EF improved from 20% to about 40%. Since her discharge from the hospital she has not noticed any further chest pain until yesterday.   Over the past 12 to 24 hours she has been having intermittent burning discomfort in the chest.  This is noted both at exertion and at rest and associated with some shortness of breath.     She also reports significant pedal edema bilaterally which has been particularly prominent over the past 1 week. She is compliant with her medications.     She also history of peripheral vascular disease with prior percutaneous interventions on her femoral arteries. She is a brittle diabetic. She has history of bipolar disorder and's depression with recent suicidal attempt about 3 weeks ago. At the time of evaluation she was not reporting any chest pain. Initial ECG performed in the ED was concerning for possible ST elevation. I reviewed that ECG and performed serial ECGs in the ER with no evolutionary changes consistent with true ST elevation MI. However she had ST depression which could suggest potential ischemia with ongoing non-ST elevation myocardial infarction. .     Investigations:  EC serial ECGs were performed which showed poor progression of R waves in anterior precordial leads. Sinus rhythm, possible inferior myocardial infarction with Q waves inferior leads and downsloping ST segments in lateral limb leads.     Review of records: Records from [facility] were reviewed and showed recent coronary bypass grafting with LIMA to LAD, vein graft to ramus. EF at the time of hospitalization post CABG-40%.      Assessment and PLAN   1. History of multivessel coronary artery disease status post coronary artery bypass grafting with LIMA to LAD, vein graft to ramus now presenting with ongoing chest pain suggestive of possible unstable angina/non-ST elevation myocardial function. Residual on revascularized disease in the RCA not amenable to PCI. 2.  Hyperlipidemia  3. Ischemic cardiomyopathy secondary to 1.  4.  Acute on chronic decompensated congestive heart failure with reduced LV systolic function(heart failure reduced EF/systolic heart failure)  5.   Bipolar disorder with depression and repeated suicidal ideations.     Patient was seen urgently in the emergency department due to concern for ST elevation myocardial infarction. More than 30 minutes were spent on urgent critical care aspects. This included performance of bedside serial ECGs as well as bedside echocardiogram to assess myocardial function. Initial evaluation is not consistent with a true ST elevation myocardial infarction although patient may have ongoing ischemia given recent chest pain and ST depressions. The ST elevation found on her ECG may represent distant artery ischemia in the RCA territory which is not amenable to PCI. Hence we will manage the patient with medical therapy at this point of time with serial troponins, use of anticoagulant/heparin drip. For ongoing chest pain which is protracted, nitroglycerin drip can be used. If troponins are significantly elevated we may consider performing cardiac catheterization with coronary and graft angiography for further delineation of coronary anatomy. At this point of time she also demonstrates some features of acute on chronic congestive heart failure with lower extremity edema. Hence we will recommend IV diuretics as well. Home medication should be continued patient would be closely monitored in CCU with telemetry. [x]    High complexity decision making was performed  [x]    Patient is at high-risk of decompensation with multiple organ involvement  Investigation  Telemetry:  ECG: Subtle upsloping less than half millimeter ST elevation in inferior leads not typical for acute MI. ST depressions in lateral limb leads. Poor progression of R wave in anterior precordial leads.   Echocardiogram: Post CABG June 2019 EF 40%  Social History            DISCHARGE DIAGNOSES / PLAN:      1.  as above      ADDITIONAL CARE RECOMMENDATIONS: na    PENDING TEST RESULTS:   At the time of discharge the following test results are still pending: na    FOLLOW UP APPOINTMENTS:    Follow-up Information     Follow up With Specialties Details Why Contact Info       [facility] cardiologist of record as directed               DIET: Cardiac Diet and Diabetic Diet     ACTIVITY: Activity as tolerated    WOUND CARE: na    EQUIPMENT needed: na      DISCHARGE MEDICATIONS:  Current Discharge Medication List      CONTINUE these medications which have NOT CHANGED    Details   insulin glargine (LANTUS) 100 unit/mL injection 28 Units by SubCUTAneous route nightly. ascorbic acid, vitamin C, (VITAMIN C) 500 mg tablet Take 500 mg by mouth three (3) times daily. thiamine HCL (B-1) 100 mg tablet Take 100 mg by mouth daily. magnesium oxide (MAG-OX) 400 mg tablet Take 400 mg by mouth daily. calcium carbonate (OS-GUANAKO) 500 mg calcium (1,250 mg) tablet Take 1 Tab by mouth daily. cholecalciferol, vitamin D3, 2,000 unit tab Take 1 Tab by mouth daily. HYDROcodone-acetaminophen (NORCO)  mg tablet Take 1 Tab by mouth every six (6) hours as needed for Pain. aspirin delayed-release 81 mg tablet Take 2 Tabs by mouth daily. Indications: Myocardial Reinfarction Prevention  Qty: 30 Tab, Refills: 0      divalproex ER (DEPAKOTE ER) 500 mg ER tablet Take 3 Tabs by mouth nightly. Indications: mood  Qty: 90 Tab, Refills: 0      metoprolol succinate (TOPROL-XL) 50 mg XL tablet Take 1 Tab by mouth daily. Indications: high blood pressure  Qty: 30 Tab, Refills: 0      apixaban (ELIQUIS) 5 mg tablet Take 1 Tab by mouth two (2) times a day. Indications: Deep Vein Thrombosis Prevention  Qty: 30 Tab, Refills: 0      doxepin (SINEQUAN) 50 mg capsule Take 1 Cap by mouth nightly. Indications: sleep  Qty: 30 Cap, Refills: 0      ferrous sulfate 325 mg (65 mg iron) tablet Take 1 Tab by mouth three (3) times daily (with meals). Indications: anemia from inadequate iron  Qty: 90 Tab, Refills: 0      therapeutic multivitamin (THERAGRAN) tablet Take 1 Tab by mouth daily.  Indications: Treatment To Prevent Vitamin Deficiency  Qty: 30 Tab, Refills: 0      atorvastatin (LIPITOR) 80 mg tablet Take 80 mg by mouth daily. nitroglycerin (NITROSTAT) 0.4 mg SL tablet 0.4 mg by SubLINGual route every five (5) minutes as needed for Chest Pain. Up to 3 doses. furosemide (LASIX) 80 mg tablet Take 80 mg by mouth daily. pantoprazole (PROTONIX) 40 mg tablet Take 40 mg by mouth daily as needed. Indications: gastroesophageal reflux disease      gabapentin (NEURONTIN) 300 mg capsule Take 300 mg by mouth three (3) times daily. DULoxetine (CYMBALTA) 60 mg capsule Take 1 Cap by mouth daily. Qty: 90 Cap, Refills: 1    Associated Diagnoses: Fibromyalgia      insulin lispro (HUMALOG) 100 unit/mL injection 5 Units by SubCUTAneous route Before breakfast, lunch, and dinner. Plus sliding scale if needed  Indications: type 1 diabetes mellitus         STOP taking these medications       insulin glargine (LANTUS SOLOSTAR U-100 INSULIN) 100 unit/mL (3 mL) inpn Comments:   Reason for Stopping:                 NOTIFY YOUR PHYSICIAN FOR ANY OF THE FOLLOWING:   Fever over 101 degrees for 24 hours. Chest pain, shortness of breath, fever, chills, nausea, vomiting, diarrhea, change in mentation, falling, weakness, bleeding. Severe pain or pain not relieved by medications. Or, any other signs or symptoms that you may have questions about. DISPOSITION:    Home With:   OT  PT  HH  RN       Long term SNF/Inpatient Rehab   x Independent/assisted living    Hospice    Other:       PATIENT CONDITION AT DISCHARGE:     Functional status    Poor     Deconditioned    x Independent      Cognition    x Lucid     Forgetful     Dementia      Catheters/lines (plus indication)    Bates     PICC     PEG    x None      Code status    x Full code     DNR      PHYSICAL EXAMINATION AT DISCHARGE:   Refer to Progress Note  Visit Vitals  /70 (BP 1 Location: Right arm, BP Patient Position: At rest)   Pulse 72   Temp 97.4 °F (36.3 °C)   Resp 16   Ht 5' 3\" (1.6 m)   Wt 76.7 kg (169 lb 1.5 oz)   SpO2 97%   Breastfeeding?  No   BMI 29.95 kg/m² CHRONIC MEDICAL DIAGNOSES:  Problem List as of 8/7/2019 Date Reviewed: 8/7/2019          Codes Class Noted - Resolved    Bipolar 1 disorder ([facility] ICD-10-CM: F31.9  ICD-9-CM: 296.7  7/27/2019 - Present        Insulin overdose ICD-10-CM: T38.3X1A  ICD-9-CM: 962.3, E980.4  7/24/2019 - Present        NSTEMI (non-ST elevated myocardial infarction) ([facility].) ICD-10-CM: I21.4  ICD-9-CM: 410.70  6/8/2019 - Present        Acute exacerbation of CHF (congestive heart failure) ([facility].) ICD-10-CM: I50.9  ICD-9-CM: 428.0  6/8/2019 - Present        SOB (shortness of breath) ICD-10-CM: R06.02  ICD-9-CM: 786.05  6/7/2019 - Present        RESOLVED: Chest pain ICD-10-CM: R07.9  ICD-9-CM: 786.50  8/5/2019 - 8/7/2019              Greater than  20  minutes were spent with the patient on counseling and coordination of care    Signed:   Solange Wu MD  8/7/2019  8:59 AM

## 2019-08-07 NOTE — PROGRESS NOTES
Hospital follow-up PCP transitional care appointment has been scheduled with ZEFERINO Ibarra for Wednesday, 8/14/19 at 2:10 p.m. Pending patient discharge.   Ancel Holter, Care Management Specialist.

## 2019-08-07 NOTE — PROGRESS NOTES
Cardiology Consult/Progress Note           2019  3:47 PM   Chest pain [R07.9]    Subjective:   Feels better this AM. NO chest pain after 8 pm night before yesterday. Leg swelling improved. Cath performed. : Occluded grafts. Native LAD,Ramus with severe disease and native RCA . Investigations:  EC serial ECGs were performed which showed poor progression of R waves in anterior precordial leads. Sinus rhythm, possible inferior myocardial infarction with Q waves inferior leads and downsloping ST segments in lateral limb leads. Review of records: Records from [facility] were reviewed and showed recent coronary bypass grafting with LIMA to LAD, vein graft to ramus. EF at the time of hospitalization post CABG-40%. Assessment and PLAN   1. History of multivessel coronary artery disease status post recent coronary artery bypass grafting with LIMA to LAD, vein graft to ramus now presenting with ongoing chest pain now with occluded grafts and severe native multivessel disease. 2.  Hyperlipidemia  3. Ischemic cardiomyopathy secondary to 1.  4.  Acute on chronic decompensated congestive heart failure with reduced LV systolic function(heart failure reduced EF/systolic heart failure)  5. Bipolar disorder with depression and repeated suicidal ideations. She will need revascularization. She prefers treatment at [facility] and option of direct transfer were given. She however would like to be discharged, will go to the appointment to the surgeon today and discuss immediate vs deferred admission for revascularization. Apixaban can be withheld until she discusses with her surgeons the plan later this AM. Otherwise, home medication should be continued at discharge.     [x]    High complexity decision making was performed  []    Patient is at high-risk of decompensation with multiple organ involvement    ----------------------------------------------------------------------------------------------------------------------------  Reason for consult: Chest pain    Amy Ortega is a 37 y.o. female admitted for Chest pain [R07.9]. Mrs. Herman Muñiz is known to have multivessel coronary artery disease. She was also known to have severe LV systolic dysfunction in fact that was admitted to [facility] with cardiogenic pulmonary edema and acute respiratory failure 2 months ago. At that time she was transferred to [facility] for further care. She has been previously seen at [facility] for coronary artery disease and peripheral vascular disease. She has had previous PCI to her circumflex and left anterior descending vessels with failed PCI to right coronary artery which was small and chronically occluded. After her recent transferred to [facility] she was identified to have significant multivessel disease and underwent a coronary artery bypass grafting with LIMA to LAD and vein graft to circumflex/ramus. RCA was not bypassable being a small vessel. Subsequently her EF improved from 20% to about 40%. Since her discharge from the hospital she has not noticed any further chest pain until yesterday. Over the past 12 to 24 hours she has been having intermittent burning discomfort in the chest.  This is noted both at exertion and at rest and associated with some shortness of breath. She also reports significant pedal edema bilaterally which has been particularly prominent over the past 1 week. She is compliant with her medications. She also history of peripheral vascular disease with prior percutaneous interventions on her femoral arteries. She is a brittle diabetic. She has history of bipolar disorder and's depression with recent suicidal attempt about 3 weeks ago. At the time of evaluation she was not reporting any chest pain.   Initial ECG performed in the ED was concerning for possible ST elevation. I reviewed that ECG and performed serial ECGs in the ER with no evolutionary changes consistent with true ST elevation MI. However she had ST depression which could suggest potential ischemia with ongoing non-ST elevation myocardial infarction. .      Investigation  Telemetry:  ECG: Subtle upsloping less than half millimeter ST elevation in inferior leads not typical for acute MI. ST depressions in lateral limb leads. Poor progression of R wave in anterior precordial leads.   Echocardiogram: Post CABG June 2019 EF 40%  Social History     Socioeconomic History    Marital status:      Spouse name: Not on file    Number of children: Not on file    Years of education: Not on file    Highest education level: Not on file   Tobacco Use    Smoking status: Current Every Day Smoker     Packs/day: 0.25    Smokeless tobacco: Never Used   Substance and Sexual Activity    Alcohol use: Yes     Comment: occasionally    Drug use: No    Sexual activity: Yes      Patient Active Problem List    Diagnosis Date Noted    Bipolar 1 disorder ([facility] 07/27/2019    Insulin overdose 07/24/2019    NSTEMI (non-ST elevated myocardial infarction) ([facility] 06/08/2019    Acute exacerbation of CHF (congestive heart failure) ([facility] 06/08/2019    SOB (shortness of breath) 06/07/2019      Nick Shabazz MD  Past Medical History:   Diagnosis Date    Arthritis     Bipolar 1 disorder ([facility]     Diabetes ([facility]     Gastrointestinal disorder     gastroparesis    Gastroparesis     GERD (gastroesophageal reflux disease)     Heart disease     Hypertension     Psychiatric disorder     Bipolar      Past Surgical History:   Procedure Laterality Date    HX ANKLE FRACTURE TX      HX CAROTID STENT      HX GYN      BTL    HX ORTHOPAEDIC      toe     Allergies   Allergen Reactions    Voltaren [Diclofenac Sodium] Anaphylaxis    Dramamine Ii [Meclizine] Itching     Auditory and Visual hallucinations    Imipramine Hives    Levaquin [Levofloxacin] Itching and Nausea and Vomiting      Family History   Problem Relation Age of Onset    Thyroid Disease Mother     Hypertension Father     Heart Disease Father     Diabetes Sister     Heart Disease Sister     Hypertension Sister     Thyroid Disease Brother       Current Facility-Administered Medications   Medication Dose Route Frequency    enoxaparin (LOVENOX) injection 80 mg  80 mg SubCUTAneous Q12H    atorvastatin (LIPITOR) tablet 80 mg  80 mg Oral DAILY    nitroglycerin (Tridil) 200 mcg/ml infusion  5 mcg/min IntraVENous TITRATE    aspirin chewable tablet 162 mg  162 mg Oral DAILY    divalproex ER (DEPAKOTE ER) 24 hour tablet 1,500 mg  1,500 mg Oral QHS    doxepin (SINEquan) capsule 50 mg  50 mg Oral QHS    DULoxetine (CYMBALTA) capsule 60 mg  60 mg Oral DAILY    ferrous sulfate tablet 325 mg  325 mg Oral TID WITH MEALS    gabapentin (NEURONTIN) capsule 300 mg  300 mg Oral TID    insulin glargine (LANTUS) injection 28 Units  28 Units SubCUTAneous QHS    metoprolol succinate (TOPROL-XL) XL tablet 50 mg  50 mg Oral DAILY    pantoprazole (PROTONIX) tablet 40 mg  40 mg Oral ACB    therapeutic multivitamin (THERAGRAN) tablet 1 Tab  1 Tab Oral DAILY    sodium chloride (NS) flush 5-40 mL  5-40 mL IntraVENous Q8H    sodium chloride (NS) flush 5-40 mL  5-40 mL IntraVENous PRN    morphine injection 2 mg  2 mg IntraVENous Q4H PRN    furosemide (LASIX) injection 40 mg  40 mg IntraVENous BID    insulin lispro (HUMALOG) injection   SubCUTAneous AC&HS    glucose chewable tablet 16 g  4 Tab Oral PRN    dextrose (D50W) injection syrg 12.5-25 g  12.5-25 g IntraVENous PRN    glucagon (GLUCAGEN) injection 1 mg  1 mg IntraMUSCular PRN    HYDROcodone-acetaminophen (NORCO)  mg tablet 1 Tab  1 Tab Oral Q4H PRN      Prior to Admission Medications   Prescriptions Last Dose Informant Patient Reported? Taking?    DULoxetine (CYMBALTA) 60 mg capsule 2019 at Unknown time  No Yes   Sig: Take 1 Cap by mouth daily. apixaban (ELIQUIS) 5 mg tablet 2019 at am  No Yes   Sig: Take 1 Tab by mouth two (2) times a day. Indications: Deep Vein Thrombosis Prevention   ascorbic acid, vitamin C, (VITAMIN C) 500 mg tablet 2019 at Unknown time  Yes Yes   Sig: Take 500 mg by mouth three (3) times daily. aspirin delayed-release 81 mg tablet 2019 at Unknown time  No Yes   Sig: Take 2 Tabs by mouth daily. Indications: Myocardial Reinfarction Prevention   atorvastatin (LIPITOR) 80 mg tablet 2019 at Unknown time  Yes Yes   Sig: Take 80 mg by mouth daily. calcium carbonate (OS-GUANAKO) 500 mg calcium (1,250 mg) tablet 2019 at Unknown time  Yes Yes   Sig: Take 1 Tab by mouth daily. cholecalciferol, vitamin D3, 2,000 unit tab 2019 at Unknown time  Yes Yes   Sig: Take 1 Tab by mouth daily. divalproex ER (DEPAKOTE ER) 500 mg ER tablet 2019 at Unknown time  No Yes   Sig: Take 3 Tabs by mouth nightly. Indications: mood   doxepin (SINEQUAN) 50 mg capsule 2019 at Unknown time  No Yes   Sig: Take 1 Cap by mouth nightly. Indications: sleep   ferrous sulfate 325 mg (65 mg iron) tablet 2019 at Unknown time  No Yes   Sig: Take 1 Tab by mouth three (3) times daily (with meals). Indications: anemia from inadequate iron   furosemide (LASIX) 80 mg tablet 2019 at Unknown time  Yes Yes   Sig: Take 80 mg by mouth daily. gabapentin (NEURONTIN) 300 mg capsule 2019 at Unknown time  Yes Yes   Sig: Take 300 mg by mouth three (3) times daily. insulin glargine (LANTUS) 100 unit/mL injection 2019 at Unknown time  Yes Yes   Si Units by SubCUTAneous route nightly. insulin lispro (HUMALOG) 100 unit/mL injection 2019 at Unknown time  Yes Yes   Si Units by SubCUTAneous route Before breakfast, lunch, and dinner.  Plus sliding scale if needed  Indications: type 1 diabetes mellitus   magnesium oxide (MAG-OX) 400 mg tablet 2019 at Unknown time  Yes Yes   Sig: Take 400 mg by mouth daily. metoprolol succinate (TOPROL-XL) 50 mg XL tablet 2019 at Unknown time  No Yes   Sig: Take 1 Tab by mouth daily. Indications: high blood pressure   nitroglycerin (NITROSTAT) 0.4 mg SL tablet   Yes Yes   Si.4 mg by SubLINGual route every five (5) minutes as needed for Chest Pain. Up to 3 doses. pantoprazole (PROTONIX) 40 mg tablet   Yes Yes   Sig: Take 40 mg by mouth daily as needed. Indications: gastroesophageal reflux disease   therapeutic multivitamin (THERAGRAN) tablet 2019 at Unknown time  No Yes   Sig: Take 1 Tab by mouth daily. Indications: Treatment To Prevent Vitamin Deficiency   thiamine HCL (B-1) 100 mg tablet 2019 at Unknown time  Yes Yes   Sig: Take 100 mg by mouth daily.       Facility-Administered Medications: None          Labs:   Recent Results (from the past 24 hour(s))   GLUCOSE, POC    Collection Time: 19 12:04 PM   Result Value Ref Range    Glucose (POC) 191 (H) 65 - 100 mg/dL    Performed by Madan Ballesteros    CARDIAC PROCEDURE    Collection Time: 19  3:16 PM   Result Value Ref Range    EDP 18    GLUCOSE, POC    Collection Time: 19  4:53 PM   Result Value Ref Range    Glucose (POC) 67 65 - 100 mg/dL    Performed by RUBINA BAEZ    GLUCOSE, POC    Collection Time: 19  5:18 PM   Result Value Ref Range    Glucose (POC) 82 65 - 100 mg/dL    Performed by RUBINA BAEZ    GLUCOSE, POC    Collection Time: 19  9:25 PM   Result Value Ref Range    Glucose (POC) 440 (H) 65 - 100 mg/dL    Performed by AMADA SARKAR    METABOLIC PANEL, BASIC    Collection Time: 19  3:27 AM   Result Value Ref Range    Sodium 137 136 - 145 mmol/L    Potassium 4.4 3.5 - 5.1 mmol/L    Chloride 102 97 - 108 mmol/L    CO2 29 21 - 32 mmol/L    Anion gap 6 5 - 15 mmol/L    Glucose 373 (H) 65 - 100 mg/dL    BUN 15 6 - 20 MG/DL    Creatinine 1.07 (H) 0.55 - 1.02 MG/DL    BUN/Creatinine ratio 14 12 - 20      GFR est AA >60 >60 ml/min/1.73m2    GFR est non-AA 56 (L) >60 ml/min/1.73m2    Calcium 8.7 8.5 - 10.1 MG/DL   GLUCOSE, POC    Collection Time: 08/07/19  6:22 AM   Result Value Ref Range    Glucose (POC) 303 (H) 65 - 100 mg/dL    Performed by ZULEIMA MIMS         Review of Symptoms:  Respiratory: No  orthopnea, PND, cough, hemoptysis, URI. Cardiovascular: No  palpitations, sweating, lightheadedness, dizziness, syncope, presyncope. Reports progressive lower extremity swelling. Otherwise no other pertinent positive or negative symptoms on ROS. Subjective: Intake/Output Summary (Last 24 hours) at 8/7/2019 0947  Last data filed at 8/6/2019 1222  Gross per 24 hour   Intake 55.02 ml   Output 400 ml   Net -344.98 ml      Patient Vitals for the past 24 hrs:   Temp Pulse Resp BP SpO2   08/07/19 0733 97.4 °F (36.3 °C) 72 16 124/70 97 %   08/07/19 0320 97.4 °F (36.3 °C) 74 16 122/57 96 %   08/06/19 2305 98.1 °F (36.7 °C) 74 16 113/63 96 %   08/06/19 1901 97.5 °F (36.4 °C) 72 16 107/59 99 %   08/06/19 1800  72  107/59 99 %   08/06/19 1756  72  102/52 100 %   08/06/19 1730  72  104/41 95 %   08/06/19 1700  72  133/74 100 %   08/06/19 1644 98.1 °F (36.7 °C) 69 16 120/86 99 %   08/06/19 1615  65 14 119/76 97 %   08/06/19 1600   14 111/61    08/06/19 1545  65 14 96/49 97 %   08/06/19 1530  67 16 107/58 91 %   08/06/19 1525  66 12 97/60 92 %   08/06/19 1512  63 16 97/60 99 %   08/06/19 1222  74 16 (!) 86/60 97 %   08/06/19 1100  73 15 119/82 97 %   08/06/19 1000  73 (!) 7 96/58 97 %      General:    Alert, cooperative, no distress. Psychiatric:    Normal Mood and affect    Eye/ENT:      Pupils equal, No asymmetry, Conjunctival pink. Able to hear voice at normal amplitude   Lungs:      Visibly symmetric chest expansion, No palpable tenderness. Clear to auscultation bilaterally. Heart[de-identified]    Regular rate and rhythm, S1, S2 normal, no murmur, click, rub or gallop. No JVD, Normal palpable peripheral pulses.  No cyanosis. Midline sternotomy scar   Abdomen:     Soft, non-tender. Bowel sounds normal. No masses,  No      organomegaly. Extremities:   Extremities normal, atraumatic; bilateral lower extremity edema. Neurologic:   CN II-XII grossly intact.  No gross focal deficits         Manuel Oneill MD  8/7/2019   8:40 PM     [facility] Office:   [facility] Office:  [address]    [place] [address] [place] [facility]  [address]     [address] [place] [address]  P: [phone]    P: [phone]  F: [phone]    F: [phone]

## 2019-08-07 NOTE — ADVANCED PRACTICE NURSE
Dr. Rosalva Montiel spoke with pt this a.m.- pt is adament she does not want to be directly transferred to [address] [place] [address]. She prefers to be discharged and go to her 6 AM appointment with her CT surgeon at [address] [place] [address]. Ok per Dr. Rosalva Montiel to discharge- mother to  (she is not to drive herself to appointment)- she will go to her 6 AM appointment today. She reports she ran out of her statin prescription and needs script at discharge. She has a CD of her imagining on chart to go with her to appointment. Full progress note to follow.

## 2019-08-07 NOTE — PROGRESS NOTES
2000: Bedside and Verbal shift change report given to Pascual Barger RN (oncoming nurse) by Juan M Monteiro RN (offgoing nurse). Report included the following information SBAR, Kardex, ED Summary, Procedure Summary, Intake/Output, MAR, Recent Results and Med Rec Status. 2125: Patient . Patient states she had a hospital dinner and a dinner brought by family. She also states that she had a milkshake. Scheduled 28 units of Lantus given. Family states patient often has low blood sugars overnight. RN paged NP on call. 2157: RN spoke to Kyrie Trent NP.  NP verbal order to give 4 units of Humalog. 0730: Bedside and Verbal shift change report given to Duc Daniel RN and LEISA Tate (oncoming nurse) by Oswaldo Brown RN (offgoing nurse). Report included the following information SBAR, Kardex and Procedure Summary.

## 2019-08-08 ENCOUNTER — PATIENT OUTREACH (OUTPATIENT)
Dept: FAMILY MEDICINE CLINIC | Age: 44
End: 2019-08-08

## 2019-08-08 NOTE — PROGRESS NOTES
Hospital Discharge Follow-Up      Date/Time:  2019 10:32 AM    Patient was admitted to [facility] on 19 and discharged on 19 for Chest pain. The physician discharge summary was available at the time of outreach. Patient was contacted within 2 business days of discharge. Top Challenges reviewed with the provider   Pt had recent suicide attempt 3 weeks ago OD on insulin  Pt needs additional cardiac surgery   K 3.2 on 19  Troponin peak 8.97      Method of communication with provider :staff message    Inpatient RRAT score: 6  Was this a readmission? yes   Patient stated reason for the readmission: chest pain (previous admissions for SOB and insulin overdose    Nurse Navigator (NN) contacted the patient by telephone to perform post hospital discharge assessment. Verified name and  with patient as identifiers. Provided introduction to self, and explanation of the Nurse Navigator role. Reviewed discharge instructions and red flags with patient who verbalized understanding. Patient given an opportunity to ask questions and does not have any further questions or concerns at this time. The patient agrees to contact the PCP office for questions related to their healthcare. NN provided contact information for future reference.     Disease Specific:   N/A    Summary of patient's top problems:  1.  History of multivessel coronary artery disease status post coronary artery bypass grafting with LIMA to LAD, vein graft to ramus now presenting with ongoing chest pain suggestive of possible unstable angina/non-ST elevation myocardial function.  Residual on revascularized disease in the RCA not amenable to PCI    2. 4.  Acute on chronic decompensated congestive heart failure with reduced LV systolic function(heart failure reduced EF/systolic heart failure)    3. 3.  Ischemic cardiomyopathy secondary to 1    Home Health orders at discharge: [address]: N/A  Date of initial visit: N/A    Durable Medical Equipment ordered/company: none  Durable Medical Equipment received: none    Barriers to care? depression, ineffective coping    Advance Care Planning:   Does patient have an Advance Directive:  not on file     Medication(s):   New Medications at Discharge: none  Changed Medications at Discharge: none  Discontinued Medications at Discharge: none    Medication reconciliation was performed with patient, who verbalizes understanding of administration of home medications. There were no barriers to obtaining medications identified at this time. Referral to Pharm D needed: no     Current Outpatient Medications   Medication Sig    insulin glargine (LANTUS) 100 unit/mL injection 28 Units by SubCUTAneous route nightly.  magnesium oxide (MAG-OX) 400 mg tablet Take 400 mg by mouth daily.  calcium carbonate (OS-GUANAKO) 500 mg calcium (1,250 mg) tablet Take 1 Tab by mouth daily.  cholecalciferol, vitamin D3, 2,000 unit tab Take 1 Tab by mouth daily.  aspirin delayed-release 81 mg tablet Take 2 Tabs by mouth daily. Indications: Myocardial Reinfarction Prevention    divalproex ER (DEPAKOTE ER) 500 mg ER tablet Take 3 Tabs by mouth nightly. Indications: mood    metoprolol succinate (TOPROL-XL) 50 mg XL tablet Take 1 Tab by mouth daily. Indications: high blood pressure    apixaban (ELIQUIS) 5 mg tablet Take 1 Tab by mouth two (2) times a day. Indications: Deep Vein Thrombosis Prevention    doxepin (SINEQUAN) 50 mg capsule Take 1 Cap by mouth nightly. Indications: sleep    therapeutic multivitamin (THERAGRAN) tablet Take 1 Tab by mouth daily. Indications: Treatment To Prevent Vitamin Deficiency    atorvastatin (LIPITOR) 80 mg tablet Take 80 mg by mouth daily.  nitroglycerin (NITROSTAT) 0.4 mg SL tablet 0.4 mg by SubLINGual route every five (5) minutes as needed for Chest Pain. Up to 3 doses.  furosemide (LASIX) 80 mg tablet Take 80 mg by mouth daily.     pantoprazole (PROTONIX) 40 mg tablet Take 40 mg by mouth daily as needed. Indications: gastroesophageal reflux disease    gabapentin (NEURONTIN) 300 mg capsule Take 300 mg by mouth three (3) times daily.  DULoxetine (CYMBALTA) 60 mg capsule Take 1 Cap by mouth daily.  insulin lispro (HUMALOG) 100 unit/mL injection 5 Units by SubCUTAneous route Before breakfast, lunch, and dinner. Plus sliding scale if needed  Indications: type 1 diabetes mellitus    ascorbic acid, vitamin C, (VITAMIN C) 500 mg tablet Take 500 mg by mouth three (3) times daily.  thiamine HCL (B-1) 100 mg tablet Take 100 mg by mouth daily.  HYDROcodone-acetaminophen (NORCO)  mg tablet Take 1 Tab by mouth every six (6) hours as needed for Pain.  ferrous sulfate 325 mg (65 mg iron) tablet Take 1 Tab by mouth three (3) times daily (with meals). Indications: anemia from inadequate iron     No current facility-administered medications for this visit. There are no discontinued medications. [facility] follow up appointment(s):   Future Appointments   Date Time Provider Melissa Henriquez   8/14/2019  2:10 PM Antonieta Dietz,  [facility]      Non-[facility] follow up appointment(s): [facility] cardiologist, Champ Kehr ( pt attended appointment on 8/7/19)  [facility]:  n/a       Goals      Patient verbalizes understanding of self -management goals after a Myocardial Infaraction. 8/8/19-Pt aware of S&S of MI, has nitroglycerin ordered PRN,  Pt has CAD and went to her [facility]  cardiologist appointment after her discharged,  She has not been readmitted. Pt states she will need additional cardiac surgery and will attend all of her follow up appointments. Pt will call EMS if additional symptoms arise. Is taking medications as prescribed.   CW

## 2019-08-09 ENCOUNTER — TELEPHONE (OUTPATIENT)
Dept: CARDIAC REHAB | Age: 44
End: 2019-08-09

## 2019-08-09 NOTE — TELEPHONE ENCOUNTER
8/9/2019 Cardiac Rehab: Called Ms. Cecily Funk  to discuss participation in the Cardiac Rehab Program following NSTEMI on 8/6/19. Per patient Dee Kemi are trying to decide if they want to do a stent or balloon my blockage\". 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 She has no additional questions.   Amanda Rivera RN

## 2019-08-14 ENCOUNTER — PATIENT OUTREACH (OUTPATIENT)
Dept: FAMILY MEDICINE CLINIC | Age: 44
End: 2019-08-14

## 2019-08-14 NOTE — PROGRESS NOTES
This NN has left a VM for pt to return call,  Per chart review pts PCP appointment for today has been cancelled.

## 2019-08-23 ENCOUNTER — PATIENT OUTREACH (OUTPATIENT)
Dept: FAMILY MEDICINE CLINIC | Age: 44
End: 2019-08-23

## 2019-09-10 ENCOUNTER — PATIENT OUTREACH (OUTPATIENT)
Dept: FAMILY MEDICINE CLINIC | Age: 44
End: 2019-09-10

## 2019-09-10 NOTE — PROGRESS NOTES
Patient has graduated from the Transitions of Care Coordination  program on 9/10/19. Patient's symptoms are stable at this time. Patient/family has the ability to self-manage. Care management goals have been completed at this time. No further nurse navigator follow up scheduled. Goals Addressed                 This Visit's Progress     Patient verbalizes understanding of self -management goals after a Myocardial Infaraction. 9/10/19-pt states \"I am feeling better\" has had not additional admissions, will have a f/u with vascular surgery later this month, no additional plans for cardiac surgery at this time. CW    8/23/19- pt states on 8/10 she was having CP, went to [address] [place] [address] and was admitted with an MI, states she had a cardiac cath with another stent placed. Plan is to start pt on HF medication. Pt will be seeing vascular Dr form [address] [place] [address] on 8/26 where she will have her labs re-drawn since she states being hypokalemic during admission, possible plan form cardiac surgery. CW    8/8/19-Pt aware of S&S of MI, has nitroglycerin ordered PRN,  Pt has CAD and went to her [facility]  cardiologist appointment after her discharged,  She has not been readmitted. Pt states she will need additional cardiac surgery and will attend all of her follow up appointments. Pt will call EMS if additional symptoms arise. Is taking medications as prescribed. CW            Pt has nurse navigator's contact information for any further questions, concerns, or needs. Patients upcoming visits:  No future appointments.

## 2019-12-06 ENCOUNTER — HOSPITAL ENCOUNTER (OUTPATIENT)
Age: 44
Setting detail: OBSERVATION
Discharge: HOME | End: 2019-12-07
Attending: EMERGENCY MEDICINE | Admitting: INTERNAL MEDICINE
Payer: COMMERCIAL

## 2019-12-06 ENCOUNTER — APPOINTMENT (OUTPATIENT)
Dept: MRI IMAGING | Age: 44
End: 2019-12-06
Attending: INTERNAL MEDICINE
Payer: COMMERCIAL

## 2019-12-06 ENCOUNTER — APPOINTMENT (OUTPATIENT)
Dept: CT IMAGING | Age: 44
End: 2019-12-06
Attending: EMERGENCY MEDICINE
Payer: COMMERCIAL

## 2019-12-06 DIAGNOSIS — R20.0 ARM NUMBNESS LEFT: Primary | ICD-10-CM

## 2019-12-06 DIAGNOSIS — R29.898 WEAKNESS OF LEFT LEG: ICD-10-CM

## 2019-12-06 DIAGNOSIS — G56.32 RADIAL NEUROPATHY, LEFT: ICD-10-CM

## 2019-12-06 PROBLEM — G45.9 TIA (TRANSIENT ISCHEMIC ATTACK): Status: ACTIVE | Noted: 2019-12-06

## 2019-12-06 LAB
ALBUMIN SERPL-MCNC: 2.9 G/DL (ref 3.5–5)
ALBUMIN/GLOB SERPL: 0.8 {RATIO} (ref 1.1–2.2)
ALP SERPL-CCNC: 113 U/L (ref 45–117)
ALT SERPL-CCNC: 18 U/L (ref 12–78)
ANION GAP SERPL CALC-SCNC: 4 MMOL/L (ref 5–15)
AST SERPL-CCNC: 29 U/L (ref 15–37)
ATRIAL RATE: 72 BPM
BASOPHILS # BLD: 0 K/UL (ref 0–0.1)
BASOPHILS NFR BLD: 1 % (ref 0–1)
BILIRUB SERPL-MCNC: 0.4 MG/DL (ref 0.2–1)
BUN SERPL-MCNC: 22 MG/DL (ref 6–20)
BUN/CREAT SERPL: 19 (ref 12–20)
CALCIUM SERPL-MCNC: 8.5 MG/DL (ref 8.5–10.1)
CALCULATED P AXIS, ECG09: 42 DEGREES
CALCULATED R AXIS, ECG10: 101 DEGREES
CALCULATED T AXIS, ECG11: 90 DEGREES
CHLORIDE SERPL-SCNC: 105 MMOL/L (ref 97–108)
CO2 SERPL-SCNC: 27 MMOL/L (ref 21–32)
COMMENT, HOLDF: NORMAL
CREAT SERPL-MCNC: 1.16 MG/DL (ref 0.55–1.02)
DIAGNOSIS, 93000: NORMAL
DIFFERENTIAL METHOD BLD: ABNORMAL
EOSINOPHIL # BLD: 0.2 K/UL (ref 0–0.4)
EOSINOPHIL NFR BLD: 2 % (ref 0–7)
ERYTHROCYTE [DISTWIDTH] IN BLOOD BY AUTOMATED COUNT: 19.9 % (ref 11.5–14.5)
GLOBULIN SER CALC-MCNC: 3.5 G/DL (ref 2–4)
GLUCOSE BLD STRIP.AUTO-MCNC: 148 MG/DL (ref 65–100)
GLUCOSE BLD STRIP.AUTO-MCNC: 213 MG/DL (ref 65–100)
GLUCOSE SERPL-MCNC: 201 MG/DL (ref 65–100)
HCT VFR BLD AUTO: 43.8 % (ref 35–47)
HGB BLD-MCNC: 13.7 G/DL (ref 11.5–16)
IMM GRANULOCYTES # BLD AUTO: 0.1 K/UL (ref 0–0.04)
IMM GRANULOCYTES NFR BLD AUTO: 1 % (ref 0–0.5)
LYMPHOCYTES # BLD: 1.9 K/UL (ref 0.8–3.5)
LYMPHOCYTES NFR BLD: 22 % (ref 12–49)
MCH RBC QN AUTO: 25.8 PG (ref 26–34)
MCHC RBC AUTO-ENTMCNC: 31.3 G/DL (ref 30–36.5)
MCV RBC AUTO: 82.5 FL (ref 80–99)
MONOCYTES # BLD: 0.8 K/UL (ref 0–1)
MONOCYTES NFR BLD: 9 % (ref 5–13)
NEUTS SEG # BLD: 5.7 K/UL (ref 1.8–8)
NEUTS SEG NFR BLD: 65 % (ref 32–75)
NRBC # BLD: 0 K/UL (ref 0–0.01)
NRBC BLD-RTO: 0 PER 100 WBC
P-R INTERVAL, ECG05: 154 MS
PLATELET # BLD AUTO: 195 K/UL (ref 150–400)
PMV BLD AUTO: 10.6 FL (ref 8.9–12.9)
POTASSIUM SERPL-SCNC: 5 MMOL/L (ref 3.5–5.1)
PROT SERPL-MCNC: 6.4 G/DL (ref 6.4–8.2)
Q-T INTERVAL, ECG07: 418 MS
QRS DURATION, ECG06: 88 MS
QTC CALCULATION (BEZET), ECG08: 457 MS
RBC # BLD AUTO: 5.31 M/UL (ref 3.8–5.2)
SAMPLES BEING HELD,HOLD: NORMAL
SERVICE CMNT-IMP: ABNORMAL
SERVICE CMNT-IMP: ABNORMAL
SODIUM SERPL-SCNC: 136 MMOL/L (ref 136–145)
VENTRICULAR RATE, ECG03: 72 BPM
WBC # BLD AUTO: 8.6 K/UL (ref 3.6–11)

## 2019-12-06 PROCEDURE — 36415 COLL VENOUS BLD VENIPUNCTURE: CPT

## 2019-12-06 PROCEDURE — 80053 COMPREHEN METABOLIC PANEL: CPT

## 2019-12-06 PROCEDURE — 99218 HC RM OBSERVATION: CPT

## 2019-12-06 PROCEDURE — 93005 ELECTROCARDIOGRAM TRACING: CPT

## 2019-12-06 PROCEDURE — 72148 MRI LUMBAR SPINE W/O DYE: CPT

## 2019-12-06 PROCEDURE — 85025 COMPLETE CBC W/AUTO DIFF WBC: CPT

## 2019-12-06 PROCEDURE — 99285 EMERGENCY DEPT VISIT HI MDM: CPT

## 2019-12-06 PROCEDURE — 74011250637 HC RX REV CODE- 250/637: Performed by: INTERNAL MEDICINE

## 2019-12-06 PROCEDURE — 82962 GLUCOSE BLOOD TEST: CPT

## 2019-12-06 PROCEDURE — 70450 CT HEAD/BRAIN W/O DYE: CPT

## 2019-12-06 PROCEDURE — 70551 MRI BRAIN STEM W/O DYE: CPT

## 2019-12-06 PROCEDURE — 74011636637 HC RX REV CODE- 636/637: Performed by: INTERNAL MEDICINE

## 2019-12-06 RX ORDER — ATORVASTATIN CALCIUM 40 MG/1
80 TABLET, FILM COATED ORAL DAILY
Status: DISCONTINUED | OUTPATIENT
Start: 2019-12-07 | End: 2019-12-06

## 2019-12-06 RX ORDER — FUROSEMIDE 10 MG/ML
20 INJECTION INTRAMUSCULAR; INTRAVENOUS ONCE
Status: ACTIVE | OUTPATIENT
Start: 2019-12-06 | End: 2019-12-07

## 2019-12-06 RX ORDER — AMLODIPINE BESYLATE 5 MG/1
5 TABLET ORAL DAILY
Status: DISCONTINUED | OUTPATIENT
Start: 2019-12-07 | End: 2019-12-07 | Stop reason: HOSPADM

## 2019-12-06 RX ORDER — METOPROLOL SUCCINATE 50 MG/1
50 TABLET, EXTENDED RELEASE ORAL DAILY
Status: DISCONTINUED | OUTPATIENT
Start: 2019-12-07 | End: 2019-12-07 | Stop reason: HOSPADM

## 2019-12-06 RX ORDER — HEPARIN SODIUM 5000 [USP'U]/ML
5000 INJECTION, SOLUTION INTRAVENOUS; SUBCUTANEOUS EVERY 8 HOURS
Status: DISCONTINUED | OUTPATIENT
Start: 2019-12-06 | End: 2019-12-06

## 2019-12-06 RX ORDER — CLOPIDOGREL BISULFATE 75 MG/1
75 TABLET ORAL DAILY
COMMUNITY
End: 2021-06-07 | Stop reason: SDUPTHER

## 2019-12-06 RX ORDER — IBUPROFEN 200 MG
1 TABLET ORAL DAILY
Status: DISCONTINUED | OUTPATIENT
Start: 2019-12-06 | End: 2019-12-06 | Stop reason: DRUGHIGH

## 2019-12-06 RX ORDER — DULOXETIN HYDROCHLORIDE 60 MG/1
60 CAPSULE, DELAYED RELEASE ORAL DAILY
Status: DISCONTINUED | OUTPATIENT
Start: 2019-12-07 | End: 2019-12-07 | Stop reason: HOSPADM

## 2019-12-06 RX ORDER — ACETAMINOPHEN 650 MG/1
650 SUPPOSITORY RECTAL
Status: DISCONTINUED | OUTPATIENT
Start: 2019-12-06 | End: 2019-12-07 | Stop reason: HOSPADM

## 2019-12-06 RX ORDER — CLOPIDOGREL BISULFATE 75 MG/1
75 TABLET ORAL DAILY
Status: DISCONTINUED | OUTPATIENT
Start: 2019-12-07 | End: 2019-12-07 | Stop reason: HOSPADM

## 2019-12-06 RX ORDER — INSULIN GLARGINE 100 [IU]/ML
26 INJECTION, SOLUTION SUBCUTANEOUS
Status: DISCONTINUED | OUTPATIENT
Start: 2019-12-06 | End: 2019-12-07 | Stop reason: HOSPADM

## 2019-12-06 RX ORDER — DIVALPROEX SODIUM 250 MG/1
500 TABLET, EXTENDED RELEASE ORAL 3 TIMES DAILY
Status: DISCONTINUED | OUTPATIENT
Start: 2019-12-06 | End: 2019-12-07 | Stop reason: HOSPADM

## 2019-12-06 RX ORDER — IBUPROFEN 200 MG
1 TABLET ORAL DAILY
Status: DISCONTINUED | OUTPATIENT
Start: 2019-12-07 | End: 2019-12-06

## 2019-12-06 RX ORDER — AMLODIPINE BESYLATE 5 MG/1
5 TABLET ORAL DAILY
COMMUNITY
End: 2021-04-30

## 2019-12-06 RX ORDER — DIVALPROEX SODIUM 500 MG/1
500 TABLET, EXTENDED RELEASE ORAL 3 TIMES DAILY
COMMUNITY
End: 2022-04-22 | Stop reason: SDUPTHER

## 2019-12-06 RX ORDER — INSULIN LISPRO 100 [IU]/ML
5 INJECTION, SOLUTION INTRAVENOUS; SUBCUTANEOUS
Status: DISCONTINUED | OUTPATIENT
Start: 2019-12-06 | End: 2019-12-07 | Stop reason: HOSPADM

## 2019-12-06 RX ORDER — LANOLIN ALCOHOL/MO/W.PET/CERES
325 CREAM (GRAM) TOPICAL
Status: DISCONTINUED | OUTPATIENT
Start: 2019-12-06 | End: 2019-12-07 | Stop reason: HOSPADM

## 2019-12-06 RX ORDER — FUROSEMIDE 40 MG/1
20 TABLET ORAL
COMMUNITY
End: 2021-04-30 | Stop reason: ALTCHOICE

## 2019-12-06 RX ORDER — IBUPROFEN 200 MG
1 TABLET ORAL DAILY
Status: DISCONTINUED | OUTPATIENT
Start: 2019-12-06 | End: 2019-12-07 | Stop reason: HOSPADM

## 2019-12-06 RX ORDER — GABAPENTIN 300 MG/1
300 CAPSULE ORAL 3 TIMES DAILY
Status: DISCONTINUED | OUTPATIENT
Start: 2019-12-06 | End: 2019-12-07 | Stop reason: HOSPADM

## 2019-12-06 RX ORDER — ATORVASTATIN CALCIUM 40 MG/1
80 TABLET, FILM COATED ORAL
Status: DISCONTINUED | OUTPATIENT
Start: 2019-12-06 | End: 2019-12-07 | Stop reason: HOSPADM

## 2019-12-06 RX ORDER — ACETAMINOPHEN 325 MG/1
650 TABLET ORAL
Status: DISCONTINUED | OUTPATIENT
Start: 2019-12-06 | End: 2019-12-07 | Stop reason: HOSPADM

## 2019-12-06 RX ADMIN — GABAPENTIN 300 MG: 300 CAPSULE ORAL at 22:39

## 2019-12-06 RX ADMIN — INSULIN GLARGINE 26 UNITS: 100 INJECTION, SOLUTION SUBCUTANEOUS at 22:39

## 2019-12-06 RX ADMIN — APIXABAN 5 MG: 5 TABLET, FILM COATED ORAL at 22:49

## 2019-12-06 RX ADMIN — ATORVASTATIN CALCIUM 80 MG: 40 TABLET, FILM COATED ORAL at 22:45

## 2019-12-06 RX ADMIN — DIVALPROEX SODIUM 500 MG: 500 TABLET, EXTENDED RELEASE ORAL at 22:39

## 2019-12-06 NOTE — ED TRIAGE NOTES
Triage Note: Patient awoke this morning with left arm weakness. Patient states that last known well was last night at 2200. Patient states that yesterday at 1000 she was unable to move entire body from 1000 to 1100.

## 2019-12-06 NOTE — PROGRESS NOTES
Admission Medication Reconciliation:    Information obtained from:  patient, chart review, insurance claim information  RxQuery data available¹:  YES    Comments/Recommendations: All medications/allergies have been reviewed and updated; last medication administration times reviewed and recorded. The patient was an excellent historian and reports she took her morning medications prior to presenting to the Emergency department. Added lisinopril to allergy list, causes cough. Changes made to Prior to Admission (PTA) Medication List:   ?   Medications Added:   - amlodipine, Plavix  ? Medications Changed:   - divalproex ER changed from 1500 mg QHS to 500 mg TID  - furosemide changed from 80 mg daily to 20 mg daily PRN  - Lantus changed from 28 units QHS to 26 units QHS  ? Medications Removed:   - ascorbic aicd  - aspirin 162 mg daily  - calcium carbonate  - cholecalciferol  - doxepin  - hydrocodone/APAP  - magnesium oxide  - nitroglycerin  - therapeutic multivitamin   - thiamine     ¹RxQuery pharmacy benefit data reflects medications filled and processed through the patient's insurance, however   this data does NOT capture whether the medication was picked up or is currently being taken by the patient. Allergies:  Voltaren [diclofenac sodium]; Dramamine ii [meclizine]; Imipramine; Levaquin [levofloxacin]; and Lisinopril    Significant PMH/Disease States:   Past Medical History:   Diagnosis Date    Arthritis     Bipolar 1 disorder ([facility] [address]     Diabetes ([facility] [address]     Gastrointestinal disorder     gastroparesis    Gastroparesis     GERD (gastroesophageal reflux disease)     Heart disease     Hypertension     Psychiatric disorder     Bipolar       Chief Complaint for this Admission:    Chief Complaint   Patient presents with    Extremity Weakness       Prior to Admission Medications:   Prior to Admission Medications   Prescriptions Last Dose Informant Patient Reported? Taking?    DULoxetine (CYMBALTA) 60 mg capsule 2019 at Unknown time  No Yes   Sig: Take 1 Cap by mouth daily. amLODIPine (NORVASC) 5 mg tablet 2019 at Unknown time  Yes Yes   Sig: Take 5 mg by mouth daily. apixaban (ELIQUIS) 5 mg tablet 2019 at Unknown time  No Yes   Sig: Take 1 Tab by mouth two (2) times a day. Indications: Deep Vein Thrombosis Prevention   atorvastatin (LIPITOR) 80 mg tablet 2019 at Unknown time  Yes Yes   Sig: Take 80 mg by mouth daily. clopidogrel (PLAVIX) 75 mg tab   Yes Yes   Sig: Take 75 mg by mouth daily. divalproex ER (DEPAKOTE ER) 500 mg ER tablet   Yes Yes   Sig: Take 500 mg by mouth three (3) times daily. ferrous sulfate 325 mg (65 mg iron) tablet 2019 at Unknown time  No Yes   Sig: Take 1 Tab by mouth three (3) times daily (with meals). Indications: anemia from inadequate iron   furosemide (LASIX) 40 mg tablet 2019 at Unknown time  Yes Yes   Sig: Take 20 mg by mouth daily as needed (edema). gabapentin (NEURONTIN) 300 mg capsule   Yes No   Sig: Take 300 mg by mouth three (3) times daily. insulin glargine (LANTUS) 100 unit/mL injection 2019 at Unknown time  Yes Yes   Si Units by SubCUTAneous route nightly. insulin lispro (HUMALOG) 100 unit/mL injection 2019 at Unknown time  Yes Yes   Si Units by SubCUTAneous route Before breakfast, lunch, and dinner. Plus sliding scale if needed  Indications: type 1 diabetes mellitus   metoprolol succinate (TOPROL-XL) 50 mg XL tablet 2019 at Unknown time  No Yes   Sig: Take 1 Tab by mouth daily. Indications: high blood pressure   nitroglycerin (NITROSTAT) 0.4 mg SL tablet   Yes No   Si.4 mg by SubLINGual route every five (5) minutes as needed for Chest Pain. Up to 3 doses. pantoprazole (PROTONIX) 40 mg tablet   Yes No   Sig: Take 40 mg by mouth daily as needed.  Indications: gastroesophageal reflux disease      Facility-Administered Medications: None       Thank you for allowing pharmacy to participate in the coordination of this patient's care. If you have any other questions, please contact the medication reconciliation pharmacist at [phone]. Rainer Gonzalez, Pharm. D., BCPS

## 2019-12-06 NOTE — H&P
HISTORY AND PHYSICAL      PCP: Feli Perez MD  History source: Patient      CC: right hand numbness/ weakness      HPI: A 37year old female patient with CAD s/p CABG in 6/19, s/p 2 stents in 10/19, CHF EF 26-30%, HTN, IDDM, PAD, Bipolar presented to Ed for evaluation of right hand weakness and numbness. Patient slept last night in a dining chair and woke up at 3am and noticed that her right hand is weak. She slept with her hand over the chair. She then went to bed to sleep. She woke up at 10 am and still has right hand numbness and so she came to ED. She denied any other weakness, headache, slurred speech, dysphagia, urinary or stool incontinence. She had an episode of severe back pain yesterday morning which has improved now but still has it. She also c/o left groin pain where she had catheter insert for cardiac procedures. Denied cheat pain, sob, palpitations, abd pain or fever. She c/o increased urinary frequency. In ED, Code Stroke called in. CT head was negative. Tele neurology recommended admission with MRI work up. Hospitalist consulted for admission. PMH/PSH:  Past Medical History:   Diagnosis Date    Arthritis     B[facility] 1 disorder ([facility] [address]     Diabetes ([facility] [address]     Gastrointestinal disorder     gastroparesis    Gastroparesis     GERD (gastroesophageal reflux disease)     Heart disease     Hypertension     Psychiatric disorder     Bipolar     Past Surgical History:   Procedure Laterality Date    HX ANKLE FRACTURE TX      HX CAROTID STENT      HX GYN      BTL    HX ORTHOPAEDIC      toe       Home meds:   Prior to Admission medications    Medication Sig Start Date End Date Taking? Authorizing Provider   divalproex ER (DEPAKOTE ER) 500 mg ER tablet Take 500 mg by mouth three (3) times daily. Yes Provider, Historical   furosemide (LASIX) 40 mg tablet Take 20 mg by mouth daily as needed (edema). Yes Provider, Historical   amLODIPine (NORVASC) 5 mg tablet Take 5 mg by mouth daily. Yes Provider, Historical   clopidogrel (PLAVIX) 75 mg tab Take 75 mg by mouth daily. Yes Provider, Historical   insulin glargine (LANTUS) 100 unit/mL injection 26 Units by SubCUTAneous route nightly. Yes Provider, Historical   metoprolol succinate (TOPROL-XL) 50 mg XL tablet Take 1 Tab by mouth daily. Indications: high blood pressure 7/30/19  Yes Norval Spurling, MD   apixaban (ELIQUIS) 5 mg tablet Take 1 Tab by mouth two (2) times a day. Indications: Deep Vein Thrombosis Prevention 7/30/19  Yes Norval Spurling, MD   ferrous sulfate 325 mg (65 mg iron) tablet Take 1 Tab by mouth three (3) times daily (with meals). Indications: anemia from inadequate iron 7/30/19  Yes Norval Spurling, MD   atorvastatin (LIPITOR) 80 mg tablet Take 80 mg by mouth daily. Yes Provider, Historical   pantoprazole (PROTONIX) 40 mg tablet Take 40 mg by mouth daily as needed. Indications: gastroesophageal reflux disease   Yes Provider, Historical   gabapentin (NEURONTIN) 300 mg capsule Take 300 mg by mouth three (3) times daily. Yes Provider, Historical   DULoxetine (CYMBALTA) 60 mg capsule Take 1 Cap by mouth daily. 1/21/19  Yes Axel Gutierrez MD   insulin lispro (HUMALOG) 100 unit/mL injection 5 Units by SubCUTAneous route Before breakfast, lunch, and dinner. Plus sliding scale if needed  Indications: type 1 diabetes mellitus   Yes Other, MD Lupe   nitroglycerin (NITROSTAT) 0.4 mg SL tablet 0.4 mg by SubLINGual route every five (5) minutes as needed for Chest Pain. Up to 3 doses. Provider, Historical       Allergies:   Allergies   Allergen Reactions    Voltaren [Diclofenac Sodium] Anaphylaxis    Dramamine Ii [Meclizine] Itching     Auditory and Visual hallucinations    Imipramine Hives    Levaquin [Levofloxacin] Itching and Nausea and Vomiting    Lisinopril Cough       FH:  Family History   Problem Relation Age of Onset    Thyroid Disease Mother     Hypertension Father     Heart Disease Father     Diabetes Sister     Heart Disease Sister     Hypertension Sister     Thyroid Disease Brother        SH:  Social History     Tobacco Use    Smoking status: Current Every Day Smoker     Packs/day: 0.25    Smokeless tobacco: Never Used   Substance Use Topics    Alcohol use: Yes     Comment: occasionally       ROS: A comprehensive review of systems was negative except for that written in the HPI. PHYSICAL EXAM:  Visit Vitals  /79   Pulse 72   Temp 98 °F (36.7 °C)   Resp 13   Ht 5' 3\" (1.6 m)   Wt 78.5 kg (173 lb 1 oz)   SpO2 100%   BMI 30.66 kg/m²       Gen: NAD  HEENT: anicteric sclerae, normal conjunctiva, oropharynx clear, MM moist  Neck: supple, trachea midline, no adenopathy  Heart: RRR, no MRG, no JVD, trace edema  Lungs: CTA b/l, non-labored respirations  Abd: soft, NT, ND, BS+, no organomegaly  Extr: warm  Skin: dry, no rash  Neuro: alert and oriented. CN 2-12 intact. Right wrist drop,  hand claw like. No . Sensations decreased over the hand and forearm  Psych: normal mood, appropriate affect      Labs/Imaging:  Recent Results (from the past 24 hour(s))   GLUCOSE, POC    Collection Time: 12/06/19 12:32 PM   Result Value Ref Range    Glucose (POC) 148 (H) 65 - 100 mg/dL    Performed by Terese Orosco    CBC WITH AUTOMATED DIFF    Collection Time: 12/06/19  1:07 PM   Result Value Ref Range    WBC 8.6 3.6 - 11.0 K/uL    RBC 5.31 (H) 3.80 - 5.20 M/uL    HGB 13.7 11.5 - 16.0 g/dL    HCT 43.8 35.0 - 47.0 %    MCV 82.5 80.0 - 99.0 FL    MCH 25.8 (L) 26.0 - 34.0 PG    MCHC 31.3 30.0 - 36.5 g/dL    RDW 19.9 (H) 11.5 - 14.5 %    PLATELET 969 144 - 769 K/uL    MPV 10.6 8.9 - 12.9 FL    NRBC 0.0 0  WBC    ABSOLUTE NRBC 0.00 0.00 - 0.01 K/uL    NEUTROPHILS 65 32 - 75 %    LYMPHOCYTES 22 12 - 49 %    MONOCYTES 9 5 - 13 %    EOSINOPHILS 2 0 - 7 %    BASOPHILS 1 0 - 1 %    IMMATURE GRANULOCYTES 1 (H) 0.0 - 0.5 %    ABS. NEUTROPHILS 5.7 1.8 - 8.0 K/UL    ABS. LYMPHOCYTES 1.9 0.8 - 3.5 K/UL    ABS.  MONOCYTES 0.8 0.0 - 1.0 K/UL ABS. EOSINOPHILS 0.2 0.0 - 0.4 K/UL    ABS. BASOPHILS 0.0 0.0 - 0.1 K/UL    ABS. IMM. GRANS. 0.1 (H) 0.00 - 0.04 K/UL    DF AUTOMATED     METABOLIC PANEL, COMPREHENSIVE    Collection Time: 12/06/19  1:07 PM   Result Value Ref Range    Sodium 136 136 - 145 mmol/L    Potassium 5.0 3.5 - 5.1 mmol/L    Chloride 105 97 - 108 mmol/L    CO2 27 21 - 32 mmol/L    Anion gap 4 (L) 5 - 15 mmol/L    Glucose 201 (H) 65 - 100 mg/dL    BUN 22 (H) 6 - 20 MG/DL    Creatinine 1.16 (H) 0.55 - 1.02 MG/DL    BUN/Creatinine ratio 19 12 - 20      GFR est AA >60 >60 ml/min/1.73m2    GFR est non-AA 51 (L) >60 ml/min/1.73m2    Calcium 8.5 8.5 - 10.1 MG/DL    Bilirubin, total 0.4 0.2 - 1.0 MG/DL    ALT (SGPT) 18 12 - 78 U/L    AST (SGOT) 29 15 - 37 U/L    Alk. phosphatase 113 45 - 117 U/L    Protein, total 6.4 6.4 - 8.2 g/dL    Albumin 2.9 (L) 3.5 - 5.0 g/dL    Globulin 3.5 2.0 - 4.0 g/dL    A-G Ratio 0.8 (L) 1.1 - 2.2     SAMPLES BEING HELD    Collection Time: 12/06/19  1:07 PM   Result Value Ref Range    SAMPLES BEING HELD  1 RED, 1 BLUE     COMMENT        Add-on orders for these samples will be processed based on acceptable specimen integrity and analyte stability, which may vary by analyte.    EKG, 12 LEAD, INITIAL    Collection Time: 12/06/19  1:16 PM   Result Value Ref Range    Ventricular Rate 72 BPM    Atrial Rate 72 BPM    P-R Interval 154 ms    QRS Duration 88 ms    Q-T Interval 418 ms    QTC Calculation (Bezet) 457 ms    Calculated P Axis 42 degrees    Calculated R Axis 101 degrees    Calculated T Axis 90 degrees    Diagnosis       Normal sinus rhythm  Low voltage QRS  When compared with ECG of 05-AUG-2019 17:25,  Nonspecific T wave abnormality no longer evident in Lateral leads  Confirmed by Domo Safety Ryan ([id number]) on 12/6/2019 5:20:14 PM         Recent Labs     12/06/19  1307   WBC 8.6   HGB 13.7   HCT 43.8        Recent Labs     12/06/19  1307      K 5.0      CO2 27   BUN 22*   CREA 1.16*   *   CA 8.5 Recent Labs     12/06/19  1307   SGOT 29   ALT 18      TBILI 0.4   TP 6.4   ALB 2.9*   GLOB 3.5       No results for input(s): CPK, CKNDX, TROIQ in the last 72 hours. No lab exists for component: CPKMB    No results for input(s): INR, PTP, APTT, INREXT in the last 72 hours. No results for input(s): PH, PCO2, PO2 in the last 72 hours. Assessment & Plan:   Right hand weakness/ numbness  - admit to obs, neuro  - suspicion for right radial nerve palsy. Will r/o stroke  - CT head: negative  - Lipid panel, A1c ordered  - Neurology consulted  - MRI brain ordered  - PT/OT    Sever back pain ? Sciatica  - MRI L spine ordered   - pain controlled now    CAD s/p CABG in 6/19, s/p 2 stents in 10/19  CHF EF 26-30%  - currently stable  - recent echo 8/2019: EF 26-30%  - c/w home meds metoprolol, plavix, eliquis, statin  - mild edema of legs, one dose of IV lasix given     DM   - c/w home meds lantus    HTN - BP stable.  C/w amlodipine, metoprolol    Bipolar: c/w Cymbalta, depakote   Tobacco abuse- on nicotine patch     DVT ppx: Eliquis   Code status: Full  Disposition: TBD    Signed By: Enrrique Fabian MD     December 6, 2019

## 2019-12-06 NOTE — ED PROVIDER NOTES
37 y.o. female with past medical history significant for gastroparesis, DM, HTN, bipolar, and heart disease who presents from home with chief complaint of extremity weakness. Pt complains of left hand weakness and numbness that she awoke with around 3:00 AM today. Pt states she went to bed at baseline around 10:00 PM. Pt notes an episode yesterday in which she \"could not move her whole body\" from 10:00 AM to 11:00 AM. Pt denies headache. There are no other acute medical concerns at this time. Social hx: Current Smoker. Occasional EtOH Use. PCP: Colleen Ayon MD    Note written by Brii Gomez [facility] [place], [facility], as dictated by Luis Alfredo Lim MD 12:44 PM      The history is provided by the patient.         Past Medical History:   Diagnosis Date    Arthritis     [facility] [address]     Diabetes ([facility] [address]     Gastrointestinal disorder     gastroparesis    Gastroparesis     GERD (gastroesophageal reflux disease)     Heart disease     Hypertension     Psychiatric disorder     Bipolar       Past Surgical History:   Procedure Laterality Date    HX ANKLE FRACTURE TX      HX CAROTID STENT      HX GYN      BTL    HX ORTHOPAEDIC      toe         Family History:   Problem Relation Age of Onset    Thyroid Disease Mother     Hypertension Father     Heart Disease Father     Diabetes Sister     Heart Disease Sister     Hypertension Sister     Thyroid Disease Brother        Social History     Socioeconomic History    Marital status:      Spouse name: Not on file    Number of children: Not on file    Years of education: Not on file    Highest education level: Not on file   Occupational History    Not on file   Social Needs    Financial resource strain: Not on file    Food insecurity:     Worry: Not on file     Inability: Not on file    Transportation needs:     Medical: Not on file     Non-medical: Not on file   Tobacco Use    Smoking status: Current Every Day Smoker     Packs/day: 0.25    Smokeless tobacco: Never Used   Substance and Sexual Activity    Alcohol use: Yes     Comment: occasionally    Drug use: No    Sexual activity: Yes   Lifestyle    Physical activity:     Days per week: Not on file     Minutes per session: Not on file    Stress: Not on file   Relationships    Social connections:     Talks on phone: Not on file     Gets together: Not on file     Attends Baptism service: Not on file     Active member of club or organization: Not on file     Attends meetings of clubs or organizations: Not on file     Relationship status: Not on file    Intimate partner violence:     Fear of current or ex partner: Not on file     Emotionally abused: Not on file     Physically abused: Not on file     Forced sexual activity: Not on file   Other Topics Concern    Not on file   Social History Narrative    Not on file         ALLERGIES: Voltaren [diclofenac sodium]; Dramamine ii [meclizine]; Imipramine; and Levaquin [levofloxacin]    Review of Systems   Constitutional: Negative for activity change, appetite change and fatigue. HENT: Negative for ear pain, facial swelling, sore throat and trouble swallowing. Eyes: Negative for pain, discharge and visual disturbance. Respiratory: Negative for chest tightness, shortness of breath and wheezing. Cardiovascular: Negative for chest pain and palpitations. Gastrointestinal: Negative for abdominal pain, blood in stool, nausea and vomiting. Genitourinary: Negative for difficulty urinating, flank pain and hematuria. Musculoskeletal: Negative for arthralgias, joint swelling, myalgias and neck pain. Skin: Negative for color change and rash. Neurological: Positive for weakness and numbness. Negative for dizziness and headaches. Hematological: Negative for adenopathy. Does not bruise/bleed easily. Psychiatric/Behavioral: Negative for behavioral problems, confusion and sleep disturbance.    All other systems reviewed and are negative. Vitals:    12/06/19 1231   BP: 137/83   Pulse: 81   Resp: 20   Temp: 98 °F (36.7 °C)   SpO2: 98%            Physical Exam  Vitals signs and nursing note reviewed. Constitutional:       General: She is not in acute distress. Appearance: She is well-developed. HENT:      Head: Normocephalic and atraumatic. Nose: Nose normal.   Eyes:      General: No scleral icterus. Conjunctiva/sclera: Conjunctivae normal.      Pupils: Pupils are equal, round, and reactive to light. Neck:      Musculoskeletal: Normal range of motion and neck supple. Thyroid: No thyromegaly. Vascular: No JVD. Trachea: No tracheal deviation. Comments: No carotid bruits noted. Cardiovascular:      Rate and Rhythm: Normal rate and regular rhythm. Heart sounds: Normal heart sounds. No murmur. No friction rub. No gallop. Pulmonary:      Effort: Pulmonary effort is normal. No respiratory distress. Breath sounds: Normal breath sounds. No wheezing or rales. Chest:      Chest wall: No tenderness. Abdominal:      General: Bowel sounds are normal. There is no distension. Palpations: Abdomen is soft. There is no mass. Tenderness: There is no tenderness. There is no guarding or rebound. Musculoskeletal: Normal range of motion. General: No tenderness. Comments: Good flexion of the left elbow. Lymphadenopathy:      Cervical: No cervical adenopathy. Skin:     General: Skin is warm and dry. Findings: No erythema or rash. Neurological:      General: No focal deficit present. Mental Status: She is alert and oriented to person, place, and time. Cranial Nerves: No cranial nerve deficit. Coordination: Coordination normal.      Deep Tendon Reflexes: Reflexes are normal and symmetric. Comments: Acts like she has a wrist drop. Unable to hold arms out evenly. Will not  hand on the left.    Psychiatric:         Behavior: Behavior normal. Thought Content: Thought content normal.         Judgment: Judgment normal.      Note written by Brii Funez, as dictated by Luis Alfredo Lim MD 12:49 PM      MDM       Procedures      CONSULT NOTE:  12:46 PM Luis Alfredo Lim MD spoke with Dr. Tori Campuzano, Consult for Teleneurology. Discussed available diagnostic tests and clinical findings. Dr. Tori Campuzano will evaluate the patient. Recommends admission for stroke work up.      1:20 PM  Dr. Tori Campuzano states the patient told her that she fell asleep and woke up with her arm on a chair. Dr. Tori Campuzano noticed some left leg weakness as well. Recommends admission for work up. ED EKG interpretation:  Rhythm: normal sinus rhythm; and regular . Rate (approx.): 72; Axis: normal; ST/T wave: normal; No ectopy or acute ischemic change. Note written by Brii Shannonbiltamela Funez, as dictated by Ermias Yusuf MD 1:21 PM    1:45 PM  Awaiting CMP. Then will admit. Suspect radial nerve palsy left      Hospitalist Gil Text for Admission  1:59 PM    ED Room Number: ER10/10  Patient Name and age:  Mirella [address] 37 y.o.  female  Working Diagnosis: Weakness left arm/legReadmission: no  Isolation Requirements:  no  Recommended Level of Care:  telemetry  Code Status:  full  Other:    Department:[facility] Adult ED - [phone]    Telemetry neurology would like the patient admitted for MRI. She did not feel a CTA was necessary. If the MRI was negative she would go to EMG. See her note. Suspect the patient may have a radial nerve palsy. She did wake up during the night with her arm over the back of a chair.

## 2019-12-07 VITALS
DIASTOLIC BLOOD PRESSURE: 64 MMHG | HEART RATE: 63 BPM | OXYGEN SATURATION: 95 % | TEMPERATURE: 98.2 F | SYSTOLIC BLOOD PRESSURE: 144 MMHG | RESPIRATION RATE: 12 BRPM | BODY MASS INDEX: 30.66 KG/M2 | WEIGHT: 173.06 LBS | HEIGHT: 63 IN

## 2019-12-07 PROBLEM — G56.32 RADIAL NEUROPATHY, LEFT: Status: ACTIVE | Noted: 2019-12-06

## 2019-12-07 LAB
ANION GAP SERPL CALC-SCNC: 2 MMOL/L (ref 5–15)
APPEARANCE UR: ABNORMAL
BACTERIA URNS QL MICRO: ABNORMAL /HPF
BASOPHILS # BLD: 0 K/UL (ref 0–0.1)
BASOPHILS NFR BLD: 1 % (ref 0–1)
BILIRUB UR QL: NEGATIVE
BUN SERPL-MCNC: 20 MG/DL (ref 6–20)
BUN/CREAT SERPL: 19 (ref 12–20)
CALCIUM SERPL-MCNC: 8.2 MG/DL (ref 8.5–10.1)
CHLORIDE SERPL-SCNC: 105 MMOL/L (ref 97–108)
CHOLEST SERPL-MCNC: 102 MG/DL
CO2 SERPL-SCNC: 30 MMOL/L (ref 21–32)
COLOR UR: ABNORMAL
CREAT SERPL-MCNC: 1.03 MG/DL (ref 0.55–1.02)
DIFFERENTIAL METHOD BLD: ABNORMAL
EOSINOPHIL # BLD: 0.2 K/UL (ref 0–0.4)
EOSINOPHIL NFR BLD: 2 % (ref 0–7)
EPITH CASTS URNS QL MICRO: ABNORMAL /LPF
ERYTHROCYTE [DISTWIDTH] IN BLOOD BY AUTOMATED COUNT: 19.5 % (ref 11.5–14.5)
EST. AVERAGE GLUCOSE BLD GHB EST-MCNC: 220 MG/DL
GLUCOSE BLD STRIP.AUTO-MCNC: 232 MG/DL (ref 65–100)
GLUCOSE BLD STRIP.AUTO-MCNC: 334 MG/DL (ref 65–100)
GLUCOSE SERPL-MCNC: 236 MG/DL (ref 65–100)
GLUCOSE UR STRIP.AUTO-MCNC: NEGATIVE MG/DL
HBA1C MFR BLD: 9.3 % (ref 4–5.6)
HCT VFR BLD AUTO: 40.8 % (ref 35–47)
HDLC SERPL-MCNC: 44 MG/DL
HDLC SERPL: 2.3 {RATIO} (ref 0–5)
HGB BLD-MCNC: 12.6 G/DL (ref 11.5–16)
HGB UR QL STRIP: ABNORMAL
HYALINE CASTS URNS QL MICRO: ABNORMAL /LPF (ref 0–5)
IMM GRANULOCYTES # BLD AUTO: 0.1 K/UL (ref 0–0.04)
IMM GRANULOCYTES NFR BLD AUTO: 1 % (ref 0–0.5)
KETONES UR QL STRIP.AUTO: NEGATIVE MG/DL
LDLC SERPL CALC-MCNC: 43.8 MG/DL (ref 0–100)
LEUKOCYTE ESTERASE UR QL STRIP.AUTO: ABNORMAL
LIPID PROFILE,FLP: NORMAL
LYMPHOCYTES # BLD: 2.5 K/UL (ref 0.8–3.5)
LYMPHOCYTES NFR BLD: 37 % (ref 12–49)
MCH RBC QN AUTO: 25.4 PG (ref 26–34)
MCHC RBC AUTO-ENTMCNC: 30.9 G/DL (ref 30–36.5)
MCV RBC AUTO: 82.1 FL (ref 80–99)
MONOCYTES # BLD: 0.6 K/UL (ref 0–1)
MONOCYTES NFR BLD: 9 % (ref 5–13)
NEUTS SEG # BLD: 3.3 K/UL (ref 1.8–8)
NEUTS SEG NFR BLD: 50 % (ref 32–75)
NITRITE UR QL STRIP.AUTO: POSITIVE
NRBC # BLD: 0 K/UL (ref 0–0.01)
NRBC BLD-RTO: 0 PER 100 WBC
PH UR STRIP: 6 [PH] (ref 5–8)
PLATELET # BLD AUTO: 178 K/UL (ref 150–400)
PMV BLD AUTO: 10.3 FL (ref 8.9–12.9)
POTASSIUM SERPL-SCNC: 4.2 MMOL/L (ref 3.5–5.1)
PROT UR STRIP-MCNC: 100 MG/DL
RBC # BLD AUTO: 4.97 M/UL (ref 3.8–5.2)
RBC #/AREA URNS HPF: >100 /HPF (ref 0–5)
SERVICE CMNT-IMP: ABNORMAL
SERVICE CMNT-IMP: ABNORMAL
SODIUM SERPL-SCNC: 137 MMOL/L (ref 136–145)
SP GR UR REFRACTOMETRY: 1.02 (ref 1–1.03)
TRIGL SERPL-MCNC: 71 MG/DL (ref ?–150)
UA: UC IF INDICATED,UAUC: ABNORMAL
UROBILINOGEN UR QL STRIP.AUTO: 1 EU/DL (ref 0.2–1)
VLDLC SERPL CALC-MCNC: 14.2 MG/DL
WBC # BLD AUTO: 6.7 K/UL (ref 3.6–11)
WBC URNS QL MICRO: ABNORMAL /HPF (ref 0–4)

## 2019-12-07 PROCEDURE — 97161 PT EVAL LOW COMPLEX 20 MIN: CPT

## 2019-12-07 PROCEDURE — 36415 COLL VENOUS BLD VENIPUNCTURE: CPT

## 2019-12-07 PROCEDURE — 87077 CULTURE AEROBIC IDENTIFY: CPT

## 2019-12-07 PROCEDURE — 99218 HC RM OBSERVATION: CPT

## 2019-12-07 PROCEDURE — 87086 URINE CULTURE/COLONY COUNT: CPT

## 2019-12-07 PROCEDURE — 97165 OT EVAL LOW COMPLEX 30 MIN: CPT

## 2019-12-07 PROCEDURE — 74011250636 HC RX REV CODE- 250/636: Performed by: INTERNAL MEDICINE

## 2019-12-07 PROCEDURE — 96374 THER/PROPH/DIAG INJ IV PUSH: CPT

## 2019-12-07 PROCEDURE — 87186 SC STD MICRODIL/AGAR DIL: CPT

## 2019-12-07 PROCEDURE — 82962 GLUCOSE BLOOD TEST: CPT

## 2019-12-07 PROCEDURE — 74011636637 HC RX REV CODE- 636/637: Performed by: INTERNAL MEDICINE

## 2019-12-07 PROCEDURE — 83036 HEMOGLOBIN GLYCOSYLATED A1C: CPT

## 2019-12-07 PROCEDURE — 80048 BASIC METABOLIC PNL TOTAL CA: CPT

## 2019-12-07 PROCEDURE — 81001 URINALYSIS AUTO W/SCOPE: CPT

## 2019-12-07 PROCEDURE — 74011250637 HC RX REV CODE- 250/637: Performed by: INTERNAL MEDICINE

## 2019-12-07 PROCEDURE — 80061 LIPID PANEL: CPT

## 2019-12-07 PROCEDURE — 85025 COMPLETE CBC W/AUTO DIFF WBC: CPT

## 2019-12-07 PROCEDURE — 97116 GAIT TRAINING THERAPY: CPT

## 2019-12-07 RX ORDER — IBUPROFEN 200 MG
1 TABLET ORAL DAILY
Qty: 30 PATCH | Refills: 0 | Status: SHIPPED | OUTPATIENT
Start: 2019-12-08 | End: 2020-01-07

## 2019-12-07 RX ORDER — AMOXICILLIN AND CLAVULANATE POTASSIUM 875; 125 MG/1; MG/1
1 TABLET, FILM COATED ORAL EVERY 12 HOURS
Status: DISCONTINUED | OUTPATIENT
Start: 2019-12-07 | End: 2019-12-07 | Stop reason: HOSPADM

## 2019-12-07 RX ORDER — INSULIN LISPRO 100 [IU]/ML
INJECTION, SOLUTION INTRAVENOUS; SUBCUTANEOUS
Status: DISCONTINUED | OUTPATIENT
Start: 2019-12-07 | End: 2019-12-07 | Stop reason: HOSPADM

## 2019-12-07 RX ORDER — INSULIN LISPRO 100 [IU]/ML
INJECTION, SOLUTION INTRAVENOUS; SUBCUTANEOUS
Status: DISCONTINUED | OUTPATIENT
Start: 2019-12-07 | End: 2019-12-07

## 2019-12-07 RX ORDER — AMOXICILLIN AND CLAVULANATE POTASSIUM 875; 125 MG/1; MG/1
1 TABLET, FILM COATED ORAL EVERY 12 HOURS
Qty: 10 TAB | Refills: 0 | Status: SHIPPED | OUTPATIENT
Start: 2019-12-07 | End: 2019-12-12

## 2019-12-07 RX ORDER — MAGNESIUM SULFATE 100 %
4 CRYSTALS MISCELLANEOUS AS NEEDED
Status: DISCONTINUED | OUTPATIENT
Start: 2019-12-07 | End: 2019-12-07 | Stop reason: HOSPADM

## 2019-12-07 RX ORDER — DEXTROSE MONOHYDRATE 100 MG/ML
0-250 INJECTION, SOLUTION INTRAVENOUS AS NEEDED
Status: DISCONTINUED | OUTPATIENT
Start: 2019-12-07 | End: 2019-12-07 | Stop reason: HOSPADM

## 2019-12-07 RX ORDER — FUROSEMIDE 10 MG/ML
20 INJECTION INTRAMUSCULAR; INTRAVENOUS ONCE
Status: COMPLETED | OUTPATIENT
Start: 2019-12-07 | End: 2019-12-07

## 2019-12-07 RX ORDER — FLUCONAZOLE 100 MG/1
150 TABLET ORAL
Status: COMPLETED | OUTPATIENT
Start: 2019-12-07 | End: 2019-12-07

## 2019-12-07 RX ADMIN — AMOXICILLIN AND CLAVULANATE POTASSIUM 1 TABLET: 875; 125 TABLET, FILM COATED ORAL at 10:32

## 2019-12-07 RX ADMIN — FLUCONAZOLE 150 MG: 100 TABLET ORAL at 10:32

## 2019-12-07 RX ADMIN — METOPROLOL SUCCINATE 50 MG: 50 TABLET, EXTENDED RELEASE ORAL at 08:50

## 2019-12-07 RX ADMIN — APIXABAN 5 MG: 5 TABLET, FILM COATED ORAL at 08:50

## 2019-12-07 RX ADMIN — AMLODIPINE BESYLATE 5 MG: 5 TABLET ORAL at 08:51

## 2019-12-07 RX ADMIN — INSULIN LISPRO 5 UNITS: 100 INJECTION, SOLUTION INTRAVENOUS; SUBCUTANEOUS at 08:55

## 2019-12-07 RX ADMIN — INSULIN LISPRO 5 UNITS: 100 INJECTION, SOLUTION INTRAVENOUS; SUBCUTANEOUS at 12:28

## 2019-12-07 RX ADMIN — CLOPIDOGREL BISULFATE 75 MG: 75 TABLET ORAL at 08:51

## 2019-12-07 RX ADMIN — FERROUS SULFATE TAB 325 MG (65 MG ELEMENTAL FE) 325 MG: 325 (65 FE) TAB at 12:28

## 2019-12-07 RX ADMIN — FERROUS SULFATE TAB 325 MG (65 MG ELEMENTAL FE) 325 MG: 325 (65 FE) TAB at 08:50

## 2019-12-07 RX ADMIN — GABAPENTIN 300 MG: 300 CAPSULE ORAL at 08:50

## 2019-12-07 RX ADMIN — DULOXETINE HYDROCHLORIDE 60 MG: 60 CAPSULE, DELAYED RELEASE ORAL at 08:50

## 2019-12-07 RX ADMIN — FUROSEMIDE 20 MG: 10 INJECTION, SOLUTION INTRAMUSCULAR; INTRAVENOUS at 10:34

## 2019-12-07 RX ADMIN — DIVALPROEX SODIUM 500 MG: 500 TABLET, EXTENDED RELEASE ORAL at 08:50

## 2019-12-07 RX ADMIN — INSULIN LISPRO 7 UNITS: 100 INJECTION, SOLUTION INTRAVENOUS; SUBCUTANEOUS at 12:27

## 2019-12-07 NOTE — DISCHARGE SUMMARY
Discharge Summary     Patient:  Savana Marc       MRN: [id number]       YOB: 1975       Age: 37 y.o. Date of admission:  12/6/2019    Date of discharge:  12/7/2019    Primary care provider: Dr. Bonita Kline MD    Admitting provider:  Aydee Hutchinson MD    Discharging provider:  Jaime Morrow [address] [phone]. If unavailable, call [phone] and ask the  to page the triage hospitalist.    Consultations  · IP CONSULT TO NEUROLOGY    Procedures  · * No surgery found *    Discharge destination: home with outpt OT. The patient is stable for discharge. Admission diagnosis  · TIA (transient ischemic attack) [G45.9]    Current Discharge Medication List      START taking these medications    Details   nicotine (NICODERM CQ) 14 mg/24 hr patch 1 Patch by TransDERmal route daily for 30 days. Qty: 30 Patch, Refills: 0      amoxicillin-clavulanate (AUGMENTIN) 875-125 mg per tablet Take 1 Tab by mouth every twelve (12) hours for 5 days. Qty: 10 Tab, Refills: 0      OTHER Outpatient occupational therapy  Qty: 1 Units, Refills: 0         CONTINUE these medications which have NOT CHANGED    Details   divalproex ER (DEPAKOTE ER) 500 mg ER tablet Take 500 mg by mouth three (3) times daily. furosemide (LASIX) 40 mg tablet Take 20 mg by mouth daily as needed (edema). amLODIPine (NORVASC) 5 mg tablet Take 5 mg by mouth daily. clopidogrel (PLAVIX) 75 mg tab Take 75 mg by mouth daily. insulin glargine (LANTUS) 100 unit/mL injection 26 Units by SubCUTAneous route nightly. metoprolol succinate (TOPROL-XL) 50 mg XL tablet Take 1 Tab by mouth daily. Indications: high blood pressure  Qty: 30 Tab, Refills: 0      apixaban (ELIQUIS) 5 mg tablet Take 1 Tab by mouth two (2) times a day.  Indications: Deep Vein Thrombosis Prevention  Qty: 30 Tab, Refills: 0      ferrous sulfate 325 mg (65 mg iron) tablet Take 1 Tab by mouth three (3) times daily (with meals). Indications: anemia from inadequate iron  Qty: 90 Tab, Refills: 0      atorvastatin (LIPITOR) 80 mg tablet Take 80 mg by mouth daily. pantoprazole (PROTONIX) 40 mg tablet Take 40 mg by mouth daily as needed. Indications: gastroesophageal reflux disease      gabapentin (NEURONTIN) 300 mg capsule Take 300 mg by mouth three (3) times daily. DULoxetine (CYMBALTA) 60 mg capsule Take 1 Cap by mouth daily. Qty: 90 Cap, Refills: 1    Associated Diagnoses: Fibromyalgia      insulin lispro (HUMALOG) 100 unit/mL injection 5 Units by SubCUTAneous route Before breakfast, lunch, and dinner. Plus sliding scale if needed  Indications: type 1 diabetes mellitus      nitroglycerin (NITROSTAT) 0.4 mg SL tablet 0.4 mg by SubLINGual route every five (5) minutes as needed for Chest Pain. Up to 3 doses. Follow-up Information     Follow up With Specialties Details Why Contact Info    Ambreen Deras MD Family Practice In 1 week  [facility]. [facility]  [phone]            Final discharge diagnoses and brief hospital course  A 37year old female patient with CAD s/p CABG in 6/19, s/p 2 stents in 10/19, CHF EF 26-30%, HTN, IDDM, PAD, Bipolar presented to Ed for evaluation of right hand weakness and numbness. Patient slept last night in a dining chair and woke up at 3am and noticed that her right hand is weak. She slept with her hand over the chair. She then went to bed to sleep. She woke up at 10 am and still has right hand numbness and so she came to ED. She denied any other weakness, headache, slurred speech, dysphagia, urinary or stool incontinence. She had an episode of severe back pain yesterday morning which has improved now but still has it. She also c/o left groin pain where she had catheter insert for cardiac procedures. Denied cheat pain, sob, palpitations, abd pain or fever.  She c/o increased urinary frequency. In ED, Code Stroke called in. CT head was negative. Tele neurology recommended admission with MRI work up. Hospitalist consulted for admission    Right hand weakness/ numbness 2/2 peripheral nerve palsy   - ruled out stroke  - CT head: negative  - appreciate Neurology input   - MRI brain normal   - outpt OT    UTI  - pt c/w urinary frequency  - UA positive for nitrites, small juancho estrace, 3+ bacteria  - started on augmentin  - follow urine cx     Severe back pain - resolved likely due to muscle spasms  - MRI L spine normal   - pain controlled now     CAD s/p CABG in 6/19, s/p 2 stents in 10/19  CHF EF 26-30%  - currently stable  - recent echo 8/2019: EF 26-30%  - c/w home meds metoprolol, plavix, eliquis, statin  - mild edema of legs, one dose of IV lasix given      DM   - c/w home meds lantus, lispro     HTN - BP stable. C/w amlodipine, metoprolol     Bipolar: c/w Cymbalta, depakote   Tobacco abuse- on nicotine patch     Recommendations:  PCP f/u in 1 week  Neurology f/u for EMG  oupt OT  Follow urine cx    Physical examination at discharge  Visit Vitals  /63 (BP 1 Location: Right arm, BP Patient Position: Sitting)   Pulse 64   Temp 98 °F (36.7 °C)   Resp 13   Ht 5' 3\" (1.6 m)   Wt 78.5 kg (173 lb 1 oz)   SpO2 99%   Breastfeeding No   BMI 30.66 kg/m²     Gen: NAD  HEENT: anicteric sclerae, normal conjunctiva, oropharynx clear, MM moist  Neck: supple, trachea midline, no adenopathy  Heart: RRR, no MRG, no JVD, trace edema  Lungs: CTA b/l, non-labored respirations  Abd: soft, NT, ND, BS+, no organomegaly  Extr: warm  Skin: dry, no rash  Neuro: alert and oriented. CN 2-12 intact. Right wrist drop,  hand claw like. No .  Sensations decreased over the hand and forearm  Psych: normal mood, appropriate affect    Pertinent imaging studies:    None     Recent Labs     12/07/19  0225 12/06/19  1307   WBC 6.7 8.6   HGB 12.6 13.7   HCT 40.8 43.8    195     Recent Labs 12/07/19  0225 12/06/19  1307    136   K 4.2 5.0    105   CO2 30 27   BUN 20 22*   CREA 1.03* 1.16*   * 201*   CA 8.2* 8.5     Recent Labs     12/06/19  1307   SGOT 29      TP 6.4   ALB 2.9*   GLOB 3.5     No results for input(s): INR, PTP, APTT, INREXT in the last 72 hours. No results for input(s): FE, TIBC, PSAT, FERR in the last 72 hours. No results for input(s): PH, PCO2, PO2 in the last 72 hours. No results for input(s): CPK, CKMB in the last 72 hours. No lab exists for component: TROPONINI  No components found for: [place]    Chronic Diagnoses:    Problem List as of 12/7/2019 Date Reviewed: 8/7/2019          Codes Class Noted - Resolved    TIA (transient ischemic attack) ICD-10-CM: G45.9  ICD-9-CM: 435.9  12/6/2019 - Present        Bipolar 1 disorder ([facility] ICD-10-CM: F31.9  ICD-9-CM: 296.7  7/27/2019 - Present        Insulin overdose ICD-10-CM: T38.3X1A  ICD-9-CM: 962.3, E980.4  7/24/2019 - Present        NSTEMI (non-ST elevated myocardial infarction) ([facility] ICD-10-CM: I21.4  ICD-9-CM: 410.70  6/8/2019 - Present        Acute exacerbation of CHF (congestive heart failure) ([facility] ICD-10-CM: I50.9  ICD-9-CM: 428.0  6/8/2019 - Present        SOB (shortness of breath) ICD-10-CM: R06.02  ICD-9-CM: 786.05  6/7/2019 - Present        RESOLVED: Chest pain ICD-10-CM: R07.9  ICD-9-CM: 786.50  8/5/2019 - 8/7/2019              Time spent on discharge related activities today greater than 30 minutes. Signed:  Mateus Ross MD                 Hospitalist, Internal Medicine      Cc:  Marilin Zhang MD

## 2019-12-07 NOTE — PROGRESS NOTES
PHYSICAL THERAPY EVALUATION/DISCHARGE  Patient: Joseline Rodgers (41 y.o. female)  Date: 12/7/2019  Primary Diagnosis: TIA (transient ischemic attack) [G45.9]       Precautions: Fall         ASSESSMENT  Based on the objective data described below, the patient presents with patient presents with baseline functional mobility S/P workup for TIA. Patient MRI and CT are clear for acute infarct. Patient continues to demonstrate L UE symptoms. She demonstrates independent functional mobility including ambulating increased gait distance and ascending and descending 10 stairs with the use of her R hand on railing. Functional Outcome Measure: The patient scored 80/100 on the Barthel outcome measure which is indicative of the need for assistance with 20% of ADLs and IADLs. Other factors to consider for discharge: medical stability      Further skilled acute physical therapy is not indicated at this time. PLAN :  Recommendation for discharge: (in order for the patient to meet his/her long term goals)  No skilled physical therapy/ follow up rehabilitation needs identified at this time. This discharge recommendation:  Has been made in collaboration with the attending provider and/or case management    IF patient discharges home will need the following DME: none       SUBJECTIVE:   Patient stated I would be alright if this hand would work.     OBJECTIVE DATA SUMMARY:   HISTORY:    Past Medical History:   Diagnosis Date    Arthritis     Bipolar 1 disorder ([facility]     Diabetes ([facility]     Gastrointestinal disorder     gastroparesis    Gastroparesis     GERD (gastroesophageal reflux disease)     Heart disease     Hypertension     Psychiatric disorder     Bipolar     Past Surgical History:   Procedure Laterality Date    HX ANKLE FRACTURE TX      HX CAROTID STENT      HX GYN      BTL    HX ORTHOPAEDIC      toe       Prior level of function: independent   Personal factors and/or comorbidities impacting plan of care: please see above    Home Situation  Home Environment: Private residence  # Steps to Enter: 7  One/Two Story Residence: One story  Living Alone: No  Support Systems: Family member(s), Friends \ neighbors  Patient Expects to be Discharged to[de-identified] Private residence  Current DME Used/Available at Home: Glucometer  Tub or Shower Type: Tub/Shower combination    EXAMINATION/PRESENTATION/DECISION MAKING:   Critical Behavior:  Neurologic State: Alert  Orientation Level: Oriented X4  Cognition: Appropriate for age attention/concentration  Safety/Judgement: Insight into deficits  Hearing: Auditory  Auditory Impairment: None  Skin:    Edema:   Range Of Motion:  AROM: Generally decreased, functional(no active flexion of L wrist)                       Strength:    Strength: Generally decreased, functional(L wrist drop)                    Tone & Sensation:   Tone: Normal              Sensation: Impaired(L wrist numbness)               Coordination:  Coordination: Generally decreased, functional  Vision:   Corrective Lenses: Glasses  Functional Mobility:  Bed Mobility:              Transfers:  Sit to Stand: Independent  Stand to Sit: Independent        Bed to Chair: Independent              Balance:   Sitting: Intact  Standing: Intact  Ambulation/Gait Training:  Distance (ft): 200 Feet (ft)     Ambulation - Level of Assistance: Supervision                                                Stairs:  Number of Stairs Trained: 10  Stairs - Level of Assistance: Supervision   Rail Use: Right     Therapeutic Exercises:       Functional Measure:  Barthel Index:    Bathin  Bladder: 10  Bowels: 10  Groomin  Dressing: 10  Feedin  Mobility: 10  Stairs: 5  Toilet Use: 10  Transfer (Bed to Chair and Back): 10  Total: 80/100       The Barthel ADL Index: Guidelines  1. The index should be used as a record of what a patient does, not as a record of what a patient could do.   2. The main aim is to establish degree of independence from any help, physical or verbal, however minor and for whatever reason. 3. The need for supervision renders the patient not independent. 4. A patient's performance should be established using the best available evidence. Asking the patient, friends/relatives and nurses are the usual sources, but direct observation and common sense are also important. However direct testing is not needed. 5. Usually the patient's performance over the preceding 24-48 hours is important, but occasionally longer periods will be relevant. 6. Middle categories imply that the patient supplies over 50 per cent of the effort. 7. Use of aids to be independent is allowed. Neeraj Salgado., Barthel, DShaniW. (4892). Functional evaluation: the Barthel Index. [address] [facility] (14)2. Yuki Roman hubert SANTY Márquez, Sagar Cooper., Rherolando Sherwood., [place], [address] (1999). Measuring the change indisability after inpatient rehabilitation; comparison of the responsiveness of the Barthel Index and Functional [place]gett Measure. Journal of Neurology, Neurosurgery, and Psychiatry, 66(4), 978-991. Joel Damian, N.J.A, YANDEL Castillo, & Aram Clarke, MShaniA. (2004.) Assessment of post-stroke quality of life in cost-effectiveness studies: The usefulness of the Barthel Index and the EuroQoL-5D.  Quality of Life Research, 15, 526-89            Physical Therapy Evaluation Charge Determination   History Examination Presentation Decision-Making   MEDIUM  Complexity : 1-2 comorbidities / personal factors will impact the outcome/ POC  LOW Complexity : 1-2 Standardized tests and measures addressing body structure, function, activity limitation and / or participation in recreation  LOW Complexity : Stable, uncomplicated  Other outcome measures Barthel  LOW       Based on the above components, the patient evaluation is determined to be of the following complexity level: LOW     Pain Rating:      Activity Tolerance:   Good  Please refer to the flowsheet for vital signs taken during this treatment. After treatment patient left in no apparent distress:   Sitting in chair and Call bell within reach    COMMUNICATION/EDUCATION:   The patients plan of care was discussed with: Registered Nurse. Fall prevention education was provided and the patient/caregiver indicated understanding., Patient/family have participated as able in goal setting and plan of care. and Patient/family agree to work toward stated goals and plan of care.     Thank you for this referral.  Shad Valenzuela, PT   Time Calculation: 16 mins

## 2019-12-07 NOTE — CONSULTS
NEUROLOGY CONSULT NOTE    Name Cecily Funk Age 37 y.o. MRN [id number]  1975     Consulting Physician: Jeanie Sanchez MD      Chief Complaint:  LUE weakness/numbness     Assessment/Plan:     Active Problems:    Radial neuropathy, left (2019)      37year old AAF presenting with L wrist drop/paresthesias of the LUE likely secondary to compressive neuropathy after falling asleep in a chair. This is typically due to a radial neuropathy. She would benefit from electrodiagnostic testing on an outpatient basis to assist with diagnosis and prognostication for recovery. MRI brain was reviewed and without evidence of intracranial ischemia. [id number] [address]ai for discharge from a neurologic perspective. PT/OT for distal LUE weakness. Please arrange for neurology F/U for EMG. Thank you very much for this referral. I appreciate the opportunity to participate in this patient's care. History of Present Illness: This is a 37 y.o.  right handed  female, we were asked to see for LUE weakness/numbness. Patient reports she fell asleep in a chair for several hours the evening prior to admission with her left arm hanging over the chair. When she awoke, she noted weakness of the left hand and inability to extend the wrist along with numbness of the L hand/forearm. She denied associated focal deficits of the LLE or left face, speech or vision abnormalities. No reported cervicalgia. Symptoms have not significantly changed since onset. MRI Brain was performed showing no acute intracranial process/ischemia. Allergies   Allergen Reactions    Voltaren [Diclofenac Sodium] Anaphylaxis    Dramamine Ii [Meclizine] Itching     Auditory and Visual hallucinations    Imipramine Hives    Levaquin [Levofloxacin] Itching and Nausea and Vomiting    Lisinopril Cough        Prior to Admission medications    Medication Sig Start Date End Date Taking?  Authorizing Provider   nicotine (NICODERM CQ) 14 mg/24 hr patch 1 Patch by TransDERmal route daily for 30 days. 12/8/19 1/7/20 Yes Viktoria Roberson MD   amoxicillin-clavulanate (AUGMENTIN) 875-125 mg per tablet Take 1 Tab by mouth every twelve (12) hours for 5 days. 12/7/19 12/12/19 Yes Viktoria Roberson MD   OTHER Outpatient occupational therapy 12/7/19  Yes Viktoria Roberson MD   divalproex ER (DEPAKOTE ER) 500 mg ER tablet Take 500 mg by mouth three (3) times daily. Yes Provider, Historical   furosemide (LASIX) 40 mg tablet Take 20 mg by mouth daily as needed (edema). Yes Provider, Historical   amLODIPine (NORVASC) 5 mg tablet Take 5 mg by mouth daily. Yes Provider, Historical   clopidogrel (PLAVIX) 75 mg tab Take 75 mg by mouth daily. Yes Provider, Historical   insulin glargine (LANTUS) 100 unit/mL injection 26 Units by SubCUTAneous route nightly. Yes Provider, Historical   metoprolol succinate (TOPROL-XL) 50 mg XL tablet Take 1 Tab by mouth daily. Indications: high blood pressure 7/30/19  Yes Gaye Calhoun MD   apixaban (ELIQUIS) 5 mg tablet Take 1 Tab by mouth two (2) times a day. Indications: Deep Vein Thrombosis Prevention 7/30/19  Yes Gaye Calhoun MD   ferrous sulfate 325 mg (65 mg iron) tablet Take 1 Tab by mouth three (3) times daily (with meals). Indications: anemia from inadequate iron 7/30/19  Yes Gaye Calhoun MD   atorvastatin (LIPITOR) 80 mg tablet Take 80 mg by mouth daily. Yes Provider, Historical   pantoprazole (PROTONIX) 40 mg tablet Take 40 mg by mouth daily as needed. Indications: gastroesophageal reflux disease   Yes Provider, Historical   gabapentin (NEURONTIN) 300 mg capsule Take 300 mg by mouth three (3) times daily. Yes Provider, Historical   DULoxetine (CYMBALTA) 60 mg capsule Take 1 Cap by mouth daily. 1/21/19  Yes Shoaib Park MD   insulin lispro (HUMALOG) 100 unit/mL injection 5 Units by SubCUTAneous route Before breakfast, lunch, and dinner.  Plus sliding scale if needed  Indications: type 1 diabetes mellitus   Yes Other, MD Lupe   nitroglycerin (NITROSTAT) 0.4 mg SL tablet 0.4 mg by SubLINGual route every five (5) minutes as needed for Chest Pain. Up to 3 doses. Provider, Historical       Past Medical History:   Diagnosis Date    Arthritis     [facility]     Diabetes ([facility]     Gastrointestinal disorder     gastroparesis    Gastroparesis     GERD (gastroesophageal reflux disease)     Heart disease     Hypertension     Psychiatric disorder     Bipolar        Past Surgical History:   Procedure Laterality Date    HX ANKLE FRACTURE TX      HX CAROTID STENT      HX GYN      BTL    HX ORTHOPAEDIC      toe        Social History     Tobacco Use    Smoking status: Current Every Day Smoker     Packs/day: 0.25    Smokeless tobacco: Never Used   Substance Use Topics    Alcohol use: Yes     Comment: occasionally        Family History   Problem Relation Age of Onset    Thyroid Disease Mother     Hypertension Father     Heart Disease Father     Diabetes Sister     Heart Disease Sister     Hypertension Sister     Thyroid Disease Brother         Review of Systems:   Comprehensive review of systems performed and negative except for as listed above. Exam:     Visit Vitals  /64 (BP 1 Location: Right arm, BP Patient Position: Sitting)   Pulse 63   Temp 98.2 °F (36.8 °C)   Resp 12   Ht 5' 3\" (1.6 m)   Wt 78.5 kg (173 lb 1 oz)   SpO2 95%   Breastfeeding No   BMI 30.66 kg/m²        General: Well developed, well nourished. Patient in no apparent distress   Head: Normocephalic, atraumatic, anicteric sclera   Lungs:  Clear to auscultation bilaterally, No wheezes or rubs   Cardiac: Regular rate and rhythm with no murmurs. Abd: Bowel sounds were audible. No tenderness on palpation   Ext: No pedal edema   Skin: No overt signs of rash     Neurological Exam:  Mental Status: Alert and oriented to person place and time   Speech: Fluent no aphasia or dysarthria.    Cranial Nerves:   Intact visual fields. Facial sensation is normal. Facial movement is symmetric. Palate is midline. Normal sternocleidomastoid strength. Tongue is midline. Hearing is intact bilaterally. Eyes: PERRL, EOM's full, no nystagmus, no ptosis. Motor:  L wrist/finger extension 1/5, decreased  3/5, otherwise 5/5 throughout. Normal bulk and tone. Reflexes:   Deep tendon reflexes 1+/4 and symmetrical.  Plantar response is downgoing b/l. Sensory:   Decreased LT/PP distal L hand/forearm   Gait:  Gait is balanced and fluid with normal arm swing. Tremor:   No tremor noted. Cerebellar:  Finger to nose and heel over shin to knee was demonstrated competently. Tandem gait was demonstrated with little difficulty   Neurovascular: No carotid bruits. Imaging  CT Results (maximum last 3): Results from [place] encounter on 12/06/19   CT CODE NEURO HEAD WO CONTRAST    Narrative EXAM: CT CODE NEURO HEAD WO CONTRAST    INDICATION: Acute right arm weakness    COMPARISON: None. CONTRAST: None. TECHNIQUE: Unenhanced CT of the head was performed using 5 mm images. Brain and  bone windows were generated. CT dose reduction was achieved through use of a  standardized protocol tailored for this examination and automatic exposure  control for dose modulation. FINDINGS:  The ventricles and sulci are normal in size, shape and configuration and  midline. There is no significant white matter disease. There is no intracranial  hemorrhage, extra-axial collection, mass, mass effect or midline shift. The  basilar cisterns are open. No acute infarct is identified. The bone windows  demonstrate no abnormalities. The visualized portions of the paranasal sinuses  and mastoid air cells are clear. Impression IMPRESSION: No acute abnormality. MRI Results (maximum last 3):   Results from [place] encounter on 12/06/19   MRI LUMB SPINE WO CONT    Narrative EXAM: MRI LUMB SPINE WO CONT    INDICATION: Difficulty walking. COMPARISON: MRI lumbar spine on 7/18/2006. TECHNIQUE: MR imaging of the lumbar spine was performed using the following  sequences: sagittal T1, T2, STIR;  axial T1, T2  performed on the 3 Cara  magnet. CONTRAST:  None. FINDINGS:    There is normal alignment of the lumbar spine. Vertebral body heights are  maintained. Marrow signal is normal. Discs are within normal limits. The conus medullaris terminates at L1-L2. Signal and caliber of the distal  spinal cord are within normal limits. The paraspinal soft tissues are within normal limits. Lower thoracic spine: No herniation or stenosis. L1-L2: No herniation or stenosis. L2-L3: No herniation or stenosis. L3-L4: No herniation or stenosis. L4-L5: No herniation or stenosis. L5-S1: No herniation or stenosis. Impression IMPRESSION:  Normal MRI lumbar spine. No change. MRI BRAIN WO CONT    Narrative EXAM: MRI BRAIN WO CONT    INDICATION: Left upper extremity numbness. Left lower extremity weakness. COMPARISON: CT head earlier today at 12:25 PM.    CONTRAST: None. TECHNIQUE: MRI brain without contrast.  Multiplanar multisequence acquisition without contrast of the brain. FINDINGS:  The ventricles are normal in size and position. There is no acute infarct,  hemorrhage, extra-axial fluid collection, or mass effect. There is no cerebellar  tonsillar herniation. Expected arterial flow-voids are present. Medial temporal lobes are symmetric. Sagittal midline structures are within  normal limits. Impression IMPRESSION:   Normal noncontrast MRI brain.        Lab Review  Lab Results   Component Value Date/Time    WBC 6.7 12/07/2019 02:25 AM    HCT 40.8 12/07/2019 02:25 AM    HGB 12.6 12/07/2019 02:25 AM    PLATELET 099 24/32/1638 02:25 AM     Lab Results   Component Value Date/Time    Sodium 137 12/07/2019 02:25 AM    Potassium 4.2 12/07/2019 02:25 AM    Chloride 105 12/07/2019 02:25 AM    CO2 30 12/07/2019 02:25 AM    Glucose 236 (H) 12/07/2019 02:25 AM    Glucose 118 (H) 07/27/2019 03:44 AM    BUN 20 12/07/2019 02:25 AM    Creatinine 1.03 (H) 12/07/2019 02:25 AM    Calcium 8.2 (L) 12/07/2019 02:25 AM     No components found for: TROPQUANT  No results found for: LIBRA    Signed:  Edvin Graff DO  12/7/2019  2:25 PM

## 2019-12-07 NOTE — PROGRESS NOTES
OCCUPATIONAL THERAPY EVALUATION/DISCHARGE  Patient: lAla Nguyen (37 y.o. female)  Date: 12/7/2019  Primary Diagnosis: TIA (transient ischemic attack) [G45.9]       Precautions: fall       ASSESSMENT  Based on the objective data described below, the patient presents with near functional baseline balance however with L wrist drop at this time. Patient's MRI negative. Presume patient has radial nerve palsy as she stated she slept in a chair in an uncomfortable position with arm rest of chair in axilla. Recommend OP hand therapy for splinting and L forearm strengthening. .    Current Level of Function (ADLs/self-care): independent-supervision    Functional Outcome Measure: The patient scored 80/100 on the Barthel Index outcome measure which is indicative of 20% ADL impairment (primarily due to decreased L hand dexterity. PLAN :  Recommend with staff: up ad viola to bathroom    Recommendation for discharge: (in order for the patient to meet his/her long term goals)  Outpatient occupational therapy follow up recommended for hand therapy (wrist splinting)    This discharge recommendation:  Has been made in collaboration with the attending provider and/or case management    IF patient discharges home will need the following DME: to be determined (TBD)       SUBJECTIVE:   Patient stated I slept weird.     OBJECTIVE DATA SUMMARY:   HISTORY:   Past Medical History:   Diagnosis Date    Arthritis     Bipolar 1 disorder [facility]     Diabetes ([facility]     Gastrointestinal disorder     gastroparesis    Gastroparesis     GERD (gastroesophageal reflux disease)     Heart disease     Hypertension     Psychiatric disorder     Bipolar     Past Surgical History:   Procedure Laterality Date    HX ANKLE FRACTURE TX      HX CAROTID STENT      HX GYN      BTL    HX ORTHOPAEDIC      toe       Prior Level of Function/Environment/Context: Patient was largely IND with ADL tasks PTA.    Expanded or extensive additional review of patient history:   Home Situation  Home Environment: Private residence  # Steps to Enter: 7  One/Two Story Residence: One story  Living Alone: No  Support Systems: Family member(s), Friends \ neighbors  Patient Expects to be Discharged to[de-identified] Private residence  Current DME Used/Available at Home: Glucometer  Tub or Shower Type: Tub/Shower combination    EXAMINATION OF PERFORMANCE DEFICITS:  Cognitive/Behavioral Status:  Neurologic State: Alert  Orientation Level: Oriented X4  Cognition: Appropriate for age attention/concentration  Perception: Appears intact  Perseveration: No perseveration noted  Safety/Judgement: Insight into deficits    Skin: exposed areas grossly intact    Edema: none noted    Hearing:   Auditory  Auditory Impairment: None    Vision/Perceptual:                                Corrective Lenses: Glasses    Range of Motion:  BUE  AROM: Generally decreased, functional(no active flexion of L wrist)                         Strength:  BUE  Strength: Generally decreased, functional(L wrist drop)                Coordination:  Coordination: Generally decreased, functional  Fine Motor Skills-Upper: Left Impaired;Right Intact    Gross Motor Skills-Upper: Left Impaired;Right Intact    Tone & Sensation:  BUE  Tone: Normal  Sensation: Impaired(L wrist numbness)                      Balance:  Sitting: Intact  Standing: Intact    Functional Mobility and Transfers for ADLs:    Transfers:  Sit to Stand: Independent  Stand to Sit: Independent  Bed to Chair: Independent    ADL Assessment:  Feeding: Supervision;Setup(A due to decreased LUE dexterity)    Oral Facial Hygiene/Grooming: Independent    Bathing: Independent(per patient report)    Upper Body Dressing: Independent(per patient report)    Lower Body Dressing: Independent(per patient report)    Toileting: Independent(per patient report)                ADL Intervention and task modifications:  Feeding  Feeding Assistance: Supervision(peeling orange using LUE as a stabilizer)    Grooming  Washing Hands: Independent                             Cognitive Retraining  Safety/Judgement: Insight into deficits    Functional Measure:  Barthel Index:    Bathin  Bladder: 10  Bowels: 10  Groomin  Dressing: 10  Feedin  Mobility: 10  Stairs: 5  Toilet Use: 10  Transfer (Bed to Chair and Back): 10  Total: 80/100        The Barthel ADL Index: Guidelines  1. The index should be used as a record of what a patient does, not as a record of what a patient could do. 2. The main aim is to establish degree of independence from any help, physical or verbal, however minor and for whatever reason. 3. The need for supervision renders the patient not independent. 4. A patient's performance should be established using the best available evidence. Asking the patient, friends/relatives and nurses are the usual sources, but direct observation and common sense are also important. However direct testing is not needed. 5. Usually the patient's performance over the preceding 24-48 hours is important, but occasionally longer periods will be relevant. 6. Middle categories imply that the patient supplies over 50 per cent of the effort. 7. Use of aids to be independent is allowed. Fabiola Gilman., Barthel, D.W. (0486). Functional evaluation: the Barthel Index. [address] [facility] (14)2. SANTY Ochoa, Peter Marmolejo.[facility]., [place], [address] [place] [address] Ave (). Measuring the change indisability after inpatient rehabilitation; comparison of the responsiveness of the Barthel Index and Functional [place] Measure. Journal of Neurology, Neurosurgery, and Psychiatry, 66(4), 326-670. Alla Rodriguez, N.J.A, YANDEL Castillo, & Antionette Daley, M.A. (2004.) Assessment of post-stroke quality of life in cost-effectiveness studies: The usefulness of the Barthel Index and the EuroQoL-5D.  Quality of Life Research, 15, 833-23       Occupational Therapy Evaluation Charge Determination   History Examination Decision-Making   LOW Complexity : Brief history review  LOW Complexity : 1-3 performance deficits relating to physical, cognitive , or psychosocial skils that result in activity limitations and / or participation restrictions  LOW Complexity : No comorbidities that affect functional and no verbal or physical assistance needed to complete eval tasks       Based on the above components, the patient evaluation is determined to be of the following complexity level: LOW     Activity Tolerance:   Fair  Please refer to the flowsheet for vital signs taken during this treatment. After treatment patient left in no apparent distress:    Sitting in chair and Call bell within reach    COMMUNICATION/EDUCATION:   The patients plan of care was discussed with: Physical Therapist and Registered Nurse.     Thank you for this referral.  John Cárdenas  Time Calculation: 11 mins

## 2019-12-07 NOTE — PROGRESS NOTES
Stroke Education documented in Patient Education: YES  Core Measures Documented in Connect Care:  Risk Factors: YES  Warning signs of stroke: YES  When to Activate 911: YES  Medication Education for Risk Factors: YES  Smoking cessation if applicable: YES  Written Education Given:  YES    Discharge NIH Completed: YES  Score: 1    BRAINS: YES    Follow Up Appointment Made: NO  Date/Time if applicable:

## 2019-12-07 NOTE — DISCHARGE INSTRUCTIONS
Please bring this form with you to show your primary care provider at your follow-up appointment. Primary care provider:  Dr. Shereen Young MD    Discharging provider:  Ciera Dunaway MD    You have been admitted to the hospital with the following diagnoses:  · TIA (transient ischemic attack) [G45.9]    FOLLOW-UP CARE RECOMMENDATIONS:    APPOINTMENTS:  · Follow-up with primary care provider, Dr. Shereen Young MD  -  Please call [phone] shortly after discharge to set up an appointment to be seen in  1 week       FOLLOW-UP TESTS recommended: EMG    PENDING TEST RESULTS:  At the time of your discharge the following test results are still pending: urine culture   Please make sure you review these results with your outpatient follow-up provider(s). SYMPTOMS to watch for: chest pain, shortness of breath, fever, chills, nausea, vomiting, diarrhea, change in mentation, falling, weakness, bleeding. DIET/what to eat:  Cardiac Diet and Diabetic Diet    ACTIVITY:  Activity as tolerated    What to do if new or unexpected symptoms occur? If you experience any of the above symptoms (or should other concerns or questions arise after discharge) please call your primary care physician. Return to the emergency room if you cannot get hold of your doctor. · It is very important that you keep your follow-up appointment(s). · Please bring discharge papers, medication list (and/or medication bottles) to your follow-up appointments for review by your outpatient provider(s). · Please check the list of medications and be sure it includes every medication (even non-prescription medications) that your provider wants you to take. · It is important that you take the medication exactly as they are prescribed. · Keep your medication in the bottles provided by the pharmacist and keep a list of the medication names, dosages, and times to be taken in your wallet.    · Do not take other medications without consulting your doctor. · If you have any questions about your medications or other instructions, please talk to your nurse or care provider before you leave the hospital.    I understand that if any problems occur once I am at home I am to contact my physician. These instructions were explained to me and I had the opportunity to ask questions.

## 2019-12-07 NOTE — ROUTINE PROCESS
TRANSFER - OUT REPORT: 
 
Verbal report given to Kayla Cyr RN(name) on [facility]  being transferred to [facility](unit) for routine progression of care Report consisted of patients Situation, Background, Assessment and  
Recommendations(SBAR). Information from the following report(s) SBAR, ED Summary, Accordion and Recent Results was reviewed with the receiving nurse. Lines:    
 
Opportunity for questions and clarification was provided. Patient transported with: 
Visit Vitals /65 Pulse 77 Temp 98.2 °F (36.8 °C) Resp 16 Ht 5' 3\" (1.6 m) Wt 78.5 kg (173 lb 1 oz) SpO2 100% BMI 30.66 kg/m²

## 2019-12-09 LAB
BACTERIA SPEC CULT: ABNORMAL
CC UR VC: ABNORMAL
SERVICE CMNT-IMP: ABNORMAL

## 2021-04-30 ENCOUNTER — VIRTUAL VISIT (OUTPATIENT)
Dept: INTERNAL MEDICINE CLINIC | Age: 46
End: 2021-04-30
Payer: COMMERCIAL

## 2021-04-30 DIAGNOSIS — E10.42 TYPE 1 DM WITH POLYNEUROPATHY (HCC): ICD-10-CM

## 2021-04-30 DIAGNOSIS — I82.5Y9 CHRONIC DEEP VEIN THROMBOSIS (DVT) OF PROXIMAL VEIN OF LOWER EXTREMITY, UNSPECIFIED LATERALITY (HCC): ICD-10-CM

## 2021-04-30 DIAGNOSIS — I50.9 CONGESTIVE HEART FAILURE, UNSPECIFIED HF CHRONICITY, UNSPECIFIED HEART FAILURE TYPE (HCC): ICD-10-CM

## 2021-04-30 DIAGNOSIS — F43.21 SITUATIONAL DEPRESSION: ICD-10-CM

## 2021-04-30 DIAGNOSIS — K59.03 DRUG INDUCED CONSTIPATION: ICD-10-CM

## 2021-04-30 DIAGNOSIS — Z76.89 ESTABLISHING CARE WITH NEW DOCTOR, ENCOUNTER FOR: Primary | ICD-10-CM

## 2021-04-30 DIAGNOSIS — M79.7 FIBROMYALGIA: ICD-10-CM

## 2021-04-30 PROCEDURE — 99204 OFFICE O/P NEW MOD 45 MIN: CPT | Performed by: NURSE PRACTITIONER

## 2021-04-30 RX ORDER — DOCUSATE SODIUM 100 MG/1
100 CAPSULE, LIQUID FILLED ORAL
Qty: 60 CAP | Refills: 2 | Status: SHIPPED | OUTPATIENT
Start: 2021-04-30 | End: 2021-07-29

## 2021-04-30 RX ORDER — FLASH GLUCOSE SENSOR
1 KIT MISCELLANEOUS
Qty: 2 KIT | Refills: 11 | Status: SHIPPED | OUTPATIENT
Start: 2021-04-30 | End: 2021-09-07

## 2021-04-30 RX ORDER — ISOSORBIDE MONONITRATE 30 MG/1
120 TABLET, EXTENDED RELEASE ORAL DAILY
COMMUNITY
End: 2022-04-22

## 2021-04-30 RX ORDER — PANTOPRAZOLE SODIUM 40 MG/1
40 TABLET, DELAYED RELEASE ORAL
Qty: 30 TAB | Refills: 3 | Status: SHIPPED | OUTPATIENT
Start: 2021-04-30 | End: 2021-06-01

## 2021-04-30 RX ORDER — METHADONE HYDROCHLORIDE 10 MG/ML
140 CONCENTRATE ORAL
COMMUNITY

## 2021-04-30 RX ORDER — ATORVASTATIN CALCIUM 80 MG/1
80 TABLET, FILM COATED ORAL
Qty: 90 TAB | Refills: 0 | Status: SHIPPED | OUTPATIENT
Start: 2021-04-30 | End: 2021-06-07 | Stop reason: SDUPTHER

## 2021-04-30 RX ORDER — LOSARTAN POTASSIUM 25 MG/1
TABLET ORAL DAILY
COMMUNITY
End: 2021-06-07 | Stop reason: SDUPTHER

## 2021-04-30 RX ORDER — BUMETANIDE 2 MG/1
2 TABLET ORAL DAILY
COMMUNITY
End: 2021-06-07 | Stop reason: SDUPTHER

## 2021-04-30 RX ORDER — FLASH GLUCOSE SCANNING READER
1 EACH MISCELLANEOUS
Qty: 8 EACH | Refills: 11 | Status: SHIPPED | OUTPATIENT
Start: 2021-04-30 | End: 2021-09-07

## 2021-04-30 NOTE — PROGRESS NOTES
Anusha Preciado is a 39 y.o. female who was seen by synchronous (real-time) audio-video technology on 4/30/2021 for Establish Care        Assessment & Plan:   Diagnoses and all orders for this visit:    1. Establishing care with new doctor, encounter for    2. Drug induced constipation  -     docusate sodium (COLACE) 100 mg capsule; Take 1 Cap by mouth two (2) times daily as needed for Constipation for up to 90 days. 3. Type 1 DM with polyneuropathy (HCC)  -     flash glucose scanning reader (FreeStyle Jone 14 Day [place]) misc; 1 Each by Does Not Apply route Twice a month. -     flash glucose sensor (FreeStyle Jone 14 Day Sensor) kit; 1 Each by Does Not Apply route Twice a month. 4. Congestive heart failure, unspecified HF chronicity, unspecified heart failure type ([facility]    5. Chronic deep vein thrombosis (DVT) of proximal vein of lower extremity, unspecified laterality ([facility]    6. Fibromyalgia    7. Situational depression    Other orders  -     atorvastatin (Lipitor) 80 mg tablet; Take 1 Tab by mouth nightly. Indications: high cholesterol and high triglycerides  -     pantoprazole (Protonix) 40 mg tablet; Take 1 Tab by mouth daily as needed (acid reflux). Indications: gastroesophageal reflux disease      The complexity of medical decision making for this visit is moderate     Therapy referral  Needs in person visit    Subjective:       Prior to Admission medications    Medication Sig Start Date End Date Taking? Authorizing Provider   losartan (COZAAR) 25 mg tablet Take  by mouth daily. Yes Provider, Historical   isosorbide mononitrate ER (IMDUR) 30 mg tablet Take  by mouth daily. Yes Provider, Historical   bumetanide (BUMEX) 2 mg tablet Take 2 mg by mouth daily. Yes Provider, Historical   methadone (DOLOPHINE) 10 mg/mL solution Take  by mouth. Yes Provider, Historical   divalproex ER (DEPAKOTE ER) 500 mg ER tablet Take 500 mg by mouth three (3) times daily.    Yes Provider, Historical clopidogrel (PLAVIX) 75 mg tab Take 75 mg by mouth daily. Yes Provider, Historical   insulin glargine (LANTUS) 100 unit/mL injection 26 Units by SubCUTAneous route nightly. Yes Provider, Historical   metoprolol succinate (TOPROL-XL) 50 mg XL tablet Take 1 Tab by mouth daily. Indications: high blood pressure 7/30/19  Yes Niki House MD   apixaban (ELIQUIS) 5 mg tablet Take 1 Tab by mouth two (2) times a day. Indications: Deep Vein Thrombosis Prevention 7/30/19  Yes Niki House MD   atorvastatin (LIPITOR) 80 mg tablet Take 80 mg by mouth daily. Yes Provider, Historical   pantoprazole (PROTONIX) 40 mg tablet Take 40 mg by mouth daily as needed. Indications: gastroesophageal reflux disease   Yes Provider, Historical   gabapentin (NEURONTIN) 300 mg capsule Take 300 mg by mouth three (3) times daily. Yes Provider, Historical   DULoxetine (CYMBALTA) 60 mg capsule Take 1 Cap by mouth daily. 1/21/19  Yes Abiola Wilson MD   insulin lispro (HUMALOG) 100 unit/mL injection 5 Units by SubCUTAneous route Before breakfast, lunch, and dinner. Plus sliding scale if needed  Indications: type 1 diabetes mellitus   Yes Other, MD Lupe   OTHER Outpatient occupational therapy 12/7/19   Guera Ellis, MD   furosemide (LASIX) 40 mg tablet Take 20 mg by mouth daily as needed (edema). Provider, Historical   amLODIPine (NORVASC) 5 mg tablet Take 5 mg by mouth daily. Provider, Historical   ferrous sulfate 325 mg (65 mg iron) tablet Take 1 Tab by mouth three (3) times daily (with meals). Indications: anemia from inadequate iron 7/30/19   Niki House MD   nitroglycerin (NITROSTAT) 0.4 mg SL tablet 0.4 mg by SubLINGual route every five (5) minutes as needed for Chest Pain. Up to 3 doses. Provider, Historical         [facility]     Est care.  Needing refills    She moved her from [address] recently  She is followed by     Diabetic Review of Systems - medication compliance: compliant all of the time, diabetic diet compliance: compliant most of the time, home glucose monitoring: is performed sporadically. She does not check sugar regularly, is interested in continuous glucose monitoring  DM neuropathy, on gabapentin  Last A1c around 6%    CHF: does not monitor home BP, has increased lower ext edema  Has new pt appt w/ cardio here next week  No cp, webb    Fibro: on cymbalta    Situational depression: she moved here to get away from some stressors in [place], she does not provide details today but sates she would like to est w/ a therapist    GERD stable    Chronic DVT: reports IVC filter and eliquis    On methadone maintenance: occ constipation    Objective:   No flowsheet data found.      [INSTRUCTIONS:  \"[x]\" Indicates a positive item  \"[]\" Indicates a negative item  -- DELETE ALL ITEMS NOT EXAMINED]    Constitutional: [x] Appears well-developed and well-nourished [x] No apparent distress      [] Abnormal -     Mental status: [x] Alert and awake  [x] Oriented to person/place/time [x] Able to follow commands    [] Abnormal -     Eyes:   EOM    [x]  Normal    [] Abnormal -   Sclera  [x]  Normal    [] Abnormal -          Discharge [x]  None visible   [] Abnormal -     HENT: [x] Normocephalic, atraumatic  [] Abnormal -   [x] Mouth/Throat: Mucous membranes are moist    External Ears [x] Normal  [] Abnormal -    Neck: [x] No visualized mass [] Abnormal -     Pulmonary/Chest: [x] Respiratory effort normal   [x] No visualized signs of difficulty breathing or respiratory distress        [] Abnormal -      Musculoskeletal:   [x] Normal gait with no signs of ataxia         [x] Normal range of motion of neck        [] Abnormal -     Neurological:        [x] No Facial Asymmetry (Cranial nerve 7 motor function) (limited exam due to video visit)          [x] No gaze palsy        [] Abnormal -          Skin:        [x] No significant exanthematous lesions or discoloration noted on facial skin         [] Abnormal -            Psychiatric:       [x] Normal Affect [] Abnormal -        [x] No Hallucinations    Other pertinent observable physical exam findings:-        We discussed the expected course, resolution and complications of the diagnosis(es) in detail. Medication risks, benefits, costs, interactions, and alternatives were discussed as indicated. I advised her to contact the office if her condition worsens, changes or fails to improve as anticipated. She expressed understanding with the diagnosis(es) and plan. Cecily Funk, was evaluated through a synchronous (real-time) audio-video encounter. The patient (or guardian if applicable) is aware that this is a billable service. Verbal consent to proceed has been obtained within the past 12 months. The visit was conducted pursuant to the emergency declaration under the [facility], [address] [place] [address] authority and the [employer] General Act. Patient identification was verified, and a caregiver was present when appropriate. The patient was located in a state where the provider was credentialed to provide care. Sofia Beatty NP

## 2021-04-30 NOTE — PROGRESS NOTES
Chief Complaint   Patient presents with   Polly Cushing Establish Care    Other     doxy. me [phone]     Pt rates knee pain 9/10     1. Have you been to the ER, urgent care clinic since your last visit? Hospitalized since your last visit? No    2. Have you seen or consulted any other health care providers outside of the [address] [place] [address] since your last visit? Include any pap smears or colon screening.  No

## 2021-06-01 RX ORDER — PANTOPRAZOLE SODIUM 40 MG/1
TABLET, DELAYED RELEASE ORAL
Qty: 30 TABLET | Refills: 0 | Status: SHIPPED | OUTPATIENT
Start: 2021-06-01 | End: 2021-06-07 | Stop reason: SDUPTHER

## 2021-06-07 ENCOUNTER — OFFICE VISIT (OUTPATIENT)
Dept: INTERNAL MEDICINE CLINIC | Age: 46
End: 2021-06-07
Payer: COMMERCIAL

## 2021-06-07 ENCOUNTER — HOSPITAL ENCOUNTER (OUTPATIENT)
Dept: GENERAL RADIOLOGY | Age: 46
Discharge: HOME | End: 2021-06-07
Payer: COMMERCIAL

## 2021-06-07 VITALS
HEART RATE: 64 BPM | SYSTOLIC BLOOD PRESSURE: 114 MMHG | BODY MASS INDEX: 32.96 KG/M2 | RESPIRATION RATE: 16 BRPM | OXYGEN SATURATION: 97 % | WEIGHT: 186 LBS | DIASTOLIC BLOOD PRESSURE: 55 MMHG | HEIGHT: 63 IN | TEMPERATURE: 98 F

## 2021-06-07 DIAGNOSIS — E10.65 HYPERGLYCEMIA DUE TO TYPE 1 DIABETES MELLITUS (HCC): Primary | ICD-10-CM

## 2021-06-07 DIAGNOSIS — Z12.31 SCREENING MAMMOGRAM, ENCOUNTER FOR: ICD-10-CM

## 2021-06-07 DIAGNOSIS — E78.2 MIXED HYPERLIPIDEMIA: ICD-10-CM

## 2021-06-07 DIAGNOSIS — M25.561 CHRONIC PAIN OF RIGHT KNEE: ICD-10-CM

## 2021-06-07 DIAGNOSIS — G89.29 CHRONIC PAIN OF RIGHT KNEE: ICD-10-CM

## 2021-06-07 DIAGNOSIS — M79.7 FIBROMYALGIA: ICD-10-CM

## 2021-06-07 DIAGNOSIS — G47.30 SLEEP APNEA, UNSPECIFIED TYPE: ICD-10-CM

## 2021-06-07 DIAGNOSIS — K59.03 DRUG INDUCED CONSTIPATION: ICD-10-CM

## 2021-06-07 LAB
ALBUMIN UR QL STRIP: 30 MG/L
CHOLEST SERPL-MCNC: 101 MG/DL
CREATININE, URINE POC: 50 MG/DL
GLUCOSE POC: 101 MG/DL
HBA1C MFR BLD HPLC: 8.1 %
HDLC SERPL-MCNC: 41 MG/DL
LDL CHOLESTEROL POC: 47 MG/DL
MICROALBUMIN/CREAT RATIO POC: NORMAL MG/G
NON-HDL GOAL (POC): 60
TCHOL/HDL RATIO (POC): 1.1
TRIGL SERPL-MCNC: 67 MG/DL

## 2021-06-07 PROCEDURE — 73562 X-RAY EXAM OF KNEE 3: CPT

## 2021-06-07 PROCEDURE — 82044 UR ALBUMIN SEMIQUANTITATIVE: CPT | Performed by: NURSE PRACTITIONER

## 2021-06-07 PROCEDURE — 80061 LIPID PANEL: CPT | Performed by: NURSE PRACTITIONER

## 2021-06-07 PROCEDURE — 99214 OFFICE O/P EST MOD 30 MIN: CPT | Performed by: NURSE PRACTITIONER

## 2021-06-07 PROCEDURE — 82962 GLUCOSE BLOOD TEST: CPT | Performed by: NURSE PRACTITIONER

## 2021-06-07 PROCEDURE — 83036 HEMOGLOBIN GLYCOSYLATED A1C: CPT | Performed by: NURSE PRACTITIONER

## 2021-06-07 RX ORDER — BUMETANIDE 2 MG/1
2 TABLET ORAL DAILY
Qty: 90 TABLET | Refills: 0 | Status: SHIPPED | OUTPATIENT
Start: 2021-06-07 | End: 2021-10-14

## 2021-06-07 RX ORDER — DULOXETIN HYDROCHLORIDE 60 MG/1
60 CAPSULE, DELAYED RELEASE ORAL DAILY
Qty: 90 CAPSULE | Refills: 1 | Status: SHIPPED | OUTPATIENT
Start: 2021-06-07 | End: 2022-04-22

## 2021-06-07 RX ORDER — DOXEPIN HYDROCHLORIDE 25 MG/1
25 CAPSULE ORAL
Qty: 90 CAPSULE | Refills: 0 | Status: SHIPPED | OUTPATIENT
Start: 2021-06-07 | End: 2021-10-14

## 2021-06-07 RX ORDER — PEN NEEDLE, DIABETIC 31 GX3/16"
1 NEEDLE, DISPOSABLE MISCELLANEOUS 4 TIMES DAILY
Qty: 100 PEN NEEDLE | Refills: 11 | Status: SHIPPED | OUTPATIENT
Start: 2021-06-07 | End: 2021-12-07

## 2021-06-07 RX ORDER — INSULIN LISPRO 100 [IU]/ML
10 INJECTION, SOLUTION INTRAVENOUS; SUBCUTANEOUS
Qty: 3 VIAL | Refills: 1 | Status: SHIPPED | OUTPATIENT
Start: 2021-06-07 | End: 2022-04-22

## 2021-06-07 RX ORDER — NITROGLYCERIN 0.4 MG/1
0.4 TABLET SUBLINGUAL
Qty: 20 TABLET | Refills: 1 | Status: SHIPPED | OUTPATIENT
Start: 2021-06-07 | End: 2021-06-18

## 2021-06-07 RX ORDER — LOSARTAN POTASSIUM 25 MG/1
25 TABLET ORAL DAILY
Qty: 90 TABLET | Refills: 0 | Status: SHIPPED | OUTPATIENT
Start: 2021-06-07 | End: 2021-10-14

## 2021-06-07 RX ORDER — GABAPENTIN 300 MG/1
300 CAPSULE ORAL 3 TIMES DAILY
Qty: 270 CAPSULE | Refills: 0 | Status: SHIPPED | OUTPATIENT
Start: 2021-06-07 | End: 2021-10-14 | Stop reason: ALTCHOICE

## 2021-06-07 RX ORDER — PANTOPRAZOLE SODIUM 40 MG/1
40 TABLET, DELAYED RELEASE ORAL
Qty: 90 TABLET | Refills: 0 | Status: SHIPPED | OUTPATIENT
Start: 2021-06-07 | End: 2021-12-08 | Stop reason: SDUPTHER

## 2021-06-07 RX ORDER — CLOPIDOGREL BISULFATE 75 MG/1
75 TABLET ORAL DAILY
Qty: 90 TABLET | Refills: 0 | Status: SHIPPED | OUTPATIENT
Start: 2021-06-07 | End: 2021-08-09 | Stop reason: SDUPTHER

## 2021-06-07 RX ORDER — DOXEPIN HYDROCHLORIDE 25 MG/1
CAPSULE ORAL
COMMUNITY
End: 2021-06-07 | Stop reason: SDUPTHER

## 2021-06-07 RX ORDER — INSULIN GLARGINE 100 [IU]/ML
41 INJECTION, SOLUTION SUBCUTANEOUS
Qty: 3 VIAL | Refills: 1 | Status: SHIPPED | OUTPATIENT
Start: 2021-06-07 | End: 2021-07-27 | Stop reason: ALTCHOICE

## 2021-06-07 RX ORDER — METOPROLOL SUCCINATE 50 MG/1
50 TABLET, EXTENDED RELEASE ORAL DAILY
Qty: 90 TABLET | Refills: 0 | Status: SHIPPED | OUTPATIENT
Start: 2021-06-07 | End: 2022-09-19

## 2021-06-07 RX ORDER — DIVALPROEX SODIUM 500 MG/1
500 TABLET, EXTENDED RELEASE ORAL 3 TIMES DAILY
Status: CANCELLED | OUTPATIENT
Start: 2021-06-07

## 2021-06-07 RX ORDER — ATORVASTATIN CALCIUM 80 MG/1
80 TABLET, FILM COATED ORAL
Qty: 90 TABLET | Refills: 0 | Status: SHIPPED | OUTPATIENT
Start: 2021-06-07 | End: 2021-08-16 | Stop reason: SDUPTHER

## 2021-06-07 NOTE — PROGRESS NOTES
Chief Complaint   Patient presents with    Follow-up     DM    Request For New Medication     pen needles 32G x 5/32\" and lancets pt has one touch verio reflex ; lasix     Medication Refill     pt will need meds faxeds to [phone]    Knee Pain     right knee- pt states leg is always giving out and she fell last night        1. Have you been to the ER, urgent care clinic since your last visit? Hospitalized since your last visit? No    2. Have you seen or consulted any other health care providers outside of the [address] [place] [address] since your last visit? Include any pap smears or colon screening.  Yes When: 05/2021 Where: cardio  Reason for visit: echo 1) PCP Contacted on Admission: (Y/N) --> Yes. Name & Phone #: Dr. Trevizo  2) Date of Contact with PCP:  3) PCP Contacted at Discharge: (Y/N)  4) Summary of Handoff Given to PCP:   5) Post-Discharge Appointment Date and Location:

## 2021-06-07 NOTE — PROGRESS NOTES
Subjective: (As above and below)     Chief Complaint   Patient presents with    Follow-up     DM    Request For New Medication     pen needles 32G x 5/32\" and lancets pt has one touch verio reflex ; lasix     Medication Refill     pt will need meds faxeds to [phone]    Knee Pain     right knee- pt states leg is always giving out and she fell last night      [facility] is a 39y.o. year old female who presents for     Diabetic Review of Systems - medication compliance: compliant all of the time, diabetic diet compliance: noncompliant some of the time, home glucose monitoring: is performed regularly. She does have an appt w/ endo at [facility] coming up- she is type 1 DM  She has DM neuropathy, on gabapentin, asking for higher dose. .. Hyperlipidemia: tolerating statin    Hypertension ROS:  taking medications as instructed, no medication side effects noted, no TIAs, no chest pain on exertion, no dyspnea on exertion, no swelling of ankles    Bipolar disorder: doing fair, at last virtual visit she did request therapy referral, she has yet to schedule this    Chronic DVT: on eliquis, cardiology has her on both plavix and eliquis    CHF: follows w/ cardiology    Mammo: due    Sleep apnea: has bipap, not est w/ sleep med here    Fibromyalgia: on cymbalta, doing fair  Chronic low back pain:    Methadone maintenance: stable, she does experience constipation      Reviewed PmHx, RxHx, FmHx, SocHx, AllgHx and updated in chart.   Family History   Problem Relation Age of Onset    Thyroid Disease Mother     Hypertension Father     Heart Disease Father     Diabetes Sister     Heart Disease Sister     Hypertension Sister     Thyroid Disease Brother        Past Medical History:   Diagnosis Date    Arthritis     [facility] [facility]     Diabetes ([facility] [facility]     Gastrointestinal disorder     gastroparesis    Gastroparesis     GERD (gastroesophageal reflux disease)     Heart disease     Hypertension     Psychiatric disorder     Bipolar      Social History     Socioeconomic History    Marital status:      Spouse name: Not on file    Number of children: Not on file    Years of education: Not on file    Highest education level: Not on file   Tobacco Use    Smoking status: Current Every Day Smoker     Packs/day: 0.25    Smokeless tobacco: Never Used   Vaping Use    Vaping Use: Never used   Substance and Sexual Activity    Alcohol use: Yes     Comment: occasionally    Drug use: No    Sexual activity: Yes     Social Determinants of Health     Financial Resource Strain:     Difficulty of Paying Living Expenses:    Food Insecurity:     Worried About Running Out of Food in the Last Year:     [address] in the Last Year:    Transportation Needs:     Lack of Transportation (Medical):  Lack of Transportation (Non-Medical):    Physical Activity:     Days of Exercise per Week:     Minutes of Exercise per Session:    Stress:     Feeling of Stress :    Social Connections:     Frequency of Communication with Friends and Family:     Frequency of Social Gatherings with Friends and Family:     Attends Jainism Services:     Active Member of Clubs or Organizations:     Attends Club or Organization Meetings:     Marital Status:           Current Outpatient Medications   Medication Sig    doxepin (SINEquan) 25 mg capsule Take  by mouth nightly.  pantoprazole (PROTONIX) 40 mg tablet TAKE 1 TABLET BY MOUTH ONCE DAILY AS NEEDED    losartan (COZAAR) 25 mg tablet Take  by mouth daily.  isosorbide mononitrate ER (IMDUR) 30 mg tablet Take  by mouth daily.  bumetanide (BUMEX) 2 mg tablet Take 2 mg by mouth daily.  methadone (DOLOPHINE) 10 mg/mL solution Take 120 mg by mouth.  atorvastatin (Lipitor) 80 mg tablet Take 1 Tab by mouth nightly.  Indications: high cholesterol and high triglycerides    docusate sodium (COLACE) 100 mg capsule Take 1 Cap by mouth two (2) times daily as needed for Constipation for up to 90 days.  divalproex ER (DEPAKOTE ER) 500 mg ER tablet Take 500 mg by mouth three (3) times daily.  clopidogrel (PLAVIX) 75 mg tab Take 75 mg by mouth daily.  insulin glargine (LANTUS) 100 unit/mL injection 36 Units by SubCUTAneous route nightly.  metoprolol succinate (TOPROL-XL) 50 mg XL tablet Take 1 Tab by mouth daily. Indications: high blood pressure    apixaban (ELIQUIS) 5 mg tablet Take 1 Tab by mouth two (2) times a day. Indications: Deep Vein Thrombosis Prevention    nitroglycerin (NITROSTAT) 0.4 mg SL tablet 0.4 mg by SubLINGual route every five (5) minutes as needed for Chest Pain. Up to 3 doses.  DULoxetine (CYMBALTA) 60 mg capsule Take 1 Cap by mouth daily.  flash glucose scanning reader (FreeStyle Jone 14 Day [place]) misc 1 Each by Does Not Apply route Twice a month.  flash glucose sensor (FreeStyle Jone 14 Day Sensor) kit 1 Each by Does Not Apply route Twice a month.  ferrous sulfate 325 mg (65 mg iron) tablet Take 1 Tab by mouth three (3) times daily (with meals). Indications: anemia from inadequate iron    gabapentin (NEURONTIN) 300 mg capsule Take 300 mg by mouth three (3) times daily.  insulin lispro (HUMALOG) 100 unit/mL injection 10 Units by SubCUTAneous route Before breakfast, lunch, and dinner. Plus sliding scale if needed  Indications: type 1 diabetes mellitus     No current facility-administered medications for this visit. Review of Systems:   Constitutional:    Negative for fever and chills, negative diaphoresis. HEENT:              Negative for neck pain and stiffness. Eyes:                  Negative for visual disturbance, itching, redness or discharge. Respiratory:        Negative for cough and shortness of breath. Cardiovascular:  Negative for chest pain and palpitations. Gastrointestinal: Negative for nausea, vomiting, abdominal pain, diarrhea  +constipation. Genitourinary:     Negative for dysuria and frequency. Musculoskeletal: Negative for falls, tenderness and swelling. Skin:                    Negative for rash, masses or lesions. Neurological:       Negative for dizzyness, seizure, loss of consciousness, weakness and numbness. Objective:     Vitals:    06/07/21 1434   BP: (!) 114/55   Pulse: 64   Resp: 16   Temp: 98 °F (36.7 °C)   TempSrc: Temporal   SpO2: 97%   Weight: 186 lb (84.4 kg)   Height: 5' 3\" (1.6 m)     Results for orders placed or performed in visit on 06/07/21   AMB POC GLUCOSE BLOOD, BY GLUCOSE MONITORING DEVICE   Result Value Ref Range    Glucose  MG/DL   AMB POC HEMOGLOBIN A1C   Result Value Ref Range    Hemoglobin A1c (POC) 8.1 %   AMB POC LIPID PROFILE   Result Value Ref Range    Cholesterol (POC) 101     Triglycerides (POC) 67     HDL Cholesterol (POC) 41     LDL Cholesterol (POC) 47 MG/DL    Non-HDL Goal (POC) 60     TChol/HDL Ratio (POC) 1.1    AMB POC URINE, MICROALBUMIN, SEMIQUANT (3 RESULTS)   Result Value Ref Range    ALBUMIN, URINE POC 30 Negative mg/L    CREATININE, URINE POC 50 mg/dL    Microalbumin/creat ratio (POC)  <30 MG/G             Gen: Oriented to person, place and time and well-developed, well-nourished and in no distress. HEENT:    Head: normocephalic and atraumatic. Eyes:  EOM are normal. Pupils equal and round. Neck:  Normal range of motion. Neck supple. Cardiovascular: normal rate, regular rhythm and normal heart sounds. Pulmonary/Chest:  Effort normal and breath sounds normal.  No respiratory distress. No wheezes, no rales. Abdominal: soft, normal  bowel sounds. Musculoskeletal:  No edema, no tenderness. No calf tenderness or edema. Neurological:  Alert, oriented to person, place and time. Skin: skin is warm and dry. Assessment/ Plan:        Do not want to increase meds until labs done for renal function  Fu w/ cardiology, endo    1.  Hyperglycemia due to type 1 diabetes mellitus (HCC)  Fu endo, dose increase on lantus  - AMB POC GLUCOSE BLOOD, BY GLUCOSE MONITORING DEVICE  - AMB POC HEMOGLOBIN A1C  - AMB POC LIPID PROFILE  - insulin lispro (HUMALOG) 100 unit/mL injection; 10 Units by SubCUTAneous route Before breakfast, lunch, and dinner. Plus sliding scale if needed  Indications: type 1 diabetes mellitus  Dispense: 3 Vial; Refill: 1  - insulin glargine (LANTUS) 100 unit/mL injection; 41 Units by SubCUTAneous route nightly. Dispense: 3 Vial; Refill: 1  - CBC W/O DIFF; Future  - METABOLIC PANEL, COMPREHENSIVE; Future  - AMB POC URINE, MICROALBUMIN, SEMIQUANT (3 RESULTS)    2. Drug induced constipation    - polyethylene glycol (MIRALAX) 17 gram packet; Take 1 Packet by mouth daily as needed for Constipation. Dispense: 30 Packet; Refill: 1    3. Fibromyalgia    - gabapentin (NEURONTIN) 300 mg capsule; Take 1 Capsule by mouth three (3) times daily. Max Daily Amount: 900 mg. Indications: neuropathic pain  Dispense: 270 Capsule; Refill: 0  - DULoxetine (Cymbalta) 60 mg capsule; Take 1 Capsule by mouth daily. Indications: anxiousness associated with depression  Dispense: 90 Capsule; Refill: 1    4. Screening mammogram, encounter for    - [facility] BI SCREENING INCL CAD; Future    5. Chronic pain of right knee    - XR KNEE RT 3 V; Future    6. Sleep apnea, unspecified type    - SLEEP MEDICINE REFERRAL    7. Mixed hyperlipidemia  Cont statin            I have discussed the diagnosis with the patient and the intended plan as seen in the above orders. The patient has received an after-visit summary and questions were answered concerning future plans. Pt conveyed understanding of plan. Medication Side Effects and Warnings were discussed with patient: yes  Patient Labs were reviewed: yes  Patient Past Records were reviewed:  yes    Maylon Dancer.  Fernando Massey NP

## 2021-06-09 RX ORDER — POLYETHYLENE GLYCOL 3350 17 G/17G
17 POWDER, FOR SOLUTION ORAL
Qty: 30 PACKET | Refills: 1 | Status: SHIPPED | OUTPATIENT
Start: 2021-06-09 | End: 2021-06-18

## 2021-06-15 DIAGNOSIS — E83.51 HYPOCALCEMIA: Primary | ICD-10-CM

## 2021-06-15 NOTE — LETTER
6/15/2021 [address] [id number] [id number]: Please find your most recent results below. Your have some minor lab changes that I would like to recheck in 3 months. Your creatinine (kidney test) is a bit high, please make sure you drink plenty of water and maintain good sugar and blood pressure control. Your calcium is a bit low, sometimes this is linked to your vitamin D levels so I will recheck this as well. Please get the enclosed labs rechecked in about 3 months. Resulted Orders METABOLIC PANEL, COMPREHENSIVE Result Value Ref Range Sodium 141 136 - 145 mmol/L Potassium 4.0 3.5 - 5.1 mmol/L Chloride 106 97 - 108 mmol/L  
 CO2 33 (H) 21 - 32 mmol/L Anion gap 2 (L) 5 - 15 mmol/L Glucose 98 65 - 100 mg/dL BUN 30 (H) 6 - 20 MG/DL Creatinine 1.23 (H) 0.55 - 1.02 MG/DL  
 BUN/Creatinine ratio 24 (H) 12 - 20 GFR est AA 57 (L) >60 ml/min/1.73m2 GFR est non-AA 47 (L) >60 ml/min/1.73m2 Calcium 8.4 (L) 8.5 - 10.1 MG/DL Bilirubin, total 0.4 0.2 - 1.0 MG/DL  
 ALT (SGPT) 22 12 - 78 U/L  
 AST (SGOT) 22 15 - 37 U/L Alk. phosphatase 120 (H) 45 - 117 U/L Protein, total 5.9 (L) 6.4 - 8.2 g/dL Albumin 3.0 (L) 3.5 - 5.0 g/dL Globulin 2.9 2.0 - 4.0 g/dL A-G Ratio 1.0 (L) 1.1 - 2.2    
CBC W/O DIFF Result Value Ref Range WBC 6.7 3.6 - 11.0 K/uL  
 RBC 4.18 3.80 - 5.20 M/uL  
 HGB 10.7 (L) 11.5 - 16.0 g/dL HCT 35.1 35.0 - 47.0 % MCV 84.0 80.0 - 99.0 FL  
 MCH 25.6 (L) 26.0 - 34.0 PG  
 MCHC 30.5 30.0 - 36.5 g/dL  
 RDW 18.2 (H) 11.5 - 14.5 % PLATELET 948 658 - 322 K/uL MPV 10.4 8.9 - 12.9 FL  
 NRBC 0.0 0  WBC ABSOLUTE NRBC 0.00 0.00 - 0.01 K/uL RECOMMENDATIONS: 
 
 
Please call me if you have any questions: [phone] Sincerely, Sofia Heck, NP

## 2021-06-18 DIAGNOSIS — K59.03 DRUG INDUCED CONSTIPATION: ICD-10-CM

## 2021-06-18 RX ORDER — NITROGLYCERIN 0.4 MG/1
TABLET SUBLINGUAL
Qty: 25 TABLET | Refills: 11 | Status: SHIPPED | OUTPATIENT
Start: 2021-06-18

## 2021-06-18 RX ORDER — POLYETHYLENE GLYCOL 3350 17 G/17G
POWDER, FOR SOLUTION ORAL
Qty: 30 PACKET | Refills: 11 | Status: SHIPPED | OUTPATIENT
Start: 2021-06-18 | End: 2022-04-22

## 2021-06-23 DIAGNOSIS — M17.11 PRIMARY OSTEOARTHRITIS OF RIGHT KNEE: Primary | ICD-10-CM

## 2021-06-24 ENCOUNTER — PATIENT MESSAGE (OUTPATIENT)
Dept: INTERNAL MEDICINE CLINIC | Age: 46
End: 2021-06-24

## 2021-07-09 RX ORDER — INSULIN LISPRO 100 [IU]/ML
INJECTION, SOLUTION INTRAVENOUS; SUBCUTANEOUS
Qty: 15 ML | Refills: 11 | Status: SHIPPED | OUTPATIENT
Start: 2021-07-09 | End: 2021-10-14

## 2021-07-23 DIAGNOSIS — E10.65 HYPERGLYCEMIA DUE TO TYPE 1 DIABETES MELLITUS (HCC): ICD-10-CM

## 2021-07-23 NOTE — TELEPHONE ENCOUNTER
----- Message from [facility] sent at 7/23/2021 12:28 PM EDT -----  Regarding: ALEXIA Crespo/telephone  General Message/Vendor Calls    Caller's first and last name: [employer]/Smart script       Reason for call:Clarification/new order       Callback required yes/no and why:Y      Best contact number(s):[phone]      Details to clarify the request:Caller stated that they would need a new script for the patients insulin due to having viles and patient needs pens.       Kasey Perkins

## 2021-07-27 RX ORDER — INSULIN GLARGINE 100 [IU]/ML
41 INJECTION, SOLUTION SUBCUTANEOUS DAILY
Qty: 5 ADJUSTABLE DOSE PRE-FILLED PEN SYRINGE | Refills: 0 | Status: SHIPPED | OUTPATIENT
Start: 2021-07-27 | End: 2022-02-21 | Stop reason: SDUPTHER

## 2021-07-27 RX ORDER — INSULIN GLARGINE 100 [IU]/ML
41 INJECTION, SOLUTION SUBCUTANEOUS
Qty: 3 VIAL | Refills: 1 | OUTPATIENT
Start: 2021-07-27

## 2021-08-09 RX ORDER — CLOPIDOGREL BISULFATE 75 MG/1
75 TABLET ORAL DAILY
Qty: 90 TABLET | Refills: 0 | Status: SHIPPED | OUTPATIENT
Start: 2021-08-09

## 2021-08-09 NOTE — TELEPHONE ENCOUNTER
Pharmacist from [facility] called because they did not receive this prescription. They can get e- script.  They need for it to be sent to them again

## 2021-08-11 ENCOUNTER — TELEPHONE (OUTPATIENT)
Dept: INTERNAL MEDICINE CLINIC | Age: 46
End: 2021-08-11

## 2021-08-11 NOTE — TELEPHONE ENCOUNTER
Pt states she was having trouble w/ MyChart, password has been reset and but she has not tried to log in yet. Pt will call back if she is unable to. Pt would also like to let provider know she is getting Heart Catheterization done at [facility] 08/24/2021, this was ordered by her cardio dr. Pastor Garza states she requested everything from cardio be sent to you but is unsure if they have been doing so.

## 2021-08-12 NOTE — TELEPHONE ENCOUNTER
Writer left a voicemail for the patient to call back to the office. Writer is needing to know who her cardiologist is. Also. The patient was needing the number for [employer].  The number is [phone]

## 2021-08-16 RX ORDER — ATORVASTATIN CALCIUM 80 MG/1
80 TABLET, FILM COATED ORAL
Qty: 90 TABLET | Refills: 0 | Status: SHIPPED | OUTPATIENT
Start: 2021-08-16 | End: 2021-11-04

## 2021-08-16 NOTE — TELEPHONE ENCOUNTER
[facility] left a voice message requesting a refill on behalf of the patient. Last visit 06/07/2021 ALEXIA Shipley   Next appointment 09/07/2021 ALEXIA Shipley   Previous refill encounter(s)   06/07/2021 Lipitor #[id number]     Requested Prescriptions     Pending Prescriptions Disp Refills    atorvastatin (Lipitor) 80 mg tablet 90 Tablet 0     Sig: Take 1 Tablet by mouth nightly.  Indications: high cholesterol and high triglycerides

## 2021-09-07 ENCOUNTER — OFFICE VISIT (OUTPATIENT)
Dept: INTERNAL MEDICINE CLINIC | Age: 46
End: 2021-09-07
Payer: COMMERCIAL

## 2021-09-07 VITALS
HEIGHT: 63 IN | TEMPERATURE: 98.3 F | DIASTOLIC BLOOD PRESSURE: 58 MMHG | SYSTOLIC BLOOD PRESSURE: 125 MMHG | OXYGEN SATURATION: 97 % | HEART RATE: 62 BPM | RESPIRATION RATE: 16 BRPM | WEIGHT: 185 LBS | BODY MASS INDEX: 32.78 KG/M2

## 2021-09-07 DIAGNOSIS — I82.5Y9 CHRONIC DEEP VEIN THROMBOSIS (DVT) OF PROXIMAL VEIN OF LOWER EXTREMITY, UNSPECIFIED LATERALITY (HCC): ICD-10-CM

## 2021-09-07 DIAGNOSIS — E10.65 HYPERGLYCEMIA DUE TO TYPE 1 DIABETES MELLITUS (HCC): ICD-10-CM

## 2021-09-07 DIAGNOSIS — Z23 ENCOUNTER FOR IMMUNIZATION: ICD-10-CM

## 2021-09-07 DIAGNOSIS — G44.52 HEADACHE, NEW DAILY PERSISTENT (NDPH): ICD-10-CM

## 2021-09-07 DIAGNOSIS — I50.9 CHRONIC CONGESTIVE HEART FAILURE, UNSPECIFIED HEART FAILURE TYPE (HCC): ICD-10-CM

## 2021-09-07 DIAGNOSIS — Z12.11 COLON CANCER SCREENING: ICD-10-CM

## 2021-09-07 DIAGNOSIS — M79.7 FIBROMYALGIA: ICD-10-CM

## 2021-09-07 DIAGNOSIS — Z23 NEEDS FLU SHOT: Primary | ICD-10-CM

## 2021-09-07 LAB
GLUCOSE POC: 322 MG/DL
HBA1C MFR BLD HPLC: 8.5 %

## 2021-09-07 PROCEDURE — 90471 IMMUNIZATION ADMIN: CPT | Performed by: INTERNAL MEDICINE

## 2021-09-07 PROCEDURE — 83036 HEMOGLOBIN GLYCOSYLATED A1C: CPT | Performed by: NURSE PRACTITIONER

## 2021-09-07 PROCEDURE — 99214 OFFICE O/P EST MOD 30 MIN: CPT | Performed by: NURSE PRACTITIONER

## 2021-09-07 PROCEDURE — 90732 PPSV23 VACC 2 YRS+ SUBQ/IM: CPT | Performed by: INTERNAL MEDICINE

## 2021-09-07 PROCEDURE — 82962 GLUCOSE BLOOD TEST: CPT | Performed by: NURSE PRACTITIONER

## 2021-09-07 PROCEDURE — 90686 IIV4 VACC NO PRSV 0.5 ML IM: CPT | Performed by: INTERNAL MEDICINE

## 2021-09-07 RX ORDER — BLOOD-GLUCOSE,RECEIVER,CONT
1 EACH MISCELLANEOUS DAILY
Qty: 30 EACH | Refills: 11 | Status: SHIPPED | OUTPATIENT
Start: 2021-09-07 | End: 2021-09-08 | Stop reason: SDUPTHER

## 2021-09-07 RX ORDER — BLOOD-GLUCOSE SENSOR
1 EACH MISCELLANEOUS DAILY
Qty: 1 EACH | Refills: 0 | Status: SHIPPED | OUTPATIENT
Start: 2021-09-07 | End: 2021-09-08 | Stop reason: SDUPTHER

## 2021-09-07 RX ORDER — BLOOD-GLUCOSE TRANSMITTER
1 EACH MISCELLANEOUS DAILY
Qty: 1 EACH | Refills: 0 | Status: SHIPPED | OUTPATIENT
Start: 2021-09-07 | End: 2021-09-08 | Stop reason: SDUPTHER

## 2021-09-07 NOTE — PATIENT INSTRUCTIONS
Please schedule your mammogram at: [phone]    Lab- 2nd floor [address]    Vaccine Information Statement    Influenza (Flu) Vaccine (Inactivated or Recombinant): What You Need to Know    Many vaccine information statements are available in [place]ic and other languages. See www.immunize.org/vis. Hojas de información sobre vacunas están disponibles en español y en muchos otros idiomas. Visite www.immunize.org/vis. 1. Why get vaccinated? Influenza vaccine can prevent influenza (flu). Flu is a contagious disease that spreads around the [place] every year, usually between October and May. Anyone can get the flu, but it is more dangerous for some people. Infants and young children, people 72 years and older, pregnant people, and people with certain health conditions or a weakened immune system are at greatest risk of flu complications. Pneumonia, bronchitis, sinus infections, and ear infections are examples of flu-related complications. If you have a medical condition, such as heart disease, cancer, or diabetes, flu can make it worse. Flu can cause fever and chills, sore throat, muscle aches, fatigue, cough, headache, and runny or stuffy nose. Some people may have vomiting and diarrhea, though this is more common in children than adults. In an average year, thousands of people in the [facility] die from flu, and many more are hospitalized. Flu vaccine prevents millions of illnesses and flu-related visits to the doctor each year. 2. Influenza vaccines     CDC recommends everyone 6 months and older get vaccinated every flu season. Children 6 months through 6years of age may need 2 doses during a single flu season. Everyone else needs only 1 dose each flu season. It takes about 2 weeks for protection to develop after vaccination. There are many flu viruses, and they are always changing.  Each year a new flu vaccine is made to protect against the influenza viruses believed to be likely to cause disease in the upcoming flu season. Even when the vaccine doesnt exactly match these viruses, it may still provide some protection. Influenza vaccine does not cause flu. Influenza vaccine may be given at the same time as other vaccines. 3. Talk with your health care provider    Tell your vaccination provider if the person getting the vaccine:   Has had an allergic reaction after a previous dose of influenza vaccine, or has any severe, life-threatening allergies    Has ever had Guillain-Barré Syndrome (also called GBS)    In some cases, your health care provider may decide to postpone influenza vaccination until a future visit. Influenza vaccine can be administered at any time during pregnancy. People who are or will be pregnant during influenza season should receive inactivated influenza vaccine. People with minor illnesses, such as a cold, may be vaccinated. People who are moderately or severely ill should usually wait until they recover before getting influenza vaccine. Your health care provider can give you more information. 4. Risks of a vaccine reaction     Soreness, redness, and swelling where the shot is given, fever, muscle aches, and headache can happen after influenza vaccination.  There may be a very small increased risk of Guillain-Barré Syndrome (GBS) after inactivated influenza vaccine (the flu shot). [facility] children who get the flu shot along with pneumococcal vaccine (PCV13) and/or DTaP vaccine at the same time might be slightly more likely to have a seizure caused by fever. Tell your health care provider if a child who is getting flu vaccine has ever had a seizure. People sometimes faint after medical procedures, including vaccination. Tell your provider if you feel dizzy or have vision changes or ringing in the ears.     As with any medicine, there is a very remote chance of a vaccine causing a severe allergic reaction, other serious injury, or death.    5. What if there is a serious problem? An allergic reaction could occur after the vaccinated person leaves the clinic. If you see signs of a severe allergic reaction (hives, swelling of the face and throat, difficulty breathing, a fast heartbeat, dizziness, or weakness), call 9-1-1 and get the person to the nearest hospital.    For other signs that concern you, call your health care provider. Adverse reactions should be reported to the Vaccine Adverse Event Reporting System (VAERS). Your health care provider will usually file this report, or you can do it yourself. Visit the VAERS website at www.vaers. [facility].gov or call 9-485.717.1458. VAERS is only for reporting reactions, and VAERS staff members do not give medical advice. 6. The National Vaccine Injury Compensation Program    The [facility] Vaccine Injury Compensation Program (VICP) is a federal program that was created to compensate people who may have been injured by certain vaccines. Claims regarding alleged injury or death due to vaccination have a time limit for filing, which may be as short as two years. Visit the VICP website at www.[facility].gov/vaccinecompensation or call 9-255.540.4587 to learn about the program and about filing a claim. 7. How can I learn more?  Ask your health care provider.  Call your local or state health department.  Visit the website of the Food and Drug Administration (FDA) for vaccine package inserts and additional information at www.fda.gov/vaccines-blood-biologics/vaccines.  Contact the Centers for Disease Control and Prevention (CDC):  - Call 7-878.791.3051 (1-800-CDC-INFO) or  - Visit CDCs influenza website at www.cdc.gov/flu. Vaccine Information Statement   Inactivated Influenza Vaccine   8/6/2021  42 ALIDA Yeager [id number]   Department of Health and Human Services  Centers for Disease Control and Prevention    Office Use Only      Vaccine Information Statement    Pneumococcal Polysaccharide Vaccine (PPSV23): What You Need to Know    Many Vaccine Information Statements are available in Croatian and other languages. See www.immunize.org/vis  Hojas de información sobre vacunas están disponibles en español y en muchos otros idiomas. Visite www.immunize.org/vis    1. Why get vaccinated? Pneumococcal polysaccharide vaccine (PPSV23) can prevent pneumococcal disease. Pneumococcal disease refers to any illness caused by pneumococcal bacteria. These bacteria can cause many types of illnesses, including pneumonia, which is an infection of the lungs. Pneumococcal bacteria are one of the most common causes of pneumonia. Besides pneumonia, pneumococcal bacteria can also cause:   Ear infections   Sinus infections   Meningitis (infection of the tissue covering the brain and spinal cord)   Bacteremia (bloodstream infection)    Anyone can get pneumococcal disease, but children under 3years of age, people with certain medical conditions, adults 72 years or older, and cigarette smokers are at the highest risk. Most pneumococcal infections are mild. However, some can result in long-term problems, such as brain damage or hearing loss. Meningitis, bacteremia, and pneumonia caused by pneumococcal disease can be fatal.     2. PPSV23     PPSV23 protects against 23 types of bacteria that cause pneumococcal disease. PPSV23 is recommended for:   All adults 72 years or older,   Anyone 2 years or older with certain medical conditions that can lead to an increased risk for pneumococcal disease. Most people need only one dose of PPSV23. A second dose of PPSV23, and another type of pneumococcal vaccine called PCV13, are recommended for certain high-risk groups. Your health care provider can give you more information.     People 65 years or older should get a dose of PPSV23 even if they have already gotten one or more doses of the vaccine before they turned 72.    3. Talk with your health care provider    Tell your vaccine provider if the person getting the vaccine:   Has had an allergic reaction after a previous dose of PPSV23, or has any severe, life-threatening allergies. In some cases, your health care provider may decide to postpone PPSV23 vaccination to a future visit. People with minor illnesses, such as a cold, may be vaccinated. People who are moderately or severely ill should usually wait until they recover before getting PPSV23. Your health care provider can give you more information. 4. Risks of a vaccine reaction     Redness or pain where the shot is given, feeling tired, fever, or muscle aches can happen after PPSV23. People sometimes faint after medical procedures, including vaccination. Tell your provider if you feel dizzy or have vision changes or ringing in the ears. As with any medicine, there is a very remote chance of a vaccine causing a severe allergic reaction, other serious injury, or death. 5. What if there is a serious problem? An allergic reaction could occur after the vaccinated person leaves the clinic. If you see signs of a severe allergic reaction (hives, swelling of the face and throat, difficulty breathing, a fast heartbeat, dizziness, or weakness), call 9-1-1 and get the person to the nearest hospital.    For other signs that concern you, call your health care provider. Adverse reactions should be reported to the Vaccine Adverse Event Reporting System (VAERS). Your health care provider will usually file this report, or you can do it yourself. Visit the VAERS website at www.vaers. hhs.gov or call 6-542.950.2849. VAERS is only for reporting reactions, and VAERS staff do not give medical advice. 6. How can I learn more?  Ask your health care provider.  Call your local or state health department.    Contact the Centers for Disease Control and Prevention (CDC):  - Call 8-102.430.9019 (1-800-CDC-INFO) or  - Visit CDCs website at www.cdc.gov/vaccines    Vaccine Information Statement   PPSV23   10/30/2019    Department of Health and Human Services  Centers for Disease Control and Prevention    Office Use Only

## 2021-09-07 NOTE — PROGRESS NOTES
Chief Complaint   Patient presents with    Follow-up    Letter for School/Work     disability, pt states she needs a note saying it is safer for her to drive her own car and not the bus    Request For New Medication     pt would like gabapentin increased     Headache     pt states she is having a couple headaches a day, is concerned for high BP        1. Have you been to the ER, urgent care clinic since your last visit? Hospitalized since your last visit? No    2. Have you seen or consulted any other health care providers outside of the [address] [place] [address] since your last visit? Include any pap smears or colon screening.  No

## 2021-09-07 NOTE — PROGRESS NOTES
Subjective: (As above and below)     Chief Complaint   Patient presents with    Follow-up    Letter for School/Work     disability, pt states she needs a note saying it is safer for her to drive her own car and not the bus    Request For New Medication     pt would like gabapentin increased     Headache     pt states she is having a couple headaches a day, is concerned for high BP      Jose BE 1983 Shantell Ayoub is a 39y.o. year old female who presents for     Hypertension ROS:  taking medications as instructed, no medication side effects noted, no TIAs, no chest pain on exertion, no dyspnea on exertion, no swelling of ankles    Diabetic Review of Systems - medication compliance: compliant all of the time, diabetic diet compliance: noncompliant some of the time, home glucose monitoring: is not performed - requesting DEXCOM meter    Headaches left sided, throbbing- occurring several x per week  +photo/phonophobia  Denies head injury, dizziness, vision changes    Mammo: due    CHF, chronic DVT: follows w/ cardiology, on plavix and eliquis  Denies leg swelling, HUERTA, chest pain        Reviewed PmHx, RxHx, FmHx, SocHx, AllgHx and updated in chart.   Family History   Problem Relation Age of Onset    Thyroid Disease Mother     Hypertension Father     Heart Disease Father     Diabetes Sister     Heart Disease Sister     Hypertension Sister     Thyroid Disease Brother        Past Medical History:   Diagnosis Date    Arthritis     [facility]     Diabetes ([facility]     Gastrointestinal disorder     gastroparesis    Gastroparesis     GERD (gastroesophageal reflux disease)     Heart disease     Hypertension     Psychiatric disorder     Bipolar      Social History     Socioeconomic History    Marital status:      Spouse name: Not on file    Number of children: Not on file    Years of education: Not on file    Highest education level: Not on file   Tobacco Use    Smoking status: Current Every Day Smoker     Packs/day: 0.25    Smokeless tobacco: Never Used   Vaping Use    Vaping Use: Never used   Substance and Sexual Activity    Alcohol use: Yes     Comment: occasionally    Drug use: No    Sexual activity: Yes     Social Determinants of Health     Financial Resource Strain:     Difficulty of Paying Living Expenses:    Food Insecurity:     Worried About Running Out of Food in the Last Year:     Ran Out of Food in the Last Year:    Transportation Needs:     Lack of Transportation (Medical):  Lack of Transportation (Non-Medical):    Physical Activity:     Days of Exercise per Week:     Minutes of Exercise per Session:    Stress:     Feeling of Stress :    Social Connections:     Frequency of Communication with Friends and Family:     Frequency of Social Gatherings with Friends and Family:     Attends Judaism Services:     Active Member of Clubs or Organizations:     Attends Club or Organization Meetings:     Marital Status:           Current Outpatient Medications   Medication Sig    atorvastatin (Lipitor) 80 mg tablet Take 1 Tablet by mouth nightly. Indications: high cholesterol and high triglycerides    clopidogreL (Plavix) 75 mg tab Take 1 Tablet by mouth daily.  insulin glargine (LANTUS,BASAGLAR) 100 unit/mL (3 mL) inpn 41 Units by SubCUTAneous route daily.  HumaLOG KwikPen Insulin 100 unit/mL kwikpen INJECT 10 UNITS SUBCUTANEOUSLY BEFORE BREAKFAST, LUNCH, AND DINNER PLUS SLIDING SCALE IF NEEDED. MAX 40 UNITS DAILY.  nitroglycerin (NITROSTAT) 0.4 mg SL tablet DISSOLVE 1 TABLET SUBLINGUALLY EVERY 5 MINUTES AS NEEDED FOR CHEST PAIN FOR UP TO 3 DOSES    polyethylene glycol (MIRALAX) 17 gram packet TAKE 1 PACKET BY MOUTH DAILY AS NEEDD FOR CONSTIPATION    insulin lispro (HUMALOG) 100 unit/mL injection 10 Units by SubCUTAneous route Before breakfast, lunch, and dinner.  Plus sliding scale if needed  Indications: type 1 diabetes mellitus    apixaban (ELIQUIS) 5 mg tablet Take 1 Tablet by mouth two (2) times a day. Indications: deep vein thrombosis prevention    doxepin (SINEquan) 25 mg capsule Take 1 Capsule by mouth nightly.  gabapentin (NEURONTIN) 300 mg capsule Take 1 Capsule by mouth three (3) times daily. Max Daily Amount: 900 mg. Indications: neuropathic pain    losartan (COZAAR) 25 mg tablet Take 1 Tablet by mouth daily.  metoprolol succinate (TOPROL-XL) 50 mg XL tablet Take 1 Tablet by mouth daily. Indications: high blood pressure    bumetanide (BUMEX) 2 mg tablet Take 1 Tablet by mouth daily.  DULoxetine (Cymbalta) 60 mg capsule Take 1 Capsule by mouth daily. Indications: anxiousness associated with depression    pantoprazole (PROTONIX) 40 mg tablet Take 1 Tablet by mouth daily as needed (acid reflux).  Insulin Needles, Disposable, 32 gauge x 5/32\" ndle 1 Each by Does Not Apply route four (4) times daily.  isosorbide mononitrate ER (IMDUR) 30 mg tablet Take  by mouth daily.  methadone (DOLOPHINE) 10 mg/mL solution Take 120 mg by mouth.  flash glucose scanning reader (FreeStyle Jone 14 Day Agra) misc 1 Each by Does Not Apply route Twice a month.  flash glucose sensor (FreeStyle Jone 14 Day Sensor) kit 1 Each by Does Not Apply route Twice a month.  divalproex ER (DEPAKOTE ER) 500 mg ER tablet Take 500 mg by mouth three (3) times daily.  ferrous sulfate 325 mg (65 mg iron) tablet Take 1 Tab by mouth three (3) times daily (with meals). Indications: anemia from inadequate iron     No current facility-administered medications for this visit. Review of Systems:   Constitutional:    Negative for fever and chills, negative diaphoresis. HEENT:              Negative for neck pain and stiffness. Eyes:                  Negative for visual disturbance, itching, redness or discharge. Respiratory:        Negative for cough and shortness of breath. Cardiovascular:  Negative for chest pain and palpitations.    Gastrointestinal: Negative for nausea, vomiting, abdominal pain, diarrhea or constipation. Genitourinary:     Negative for dysuria and frequency. Musculoskeletal: Negative for falls, tenderness and swelling. Skin:                    Negative for rash, masses or lesions. Neurological:       Negative for dizzyness, seizure, loss of consciousness, weakness and numbness. Objective:     Vitals:    09/07/21 1530   BP: (!) 125/58   Pulse: 62   Resp: 16   Temp: 98.3 °F (36.8 °C)   TempSrc: Temporal   SpO2: 97%   Weight: 185 lb (83.9 kg)   Height: 5' 3\" (1.6 m)       Results for orders placed or performed in visit on 09/07/21   AMB POC HEMOGLOBIN A1C   Result Value Ref Range    Hemoglobin A1c (POC) 8.5 %   AMB POC GLUCOSE BLOOD, BY GLUCOSE MONITORING DEVICE   Result Value Ref Range    Glucose  MG/DL         Results for orders placed or performed in visit on 51/06/51   METABOLIC PANEL, COMPREHENSIVE   Result Value Ref Range    Sodium 141 136 - 145 mmol/L    Potassium 4.0 3.5 - 5.1 mmol/L    Chloride 106 97 - 108 mmol/L    CO2 33 (H) 21 - 32 mmol/L    Anion gap 2 (L) 5 - 15 mmol/L    Glucose 98 65 - 100 mg/dL    BUN 30 (H) 6 - 20 MG/DL    Creatinine 1.23 (H) 0.55 - 1.02 MG/DL    BUN/Creatinine ratio 24 (H) 12 - 20      GFR est AA 57 (L) >60 ml/min/1.73m2    GFR est non-AA 47 (L) >60 ml/min/1.73m2    Calcium 8.4 (L) 8.5 - 10.1 MG/DL    Bilirubin, total 0.4 0.2 - 1.0 MG/DL    ALT (SGPT) 22 12 - 78 U/L    AST (SGOT) 22 15 - 37 U/L    Alk.  phosphatase 120 (H) 45 - 117 U/L    Protein, total 5.9 (L) 6.4 - 8.2 g/dL    Albumin 3.0 (L) 3.5 - 5.0 g/dL    Globulin 2.9 2.0 - 4.0 g/dL    A-G Ratio 1.0 (L) 1.1 - 2.2     CBC W/O DIFF   Result Value Ref Range    WBC 6.7 3.6 - 11.0 K/uL    RBC 4.18 3.80 - 5.20 M/uL    HGB 10.7 (L) 11.5 - 16.0 g/dL    HCT 35.1 35.0 - 47.0 %    MCV 84.0 80.0 - 99.0 FL    MCH 25.6 (L) 26.0 - 34.0 PG    MCHC 30.5 30.0 - 36.5 g/dL    RDW 18.2 (H) 11.5 - 14.5 %    PLATELET 453 771 - 688 K/uL    MPV 10.4 8.9 - 12.9 FL    NRBC 0.0 0  WBC    ABSOLUTE NRBC 0.00 0.00 - 0.01 K/uL         1. Needs flu shot    - INFLUENZA VIRUS VAC QUAD,SPLIT,PRESV FREE SYRINGE IM    2. Hyperglycemia due to type 1 diabetes mellitus ([facility]  She is seeing endo soon  She really needs to get a meter to assist in insulin titration  - AMB POC HEMOGLOBIN A1C  - AMB POC GLUCOSE BLOOD, BY GLUCOSE MONITORING DEVICE  - METABOLIC PANEL, BASIC; Future    3. Fibromyalgia      4. Chronic congestive heart failure, unspecified heart failure type ([facility]      5. Chronic deep vein thrombosis (DVT) of proximal vein of lower extremity, unspecified laterality ([facility]      6. Colon cancer screening  -cologuard    7. Headache, new daily persistent ([facility])  recc est w/ neuro as triptans, fioricet contraindicated w/ phmx  tylenol  - REFERRAL TO NEUROLOGY    8. Encounter for immunization    - PNEUMOCOCCAL POLYSACCHARIDE VACCINE, 23-VALENT, ADULT OR IMMUNOSUPPRESSED PT DOSE,      Assessment/ Plan:           I have discussed the diagnosis with the patient and the intended plan as seen in the above orders. The patient has received an after-visit summary and questions were answered concerning future plans. Pt conveyed understanding of plan. Medication Side Effects and Warnings were discussed with patient: yes  Patient Labs were reviewed: yes  Patient Past Records were reviewed:  yes    Percy Pederson.  Markus Albert NP

## 2021-09-07 NOTE — LETTER
NOTIFICATION RETURN TO WORK / SCHOOL    9/7/2021 3:54 PM    [address]  [facility] [address]      To Whom It May Concern:    Chaz Carr is currently under the care of Darren Tavares. She can drive her own vehicle to work and doctors appointments, this will give her more flexibility to see her specialists. If there are questions or concerns please have the patient contact our office. Sincerely,      Humaira Schmitt.  [facility], NP

## 2021-09-08 RX ORDER — BLOOD-GLUCOSE,RECEIVER,CONT
1 EACH MISCELLANEOUS DAILY
Qty: 30 EACH | Refills: 11 | Status: SHIPPED | OUTPATIENT
Start: 2021-09-08 | End: 2022-02-02

## 2021-09-08 RX ORDER — BLOOD-GLUCOSE TRANSMITTER
1 EACH MISCELLANEOUS DAILY
Qty: 1 EACH | Refills: 0 | Status: SHIPPED | OUTPATIENT
Start: 2021-09-08 | End: 2022-02-02

## 2021-09-08 RX ORDER — BLOOD-GLUCOSE SENSOR
1 EACH MISCELLANEOUS DAILY
Qty: 1 EACH | Refills: 0 | Status: SHIPPED | OUTPATIENT
Start: 2021-09-08 | End: 2022-02-02

## 2021-09-08 NOTE — PROGRESS NOTES
Ammy Skinner is a 39 y.o. female who presents for routine immunizations. She denies any symptoms , reactions or allergies that would exclude them from being immunized today. Risks and adverse reactions were discussed and the VIS was given to them. All questions were addressed. She was observed for 10 min post injection. There were no reactions observed.     Anne Patel    Verbal order given by Dayday Elam NP Call to pt's cell reaches voice mail stating voice mailbox is full. Unable to leave mess. mychart messg to pt requesting call back to triage nurse 12/21/2020 AM to discuss symptoms and appt noted below.

## 2021-09-30 ENCOUNTER — TELEPHONE (OUTPATIENT)
Dept: INTERNAL MEDICINE CLINIC | Age: 46
End: 2021-09-30

## 2021-09-30 NOTE — TELEPHONE ENCOUNTER
Pt called today stating her cardiologist said he was going to recommend  you give her an increase in her gabapentin. She wants to know if you have heard from him and increased the medication.     Pt # [phone]

## 2021-10-05 ENCOUNTER — TELEPHONE (OUTPATIENT)
Dept: INTERNAL MEDICINE CLINIC | Age: 46
End: 2021-10-05

## 2021-10-05 NOTE — TELEPHONE ENCOUNTER
Pt called today statiing she called last week ref to increase in her gabapentin and has not heard from anyone .   Pt # [phone]

## 2021-10-06 NOTE — TELEPHONE ENCOUNTER
Informed pt of provider's response, pt verbalized understanding and states she does not think we understand how much pain she is in. Pt states she is taking 300 TID and is still in extreme pain and cannot stand long to work or do activities. Pt states Cardio dr Ken Lynn) has sent over notes w/ recommendations.

## 2021-10-14 ENCOUNTER — OFFICE VISIT (OUTPATIENT)
Dept: NEUROLOGY | Age: 46
End: 2021-10-14
Payer: COMMERCIAL

## 2021-10-14 VITALS
SYSTOLIC BLOOD PRESSURE: 124 MMHG | DIASTOLIC BLOOD PRESSURE: 68 MMHG | BODY MASS INDEX: 32.77 KG/M2 | OXYGEN SATURATION: 96 % | HEART RATE: 61 BPM | RESPIRATION RATE: 16 BRPM | HEIGHT: 63 IN

## 2021-10-14 DIAGNOSIS — E11.69 TYPE 2 DIABETES MELLITUS WITH OTHER SPECIFIED COMPLICATION, UNSPECIFIED WHETHER LONG TERM INSULIN USE (HCC): ICD-10-CM

## 2021-10-14 DIAGNOSIS — S06.5X9S TRAUMATIC SUBDURAL HEMATOMA WITH LOSS OF CONSCIOUSNESS, SEQUELA (HCC): ICD-10-CM

## 2021-10-14 DIAGNOSIS — R20.2 PARESTHESIA OF BOTH FEET: Primary | ICD-10-CM

## 2021-10-14 PROCEDURE — 99215 OFFICE O/P EST HI 40 MIN: CPT | Performed by: PSYCHIATRY & NEUROLOGY

## 2021-10-14 PROCEDURE — 3052F HG A1C>EQUAL 8.0%<EQUAL 9.0%: CPT | Performed by: PSYCHIATRY & NEUROLOGY

## 2021-10-14 RX ORDER — GLUCOSAMINE HCL 500 MG
3000 TABLET ORAL DAILY
COMMUNITY
End: 2022-05-19

## 2021-10-14 RX ORDER — GABAPENTIN 600 MG/1
600 TABLET ORAL 3 TIMES DAILY
Qty: 90 TABLET | Refills: 2 | Status: SHIPPED | OUTPATIENT
Start: 2021-10-14 | End: 2022-01-12

## 2021-10-14 RX ORDER — BISMUTH SUBSALICYLATE 262 MG
1 TABLET,CHEWABLE ORAL DAILY
COMMUNITY

## 2021-10-14 RX ORDER — SACUBITRIL AND VALSARTAN 49; 51 MG/1; MG/1
1 TABLET, FILM COATED ORAL 2 TIMES DAILY
COMMUNITY

## 2021-10-14 RX ORDER — FUROSEMIDE 40 MG/1
40 TABLET ORAL DAILY
COMMUNITY
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
## 2021-10-16 LAB
25(OH)D3+25(OH)D2 SERPL-MCNC: 49.4 NG/ML (ref 30–100)
BUN SERPL-MCNC: 27 MG/DL (ref 6–24)
BUN/CREAT SERPL: 21 (ref 9–23)
CALCIUM SERPL-MCNC: 9.1 MG/DL (ref 8.7–10.2)
CHLORIDE SERPL-SCNC: 105 MMOL/L (ref 96–106)
CO2 SERPL-SCNC: 27 MMOL/L (ref 20–29)
CREAT SERPL-MCNC: 1.26 MG/DL (ref 0.57–1)
GLUCOSE SERPL-MCNC: 55 MG/DL (ref 65–99)
INTERPRETATION: NORMAL
POTASSIUM SERPL-SCNC: 4.3 MMOL/L (ref 3.5–5.2)
SODIUM SERPL-SCNC: 140 MMOL/L (ref 134–144)

## 2021-10-20 NOTE — TELEPHONE ENCOUNTER
LVM informing pt appt is needed for form completion and to see if pt has tried lyrica and been to ortho

## 2021-10-22 ENCOUNTER — VIRTUAL VISIT (OUTPATIENT)
Dept: INTERNAL MEDICINE CLINIC | Age: 46
End: 2021-10-22
Payer: COMMERCIAL

## 2021-10-22 DIAGNOSIS — E11.65 TYPE 2 DIABETES MELLITUS WITH HYPERGLYCEMIA, WITH LONG-TERM CURRENT USE OF INSULIN (HCC): Primary | ICD-10-CM

## 2021-10-22 DIAGNOSIS — E11.42 DIABETIC POLYNEUROPATHY ASSOCIATED WITH TYPE 2 DIABETES MELLITUS (HCC): ICD-10-CM

## 2021-10-22 DIAGNOSIS — I25.10 CORONARY ARTERY DISEASE INVOLVING NATIVE HEART WITHOUT ANGINA PECTORIS, UNSPECIFIED VESSEL OR LESION TYPE: ICD-10-CM

## 2021-10-22 DIAGNOSIS — Z79.4 TYPE 2 DIABETES MELLITUS WITH HYPERGLYCEMIA, WITH LONG-TERM CURRENT USE OF INSULIN (HCC): Primary | ICD-10-CM

## 2021-10-22 DIAGNOSIS — I10 ESSENTIAL HYPERTENSION: ICD-10-CM

## 2021-10-22 DIAGNOSIS — E78.2 MIXED HYPERLIPIDEMIA: ICD-10-CM

## 2021-10-22 PROCEDURE — 99214 OFFICE O/P EST MOD 30 MIN: CPT | Performed by: NURSE PRACTITIONER

## 2021-10-22 NOTE — PROGRESS NOTES
Chief Complaint   Patient presents with    Follow-up     pt sees neuro and they increased gabapentin and will manage med    Form Completion    Other     doxy. me [phone]       1. Have you been to the ER, urgent care clinic since your last visit? Hospitalized since your last visit? No    2. Have you seen or consulted any other health care providers outside of the [address] [place] [address] since your last visit? Include any pap smears or colon screening.  No
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
## 2021-10-25 ENCOUNTER — TRANSCRIBE ORDER (OUTPATIENT)
Dept: SCHEDULING | Age: 46
End: 2021-10-25

## 2021-10-25 DIAGNOSIS — T14.8XXA CONTUSION: ICD-10-CM

## 2021-10-25 DIAGNOSIS — T14.8XXA FRACTURE: Primary | ICD-10-CM

## 2021-10-25 DIAGNOSIS — S93.611A SPRAIN OF TARSAL LIGAMENT OF RIGHT FOOT: ICD-10-CM

## 2021-11-04 RX ORDER — ATORVASTATIN CALCIUM 80 MG/1
TABLET, FILM COATED ORAL
Qty: 30 TABLET | Refills: 10 | Status: SHIPPED | OUTPATIENT
Start: 2021-11-04 | End: 2022-08-23 | Stop reason: SDUPTHER

## 2021-11-10 ENCOUNTER — HOSPITAL ENCOUNTER (OUTPATIENT)
Dept: MRI IMAGING | Age: 46
Discharge: HOME | End: 2021-11-10
Attending: PODIATRIST
Payer: COMMERCIAL

## 2021-11-10 DIAGNOSIS — T14.8XXA CONTUSION: ICD-10-CM

## 2021-11-10 DIAGNOSIS — T14.8XXA FRACTURE: ICD-10-CM

## 2021-11-10 DIAGNOSIS — S93.611A SPRAIN OF TARSAL LIGAMENT OF RIGHT FOOT: ICD-10-CM

## 2021-11-10 PROCEDURE — 73718 MRI LOWER EXTREMITY W/O DYE: CPT

## 2021-12-07 RX ORDER — PEN NEEDLE, DIABETIC 32GX 5/32"
NEEDLE, DISPOSABLE MISCELLANEOUS
Qty: 100 PEN NEEDLE | Refills: 10 | Status: SHIPPED | OUTPATIENT
Start: 2021-12-07 | End: 2022-08-01 | Stop reason: SDUPTHER

## 2021-12-08 ENCOUNTER — OFFICE VISIT (OUTPATIENT)
Dept: INTERNAL MEDICINE CLINIC | Age: 46
End: 2021-12-08
Payer: MEDICARE

## 2021-12-08 VITALS
BODY MASS INDEX: 33.66 KG/M2 | SYSTOLIC BLOOD PRESSURE: 135 MMHG | OXYGEN SATURATION: 94 % | HEART RATE: 72 BPM | TEMPERATURE: 97.5 F | HEIGHT: 63 IN | DIASTOLIC BLOOD PRESSURE: 67 MMHG | WEIGHT: 190 LBS | RESPIRATION RATE: 16 BRPM

## 2021-12-08 DIAGNOSIS — E11.65 TYPE 2 DIABETES MELLITUS WITH HYPERGLYCEMIA, WITH LONG-TERM CURRENT USE OF INSULIN (HCC): Primary | ICD-10-CM

## 2021-12-08 DIAGNOSIS — R19.09 UMBILICAL MASS: ICD-10-CM

## 2021-12-08 DIAGNOSIS — M79.7 FIBROMYALGIA: ICD-10-CM

## 2021-12-08 DIAGNOSIS — Z79.4 TYPE 2 DIABETES MELLITUS WITH HYPERGLYCEMIA, WITH LONG-TERM CURRENT USE OF INSULIN (HCC): Primary | ICD-10-CM

## 2021-12-08 DIAGNOSIS — T14.8XXA FRACTURE: ICD-10-CM

## 2021-12-08 DIAGNOSIS — I50.812 CHRONIC RIGHT-SIDED CONGESTIVE HEART FAILURE (HCC): ICD-10-CM

## 2021-12-08 DIAGNOSIS — M25.559 HIP PAIN: ICD-10-CM

## 2021-12-08 DIAGNOSIS — M85.871 OTHER SPECIFIED DISORDERS OF BONE DENSITY AND STRUCTURE, RIGHT ANKLE AND FOOT: ICD-10-CM

## 2021-12-08 LAB
GLUCOSE POC: 314 MG/DL
HBA1C MFR BLD HPLC: 9.3 %

## 2021-12-08 PROCEDURE — G8417 CALC BMI ABV UP PARAM F/U: HCPCS | Performed by: NURSE PRACTITIONER

## 2021-12-08 PROCEDURE — 82962 GLUCOSE BLOOD TEST: CPT | Performed by: NURSE PRACTITIONER

## 2021-12-08 PROCEDURE — 83036 HEMOGLOBIN GLYCOSYLATED A1C: CPT | Performed by: NURSE PRACTITIONER

## 2021-12-08 PROCEDURE — G8427 DOCREV CUR MEDS BY ELIG CLIN: HCPCS | Performed by: NURSE PRACTITIONER

## 2021-12-08 PROCEDURE — G9717 DOC PT DX DEP/BP F/U NT REQ: HCPCS | Performed by: NURSE PRACTITIONER

## 2021-12-08 PROCEDURE — 2022F DILAT RTA XM EVC RTNOPTHY: CPT | Performed by: NURSE PRACTITIONER

## 2021-12-08 PROCEDURE — 99214 OFFICE O/P EST MOD 30 MIN: CPT | Performed by: NURSE PRACTITIONER

## 2021-12-08 PROCEDURE — 3046F HEMOGLOBIN A1C LEVEL >9.0%: CPT | Performed by: NURSE PRACTITIONER

## 2021-12-08 RX ORDER — PANTOPRAZOLE SODIUM 40 MG/1
40 TABLET, DELAYED RELEASE ORAL
Qty: 90 TABLET | Refills: 0 | Status: SHIPPED | OUTPATIENT
Start: 2021-12-08 | End: 2022-02-22

## 2021-12-08 RX ORDER — DIVALPROEX SODIUM 500 MG/1
500 TABLET, EXTENDED RELEASE ORAL 3 TIMES DAILY
Status: CANCELLED | OUTPATIENT
Start: 2021-12-08

## 2021-12-08 NOTE — PROGRESS NOTES
Subjective: (As above and below)     Chief Complaint   Patient presents with    Follow-up    New Order     bone density      Annakellee Boyle is a 39y.o. year old female who presents for     Hypertension ROS:  taking medications as instructed, no medication side effects noted, no TIAs, no chest pain on exertion, no dyspnea on exertion, no swelling of ankles    CHF: follows w/ cardiology, denies chest pain, occ leg swelling and baseline HUERTA    Diabetic Review of Systems - medication compliance: compliant most of the time, diabetic diet compliance: compliant most of the time, home glucose monitoring: is performed regularly. Fibromyalgia    Mammo: due  Pap: 2020  Colonoscopy: utd    R foot fracture: delayed healing, now L foot hurting  Fracture seems to be unprovoked, her podiatrist recc she gets a DEXA scan  She will fu for L foot    She needs insurance referrals for:  Dr. Peyton Corbett- podiatry  Dr. [facility]  Dr. Miguel Angel Gallegos vascular  Dr. Navdeep Boyle- optho- [facility]    Umbilical mass: tender, present years. No nausea, vomiting, GI s/s    She asks for ortho referral for hip pain  Bilat, feels \"different that fibro pain\"  Denies saddle numbness, leg weakness, falls or bowel/bladder dysfunction. Reviewed PmHx, RxHx, FmHx, SocHx, AllgHx and updated in chart.   Family History   Problem Relation Age of Onset    Thyroid Disease Mother     Hypertension Father     Heart Disease Father     Diabetes Sister     Heart Disease Sister     Hypertension Sister     Thyroid Disease Brother        Past Medical History:   Diagnosis Date    Arthritis     Bipolar 1 disorder ([facility] [address]     Diabetes ([facility] [address]     Gastrointestinal disorder     gastroparesis    Gastroparesis     GERD (gastroesophageal reflux disease)     Heart disease     Hypertension     Psychiatric disorder     Bipolar      Social History     Socioeconomic History    Marital status:    Tobacco Use    Smoking status: Current Every Day Smoker     Packs/day: 0.25    Smokeless tobacco: Never Used   Vaping Use    Vaping Use: Never used   Substance and Sexual Activity    Alcohol use: Yes     Comment: occasionally    Drug use: No    Sexual activity: Yes          Current Outpatient Medications   Medication Sig    pantoprazole (PROTONIX) 40 mg tablet Take 1 Tablet by mouth daily as needed (acid reflux).  atorvastatin (LIPITOR) 80 mg tablet TAKE 1 TABLET BY MOUTH EVERY NIGHT FOR HIGH CHOLESTEROL AND TRIGLYCERIDES    multivitamin (ONE A DAY) tablet Take 1 Tablet by mouth daily.  furosemide (Lasix) 40 mg tablet Take 40 mg by mouth daily.  sacubitriL-valsartan (Entresto) 49-51 mg tab tablet Take 1 Tablet by mouth two (2) times a day.  gabapentin (NEURONTIN) 600 mg tablet Take 1 Tablet by mouth three (3) times daily. Max Daily Amount: 1,800 mg.    insulin glargine (LANTUS,BASAGLAR) 100 unit/mL (3 mL) inpn 41 Units by SubCUTAneous route daily.  nitroglycerin (NITROSTAT) 0.4 mg SL tablet DISSOLVE 1 TABLET SUBLINGUALLY EVERY 5 MINUTES AS NEEDED FOR CHEST PAIN FOR UP TO 3 DOSES    polyethylene glycol (MIRALAX) 17 gram packet TAKE 1 PACKET BY MOUTH DAILY AS NEEDD FOR CONSTIPATION    insulin lispro (HUMALOG) 100 unit/mL injection 10 Units by SubCUTAneous route Before breakfast, lunch, and dinner. Plus sliding scale if needed  Indications: type 1 diabetes mellitus    metoprolol succinate (TOPROL-XL) 50 mg XL tablet Take 1 Tablet by mouth daily. Indications: high blood pressure (Patient taking differently: Take 50 mg by mouth daily. Indications: high blood pressure, Patient is taking 25mg)    DULoxetine (Cymbalta) 60 mg capsule Take 1 Capsule by mouth daily. Indications: anxiousness associated with depression    isosorbide mononitrate ER (IMDUR) 30 mg tablet Take  by mouth daily. Indications: Patient is taking 120mg daily    methadone (DOLOPHINE) 10 mg/mL solution Take 140 mg by mouth.     divalproex ER (DEPAKOTE ER) 500 mg ER tablet Take 500 mg by mouth three (3) times daily.  Yin Pen Needle 32 gauge x 5/32\" ndle USE 1 PEN NEEDLES FOUR TIMES A DAY    Cholecalciferol, Vitamin D3, 75 mcg (3,000 unit) tab Take 3,000 Units by mouth daily. (Patient not taking: Reported on 12/8/2021)    Blood-Glucose Meter,Continuous (Dexcom G6 ) misc 1 Each by Does Not Apply route daily.  Blood-Glucose Sensor (Dexcom G6 Sensor) isauro 1 Each by Does Not Apply route daily.  Blood-Glucose Transmitter (Dexcom G6 Transmitter) isauro 1 Each by Does Not Apply route daily.  clopidogreL (Plavix) 75 mg tab Take 1 Tablet by mouth daily. (Patient not taking: Reported on 12/8/2021)    apixaban (ELIQUIS) 5 mg tablet Take 1 Tablet by mouth two (2) times a day. Indications: deep vein thrombosis prevention (Patient not taking: Reported on 12/8/2021)     No current facility-administered medications for this visit. Review of Systems:   Constitutional:    Negative for fever and chills, negative diaphoresis. HEENT:              Negative for neck pain and stiffness. Eyes:                  Negative for visual disturbance, itching, redness or discharge. Respiratory:        Negative for cough and shortness of breath. Cardiovascular:  Negative for chest pain and palpitations. Gastrointestinal: Negative for nausea, vomiting, abdominal pain, diarrhea or constipation. Genitourinary:     Negative for dysuria and frequency. Musculoskeletal: Negative for falls, tenderness and swelling. Skin:                    Negative for rash, masses or lesions. Neurological:       Negative for dizzyness, seizure, loss of consciousness, weakness and numbness.      Objective:     Vitals:    12/08/21 0946   BP: 135/67   Pulse: 72   Resp: 16   Temp: 97.5 °F (36.4 °C)   TempSrc: Temporal   SpO2: 94%   Weight: 190 lb (86.2 kg)   Height: 5' 3\" (1.6 m)       Results for orders placed or performed in visit on 12/08/21   AMB POC HEMOGLOBIN A1C   Result Value Ref Range    Hemoglobin A1c (POC) 9.3 %   AMB POC GLUCOSE BLOOD, BY GLUCOSE MONITORING DEVICE   Result Value Ref Range    Glucose  MG/DL       Gen: Oriented to person, place and time and well-developed, well-nourished and in no distress. HEENT:    Head: normocephalic and atraumatic. Eyes:  EOM are normal. Pupils equal and round. Neck:  Normal range of motion. Neck supple. Cardiovascular: normal rate, regular rhythm and normal heart sounds. Pulmonary/Chest:  Effort normal and breath sounds normal.  No respiratory distress. No wheezes, no rales. Abdominal: soft, normal  bowel sounds. Small firm bulge at umbilicus  Musculoskeletal:  No edema, no tenderness. No calf tenderness or edema. Neurological:  Alert, oriented to person, place and time. Skin: skin is warm and dry. Assessment/ Plan:     Follow-up and Dispositions    · Return in about 4 months (around 4/8/2022). 1. Type 2 diabetes mellitus with hyperglycemia, with long-term current use of insulin ([address]  She reduced lantus, advised increase back to 41 units  - AMB POC HEMOGLOBIN A1C  - AMB POC GLUCOSE BLOOD, BY GLUCOSE MONITORING DEVICE  - METABOLIC PANEL, BASIC; Future    2. Hip pain    - REFERRAL TO ORTHOPEDICS    3. Fracture    - DEXA BONE DENSITY STUDY AXIAL; Future    4. Other specified disorders of bone density and structure, right ankle and foot     - DEXA BONE DENSITY STUDY AXIAL; Future    5. Umbilical mass    - US ABD LTD; Future    6. Chronic right-sided congestive heart failure ([address]      7. Fibromyalgia        I have discussed the diagnosis with the patient and the intended plan as seen in the above orders. The patient has received an after-visit summary and questions were answered concerning future plans. Pt conveyed understanding of plan. Medication Side Effects and Warnings were discussed with patient: yes  Patient Labs were reviewed: yes  Patient Past Records were reviewed:  yes    Darryl Escobar. Pito Vang, NP

## 2021-12-08 NOTE — PROGRESS NOTES
Chief Complaint   Patient presents with    Follow-up    New Order     bone density        1. Have you been to the ER, urgent care clinic since your last visit? Hospitalized since your last visit? No    2. Have you seen or consulted any other health care providers outside of the [address] [place] [address] since your last visit? Include any pap smears or colon screening.  No

## 2021-12-28 ENCOUNTER — HOSPITAL ENCOUNTER (OUTPATIENT)
Dept: MAMMOGRAPHY | Age: 46
Discharge: HOME | End: 2021-12-28
Attending: NURSE PRACTITIONER
Payer: MEDICARE

## 2021-12-28 ENCOUNTER — HOSPITAL ENCOUNTER (OUTPATIENT)
Dept: ULTRASOUND IMAGING | Age: 46
Discharge: HOME | End: 2021-12-28
Attending: NURSE PRACTITIONER
Payer: MEDICARE

## 2021-12-28 DIAGNOSIS — T14.8XXA FRACTURE: ICD-10-CM

## 2021-12-28 DIAGNOSIS — M85.871 OTHER SPECIFIED DISORDERS OF BONE DENSITY AND STRUCTURE, RIGHT ANKLE AND FOOT: ICD-10-CM

## 2021-12-28 DIAGNOSIS — Z12.31 ENCOUNTER FOR SCREENING MAMMOGRAM FOR HIGH-RISK PATIENT: ICD-10-CM

## 2021-12-28 DIAGNOSIS — R19.09 UMBILICAL MASS: ICD-10-CM

## 2021-12-28 PROBLEM — K42.9 UMBILICAL HERNIA: Status: ACTIVE | Noted: 2021-12-28

## 2021-12-28 PROCEDURE — 77063 BREAST TOMOSYNTHESIS BI: CPT

## 2021-12-28 PROCEDURE — 76705 ECHO EXAM OF ABDOMEN: CPT

## 2021-12-28 PROCEDURE — 77080 DXA BONE DENSITY AXIAL: CPT

## 2022-01-09 DIAGNOSIS — R20.2 PARESTHESIA OF BOTH FEET: ICD-10-CM

## 2022-01-12 RX ORDER — GABAPENTIN 600 MG/1
TABLET ORAL
Qty: 90 TABLET | Refills: 0 | Status: SHIPPED | OUTPATIENT
Start: 2022-01-12 | End: 2022-02-02 | Stop reason: SDUPTHER

## 2022-01-17 ENCOUNTER — TELEPHONE (OUTPATIENT)
Dept: INTERNAL MEDICINE CLINIC | Age: 47
End: 2022-01-17

## 2022-01-17 NOTE — TELEPHONE ENCOUNTER
----- Message from Hola Cornejo sent at 1/17/2022  9:49 AM EST -----  Subject: Referral Request    QUESTIONS   Reason for referral request? Pt needs a referral sent for Dr Benita Fuller for her appt 1/24/2022   Has the physician seen you for this condition before? No   Preferred Specialist (if applicable)? Do you already have an appointment scheduled? Yes  Select Scheduled Date? 2022-01-24  Select Scheduled Physician? Gamaliel Romero   Additional Information for Provider?   ---------------------------------------------------------------------------  --------------  [employer]  What is the best way for the office to contact you? OK to leave message on   voicemail  Preferred Call Back Phone Number?  [phone]

## 2022-01-20 DIAGNOSIS — M25.552 LEFT HIP PAIN: Primary | ICD-10-CM

## 2022-01-24 ENCOUNTER — OFFICE VISIT (OUTPATIENT)
Dept: SURGERY | Age: 47
End: 2022-01-24
Payer: MEDICARE

## 2022-01-24 VITALS
RESPIRATION RATE: 18 BRPM | HEART RATE: 69 BPM | DIASTOLIC BLOOD PRESSURE: 68 MMHG | BODY MASS INDEX: 35.61 KG/M2 | SYSTOLIC BLOOD PRESSURE: 109 MMHG | OXYGEN SATURATION: 93 % | WEIGHT: 201 LBS | TEMPERATURE: 98 F | HEIGHT: 63 IN

## 2022-01-24 DIAGNOSIS — K43.9 VENTRAL HERNIA WITHOUT OBSTRUCTION OR GANGRENE: Primary | ICD-10-CM

## 2022-01-24 DIAGNOSIS — E66.01 SEVERE OBESITY (BMI 35.0-35.9 WITH COMORBIDITY) (HCC): ICD-10-CM

## 2022-01-24 DIAGNOSIS — E11.69 TYPE 2 DIABETES MELLITUS WITH OTHER SPECIFIED COMPLICATION, UNSPECIFIED WHETHER LONG TERM INSULIN USE (HCC): ICD-10-CM

## 2022-01-24 PROBLEM — E11.9 TYPE 2 DIABETES MELLITUS (HCC): Status: ACTIVE | Noted: 2022-01-24

## 2022-01-24 PROCEDURE — G8417 CALC BMI ABV UP PARAM F/U: HCPCS | Performed by: SURGERY

## 2022-01-24 PROCEDURE — 3046F HEMOGLOBIN A1C LEVEL >9.0%: CPT | Performed by: SURGERY

## 2022-01-24 PROCEDURE — 99204 OFFICE O/P NEW MOD 45 MIN: CPT | Performed by: SURGERY

## 2022-01-24 PROCEDURE — 2022F DILAT RTA XM EVC RTNOPTHY: CPT | Performed by: SURGERY

## 2022-01-24 PROCEDURE — G9717 DOC PT DX DEP/BP F/U NT REQ: HCPCS | Performed by: SURGERY

## 2022-01-24 PROCEDURE — G8427 DOCREV CUR MEDS BY ELIG CLIN: HCPCS | Performed by: SURGERY

## 2022-01-24 RX ORDER — IBUPROFEN 200 MG
CAPSULE ORAL DAILY
COMMUNITY
End: 2022-05-19

## 2022-01-24 NOTE — PROGRESS NOTES
General Surgery Office Consultation / H & P    CC: Carlos pain  History of Present Illness:      Riley Hernandes is a 55 y.o. female who presents with a longstanding history of ventral hernia. Patient reports that ever since she was a child she had a bulge near her umbilicus. In the last few years this has become more painful and now she has 2 bulges. She had an ultrasound done which showed 2 small ventral hernia defects. She has pain from this. Pain can be an 8 out of 10. The pain is sharp in nature and worse with exertion. Relaxation helps the pain. No nausea or vomiting. Has had an EGD and colonoscopy in the past.  Significant heart failure. EF 26 to 40% on the last echo in our system. Also history of DVTs that appear to be unprovoked largely. History of angioplasties and stents along with possibly venous stents as well. She is a type I diabetic. No prior hernia repairs. She has significant gastric reflux and a history of gastroparesis. She is unsure of her EGD findings.     Past Medical History:   Diagnosis Date    Arthritis     Bipolar 1 disorder ([address]     Chronic pain     Depression     Diabetes ([address]     Gastrointestinal disorder     gastroparesis    Gastroparesis     GERD (gastroesophageal reflux disease)     Heart disease     Hypertension     Psychiatric disorder     Bipolar    Sleep apnea     Thromboembolus ([address]      Past Surgical History:   Procedure Laterality Date    HX ANKLE FRACTURE TX      HX CAROTID STENT      HX GYN      BTL    HX ORTHOPAEDIC      toe      Family History   Problem Relation Age of Onset    Thyroid Disease Mother     Hypertension Father     Heart Disease Father     Diabetes Sister     Heart Disease Sister     Hypertension Sister     Thyroid Disease Brother      Social History     Socioeconomic History    Marital status:    Tobacco Use    Smoking status: Current Every Day Smoker     Packs/day: 0.25    Smokeless tobacco: Never Used   Vaping Use    Vaping Use: Never used   Substance and Sexual Activity    Alcohol use: Yes     Comment: occasionally    Drug use: Not Currently     Types: Cocaine    Sexual activity: Yes      Prior to Admission medications    Medication Sig Start Date End Date Taking? Authorizing Provider   calcium citrate 200 mg (950 mg) tablet Take  by mouth daily. Yes Provider, Historical   gabapentin (NEURONTIN) 600 mg tablet TAKE 1 TABLET BY MOUTH THREE TIMES DAILY 1/12/22  Yes Belbiju Beat, DO   pantoprazole (PROTONIX) 40 mg tablet Take 1 Tablet by mouth daily as needed (acid reflux). 12/8/21  Yes Nallely Arredondo NP   Yin Pen Needle 32 gauge x 5/32\" ndle USE 1 PEN NEEDLES FOUR TIMES A DAY 12/7/21  Yes Nallely Arredondo NP   atorvastatin (LIPITOR) 80 mg tablet TAKE 1 TABLET BY MOUTH EVERY NIGHT FOR HIGH CHOLESTEROL AND TRIGLYCERIDES 11/4/21  Yes Nallely Arredondo NP   multivitamin (ONE A DAY) tablet Take 1 Tablet by mouth daily. Yes Provider, Historical   furosemide (Lasix) 40 mg tablet Take 40 mg by mouth daily. Yes Provider, Historical   sacubitriL-valsartan (Entresto) 49-51 mg tab tablet Take 1 Tablet by mouth two (2) times a day. Yes Provider, Historical   Cholecalciferol, Vitamin D3, 75 mcg (3,000 unit) tab Take 3,000 Units by mouth daily. Yes Provider, Historical   Blood-Glucose Meter,Continuous (Dexcom G6 ) misc 1 Each by Does Not Apply route daily. 9/8/21  Yes Nallely Arredondo NP   Blood-Glucose Sensor (Dexcom G6 Sensor) isauro 1 Each by Does Not Apply route daily. 9/8/21  Yes Nallely Arredondo NP   Blood-Glucose Transmitter (Dexcom G6 Transmitter) isauro 1 Each by Does Not Apply route daily. 9/8/21  Yes Nallely Arredondo NP   clopidogreL (Plavix) 75 mg tab Take 1 Tablet by mouth daily. 8/9/21  Yes Nallely Arredondo NP   insulin glargine (LANTUS,BASAGLAR) 100 unit/mL (3 mL) inpn 41 Units by SubCUTAneous route daily.  7/27/21  Yes Connie Muller NP nitroglycerin (NITROSTAT) 0.4 mg SL tablet DISSOLVE 1 TABLET SUBLINGUALLY EVERY 5 MINUTES AS NEEDED FOR CHEST PAIN FOR UP TO 3 DOSES 6/18/21  Yes New Marie NP   polyethylene glycol (MIRALAX) 17 gram packet TAKE 1 PACKET BY MOUTH DAILY AS NEEDD FOR CONSTIPATION 6/18/21  Yes New Marie NP   insulin lispro (HUMALOG) 100 unit/mL injection 10 Units by SubCUTAneous route Before breakfast, lunch, and dinner. Plus sliding scale if needed  Indications: type 1 diabetes mellitus 6/7/21  Yes New Chiu. NP   apixaban (ELIQUIS) 5 mg tablet Take 1 Tablet by mouth two (2) times a day. Indications: deep vein thrombosis prevention 6/7/21  Yes New Marie NP   metoprolol succinate (TOPROL-XL) 50 mg XL tablet Take 1 Tablet by mouth daily. Indications: high blood pressure  Patient taking differently: Take 50 mg by mouth daily. Indications: high blood pressure, Patient is taking 25mg 6/7/21  Yes New Marie NP   DULoxetine (Cymbalta) 60 mg capsule Take 1 Capsule by mouth daily. Indications: anxiousness associated with depression 6/7/21  Yes New Marie NP   isosorbide mononitrate ER (IMDUR) 30 mg tablet Take  by mouth daily. Indications: Patient is taking 120mg daily   Yes Provider, Historical   methadone (DOLOPHINE) 10 mg/mL solution Take 140 mg by mouth. Yes Provider, Historical   divalproex ER (DEPAKOTE ER) 500 mg ER tablet Take 500 mg by mouth three (3) times daily.    Yes Provider, Historical     Allergies   Allergen Reactions    Voltaren [Diclofenac Sodium] Anaphylaxis    Dramamine Ii [Meclizine] Itching     Auditory and Visual hallucinations    Imipramine Hives    Levaquin [Levofloxacin] Itching and Nausea and Vomiting    Lisinopril Cough       Review of Systems:  Constitutional: No fever or chills  Neurologic: No headache  Eyes: No scleral icterus or irritated eyes  Nose: No nasal pain or drainage  Mouth: No oral lesions or sore throat  Cardiac: No palpations or chest pain  Pulmonary: No cough or shortness of breath  Gastrointestinal: Periumbilical pain, no nausea, emesis, diarrhea, or constipation  Genitourinary: No dysuria  Musculoskeletal: No muscle or joint tenderness  Skin: No rashes or lesions  Psychiatric: No anxiety or depressed mood    Physical Exam:     Visit Vitals  /68   Pulse 69   Temp 98 °F (36.7 °C) (Oral)   Resp 18   Ht 5' 3\" (1.6 m)   Wt 201 lb (91.2 kg)   SpO2 93%   BMI 35.61 kg/m²     General: No acute distress, conversant  Eyes: PERRLA, no scleral icterus  HENT: Normocephalic without oral lesions  Neck: Trachea midline without LAD  Cardiac: Normal pulse rate and rhythm  Pulmonary: Symmetric chest rise with normal effort  GI: Soft, obese, NT, ND, 2 small ventral hernia cephalad and caudad to umbilicus, no splenomegaly  Skin: Warm without rash  Extremities: No edema or joint stiffness  Psych: Appropriate mood and affect    Assessment:     55-year-old female with multiple medical issues with symptomatic ventral hernia    Plan:     Ultrasound reviewed. 2 small hernias cephalad and caudad to umbilicus. She has a history of CHF along with DVTs on Eliquis and Plavix. I suggest after obtaining cardiac clearance to provide a robotic repair with mesh. Preperitoneal versus IPOM. We discussed the risk of bleeding, infection, bowel injury, mesh complication, recurrence, and the risk of anesthesia including MI stroke and death. We also discussed that she is morbidly obese and she may benefit from a gastric bypass to help control her medical issues along with her reflux. She is a type I diabetic. We will discuss this further postoperatively.     Signed By: Isidro Medina MD  Bariatric and General Surgeon  [facility]    January 24, 2022

## 2022-01-24 NOTE — PROGRESS NOTES
1. Have you been to the ER, urgent care clinic since your last visit? Hospitalized since your last visit? no    2. Have you seen or consulted any other health care providers outside of the [address] [place] [address] since your last visit? Include any pap smears or colon screening.  no

## 2022-02-02 ENCOUNTER — TELEPHONE (OUTPATIENT)
Dept: NEUROLOGY | Age: 47
End: 2022-02-02

## 2022-02-02 ENCOUNTER — OFFICE VISIT (OUTPATIENT)
Dept: NEUROLOGY | Age: 47
End: 2022-02-02
Payer: MEDICARE

## 2022-02-02 VITALS
OXYGEN SATURATION: 96 % | BODY MASS INDEX: 35.61 KG/M2 | HEIGHT: 63 IN | WEIGHT: 201 LBS | DIASTOLIC BLOOD PRESSURE: 64 MMHG | SYSTOLIC BLOOD PRESSURE: 129 MMHG | HEART RATE: 71 BPM

## 2022-02-02 DIAGNOSIS — R20.2 PARESTHESIA OF BOTH FEET: Primary | ICD-10-CM

## 2022-02-02 DIAGNOSIS — E11.69 TYPE 2 DIABETES MELLITUS WITH OTHER SPECIFIED COMPLICATION, UNSPECIFIED WHETHER LONG TERM INSULIN USE (HCC): ICD-10-CM

## 2022-02-02 DIAGNOSIS — S06.5X9S TRAUMATIC SUBDURAL HEMATOMA WITH LOSS OF CONSCIOUSNESS, SEQUELA (HCC): ICD-10-CM

## 2022-02-02 PROCEDURE — 3046F HEMOGLOBIN A1C LEVEL >9.0%: CPT | Performed by: PSYCHIATRY & NEUROLOGY

## 2022-02-02 PROCEDURE — G9717 DOC PT DX DEP/BP F/U NT REQ: HCPCS | Performed by: PSYCHIATRY & NEUROLOGY

## 2022-02-02 PROCEDURE — 99214 OFFICE O/P EST MOD 30 MIN: CPT | Performed by: PSYCHIATRY & NEUROLOGY

## 2022-02-02 PROCEDURE — G8427 DOCREV CUR MEDS BY ELIG CLIN: HCPCS | Performed by: PSYCHIATRY & NEUROLOGY

## 2022-02-02 PROCEDURE — 2022F DILAT RTA XM EVC RTNOPTHY: CPT | Performed by: PSYCHIATRY & NEUROLOGY

## 2022-02-02 PROCEDURE — G8417 CALC BMI ABV UP PARAM F/U: HCPCS | Performed by: PSYCHIATRY & NEUROLOGY

## 2022-02-02 RX ORDER — GABAPENTIN 600 MG/1
600 TABLET ORAL 3 TIMES DAILY
Qty: 90 TABLET | Refills: 5 | Status: SHIPPED | OUTPATIENT
Start: 2022-02-02 | End: 2022-07-18 | Stop reason: SDUPTHER
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
## 2022-02-05 ENCOUNTER — TRANSCRIBE ORDER (OUTPATIENT)
Dept: REGISTRATION | Age: 47
End: 2022-02-05

## 2022-02-05 DIAGNOSIS — Z01.812 PRE-PROCEDURE LAB EXAM: Primary | ICD-10-CM

## 2022-02-05 LAB
METHYLMALONATE SERPL-SCNC: 339 NMOL/L (ref 0–378)
TSH SERPL DL<=0.005 MIU/L-ACNC: 2.16 UIU/ML (ref 0.45–4.5)
VIT B12 SERPL-MCNC: 572 PG/ML (ref 232–1245)

## 2022-02-08 ENCOUNTER — TELEPHONE (OUTPATIENT)
Dept: SURGERY | Age: 47
End: 2022-02-08

## 2022-02-08 NOTE — TELEPHONE ENCOUNTER
DYLAN with [employer] called wanting to know if Dr. Abbey Victor is going to be giving restrictions for the patient for work.    CB: [phone] Ext: [phone]

## 2022-02-09 ENCOUNTER — TELEPHONE (OUTPATIENT)
Dept: SURGERY | Age: 47
End: 2022-02-09

## 2022-02-09 NOTE — TELEPHONE ENCOUNTER
Spoke with Carmen Rogers. Advised we don't have any forms or medical auth so I cant disclose patient information. She will fax over forms and auth today.

## 2022-02-09 NOTE — TELEPHONE ENCOUNTER
Caller: Tracey Morning  Phone: [phone] [id number] called and requested a call back about restrictions or limitations for pt. Said you can LVM.

## 2022-02-09 NOTE — TELEPHONE ENCOUNTER
Tried [employer] back, no answer, no voicemail, just rang and rang then call disconnected. Tried calling patient to advise she will need to sign release and have FMLA forms sent to us before we can discuss anything with [employer]. Patient does not have voicemail set up either.

## 2022-02-15 ENCOUNTER — HOSPITAL ENCOUNTER (OUTPATIENT)
Dept: PREADMISSION TESTING | Age: 47
Discharge: HOME | End: 2022-02-15
Attending: SURGERY
Payer: MEDICARE

## 2022-02-15 DIAGNOSIS — Z01.812 PRE-PROCEDURE LAB EXAM: ICD-10-CM

## 2022-02-15 PROCEDURE — U0005 INFEC AGEN DETEC AMPLI PROBE: HCPCS

## 2022-02-16 LAB
SARS-COV-2, XPLCVT: NOT DETECTED
SOURCE, COVRS: NORMAL

## 2022-02-17 RX ORDER — METOLAZONE 5 MG/1
5 TABLET ORAL DAILY
COMMUNITY

## 2022-02-17 NOTE — PERIOP NOTES
PAT PHONE INTERVIEW COMPLETED WITH THE PT. PRE OP INSTRUCTIONS WERE REVIEWED WITH THE PT WITH THE OPPORTUNITY FOR QUESTIONS. PT VERBALIZED UNDERSTANDING.

## 2022-02-18 ENCOUNTER — ANESTHESIA EVENT (OUTPATIENT)
Dept: SURGERY | Age: 47
End: 2022-02-18
Payer: MEDICARE

## 2022-02-18 ENCOUNTER — TELEPHONE (OUTPATIENT)
Dept: SURGERY | Age: 47
End: 2022-02-18

## 2022-02-18 ENCOUNTER — ANESTHESIA (OUTPATIENT)
Dept: SURGERY | Age: 47
End: 2022-02-18
Payer: MEDICARE

## 2022-02-18 ENCOUNTER — HOSPITAL ENCOUNTER (OUTPATIENT)
Age: 47
Setting detail: OUTPATIENT SURGERY
Discharge: HOME | End: 2022-02-18
Attending: SURGERY | Admitting: SURGERY
Payer: MEDICARE

## 2022-02-18 VITALS
OXYGEN SATURATION: 99 % | DIASTOLIC BLOOD PRESSURE: 67 MMHG | RESPIRATION RATE: 15 BRPM | SYSTOLIC BLOOD PRESSURE: 153 MMHG | WEIGHT: 201.06 LBS | TEMPERATURE: 97.5 F | BODY MASS INDEX: 35.62 KG/M2 | HEIGHT: 63 IN | HEART RATE: 64 BPM

## 2022-02-18 DIAGNOSIS — K43.9 VENTRAL HERNIA WITHOUT OBSTRUCTION OR GANGRENE: Primary | ICD-10-CM

## 2022-02-18 DIAGNOSIS — K42.9 UMBILICAL HERNIA WITHOUT OBSTRUCTION AND WITHOUT GANGRENE: ICD-10-CM

## 2022-02-18 LAB
ANION GAP BLD CALC-SCNC: 9 MMOL/L (ref 10–20)
CA-I BLD-MCNC: 1.19 MMOL/L (ref 1.12–1.32)
CHLORIDE BLD-SCNC: 103 MMOL/L (ref 98–107)
CO2 BLD-SCNC: 30.4 MMOL/L (ref 21–32)
CREAT BLD-MCNC: 1.06 MG/DL (ref 0.6–1.3)
GLUCOSE BLD STRIP.AUTO-MCNC: 235 MG/DL (ref 65–117)
GLUCOSE BLD-MCNC: 290 MG/DL (ref 65–100)
HCG UR QL: NEGATIVE
POTASSIUM BLD-SCNC: 4.9 MMOL/L (ref 3.5–5.1)
SERVICE CMNT-IMP: ABNORMAL
SERVICE CMNT-IMP: ABNORMAL
SODIUM BLD-SCNC: 141 MMOL/L (ref 136–145)

## 2022-02-18 PROCEDURE — 2709999900 HC NON-CHARGEABLE SUPPLY: Performed by: SURGERY

## 2022-02-18 PROCEDURE — 76210000016 HC OR PH I REC 1 TO 1.5 HR: Performed by: SURGERY

## 2022-02-18 PROCEDURE — 77030039895 HC SYST SMK EVAC LAP COVD -B: Performed by: SURGERY

## 2022-02-18 PROCEDURE — 74011250636 HC RX REV CODE- 250/636: Performed by: ANESTHESIOLOGY

## 2022-02-18 PROCEDURE — 74011250637 HC RX REV CODE- 250/637: Performed by: ANESTHESIOLOGY

## 2022-02-18 PROCEDURE — 77030022704 HC SUT VLOC COVD -B: Performed by: SURGERY

## 2022-02-18 PROCEDURE — 76210000020 HC REC RM PH II FIRST 0.5 HR: Performed by: SURGERY

## 2022-02-18 PROCEDURE — 74011000250 HC RX REV CODE- 250: Performed by: SURGERY

## 2022-02-18 PROCEDURE — 77030040361 HC SLV COMPR DVT MDII -B: Performed by: SURGERY

## 2022-02-18 PROCEDURE — 77030040922 HC BLNKT HYPOTHRM STRY -A

## 2022-02-18 PROCEDURE — 77030031139 HC SUT VCRL2 J&J -A: Performed by: SURGERY

## 2022-02-18 PROCEDURE — 77030016151 HC PROTCTR LNS DFOG COVD -B: Performed by: SURGERY

## 2022-02-18 PROCEDURE — 74011250636 HC RX REV CODE- 250/636: Performed by: SURGERY

## 2022-02-18 PROCEDURE — 77030035464 HC SUT VLOC NON ABS COVD -B: Performed by: SURGERY

## 2022-02-18 PROCEDURE — 74011000250 HC RX REV CODE- 250: Performed by: NURSE ANESTHETIST, CERTIFIED REGISTERED

## 2022-02-18 PROCEDURE — 77030010507 HC ADH SKN DERMBND J&J -B: Performed by: SURGERY

## 2022-02-18 PROCEDURE — 80047 BASIC METABLC PNL IONIZED CA: CPT

## 2022-02-18 PROCEDURE — 81025 URINE PREGNANCY TEST: CPT

## 2022-02-18 PROCEDURE — 76010000877 HC OR TIME 2.5 TO 3HR INTENSV - TIER 2: Performed by: SURGERY

## 2022-02-18 PROCEDURE — 77030035277 HC OBTRTR BLDELSS DISP INTU -B: Performed by: SURGERY

## 2022-02-18 PROCEDURE — 77030008684 HC TU ET CUF COVD -B: Performed by: ANESTHESIOLOGY

## 2022-02-18 PROCEDURE — 77030002933 HC SUT MCRYL J&J -A: Performed by: SURGERY

## 2022-02-18 PROCEDURE — C1781 MESH (IMPLANTABLE): HCPCS | Performed by: SURGERY

## 2022-02-18 PROCEDURE — 74011250636 HC RX REV CODE- 250/636: Performed by: NURSE ANESTHETIST, CERTIFIED REGISTERED

## 2022-02-18 PROCEDURE — 77030002966 HC SUT PDS J&J -A: Performed by: SURGERY

## 2022-02-18 PROCEDURE — 76060000036 HC ANESTHESIA 2.5 TO 3 HR: Performed by: SURGERY

## 2022-02-18 PROCEDURE — S2900 ROBOTIC SURGICAL SYSTEM: HCPCS | Performed by: SURGERY

## 2022-02-18 PROCEDURE — 77030035489 HC REDUCR CANN ENDOWR INTU -C: Performed by: SURGERY

## 2022-02-18 PROCEDURE — 49652 PR LAP, VENTRAL HERNIA REPAIR,REDUCIBLE: CPT | Performed by: SURGERY

## 2022-02-18 PROCEDURE — 82962 GLUCOSE BLOOD TEST: CPT

## 2022-02-18 DEVICE — BARD SOFT MESH, 6" X 6" (15 CM X 15 CM)
Type: IMPLANTABLE DEVICE | Site: ABDOMEN | Status: FUNCTIONAL
Brand: BARD

## 2022-02-18 RX ORDER — ONDANSETRON 2 MG/ML
4 INJECTION INTRAMUSCULAR; INTRAVENOUS AS NEEDED
Status: DISCONTINUED | OUTPATIENT
Start: 2022-02-18 | End: 2022-02-18 | Stop reason: HOSPADM

## 2022-02-18 RX ORDER — NEOSTIGMINE METHYLSULFATE 1 MG/ML
INJECTION, SOLUTION INTRAVENOUS AS NEEDED
Status: DISCONTINUED | OUTPATIENT
Start: 2022-02-18 | End: 2022-02-18 | Stop reason: HOSPADM

## 2022-02-18 RX ORDER — FENTANYL CITRATE 50 UG/ML
50 INJECTION, SOLUTION INTRAMUSCULAR; INTRAVENOUS AS NEEDED
Status: DISCONTINUED | OUTPATIENT
Start: 2022-02-18 | End: 2022-02-18 | Stop reason: HOSPADM

## 2022-02-18 RX ORDER — MIDAZOLAM HYDROCHLORIDE 1 MG/ML
INJECTION, SOLUTION INTRAMUSCULAR; INTRAVENOUS AS NEEDED
Status: DISCONTINUED | OUTPATIENT
Start: 2022-02-18 | End: 2022-02-18 | Stop reason: HOSPADM

## 2022-02-18 RX ORDER — ONDANSETRON 2 MG/ML
INJECTION INTRAMUSCULAR; INTRAVENOUS AS NEEDED
Status: DISCONTINUED | OUTPATIENT
Start: 2022-02-18 | End: 2022-02-18 | Stop reason: HOSPADM

## 2022-02-18 RX ORDER — SODIUM CHLORIDE 0.9 % (FLUSH) 0.9 %
5-40 SYRINGE (ML) INJECTION AS NEEDED
Status: DISCONTINUED | OUTPATIENT
Start: 2022-02-18 | End: 2022-02-18 | Stop reason: HOSPADM

## 2022-02-18 RX ORDER — SODIUM CHLORIDE 0.9 % (FLUSH) 0.9 %
5-40 SYRINGE (ML) INJECTION EVERY 8 HOURS
Status: DISCONTINUED | OUTPATIENT
Start: 2022-02-18 | End: 2022-02-18 | Stop reason: HOSPADM

## 2022-02-18 RX ORDER — HYDROMORPHONE HYDROCHLORIDE 2 MG/ML
INJECTION, SOLUTION INTRAMUSCULAR; INTRAVENOUS; SUBCUTANEOUS AS NEEDED
Status: DISCONTINUED | OUTPATIENT
Start: 2022-02-18 | End: 2022-02-18 | Stop reason: HOSPADM

## 2022-02-18 RX ORDER — DIPHENHYDRAMINE HYDROCHLORIDE 50 MG/ML
12.5 INJECTION, SOLUTION INTRAMUSCULAR; INTRAVENOUS AS NEEDED
Status: DISCONTINUED | OUTPATIENT
Start: 2022-02-18 | End: 2022-02-18 | Stop reason: HOSPADM

## 2022-02-18 RX ORDER — DEXAMETHASONE SODIUM PHOSPHATE 4 MG/ML
INJECTION, SOLUTION INTRA-ARTICULAR; INTRALESIONAL; INTRAMUSCULAR; INTRAVENOUS; SOFT TISSUE AS NEEDED
Status: DISCONTINUED | OUTPATIENT
Start: 2022-02-18 | End: 2022-02-18 | Stop reason: HOSPADM

## 2022-02-18 RX ORDER — SODIUM CHLORIDE, SODIUM LACTATE, POTASSIUM CHLORIDE, CALCIUM CHLORIDE 600; 310; 30; 20 MG/100ML; MG/100ML; MG/100ML; MG/100ML
INJECTION, SOLUTION INTRAVENOUS
Status: DISCONTINUED | OUTPATIENT
Start: 2022-02-18 | End: 2022-02-18 | Stop reason: HOSPADM

## 2022-02-18 RX ORDER — FENTANYL CITRATE 50 UG/ML
INJECTION, SOLUTION INTRAMUSCULAR; INTRAVENOUS AS NEEDED
Status: DISCONTINUED | OUTPATIENT
Start: 2022-02-18 | End: 2022-02-18 | Stop reason: HOSPADM

## 2022-02-18 RX ORDER — BUPIVACAINE HYDROCHLORIDE AND EPINEPHRINE 5; 5 MG/ML; UG/ML
INJECTION, SOLUTION EPIDURAL; INTRACAUDAL; PERINEURAL AS NEEDED
Status: DISCONTINUED | OUTPATIENT
Start: 2022-02-18 | End: 2022-02-18 | Stop reason: HOSPADM

## 2022-02-18 RX ORDER — ROCURONIUM BROMIDE 10 MG/ML
INJECTION, SOLUTION INTRAVENOUS AS NEEDED
Status: DISCONTINUED | OUTPATIENT
Start: 2022-02-18 | End: 2022-02-18 | Stop reason: HOSPADM

## 2022-02-18 RX ORDER — MIDAZOLAM HYDROCHLORIDE 1 MG/ML
1 INJECTION, SOLUTION INTRAMUSCULAR; INTRAVENOUS AS NEEDED
Status: DISCONTINUED | OUTPATIENT
Start: 2022-02-18 | End: 2022-02-18 | Stop reason: HOSPADM

## 2022-02-18 RX ORDER — HYDROMORPHONE HYDROCHLORIDE 1 MG/ML
0.2 INJECTION, SOLUTION INTRAMUSCULAR; INTRAVENOUS; SUBCUTANEOUS
Status: DISCONTINUED | OUTPATIENT
Start: 2022-02-18 | End: 2022-02-18 | Stop reason: HOSPADM

## 2022-02-18 RX ORDER — SODIUM CHLORIDE 9 MG/ML
50 INJECTION, SOLUTION INTRAVENOUS CONTINUOUS
Status: DISCONTINUED | OUTPATIENT
Start: 2022-02-18 | End: 2022-02-18 | Stop reason: HOSPADM

## 2022-02-18 RX ORDER — SODIUM CHLORIDE, SODIUM LACTATE, POTASSIUM CHLORIDE, CALCIUM CHLORIDE 600; 310; 30; 20 MG/100ML; MG/100ML; MG/100ML; MG/100ML
75 INJECTION, SOLUTION INTRAVENOUS CONTINUOUS
Status: DISCONTINUED | OUTPATIENT
Start: 2022-02-18 | End: 2022-02-18 | Stop reason: HOSPADM

## 2022-02-18 RX ORDER — MIDAZOLAM HYDROCHLORIDE 1 MG/ML
0.5 INJECTION, SOLUTION INTRAMUSCULAR; INTRAVENOUS
Status: DISCONTINUED | OUTPATIENT
Start: 2022-02-18 | End: 2022-02-18 | Stop reason: HOSPADM

## 2022-02-18 RX ORDER — ETOMIDATE 2 MG/ML
INJECTION INTRAVENOUS AS NEEDED
Status: DISCONTINUED | OUTPATIENT
Start: 2022-02-18 | End: 2022-02-18 | Stop reason: HOSPADM

## 2022-02-18 RX ORDER — GLYCOPYRROLATE 0.2 MG/ML
INJECTION INTRAMUSCULAR; INTRAVENOUS AS NEEDED
Status: DISCONTINUED | OUTPATIENT
Start: 2022-02-18 | End: 2022-02-18 | Stop reason: HOSPADM

## 2022-02-18 RX ORDER — KETAMINE HYDROCHLORIDE 10 MG/ML
INJECTION, SOLUTION INTRAMUSCULAR; INTRAVENOUS AS NEEDED
Status: DISCONTINUED | OUTPATIENT
Start: 2022-02-18 | End: 2022-02-18 | Stop reason: HOSPADM

## 2022-02-18 RX ORDER — LIDOCAINE HYDROCHLORIDE 10 MG/ML
0.1 INJECTION, SOLUTION EPIDURAL; INFILTRATION; INTRACAUDAL; PERINEURAL AS NEEDED
Status: DISCONTINUED | OUTPATIENT
Start: 2022-02-18 | End: 2022-02-18 | Stop reason: HOSPADM

## 2022-02-18 RX ORDER — LIDOCAINE HYDROCHLORIDE 20 MG/ML
INJECTION, SOLUTION EPIDURAL; INFILTRATION; INTRACAUDAL; PERINEURAL AS NEEDED
Status: DISCONTINUED | OUTPATIENT
Start: 2022-02-18 | End: 2022-02-18 | Stop reason: HOSPADM

## 2022-02-18 RX ORDER — OXYCODONE AND ACETAMINOPHEN 5; 325 MG/1; MG/1
1 TABLET ORAL
Qty: 18 TABLET | Refills: 0 | Status: SHIPPED | OUTPATIENT
Start: 2022-02-18 | End: 2022-02-21

## 2022-02-18 RX ORDER — MORPHINE SULFATE 2 MG/ML
2 INJECTION, SOLUTION INTRAMUSCULAR; INTRAVENOUS
Status: DISCONTINUED | OUTPATIENT
Start: 2022-02-18 | End: 2022-02-18 | Stop reason: HOSPADM

## 2022-02-18 RX ORDER — POLYETHYLENE GLYCOL 3350 17 G/17G
17 POWDER, FOR SOLUTION ORAL DAILY
Qty: 14 PACKET | Refills: 1 | Status: SHIPPED | OUTPATIENT
Start: 2022-02-18 | End: 2022-05-19

## 2022-02-18 RX ORDER — FENTANYL CITRATE 50 UG/ML
25 INJECTION, SOLUTION INTRAMUSCULAR; INTRAVENOUS
Status: COMPLETED | OUTPATIENT
Start: 2022-02-18 | End: 2022-02-18

## 2022-02-18 RX ORDER — CYCLOBENZAPRINE HCL 10 MG
10 TABLET ORAL
Qty: 20 TABLET | Refills: 0 | Status: SHIPPED | OUTPATIENT
Start: 2022-02-18 | End: 2022-05-19

## 2022-02-18 RX ORDER — OXYCODONE AND ACETAMINOPHEN 5; 325 MG/1; MG/1
1 TABLET ORAL AS NEEDED
Status: DISCONTINUED | OUTPATIENT
Start: 2022-02-18 | End: 2022-02-18 | Stop reason: HOSPADM

## 2022-02-18 RX ORDER — SUCCINYLCHOLINE CHLORIDE 20 MG/ML
INJECTION INTRAMUSCULAR; INTRAVENOUS AS NEEDED
Status: DISCONTINUED | OUTPATIENT
Start: 2022-02-18 | End: 2022-02-18 | Stop reason: HOSPADM

## 2022-02-18 RX ADMIN — FENTANYL CITRATE 50 MCG: 50 INJECTION, SOLUTION INTRAMUSCULAR; INTRAVENOUS at 13:14

## 2022-02-18 RX ADMIN — MIDAZOLAM 2 MG: 1 INJECTION INTRAMUSCULAR; INTRAVENOUS at 13:04

## 2022-02-18 RX ADMIN — SODIUM CHLORIDE, POTASSIUM CHLORIDE, SODIUM LACTATE AND CALCIUM CHLORIDE: 600; 310; 30; 20 INJECTION, SOLUTION INTRAVENOUS at 15:05

## 2022-02-18 RX ADMIN — WATER 2 G: 1 INJECTION INTRAMUSCULAR; INTRAVENOUS; SUBCUTANEOUS at 13:20

## 2022-02-18 RX ADMIN — OXYCODONE AND ACETAMINOPHEN 1 TABLET: 5; 325 TABLET ORAL at 17:01

## 2022-02-18 RX ADMIN — SUCCINYLCHOLINE CHLORIDE 120 MG: 20 INJECTION, SOLUTION INTRAMUSCULAR; INTRAVENOUS at 13:13

## 2022-02-18 RX ADMIN — GLYCOPYRROLATE 0.6 MG: 0.2 INJECTION, SOLUTION INTRAMUSCULAR; INTRAVENOUS at 15:10

## 2022-02-18 RX ADMIN — ETOMIDATE 26 MCG: 2 INJECTION INTRAVENOUS at 13:13

## 2022-02-18 RX ADMIN — DEXAMETHASONE SODIUM PHOSPHATE 4 MG: 4 INJECTION, SOLUTION INTRAMUSCULAR; INTRAVENOUS at 13:25

## 2022-02-18 RX ADMIN — SODIUM CHLORIDE, POTASSIUM CHLORIDE, SODIUM LACTATE AND CALCIUM CHLORIDE: 600; 310; 30; 20 INJECTION, SOLUTION INTRAVENOUS at 13:05

## 2022-02-18 RX ADMIN — NEOSTIGMINE METHYLSULFATE 3 MG: 1 INJECTION, SOLUTION INTRAVENOUS at 15:10

## 2022-02-18 RX ADMIN — ROCURONIUM BROMIDE 30 MG: 10 SOLUTION INTRAVENOUS at 13:18

## 2022-02-18 RX ADMIN — FENTANYL CITRATE 25 MCG: 50 INJECTION, SOLUTION INTRAMUSCULAR; INTRAVENOUS at 16:37

## 2022-02-18 RX ADMIN — FENTANYL CITRATE 25 MCG: 50 INJECTION, SOLUTION INTRAMUSCULAR; INTRAVENOUS at 16:32

## 2022-02-18 RX ADMIN — FENTANYL CITRATE 25 MCG: 50 INJECTION, SOLUTION INTRAMUSCULAR; INTRAVENOUS at 16:52

## 2022-02-18 RX ADMIN — ROCURONIUM BROMIDE 10 MG: 10 SOLUTION INTRAVENOUS at 14:31

## 2022-02-18 RX ADMIN — FENTANYL CITRATE 25 MCG: 50 INJECTION, SOLUTION INTRAMUSCULAR; INTRAVENOUS at 16:47

## 2022-02-18 RX ADMIN — Medication 15 MG: at 13:32

## 2022-02-18 RX ADMIN — LIDOCAINE HYDROCHLORIDE 60 MG: 20 INJECTION, SOLUTION EPIDURAL; INFILTRATION; INTRACAUDAL; PERINEURAL at 13:16

## 2022-02-18 RX ADMIN — FENTANYL CITRATE 50 MCG: 50 INJECTION, SOLUTION INTRAMUSCULAR; INTRAVENOUS at 13:39

## 2022-02-18 RX ADMIN — HYDROMORPHONE HYDROCHLORIDE 0.5 MG: 2 INJECTION, SOLUTION INTRAMUSCULAR; INTRAVENOUS; SUBCUTANEOUS at 15:29

## 2022-02-18 RX ADMIN — MIDAZOLAM 1 MG: 1 INJECTION INTRAMUSCULAR; INTRAVENOUS at 13:05

## 2022-02-18 RX ADMIN — SODIUM CHLORIDE, POTASSIUM CHLORIDE, SODIUM LACTATE AND CALCIUM CHLORIDE 75 ML/HR: 600; 310; 30; 20 INJECTION, SOLUTION INTRAVENOUS at 12:07

## 2022-02-18 RX ADMIN — ONDANSETRON HYDROCHLORIDE 4 MG: 2 INJECTION, SOLUTION INTRAMUSCULAR; INTRAVENOUS at 15:24

## 2022-02-18 RX ADMIN — FENTANYL CITRATE 50 MCG: 50 INJECTION, SOLUTION INTRAMUSCULAR; INTRAVENOUS at 13:13

## 2022-02-18 NOTE — ANESTHESIA POSTPROCEDURE EVALUATION
Procedure(s):  ROBOTIC REPAIR OF VENTRAL HERNIA WITH MESH. general    Anesthesia Post Evaluation      Multimodal analgesia: multimodal analgesia used between 6 hours prior to anesthesia start to PACU discharge  Patient location during evaluation: PACU  Patient participation: complete - patient participated  Level of consciousness: awake  Pain management: adequate  Airway patency: patent  Anesthetic complications: no  Cardiovascular status: acceptable  Respiratory status: acceptable  Hydration status: acceptable  Comments: Seen, no complaints   Post anesthesia nausea and vomiting:  none  Final Post Anesthesia Temperature Assessment:  Normothermia (36.0-37.5 degrees C)      INITIAL Post-op Vital signs:   Vitals Value Taken Time   /49 02/18/22 1615   Temp 36.4 °C (97.5 °F) 02/18/22 1543   Pulse 62 02/18/22 1616   Resp 10 02/18/22 1616   SpO2 100 % 02/18/22 1616   Vitals shown include unvalidated device data.

## 2022-02-18 NOTE — ROUTINE PROCESS
Patient: Cyndi Hines MRN: [id number]  SSN: [id number]   YOB: 1975  Age: 55 y.o. Sex: female     Patient is status post Procedure(s):  ROBOTIC REPAIR OF VENTRAL HERNIA WITH MESH.     Surgeon(s) and Role:     * Katie Stoll MD - Primary    Local/Dose/Irrigation:  Marcaine 0.5% with epi 20 ml                  Peripheral IV 02/18/22 Right Hand (Active)            Airway - Endotracheal Tube 02/18/22 Oral (Active)                   Dressing/Packing:  Incision 02/18/22 Abdomen-Dressing/Treatment: Skin glue (02/18/22 1512)    Splint/Cast:  ]    Other:  Scds; no kerr for case;

## 2022-02-18 NOTE — DISCHARGE INSTRUCTIONS
Discharge Instructions for General Surgery Patients     You can resume your Eliquis and Plavix Saturday evening    1. Do not lift any objects weighing more than 15 pounds for 2 weeks. 2. Do not do any housework including vacuuming, scrubbing or yardwork for 4 weeks. 3. Do not drive or operate machinery while taking sedating or narcotic medications. 4. Post operative pain is expected. Try to wean off narcotics as soon as able and take tylenol or NSAIDS. Do not take tylenol with Norco or Percocet as this may harm your liver. No NSAIDS if you are bariatric surgery patient. 5. You may walk as desired and go up and down stairs as needed. Walking is encouraged. 6. You may shower the day after surgery. Do not take tub baths, swim or use hot tubs for 2 weeks. Pat dry wounds after with a towel. 7. Leave glue on wounds. It will fall off with time. Do not scrub around incisions. If redness develops around the glue okay to peel off in hot shower. 8. Regular diet. Take Miralax once or twice a day as needed for constipation. 9. Follow up with provider as scheduled. 10. If you experience fever (greater than 101.5), chills, vomiting or redness or drainage at surgical site, please contact your surgeons office. If you have further questions or concerns, please call your surgeons office at [phone].      ______________________________________________________________________    Anesthesia Discharge Instructions    After general anesthesia or intervenous sedation, for 24 hours or while taking prescription Narcotics:  · Limit your activities  · Do not drive or operate hazardous machinery  · If you have not urinated within 8 hours after discharge, please contact your surgeon on call.   · Do not make important personal or business decisions  · Do not drink alcoholic beverages    Report the following to your surgeon:  · Excessive pain, swelling, redness or odor of or around the surgical area  · Temperature over 100.5 degrees  · Nausea and vomiting lasting longer than 4 hours or if unable to take medication  · Any signs of decreased circulation or nerve impairment to extremity:  Change in color, persistent numbness, tingling, coldness or increased pain.   · Any questions

## 2022-02-18 NOTE — TELEPHONE ENCOUNTER
Returned called to Augusto blackburn verified patient using 2 patient identifiers. He reviewed the noted and verified that they were requesting a follow on the status of the forms that were sent over or if they have been received. Informed will have to follow with Thuy on Monday to see if forms were received. He states ok to return the call on Monday.

## 2022-02-18 NOTE — TELEPHONE ENCOUNTER
[facility]  Phone: [phone] a form 2/9 about work capacity - looking for turn around time frame.

## 2022-02-18 NOTE — ANESTHESIA PREPROCEDURE EVALUATION
Relevant Problems   No relevant active problems       Anesthetic History   No history of anesthetic complications            Review of Systems / Medical History  Patient summary reviewed, nursing notes reviewed and pertinent labs reviewed    Pulmonary        Sleep apnea: No treatment  Smoker         Neuro/Psych         Psychiatric history    Comments: Radial neuropathy, left Cardiovascular    Hypertension      CHF    Past MI, CAD, cardiac stents and CABG    Exercise tolerance: <4 METS  Comments: Patient reports improved function tolerance since 2019   GI/Hepatic/Renal     GERD           Endo/Other    Diabetes    Blood dyscrasia and arthritis     Other Findings              Physical Exam    Airway  Mallampati: III  TM Distance: 4 - 6 cm  Neck ROM: normal range of motion   Mouth opening: Normal     Cardiovascular  Regular rate and rhythm,  S1 and S2 normal,  no murmur, click, rub, or gallop  Rhythm: regular  Rate: normal         Dental    Dentition: Caps/crowns, Implants and Bridges     Pulmonary  Breath sounds clear to auscultation               Abdominal  GI exam deferred       Other Findings            Anesthetic Plan    ASA: 4  Anesthesia type: general          Induction: Intravenous  Anesthetic plan and risks discussed with: Patient

## 2022-02-18 NOTE — OP NOTES
OPERATIVE NOTE    Date of Procedure: 2/18/2022     Preoperative Diagnosis: VENTRAL HERNIA  Postoperative Diagnosis: Multiple VENTRAL HERNIA with midline diastases    Procedure: Procedure(s):  ROBOTIC REPAIR OF MULTIPLE VENTRAL HERNIA WITH MESH, repair of rectus diastases    Surgeon: Vivian Espinoza MD    Surgical Staff: Circ-1: Augusto Cosme  Circ-Intern: Lan Muniz RN  Scrub Tech-1: Dora Currie  Scrub RN-Relief: Arnel Velazquez RN  Surg Asst-1: Macario Hanson Staff: Sameera Simeon RN; Mya Ordonez    Anesthesia: General   Indications: 63-year-old female symptomatic ventral hernia proven on ultrasound here for repair. Findings: Multiple popcorn-like 1, 2, 2.5 cm ventral hernias with significant 2 to 3 cm diastases cephalad to this. Modifier 22 for time and technique involved in repair of multiple ventral hernias along with repair of of upper midline rectus diastases with internal plication of the rectus abdominis. Additional time involved in the exposure and suture repair of the hernias and abdominal rectus plication was 30 minutes on top of the normally scheduled case. Normal case duration for this procedure is an hour to 1.5 hours, this procedure took 2 hours. Description of Operation: Mirela Torres was identified in the pre-operative holding area. Informed consent was obtained after a complete discussion of risks, benefits and alternatives to surgery were had with the patient. The patient was brought back to the operating room and placed under general endotracheal anesthesia in the supine position on the operating room table with a foot board. The patient was then prepped and draped in the usual sterile fashion. A timeout was performed. We then injected local anesthetic in the left upper quadrant. We entered the abdomen safely using a 5 mm Optiview trocar.   Then placed an 8 mm robotic trocar in the mid abdomen along the anterior axillary line and another one 8 cm below this. We then up-sized the entry trocar to a 8 mm trocar. We then docked the robot. We began by identifying the hernia. There was multiple obvious hernia defects visualized within the abdomen without incarceration    Next I then used the tip of a marking pen and a ruler to leigh out the edges of the proposed mesh. We measured 10 cm around the edges of the hernia. I then removed the marker tip and ruler. Then using scissors with heat I was able to create a preperitoneal flap about 6 cm medial to the edge of the hernia defect. Working medially I approached the linea alba. I encountered multiple hernia defects, 3 or 4 separate hernias, popcorn-like lesions. Maximum size was 2.5 cm in diameter. I also encountered a large 2 to 3 cm midline diastases cephalad to these hernia defects. We then worked on the far side creating adequate space for a mesh with about a 5 cm pocket lateral to the linea alba on the right. . There were a few small peritoneal holes that were created during this dissection that were closed up with 3-0 V-Loc at a later time. Next, I used an 18 inch absorbable 0 V-Loc to close the hernia defect defects in a north-south fashion. I also ran the suture cephalad to repair the midline diastases for better functional abdominal wall. I then ran the suture back towards the starting point to reinforce this repair. This achieved good approximation of the fascia. I then placed the mesh in the preperitoneal space. Mesh size was 8 x 10 cm. This had good apposition to the muscle with good overlap of the hernia. I then used a 9 inch 3-0 absorbable V loc to close the peritoneal flap. We removed all needles. We ensured hemostasis. We then undocked the robot and watched every trocar come out under direct visualization. We injected additional local in to the trocar sites. The dermis was closed with 4-0 Monocryl followed by Dermabond for the skin.     At the end of the procedure all instrument, needle, and sponge counts were correct. I was present and scrubbed throughout the entirety of the case. The patient awoke from anesthesia and was extubated without complication and sent to PACU in stable condition. Estimated Blood Loss: 2 mL    Specimens: * No specimens in log *     Complications: None    Implants:   Implant Name Type Inv.  Item Serial No.  Lot No. LRB No. Used Action   MESH COLBY [id number] INGUINAL POLYPR SQ L PORE MFIL SFT KNIT - [id number]  [address] [place] [address] L PORE MFIL SFT [facility]_ [id number] N/A 1 Implanted         Tee Honeycutt MD  Bariatric and General Surgeon  [facility]  2/18/2022

## 2022-02-18 NOTE — H&P
General Surgery History and Physical    CC: Hernia    History of Present Illness:      Ed Swenson is a 55 y.o. female who presented with symptomatic ventral hernia here for repair. Past Medical History:   Diagnosis Date    Adverse effect of anesthesia 2017    COULD HEAR AND FEEL WHILE UNDER ANESTHESIA BUT COULD NOT RESPOND    Arthritis     B[facility]     Chronic pain     Depression     Diabetes ([facility]     TYPE 1    Gastrointestinal disorder     gastroparesis    Gastroparesis     GERD (gastroesophageal reflux disease)     Heart disease     Heart failure ([facility]     Hypertension     Psychiatric disorder     Bipolar    Sleep apnea     SUPPOSED TO USE BIPAP    Thromboembolus ([facility]     R LEG    Umbilical hernia      Past Surgical History:   Procedure Laterality Date    HX ANKLE FRACTURE TX Right     3 SCREWS PLACED    HX CAROTID STENT      HX GYN      BTL    HX ORTHOPAEDIC Left     BONE SPUR REMOVED    [place] CARDIAC SURG PROCEDURE UNLIST  2019    CABG W/2 STENTS PLACED    VASCULAR SURGERY PROCEDURE UNLIST  2017    R LEG THROMBECTOMY      Family History   Problem Relation Age of Onset    Thyroid Disease Mother     Hypertension Father     Heart Disease Father     Diabetes Sister     Heart Disease Sister     Hypertension Sister     Thyroid Disease Brother     Anesth Problems Neg Hx      Social History     Socioeconomic History    Marital status:    Tobacco Use    Smoking status: Current Every Day Smoker     Packs/day: 0.50     Years: 34.00     Pack years: 17.00    Smokeless tobacco: Never Used   Vaping Use    Vaping Use: Never used   Substance and Sexual Activity    Alcohol use: Not Currently     Comment: occasionally    Drug use: Not Currently     Types: Cocaine    Sexual activity: Yes      Prior to Admission medications    Medication Sig Start Date End Date Taking? Authorizing Provider   metOLazone (ZAROXOLYN) 5 mg tablet Take 5 mg by mouth daily.  ON MONDAYS AND THURSDAYS   Yes Provider, Historical   gabapentin (NEURONTIN) 600 mg tablet Take 1 Tablet by mouth three (3) times daily. 2/2/22  Yes Irven Estimable, DO   calcium citrate 200 mg (950 mg) tablet Take  by mouth daily. Yes Provider, Historical   pantoprazole (PROTONIX) 40 mg tablet Take 1 Tablet by mouth daily as needed (acid reflux). 12/8/21  Yes Maki Maurer, ALEXIA   atorvastatin (LIPITOR) 80 mg tablet TAKE 1 TABLET BY MOUTH EVERY NIGHT FOR HIGH CHOLESTEROL AND TRIGLYCERIDES 11/4/21  Yes Maki Maurer, NP   furosemide (Lasix) 40 mg tablet Take 40 mg by mouth daily. Yes Provider, Historical   sacubitriL-valsartan (Entresto) 49-51 mg tab tablet Take 1 Tablet by mouth two (2) times a day. Yes Provider, Historical   Cholecalciferol, Vitamin D3, 75 mcg (3,000 unit) tab Take 3,000 Units by mouth daily. Yes Provider, Historical   clopidogreL (Plavix) 75 mg tab Take 1 Tablet by mouth daily. 8/9/21  Yes Maki Maurer, ALEXIA   insulin glargine (LANTUS,BASAGLAR) 100 unit/mL (3 mL) inpn 41 Units by SubCUTAneous route daily. 7/27/21  Yes Kathleen Shea [address], NP   polyethylene glycol (MIRALAX) 17 gram packet TAKE 1 PACKET BY MOUTH DAILY AS NEEDD FOR CONSTIPATION 6/18/21  Yes Maki Maurer NP   insulin lispro (HUMALOG) 100 unit/mL injection 10 Units by SubCUTAneous route Before breakfast, lunch, and dinner. Plus sliding scale if needed  Indications: type 1 diabetes mellitus 6/7/21  Yes Maki Maurer NP   apixaban (ELIQUIS) 5 mg tablet Take 1 Tablet by mouth two (2) times a day. Indications: deep vein thrombosis prevention 6/7/21  Yes Maki Maurer NP   metoprolol succinate (TOPROL-XL) 50 mg XL tablet Take 1 Tablet by mouth daily. Indications: high blood pressure  Patient taking differently: Take 50 mg by mouth daily.  Indications: high blood pressure, Patient is taking 25mg 6/7/21  Yes Maki Maurer NP   DULoxetine (Cymbalta) 60 mg capsule Take 1 Capsule by mouth daily. Indications: anxiousness associated with depression 21  Yes Stu Mow., NP   isosorbide mononitrate ER (IMDUR) 30 mg tablet Take 120 mg by mouth daily. Indications: Patient is taking 120mg daily   Yes Provider, Historical   methadone (DOLOPHINE) 10 mg/mL solution Take 140 mg by mouth. Yes Provider, Historical   divalproex ER (DEPAKOTE ER) 500 mg ER tablet Take 500 mg by mouth three (3) times daily. Yes Provider, Historical   Yin Pen Needle 32 gauge x \" ndle USE 1 PEN NEEDLES FOUR TIMES A DAY 21   Stu Mow., NP   multivitamin (ONE A DAY) tablet Take 1 Tablet by mouth daily.   Patient not taking: Reported on 2022    Provider, Historical   nitroglycerin (NITROSTAT) 0.4 mg SL tablet DISSOLVE 1 TABLET SUBLINGUALLY EVERY 5 MINUTES AS NEEDED FOR CHEST PAIN FOR UP TO 3 DOSES  Patient not taking: Reported on 2022   Stu Mow., NP     Allergies   Allergen Reactions    Voltaren [Diclofenac Sodium] Anaphylaxis    Dramamine Ii [Meclizine] Itching     Auditory and Visual hallucinations    Imipramine Hives    Levaquin [Levofloxacin] Itching and Nausea and Vomiting    Ambien [Zolpidem] Other (comments)     SLEEP WALKING    Lisinopril Cough       Review of Systems:  Constitutional: No fever or chills  Neurologic: No headache  Eyes: No scleral icterus or irritated eyes  Nose: No nasal pain or drainage  Mouth: No oral lesions or sore throat  Cardiac: No palpations or chest pain  Pulmonary: No cough or shortness of breath  Gastrointestinal: Hernia pain, no nausea, emesis, diarrhea, or constipation  Genitourinary: No dysuria  Musculoskeletal: No muscle or joint tenderness  Skin: No rashes or lesions  Psychiatric: No anxiety or depressed mood    Physical Exam:   Temp (24hrs), Av.4 °F (36.9 °C), Min:98.4 °F (36.9 °C), Max:98.4 °F (36.9 °C)      Visit Vitals  /63 (BP 1 Location: Left upper arm, BP Patient Position: At rest)   Pulse 63   Temp 98.4 °F (36.9 °C)   Resp 17   Ht 5' 3\" (1.6 m)   Wt 201 lb 1 oz (91.2 kg)   SpO2 98%   BMI 35.62 kg/m²     General: No acute distress, conversant  Eyes: PERRLA, no scleral icterus  HENT: Normocephalic without oral lesions  Neck: Trachea midline without LAD  Cardiac: Normal pulse rate and rhythm  Pulmonary: Symmetric chest rise with normal effort  GI: Soft, NT, ND, reducible ventral hernias, no splenomegaly  Skin: Warm without rash  Extremities: No edema or joint stiffness  Psych: Appropriate mood and affect    Assessment:     51-year-old female with reducible ventral hernias here for repair    Plan:     Patient has achieved cardiac clearance by her cardiologist, consent obtained, OR this afternoon      Signed By: Sheba Dang MD  Bariatric and General Surgeon  [facility]    February 18, 2022

## 2022-02-21 RX ORDER — INSULIN GLARGINE 100 [IU]/ML
41 INJECTION, SOLUTION SUBCUTANEOUS DAILY
Qty: 5 ADJUSTABLE DOSE PRE-FILLED PEN SYRINGE | Refills: 0 | Status: SHIPPED | OUTPATIENT
Start: 2022-02-21 | End: 2022-04-07

## 2022-02-21 NOTE — TELEPHONE ENCOUNTER
Left message for Yony Perez to advise we did receive forms on 2/18.  Forms will be completed, signed, and faxed back this week

## 2022-02-21 NOTE — TELEPHONE ENCOUNTER
Pt left a voice message requesting a refill. [phone]      Last visit 2021 NP Ashkan Sotomayor   Next appointment 3 months (2022) - Nothing scheduled   Previous refill encounter(s)   2021 Lantus #5 pens     Requested Prescriptions     Pending Prescriptions Disp Refills    insulin glargine (LANTUS,BASAGLAR) 100 unit/mL (3 mL) inpn 5 Adjustable Dose Pre-filled Pen Syringe 0     Si Units by SubCUTAneous route daily.

## 2022-02-22 ENCOUNTER — TELEPHONE (OUTPATIENT)
Dept: SURGERY | Age: 47
End: 2022-02-22

## 2022-02-22 RX ORDER — PANTOPRAZOLE SODIUM 40 MG/1
TABLET, DELAYED RELEASE ORAL
Qty: 90 TABLET | Refills: 0 | Status: SHIPPED | OUTPATIENT
Start: 2022-02-22 | End: 2022-07-05

## 2022-02-23 RX ORDER — HYDROMORPHONE HYDROCHLORIDE 2 MG/1
2 TABLET ORAL
Qty: 18 TABLET | Refills: 0 | Status: SHIPPED | OUTPATIENT
Start: 2022-02-23 | End: 2022-02-26

## 2022-02-24 NOTE — TELEPHONE ENCOUNTER
Patient identified with two patient identifiers. Patient informed per Dr. Bunny Mcclelland script for dilaudid sent to pharmacy on file and she can also take flexeril as directed to help with post op pain and discomfort. Patient expressed understanding will return call if any other questions or concerns.

## 2022-02-28 ENCOUNTER — OFFICE VISIT (OUTPATIENT)
Dept: SURGERY | Age: 47
End: 2022-02-28
Payer: MEDICARE

## 2022-02-28 VITALS
WEIGHT: 207 LBS | HEART RATE: 75 BPM | TEMPERATURE: 98.4 F | OXYGEN SATURATION: 96 % | HEIGHT: 63 IN | SYSTOLIC BLOOD PRESSURE: 105 MMHG | DIASTOLIC BLOOD PRESSURE: 66 MMHG | BODY MASS INDEX: 36.68 KG/M2

## 2022-02-28 DIAGNOSIS — G89.18 POST-OP PAIN: Primary | ICD-10-CM

## 2022-02-28 DIAGNOSIS — E66.01 MORBID OBESITY (HCC): ICD-10-CM

## 2022-02-28 PROCEDURE — 99214 OFFICE O/P EST MOD 30 MIN: CPT | Performed by: SURGERY

## 2022-02-28 PROCEDURE — G8427 DOCREV CUR MEDS BY ELIG CLIN: HCPCS | Performed by: SURGERY

## 2022-02-28 PROCEDURE — G8417 CALC BMI ABV UP PARAM F/U: HCPCS | Performed by: SURGERY

## 2022-02-28 PROCEDURE — G9717 DOC PT DX DEP/BP F/U NT REQ: HCPCS | Performed by: SURGERY

## 2022-02-28 RX ORDER — OXYCODONE AND ACETAMINOPHEN 5; 325 MG/1; MG/1
1 TABLET ORAL
Qty: 18 TABLET | Refills: 0 | Status: SHIPPED | OUTPATIENT
Start: 2022-02-28 | End: 2022-03-03

## 2022-02-28 RX ORDER — CYCLOBENZAPRINE HCL 10 MG
10 TABLET ORAL
Qty: 30 TABLET | Refills: 0 | Status: SHIPPED | OUTPATIENT
Start: 2022-02-28 | End: 2022-05-12

## 2022-02-28 NOTE — PROGRESS NOTES
1. Have you been to the ER, urgent care clinic since your last visit? Hospitalized since your last visit? No    2. Have you seen or consulted any other health care providers outside of the [address] [place] [address] since your last visit? Include any pap smears or colon screening.  No

## 2022-02-28 NOTE — Clinical Note
Patient here today to discuss postoperative care along with interest in weight loss surgery. I have completed her intake for weight loss surgery. Please contact her with what ever packets she have to fill out and or things she has to watch. She will need an EGD and H. pylori. Plan for bypass.

## 2022-02-28 NOTE — PROGRESS NOTES
Bariatric Surgery Consultation    CC: Postoperative pain and morbid obesity  Subjective:     Patient 10 days out from robotic ventral hernia repair with rectus plication. Still pretty sore. No bulges. Interested in weight loss surgery. The patient is a 55 y.o. obese  female with a Body mass index is 36.67 kg/m². . They have been considering surgery for some time now. The patient presents to the clinic today to discuss surgical weight loss options. They have made multiple attempts at weight loss over the years without success. They have tried diet and exercise. Unfortunately, none of these have lead to meaningful, sustained weight loss. The patient now suffers from multiple medical conditions related to their obesity including HTN, DM, cardiac issues, and reflux. Relevant previous surgical history includes: Robotic ventral hernia repair. They have attended the bariatric seminar before the visit. The patient's goals for the surgery include lose weight, extend life, cure reflux.     Tobacco use: Current daily user of tobacco  GERD/hiatal hernia: yes  Sleep Apnea assessment: yes      Patient Active Problem List    Diagnosis Date Noted    Type 2 diabetes mellitus 04/75/8746    Umbilical hernia 07/46/8795    Radial neuropathy, left 12/06/2019    Bipolar 1 disorder ([address] 07/27/2019    Insulin overdose 07/24/2019    NSTEMI (non-ST elevated myocardial infarction) ([address] 06/08/2019    Acute exacerbation of CHF (congestive heart failure) ([address] 06/08/2019    SOB (shortness of breath) 06/07/2019      Past Medical History:   Diagnosis Date    Adverse effect of anesthesia 2017    COULD HEAR AND FEEL WHILE UNDER ANESTHESIA BUT COULD NOT RESPOND    Arthritis     Bipolar 1 disorder ([address]     Chronic pain     Depression     Diabetes ([address]     TYPE 1    Gastrointestinal disorder     gastroparesis    Gastroparesis     GERD (gastroesophageal reflux disease)     Heart disease     Heart failure ([facility]     Hypertension     Psychiatric disorder     Bipolar    Sleep apnea     SUPPOSED TO USE BIPAP    Thromboembolus ([facility])     R LEG    Umbilical hernia      Past Surgical History:   Procedure Laterality Date    HX ANKLE FRACTURE TX Right     3 SCREWS PLACED    HX CAROTID STENT      HX GYN      BTL    HX ORTHOPAEDIC Left     BONE SPUR REMOVED    [place] CARDIAC SURG PROCEDURE UNLIST  2019    CABG W/2 STENTS PLACED    VASCULAR SURGERY PROCEDURE UNLIST  2017    R LEG THROMBECTOMY      Social History     Tobacco Use    Smoking status: Current Every Day Smoker     Packs/day: 0.50     Years: 34.00     Pack years: 17.00    Smokeless tobacco: Never Used   Substance Use Topics    Alcohol use: Not Currently     Comment: occasionally      Family History   Problem Relation Age of Onset    Thyroid Disease Mother     Hypertension Father     Heart Disease Father     Diabetes Sister     Heart Disease Sister     Hypertension Sister     Thyroid Disease Brother     Anesth Problems Neg Hx       Prior to Admission medications    Medication Sig Start Date End Date Taking? Authorizing Provider   oxyCODONE-acetaminophen (PERCOCET) 5-325 mg per tablet Take 1 Tablet by mouth every four (4) hours as needed for Pain (Can take 2 tablets if needed for breakthrough pain if needed) for up to 3 days. Max Daily Amount: 6 Tablets. 2/28/22 3/3/22 Yes Alfredo Wilcox MD   cyclobenzaprine (FLEXERIL) 10 mg tablet Take 1 Tablet by mouth three (3) times daily as needed for Muscle Spasm(s). 2/28/22  Yes Alfredo Wilcox MD   pantoprazole (PROTONIX) 40 mg tablet TAKE 1 TABLET BY MOUTH ONCE DAILY AS NEEDED FOR ACID REFLUX 2/22/22  Yes Yari Greene NP   insulin glargine (LANTUS,BASAGLAR) 100 unit/mL (3 mL) inpn 41 Units by SubCUTAneous route daily. 2/21/22  Yes Yari Greene NP   polyethylene glycol (MIRALAX) 17 gram packet Take 1 Packet by mouth daily. May take 1-2 packets as needed daily for constipation.  2/18/22  Yes Isiah Wright MD   cyclobenzaprine (FLEXERIL) 10 mg tablet Take 1 Tablet by mouth three (3) times daily as needed for Muscle Spasm(s). 2/18/22  Yes Isiah Wright MD   metOLazone (ZAROXOLYN) 5 mg tablet Take 5 mg by mouth daily. ON MONDAYS AND THURSDAYS   Yes Provider, Historical   gabapentin (NEURONTIN) 600 mg tablet Take 1 Tablet by mouth three (3) times daily. 2/2/22  Yes Cesilia Becker,    calcium citrate 200 mg (950 mg) tablet Take  by mouth daily. Yes Provider, Historical   Yin Pen Needle 32 gauge x 5/32\" ndle USE 1 PEN NEEDLES FOUR TIMES A DAY 12/7/21  Yes Jailyn Ramirez NP   atorvastatin (LIPITOR) 80 mg tablet TAKE 1 TABLET BY MOUTH EVERY NIGHT FOR HIGH CHOLESTEROL AND TRIGLYCERIDES 11/4/21  Yes Jailyn Ramirez NP   multivitamin (ONE A DAY) tablet Take 1 Tablet by mouth daily. Yes Provider, Historical   furosemide (Lasix) 40 mg tablet Take 40 mg by mouth daily. Yes Provider, Historical   sacubitriL-valsartan (Entresto) 49-51 mg tab tablet Take 1 Tablet by mouth two (2) times a day. Yes Provider, Historical   Cholecalciferol, Vitamin D3, 75 mcg (3,000 unit) tab Take 3,000 Units by mouth daily. Yes Provider, Historical   clopidogreL (Plavix) 75 mg tab Take 1 Tablet by mouth daily. 8/9/21  Yes Jailyn Ramirez NP   nitroglycerin (NITROSTAT) 0.4 mg SL tablet DISSOLVE 1 TABLET SUBLINGUALLY EVERY 5 MINUTES AS NEEDED FOR CHEST PAIN FOR UP TO 3 DOSES 6/18/21  Yes Jailyn Ramirez NP   polyethylene glycol (MIRALAX) 17 gram packet TAKE 1 PACKET BY MOUTH DAILY AS NEEDD FOR CONSTIPATION 6/18/21  Yes Jailyn Ramirez NP   insulin lispro (HUMALOG) 100 unit/mL injection 10 Units by SubCUTAneous route Before breakfast, lunch, and dinner. Plus sliding scale if needed  Indications: type 1 diabetes mellitus 6/7/21  Yes Jailyn Ramirez NP   apixaban (ELIQUIS) 5 mg tablet Take 1 Tablet by mouth two (2) times a day.  Indications: deep vein thrombosis prevention 6/7/21 Yes Sarika Malcolm NP   metoprolol succinate (TOPROL-XL) 50 mg XL tablet Take 1 Tablet by mouth daily. Indications: high blood pressure  Patient taking differently: Take 50 mg by mouth daily. Indications: high blood pressure, Patient is taking 25mg 6/7/21  Yes Sarika Malcolm NP   DULoxetine (Cymbalta) 60 mg capsule Take 1 Capsule by mouth daily. Indications: anxiousness associated with depression 6/7/21  Yes Sarika Malcolm NP   isosorbide mononitrate ER (IMDUR) 30 mg tablet Take 120 mg by mouth daily. Indications: Patient is taking 120mg daily   Yes Provider, Historical   methadone (DOLOPHINE) 10 mg/mL solution Take 140 mg by mouth. Yes Provider, Historical   divalproex ER (DEPAKOTE ER) 500 mg ER tablet Take 500 mg by mouth three (3) times daily.    Yes Provider, Historical     Allergies   Allergen Reactions    Voltaren [Diclofenac Sodium] Anaphylaxis    Dramamine Ii [Meclizine] Itching     Auditory and Visual hallucinations    Imipramine Hives    Levaquin [Levofloxacin] Itching and Nausea and Vomiting    Ambien [Zolpidem] Other (comments)     SLEEP WALKING    Lisinopril Cough        Review of Systems:    Constitutional: No fever or chills  Neurologic: No headache  Eyes: No scleral icterus or irritated eyes  Nose: No nasal pain or drainage  Mouth: No oral lesions or sore throat  Cardiac: No palpations or chest pain  Pulmonary: No cough or shortness of breath  Gastrointestinal: soreness, No nausea, emesis, diarrhea, or constipation  Genitourinary: No dysuria  Musculoskeletal: No muscle or joint tenderness  Skin: No rashes or lesions  Psychiatric: No anxiety or depressed mood      Objective:     Physical Exam:  Visit Vitals  /66 (BP 1 Location: Left upper arm, BP Patient Position: Sitting, BP Cuff Size: Large adult)   Pulse 75   Temp 98.4 °F (36.9 °C) (Oral)   Ht 5' 3\" (1.6 m)   Wt 207 lb (93.9 kg)   SpO2 96%   BMI 36.67 kg/m²     General: No acute distress, conversant  Eyes: PERRLA, no scleral icterus  HENT: Normocephalic without oral lesions  Neck: Trachea midline without LAD  Cardiac: Normal pulse rate and rhythm  Pulmonary: Symmetric chest rise with normal effort  GI: Soft, obese, expected postoperative discomfort, no hernia  Skin: Warm without rash  Extremities: No edema or joint stiffness  Psych: Appropriate mood and affect    Assessment:     Morbid obesity with comorbidities and recent postoperative state  Plan:   I will give the patient a refill on her Percocet and Flexeril. This is her last refill of both. I think she will improve significantly in the next 2 weeks. Expected soreness from rectus plication. She is interested in surgical weight loss. I think this is a very good decision from her medical point of view. We will begin the process. The patient presents today with multiple complications relating to their obesity as detailed above. The patient has made multiple unsuccessful attempts at weight loss as detailed above. The patient desires surgical weight loss for a better chance of lifelong weight control and control of co-morbidities. They have attended the bariatric seminar and meet the qualifications for surgery based on the NIH criteria. We have discussed the various surgical options including the robotic sleeve gastrectomy, robotic gastric bypass, laparoscopic vs robotic single anastomosis duodenal ileostomy (DERECK), and duodenal switch. We also discussed none operative alternatives. The patient is currently interested in the gastric bypass. I believe they are a good candidate for this procedure. They will need to a/an EGD to evaluate their anatomy pre operatively. H pylori antigen ordered as well.     She needs to be free from tobacco 3 months before surgery and never go back    The patient will be required to not gain weight during this period if starting BMI is 35-40, lose 5% of starting weight if BMI is >40, or lose 10% of starting weight if BMI >60 during the supervised weight loss / pre operative process before our next visit for pre-operative consents. The goal for this patient is to lose 0 lbs. We have discussed the possible complications of bariatric surgery which include but are not limited to failed weight loss, weight regain, malnutrition, leak, bleed, stricture, gastric ulcer, gastric fistula, gastric bleed, gallstones, new or worsening gastric reflux, nausea, emesis, internal hernia, abdominal wall hernia, gastric perforation, need for revision / conversion / or reversal, pregnancy complications and loss, intestinal ischemia, post operative skin complications, possible thinning of their hair, bowel obstruction, dumping syndrome, wound infection, blood clots (DVT, mesenteric thrombus, pulmonary embolism), increased addictive tendency, risk of anesthesia, and death. The patient understands this is a life altering decision and will require compliance to the program for the remainder of their life in order to be monitored to avoid complication and ensure successful, sustained weight control. They will be placed on a lifelong low carbohydrate and low sugar diet, exercise, and vitamin regimen and will require frequent blood draws and office visits to ensure adequate nutrition and program compliance. Visits and follow up will be in compliance with the guidelines set forth by [facility]. I have specifically mentioned the need to avoid all personal and second-hand tobacco exposure, systemic steroids, and NSAIDS after any bariatric surgery to help avoid the above listed complications. The patient has expressed understanding of the above and would like to enroll in the program. The patient will be submitted for medical and psychological clearance along with establishing with out dietician and joining the pre / post operative support group. They will be screened for depression and sleep apnea and treated pre operatively if needed.  The patient will have to demonstrate cessation of tobacco if relevant for at least 3 months preoperatively along with having a controlled HbA1c less than 8. After successful completion of the preoperative regimen the patient will be submitted for insurance approval and pending this will be scheduled for surgery. All questions from the patient have been answered and they have demonstrated appropriate understanding of the process.       Patient Active Problem List    Diagnosis Date Noted    Type 2 diabetes mellitus 73/21/8989    Umbilical hernia 30/50/9474    Radial neuropathy, left 12/06/2019    Bipolar 1 disorder ([facility] 07/27/2019    Insulin overdose 07/24/2019    NSTEMI (non-ST elevated myocardial infarction) ([facility] 06/08/2019    Acute exacerbation of CHF (congestive heart failure) ([facility] 06/08/2019    SOB (shortness of breath) 06/07/2019         Signed By: Surendra Philippe MD  Bariatric and General Surgeon  [facility]    February 28, 2022

## 2022-03-18 PROBLEM — R06.02 SOB (SHORTNESS OF BREATH): Status: ACTIVE | Noted: 2019-06-07

## 2022-03-18 PROBLEM — I21.4 NSTEMI (NON-ST ELEVATED MYOCARDIAL INFARCTION) (HCC): Status: ACTIVE | Noted: 2019-06-08

## 2022-03-18 PROBLEM — T38.3X1A INSULIN OVERDOSE: Status: ACTIVE | Noted: 2019-07-24

## 2022-03-18 PROBLEM — G56.32 RADIAL NEUROPATHY, LEFT: Status: ACTIVE | Noted: 2019-12-06

## 2022-03-19 PROBLEM — I50.9 ACUTE EXACERBATION OF CHF (CONGESTIVE HEART FAILURE) (HCC): Status: ACTIVE | Noted: 2019-06-08

## 2022-03-19 PROBLEM — K42.9 UMBILICAL HERNIA: Status: ACTIVE | Noted: 2021-12-28

## 2022-03-19 PROBLEM — F31.9 BIPOLAR 1 DISORDER (HCC): Status: ACTIVE | Noted: 2019-07-27

## 2022-03-19 PROBLEM — E11.9 TYPE 2 DIABETES MELLITUS (HCC): Status: ACTIVE | Noted: 2022-01-24

## 2022-03-23 ENCOUNTER — OFFICE VISIT (OUTPATIENT)
Dept: NEUROLOGY | Age: 47
End: 2022-03-23
Payer: MEDICARE

## 2022-03-23 DIAGNOSIS — G62.9 POLYNEUROPATHY: Primary | ICD-10-CM

## 2022-03-23 PROCEDURE — 95886 MUSC TEST DONE W/N TEST COMP: CPT | Performed by: PSYCHIATRY & NEUROLOGY

## 2022-03-23 PROCEDURE — 95910 NRV CNDJ TEST 7-8 STUDIES: CPT | Performed by: PSYCHIATRY & NEUROLOGY

## 2022-03-23 NOTE — PROGRESS NOTES
[address] [facility]  [address] [facility], [address] [facility]  Phone [phone] Fax [phone]  Test Date:  3/23/2022    Patient: Aleksey Rios : 1975 Physician: Shilpa Cobos. Davida Rebollar DO   Sex: Female Height: 5' 3\" Ref Phys: Shilpa Cobos. Davida ChaconDO amber   ID#: [id number]  Weight: 207 lbs. Technician: Kelley Scales     Patient Complaints:  Progressive distal lower extremity paresthesias    NCV & EMG Findings:  Evaluation of the left peroneal motor, the left tibial motor, and the right tibial motor nerves showed reduced amplitude (L0.9, L0.8, R0.2 mV). The right peroneal motor nerve showed reduced amplitude (0.2 mV) and decreased conduction velocity (B Fib-Ankle, 23 m/s). The left Sup Peroneal sensory and the right Sup Peroneal sensory nerves showed no response (14 cm). The left sural sensory and the right sural sensory nerves showed no response (Calf). Left vs. Right side comparison data for the tibial motor nerve indicates abnormal L-R amplitude difference (75.0 %). F Wave studies indicate that the right tibial F wave has prolonged latency (64.62 ms). Needle evaluation of the right extensor digitorum brevis muscle showed increased motor unit amplitude, rapid recruitment, and very decreased interference pattern. The right abductor hallucis muscle showed very decreased interference pattern. All remaining muscles (as indicated in the following table) showed no evidence of electrical instability. Impression:  Extensive electrodiagnostic examination of the right lower extremity and additional nerve conduction studies of the left lower extremity reveals the followin. 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 2. No evidence of a right lumbosacral motor radiculopathy. ___________________________  Davis Burgess,         Nerve Conduction Studies  Anti Sensory Summary Table     Stim Site NR Peak (ms) Norm Peak (ms) P-T Amp (µV) Norm P-T Amp Onset (ms) Site1 Site2 Delta-P (ms) Dist (cm) Mauro (m/s) Norm Mauro (m/s)   Left Sup Peroneal Anti Sensory (Ant Lat Mall)  31.5°C   14 cm NR  <4.4  >5.0  14 cm Ant Lat Mall  14.0  >32   Right Sup Peroneal Anti Sensory (Ant Lat Mall)  32.2°C   14 cm NR  <4.4  >5.0  14 cm Ant Lat Mall  14.0  >32   Left Sural Anti Sensory (Lat Mall)  31.2°C   Calf NR  <4.0  >5.0  Calf Lat Mall  14.0  >35   Right Sural Anti Sensory (Lat Mall)  31.9°C   Calf NR  <4.0  >5.0  Calf Lat Mall  14.0  >35     Motor Summary Table     Stim Site NR Onset (ms) Norm Onset (ms) O-P Amp (mV) Norm O-P Amp Site1 Site2 Delta-0 (ms) Dist (cm) Mauro (m/s) Norm Mauro (m/s)   Left Peroneal Motor (Ext Dig Brev)  31°C   Ankle    3.0 <6.1 0.9 >2.5 B Fib Ankle 8.7 34.0 39 >38   B Fib    11.7  0.7  Poplt B Fib 2.1 10.0 48 >40   Poplt    13.8  0.6          Right Peroneal Motor (Ext Dig Brev)  31.8°C   Ankle    1.8 <6.1 0.2 >2.5 B Fib Ankle 13.3 30.0 23 >38   B Fib    15.1  0.1  Poplt B Fib 2.1 10.0 48 >40   Poplt    17.2  0.1          Left Tibial Motor (Abd Franco Brev)  31.4°C   Ankle    3.8 <6.1 0.8 >3.0 Knee Ankle 10.3 38.0 37 >35   Knee    14.1  0.5          Right Tibial Motor (Abd Franco Brev)  32°C   Ankle    4.9 <6.1 0.2 >3.0 Knee Ankle 10.7 37.0 35 >35   Knee    15.6  0.2            F Wave Studies     NR F-Lat (ms) Lat Norm (ms) L-R F-Lat (ms) L-R Lat Norm   Right Tibial (Mrkrs) (Abd Hallucis)  32.1°C      64.62 <61  <5.7     EMG     Side Muscle Nerve Root Ins Act Fibs Psw Amp Dur Poly Recrt Int Jerie Camelia Comment   Right Ext Dig Brev Dp Br Peronel L5, S1 Nml Nml Nml Incr Nml 0 Rapid 25%    Right AbdHallucis MedPlantar S1-2 Nml Nml Nml Nml Nml 0 Nml 25% nmu   Right Gastroc Tibial S1-2 Nml Nml Nml Nml Nml 0 Nml Nml    Right AntTibialis Dp Br Peronel L4-5 Nml Nml Nml Nml Nml 0 Nml Nml Right RectFemoris Femoral L2-4 Nml Nml Nml Nml Nml 0 Nml Nml          Waveforms:

## 2022-04-22 ENCOUNTER — VIRTUAL VISIT (OUTPATIENT)
Dept: INTERNAL MEDICINE CLINIC | Age: 47
End: 2022-04-22
Payer: MEDICARE

## 2022-04-22 DIAGNOSIS — I82.5Y9 CHRONIC DEEP VEIN THROMBOSIS (DVT) OF PROXIMAL VEIN OF LOWER EXTREMITY, UNSPECIFIED LATERALITY (HCC): ICD-10-CM

## 2022-04-22 DIAGNOSIS — F31.9 BIPOLAR 1 DISORDER (HCC): ICD-10-CM

## 2022-04-22 DIAGNOSIS — Z79.4 TYPE 2 DIABETES MELLITUS WITH HYPERGLYCEMIA, WITH LONG-TERM CURRENT USE OF INSULIN (HCC): Primary | ICD-10-CM

## 2022-04-22 DIAGNOSIS — M21.619 BUNION: ICD-10-CM

## 2022-04-22 DIAGNOSIS — E11.65 TYPE 2 DIABETES MELLITUS WITH HYPERGLYCEMIA, WITH LONG-TERM CURRENT USE OF INSULIN (HCC): Primary | ICD-10-CM

## 2022-04-22 DIAGNOSIS — I50.812 CHRONIC RIGHT-SIDED CONGESTIVE HEART FAILURE (HCC): ICD-10-CM

## 2022-04-22 PROCEDURE — 3046F HEMOGLOBIN A1C LEVEL >9.0%: CPT | Performed by: NURSE PRACTITIONER

## 2022-04-22 PROCEDURE — G8427 DOCREV CUR MEDS BY ELIG CLIN: HCPCS | Performed by: NURSE PRACTITIONER

## 2022-04-22 PROCEDURE — 2022F DILAT RTA XM EVC RTNOPTHY: CPT | Performed by: NURSE PRACTITIONER

## 2022-04-22 PROCEDURE — G9717 DOC PT DX DEP/BP F/U NT REQ: HCPCS | Performed by: NURSE PRACTITIONER

## 2022-04-22 PROCEDURE — 99214 OFFICE O/P EST MOD 30 MIN: CPT | Performed by: NURSE PRACTITIONER

## 2022-04-22 RX ORDER — INSULIN LISPRO 100 [IU]/ML
10 INJECTION, SOLUTION INTRAVENOUS; SUBCUTANEOUS
COMMUNITY
End: 2022-08-25 | Stop reason: SDUPTHER

## 2022-04-22 RX ORDER — OXYCODONE AND ACETAMINOPHEN 5; 325 MG/1; MG/1
1 TABLET ORAL
Qty: 20 TABLET | Refills: 0 | Status: SHIPPED | OUTPATIENT
Start: 2022-04-22 | End: 2022-05-12

## 2022-04-22 RX ORDER — INSULIN GLARGINE 100 [IU]/ML
INJECTION, SOLUTION SUBCUTANEOUS
Qty: 5 ADJUSTABLE DOSE PRE-FILLED PEN SYRINGE | Refills: 0 | Status: SHIPPED | OUTPATIENT
Start: 2022-04-22 | End: 2022-05-12 | Stop reason: SDUPTHER

## 2022-04-22 RX ORDER — ISOSORBIDE MONONITRATE 30 MG/1
30 TABLET, EXTENDED RELEASE ORAL DAILY
COMMUNITY

## 2022-04-22 RX ORDER — DIVALPROEX SODIUM 500 MG/1
500 TABLET, EXTENDED RELEASE ORAL 3 TIMES DAILY
Qty: 180 TABLET | Refills: 0 | Status: SHIPPED | OUTPATIENT
Start: 2022-04-22 | End: 2022-08-01 | Stop reason: SDUPTHER

## 2022-04-22 RX ORDER — DOXEPIN HYDROCHLORIDE 25 MG/1
25 CAPSULE ORAL
COMMUNITY
End: 2022-09-19

## 2022-04-22 RX ORDER — DULOXETIN HYDROCHLORIDE 60 MG/1
60 CAPSULE, DELAYED RELEASE ORAL DAILY
COMMUNITY
End: 2022-07-20 | Stop reason: SDUPTHER

## 2022-04-22 RX ORDER — ETONOGESTREL 68 MG/1
1 IMPLANT SUBCUTANEOUS
COMMUNITY

## 2022-04-22 RX ORDER — RANOLAZINE 500 MG/1
TABLET, EXTENDED RELEASE ORAL
COMMUNITY
Start: 2021-11-22 | End: 2022-05-12

## 2022-04-22 RX ORDER — DIVALPROEX SODIUM 500 MG/1
500 TABLET, EXTENDED RELEASE ORAL 3 TIMES DAILY
Status: CANCELLED | OUTPATIENT
Start: 2022-04-22

## 2022-04-22 RX ORDER — LOSARTAN POTASSIUM 25 MG/1
25 TABLET ORAL DAILY
COMMUNITY
End: 2022-04-22

## 2022-04-22 NOTE — PROGRESS NOTES
Chief Complaint   Patient presents with    Follow-up     pt states foot  was prescribing tramadol for foot but dr said he could not prescribe anything stronger without surgery and [facility] pt ask PCP     Other     text link [phone]     Pt rates feet pain 8/10     1. \"Have you been to the ER, urgent care clinic since your last visit? Hospitalized since your last visit? \" No    2. \"Have you seen or consulted any other health care providers outside of the [address] [place] [address] since your last visit? \" No     3. For patients aged 39-70: Has the patient had a colonoscopy / FIT/ Cologuard? Yes - Care Gap present. Most recent result on file      If the patient is female:    4. For patients aged 41-77: Has the patient had a mammogram within the past 2 years? Yes - Care Gap present. Most recent result on file      5. For patients aged 21-65: Has the patient had a pap smear?  No

## 2022-04-22 NOTE — PROGRESS NOTES
Mkiala Manning is a 55 y.o. female who was seen by synchronous (real-time) audio-video technology on 4/22/2022 for Follow-up (pt states foot dr was prescribing tramadol for foot but dr said he could not prescribe anything stronger without surgery and [facility] pt ask PCP ) and Other (text link [phone])        Assessment & Plan:   Diagnoses and all orders for this visit:    1. Type 2 diabetes mellitus with hyperglycemia, with long-term current use of insulin (HCC)  -     HEMOGLOBIN A1C WITH EAG; Future    2. Chronic deep vein thrombosis (DVT) of proximal vein of lower extremity, unspecified laterality ([facility]    3. Chronic right-sided congestive heart failure ([facility]    4. Bipolar 1 disorder ([facility]    5. Bunion  -     oxyCODONE-acetaminophen (PERCOCET) 5-325 mg per tablet; Take 1 Tablet by mouth every eight (8) hours as needed for Pain for up to 30 days. Max Daily Amount: 3 Tablets. Other orders  -     insulin glargine (Lantus Solostar U-100 Insulin) 100 unit/mL (3 mL) inpn; INJECT 41 UNITS SUBCUTANEOUSLY ONCE DAILY  -     divalproex ER (Depakote ER) 500 mg ER tablet; Take 1 Tablet by mouth three (3) times daily. The complexity of medical decision making for this visit is moderate         Subjective:       Prior to Admission medications    Medication Sig Start Date End Date Taking? Authorizing Provider   insulin glargine (Lantus Solostar U-100 Insulin) 100 unit/mL (3 mL) inpn INJECT 41 UNITS SUBCUTANEOUSLY ONCE DAILY 4/22/22  Yes Maria Del Carmen Arias., NP   pantoprazole (PROTONIX) 40 mg tablet TAKE 1 TABLET BY MOUTH ONCE DAILY AS NEEDED FOR ACID REFLUX 2/22/22  Yes Anisha HENNING NP   cyclobenzaprine (FLEXERIL) 10 mg tablet Take 1 Tablet by mouth three (3) times daily as needed for Muscle Spasm(s). 2/18/22  Yes Jamel Hendrix MD   gabapentin (NEURONTIN) 600 mg tablet Take 1 Tablet by mouth three (3) times daily.  2/2/22  Yes Agustina Rings, DO   calcium citrate 200 mg (950 mg) tablet Take  by mouth daily. Yes Provider, Historical   atorvastatin (LIPITOR) 80 mg tablet TAKE 1 TABLET BY MOUTH EVERY NIGHT FOR HIGH CHOLESTEROL AND TRIGLYCERIDES 11/4/21  Yes Marino Crawford NP   multivitamin (ONE A DAY) tablet Take 1 Tablet by mouth daily. Yes Provider, Historical   furosemide (Lasix) 40 mg tablet Take 40 mg by mouth daily. Yes Provider, Historical   sacubitriL-valsartan (Entresto) 49-51 mg tab tablet Take 1 Tablet by mouth two (2) times a day. Yes Provider, Historical   Cholecalciferol, Vitamin D3, 75 mcg (3,000 unit) tab Take 3,000 Units by mouth daily. Yes Provider, Historical   clopidogreL (Plavix) 75 mg tab Take 1 Tablet by mouth daily. 8/9/21  Yes Marino Crawford NP   nitroglycerin (NITROSTAT) 0.4 mg SL tablet DISSOLVE 1 TABLET SUBLINGUALLY EVERY 5 MINUTES AS NEEDED FOR CHEST PAIN FOR UP TO 3 DOSES 6/18/21  Yes Marino Crawford NP   insulin lispro (HUMALOG) 100 unit/mL injection 10 Units by SubCUTAneous route Before breakfast, lunch, and dinner. Plus sliding scale if needed  Indications: type 1 diabetes mellitus 6/7/21  Yes Marino Crawford NP   apixaban (ELIQUIS) 5 mg tablet Take 1 Tablet by mouth two (2) times a day. Indications: deep vein thrombosis prevention 6/7/21  Yes Marino Crawford NP   metoprolol succinate (TOPROL-XL) 50 mg XL tablet Take 1 Tablet by mouth daily. Indications: high blood pressure  Patient taking differently: Take 50 mg by mouth daily. Indications: high blood pressure, Patient is taking 25mg 6/7/21  Yes Marino Crawford NP   DULoxetine (Cymbalta) 60 mg capsule Take 1 Capsule by mouth daily. Indications: anxiousness associated with depression 6/7/21  Yes Marino Crawford NP   isosorbide mononitrate ER (IMDUR) 30 mg tablet Take 120 mg by mouth daily. Indications: Patient is taking 120mg daily   Yes Provider, Historical   methadone (DOLOPHINE) 10 mg/mL solution Take 140 mg by mouth.    Yes Provider, Historical   divalproex ER (DEPAKOTE ER) 500 mg ER tablet Take 500 mg by mouth three (3) times daily. Yes Provider, Historical   Lantus Solostar U-100 Insulin 100 unit/mL (3 mL) inpn INJECT 41 UNITS SUBCUTANEOUSLY ONCE DAILY 4/7/22 4/22/22  Sven Schreiber MD   cyclobenzaprine (FLEXERIL) 10 mg tablet Take 1 Tablet by mouth three (3) times daily as needed for Muscle Spasm(s). 2/28/22   Paolo Arce MD   polyethylene glycol [facility]) 17 gram packet Take 1 Packet by mouth daily. May take 1-2 packets as needed daily for constipation. 2/18/22   Paolo Arce MD   metOLazone (ZAROXOLYN) 5 mg tablet Take 5 mg by mouth daily.  ON MONDAYS    Provider, Historical   Yin Pen Needle 32 gauge x 5/32\" ndle USE 1 PEN NEEDLES FOUR TIMES A DAY 12/7/21   Yohana Mills NP   polyethylene glycol (MIRALAX) 17 gram packet TAKE 1 PACKET BY MOUTH DAILY AS NEEDD FOR CONSTIPATION  Patient not taking: Reported on 4/22/2022 6/18/21   Yohana Mills NP     Patient Active Problem List   Diagnosis Code    SOB (shortness of breath) R06.02    NSTEMI (non-ST elevated myocardial infarction) ([facility] I21.4    Acute exacerbation of CHF (congestive heart failure) ([facility]) I50.9    Insulin overdose T38.3X1A    Bipolar 1 disorder ([facility]) F31.9    Radial neuropathy, left A71.28    Umbilical hernia J62.7    Type 2 diabetes mellitus E11.9    Chronic deep vein thrombosis (DVT) of proximal vein of lower extremity, unspecified laterality ([facility]) I82.5Y9       ROS     Bunion- she is followed by podiatry, needs sx but last A1c was too high, she is due  Sugars have been variable 70 to \"high\"  She is in a great deal of pain it is not a simple bunion - see MRI  She is having nerve pain  Gabapentin, tylenol and tramadol do not work  She is in the methadone program  She did have percocet not too long ago following another sx and was able to use short term/sparingly    Bipolar disorder: on depakote, needs refill- done by last PCP, not following w/ psych  Confirmed dose which is high    Diabetic Review of Systems - medication compliance: compliant all of the time, diabetic diet compliance: compliant most of the time, home glucose monitoring: is performed sporadically. Chronic dvt: on eliquis  CHF: stable      Objective:   No flowsheet data found. [INSTRUCTIONS:  \"[x]\" Indicates a positive item  \"[]\" Indicates a negative item  -- DELETE ALL ITEMS NOT EXAMINED]    Constitutional: [x] Appears well-developed and well-nourished [x] No apparent distress      [] Abnormal -     Mental status: [x] Alert and awake  [x] Oriented to person/place/time [x] Able to follow commands    [] Abnormal -     Eyes:   EOM    [x]  Normal    [] Abnormal -   Sclera  [x]  Normal    [] Abnormal -          Discharge [x]  None visible   [] Abnormal -     HENT: [x] Normocephalic, atraumatic  [] Abnormal -   [x] Mouth/Throat: Mucous membranes are moist    External Ears [x] Normal  [] Abnormal -    Neck: [x] No visualized mass [] Abnormal -     Pulmonary/Chest: [x] Respiratory effort normal   [x] No visualized signs of difficulty breathing or respiratory distress        [] Abnormal -      Musculoskeletal:   [x] Normal gait with no signs of ataxia         [x] Normal range of motion of neck        [] Abnormal -     Neurological:        [x] No Facial Asymmetry (Cranial nerve 7 motor function) (limited exam due to video visit)          [x] No gaze palsy        [] Abnormal -          Skin:        [x] No significant exanthematous lesions or discoloration noted on facial skin         [] Abnormal -            Psychiatric:       [x] Normal Affect [] Abnormal -        [x] No Hallucinations    Other pertinent observable physical exam findings:-        We discussed the expected course, resolution and complications of the diagnosis(es) in detail. Medication risks, benefits, costs, interactions, and alternatives were discussed as indicated.   I advised her to contact the office if her condition worsens, changes or fails to improve as anticipated. She expressed understanding with the diagnosis(es) and plan. Cecily Funk, was evaluated through a synchronous (real-time) audio-video encounter. The patient (or guardian if applicable) is aware that this is a billable service, which includes applicable co-pays. Verbal consent to proceed has been obtained. The visit was conducted pursuant to the emergency declaration under the [address] [place] [address] and the [employer]. Patient identification was verified, and a caregiver was present when appropriate. The patient was located at home in a state where the provider was licensed to provide care. Sofia Pantoja NP

## 2022-05-09 DIAGNOSIS — M21.619 BUNION: ICD-10-CM

## 2022-05-09 NOTE — TELEPHONE ENCOUNTER
Please review pt message below regarding how many times per day pt was taking the Percocet. Thank you. Last visit 04/22/2022 Virtual visit NP Balwinder Gallardo   Next appointment Nothing scheduled   Previous refill encounter(s)   04/22/2022 Percocet #20     No access to PDMP     Requested Prescriptions     Pending Prescriptions Disp Refills    oxyCODONE-acetaminophen (PERCOCET) 5-325 mg per tablet 20 Tablet 0     Sig: Take 1 Tablet by mouth every eight (8) hours as needed for Pain for up to 30 days. Max Daily Amount: 3 Tablets.             ----- Message from Jojo Back sent at 5/9/2022 11:55 AM EDT -----  Subject: Refill Request    QUESTIONS  Name of Medication? oxyCODONE-acetaminophen (PERCOCET) 5-325 mg per tablet  Patient-reported dosage and instructions? 3 times a day. she did 2 times. How many days do you have left? 0  Preferred Pharmacy? [address]  Pharmacy phone number (if available)? [phone]  Additional Information for Provider? knows there's not a refill needs more   if possible.   ---------------------------------------------------------------------------  --------------  CALL BACK INFO  What is the best way for the office to contact you? OK to leave message on   voicemail  Preferred Call Back Phone Number? [phone]  ---------------------------------------------------------------------------  --------------  SCRIPT ANSWERS  Relationship to Patient?  Self

## 2022-05-10 ENCOUNTER — TELEPHONE (OUTPATIENT)
Dept: INTERNAL MEDICINE CLINIC | Age: 47
End: 2022-05-10

## 2022-05-10 LAB
EST. AVERAGE GLUCOSE BLD GHB EST-MCNC: 206 MG/DL
HBA1C MFR BLD: 8.8 % (ref 4.8–5.6)

## 2022-05-10 RX ORDER — OXYCODONE AND ACETAMINOPHEN 5; 325 MG/1; MG/1
1 TABLET ORAL
Qty: 20 TABLET | Refills: 0 | OUTPATIENT
Start: 2022-05-10 | End: 2022-06-09

## 2022-05-11 DIAGNOSIS — M21.619 BUNION: ICD-10-CM

## 2022-05-11 NOTE — TELEPHONE ENCOUNTER
----- Message from Maddison Davies sent at 5/11/2022 12:47 PM EDT -----  Subject: Message to Provider    QUESTIONS  Information for Provider? Pt sees ALEXIA Crespo & is following up on a   request for a refill of percocet. Unable to reach practice, please call pt   with an update.  ---------------------------------------------------------------------------  --------------  CALL BACK INFO  What is the best way for the office to contact you? OK to leave message on   voicemail  Preferred Call Back Phone Number?  [phone]  ---------------------------------------------------------------------------  --------------  SCRIPT ANSWERS  undefined

## 2022-05-12 ENCOUNTER — OFFICE VISIT (OUTPATIENT)
Dept: INTERNAL MEDICINE CLINIC | Age: 47
End: 2022-05-12
Payer: MEDICARE

## 2022-05-12 ENCOUNTER — NURSE TRIAGE (OUTPATIENT)
Dept: OTHER | Facility: CLINIC | Age: 47
End: 2022-05-12

## 2022-05-12 VITALS
RESPIRATION RATE: 18 BRPM | DIASTOLIC BLOOD PRESSURE: 65 MMHG | TEMPERATURE: 98.3 F | SYSTOLIC BLOOD PRESSURE: 130 MMHG | HEIGHT: 63 IN | WEIGHT: 196 LBS | BODY MASS INDEX: 34.73 KG/M2 | HEART RATE: 68 BPM | OXYGEN SATURATION: 97 %

## 2022-05-12 DIAGNOSIS — E10.65 HYPERGLYCEMIA DUE TO TYPE 1 DIABETES MELLITUS (HCC): ICD-10-CM

## 2022-05-12 DIAGNOSIS — E78.2 MIXED HYPERLIPIDEMIA: ICD-10-CM

## 2022-05-12 DIAGNOSIS — I10 ESSENTIAL HYPERTENSION: Primary | ICD-10-CM

## 2022-05-12 DIAGNOSIS — F41.9 ANXIETY: ICD-10-CM

## 2022-05-12 DIAGNOSIS — L03.116 CELLULITIS OF LEFT LOWER EXTREMITY: ICD-10-CM

## 2022-05-12 DIAGNOSIS — Z00.00 MEDICARE ANNUAL WELLNESS VISIT, SUBSEQUENT: ICD-10-CM

## 2022-05-12 DIAGNOSIS — N18.31 STAGE 3A CHRONIC KIDNEY DISEASE (HCC): ICD-10-CM

## 2022-05-12 PROBLEM — N18.30 CHRONIC RENAL DISEASE, STAGE III (HCC): Status: ACTIVE | Noted: 2022-05-12

## 2022-05-12 LAB
ALBUMIN UR QL STRIP: 80 MG/L
CREATININE, URINE POC: 50 MG/DL
GLUCOSE POC: 77 MG/DL
MICROALBUMIN/CREAT RATIO POC: >300 MG/G

## 2022-05-12 PROCEDURE — G8417 CALC BMI ABV UP PARAM F/U: HCPCS | Performed by: NURSE PRACTITIONER

## 2022-05-12 PROCEDURE — 2022F DILAT RTA XM EVC RTNOPTHY: CPT | Performed by: NURSE PRACTITIONER

## 2022-05-12 PROCEDURE — G8427 DOCREV CUR MEDS BY ELIG CLIN: HCPCS | Performed by: NURSE PRACTITIONER

## 2022-05-12 PROCEDURE — 99214 OFFICE O/P EST MOD 30 MIN: CPT | Performed by: NURSE PRACTITIONER

## 2022-05-12 PROCEDURE — G8752 SYS BP LESS 140: HCPCS | Performed by: NURSE PRACTITIONER

## 2022-05-12 PROCEDURE — G0439 PPPS, SUBSEQ VISIT: HCPCS | Performed by: NURSE PRACTITIONER

## 2022-05-12 PROCEDURE — G8754 DIAS BP LESS 90: HCPCS | Performed by: NURSE PRACTITIONER

## 2022-05-12 PROCEDURE — G9717 DOC PT DX DEP/BP F/U NT REQ: HCPCS | Performed by: NURSE PRACTITIONER

## 2022-05-12 PROCEDURE — 82044 UR ALBUMIN SEMIQUANTITATIVE: CPT | Performed by: NURSE PRACTITIONER

## 2022-05-12 PROCEDURE — 82962 GLUCOSE BLOOD TEST: CPT | Performed by: NURSE PRACTITIONER

## 2022-05-12 RX ORDER — INSULIN GLARGINE 100 [IU]/ML
INJECTION, SOLUTION SUBCUTANEOUS
Qty: 5 ADJUSTABLE DOSE PRE-FILLED PEN SYRINGE | Refills: 0 | Status: SHIPPED | OUTPATIENT
Start: 2022-05-12 | End: 2022-05-12 | Stop reason: SDUPTHER

## 2022-05-12 RX ORDER — AMOXICILLIN AND CLAVULANATE POTASSIUM 875; 125 MG/1; MG/1
1 TABLET, FILM COATED ORAL 2 TIMES DAILY
Qty: 20 TABLET | Refills: 0 | Status: SHIPPED | OUTPATIENT
Start: 2022-05-12 | End: 2022-05-22

## 2022-05-12 RX ORDER — TRAMADOL HYDROCHLORIDE 50 MG/1
50 TABLET ORAL
Qty: 9 TABLET | Refills: 0 | Status: SHIPPED | OUTPATIENT
Start: 2022-05-12 | End: 2022-05-15

## 2022-05-12 RX ORDER — FLUCONAZOLE 150 MG/1
150 TABLET ORAL DAILY
Qty: 1 TABLET | Refills: 0 | Status: SHIPPED | OUTPATIENT
Start: 2022-05-12 | End: 2022-05-13

## 2022-05-12 RX ORDER — INSULIN GLARGINE 100 [IU]/ML
45 INJECTION, SOLUTION SUBCUTANEOUS DAILY
Qty: 5 ADJUSTABLE DOSE PRE-FILLED PEN SYRINGE | Refills: 1 | Status: SHIPPED | OUTPATIENT
Start: 2022-05-12 | End: 2022-05-19 | Stop reason: SDUPTHER

## 2022-05-12 NOTE — PROGRESS NOTES
Subjective: (As above and below)     Chief Complaint   Patient presents with    Toe Pain     bunion/open sore from wearing steel toed boots, great toe left foot, red streaks to ankle x 6 months, pt thinks toe is infected      Maddiefeliberto A. Karyle Or is a 55y.o. year old female who presents for     Diabetic Review of Systems - medication compliance: compliant all of the time, diabetic diet compliance: compliant most of the time, home glucose monitoring: is performed sporadically. Would benefit from CGM    Anxiety: not seeing psychiatry, states that the only things that helped before are benzos,    hyperlipidiemia    Bunion; surgery deferred until A1c less than 8  She is unsure she will even be able to do sx due to her job and lack of benefits/time off  She has foot pain, asking for oxycodone  She thinks it is infected as well, swollen, scant drainage, pain,redness    Hypertension ROS:  taking medications as instructed, no medication side effects noted, no TIAs, no chest pain on exertion, no dyspnea on exertion, no swelling of ankles      Reviewed PmHx, RxHx, FmHx, SocHx, AllgHx and updated in chart.   Family History   Problem Relation Age of Onset    Thyroid Disease Mother     Hypertension Father     Heart Disease Father     Diabetes Sister     Heart Disease Sister     Hypertension Sister     Thyroid Disease Brother     Anesth Problems Neg Hx        Past Medical History:   Diagnosis Date    Adverse effect of anesthesia 2017    COULD HEAR AND FEEL WHILE UNDER ANESTHESIA BUT COULD NOT RESPOND    Arthritis     Bipolar 1 disorder ([address]     Chronic pain     Depression     Diabetes ([address]     TYPE 1    Gastrointestinal disorder     gastroparesis    Gastroparesis     GERD (gastroesophageal reflux disease)     Heart disease     Heart failure (HCC)     Hypertension     Psychiatric disorder     Bipolar    Sleep apnea     SUPPOSED TO USE BIPAP    Thromboembolus ([address]     R LEG    Umbilical hernia Social History     Socioeconomic History    Marital status:    Tobacco Use    Smoking status: Current Every Day Smoker     Packs/day: 0.50     Years: 34.00     Pack years: 17.00    Smokeless tobacco: Never Used   Vaping Use    Vaping Use: Never used   Substance and Sexual Activity    Alcohol use: Not Currently     Comment: occasionally    Drug use: Not Currently     Types: Cocaine    Sexual activity: Yes          Current Outpatient Medications   Medication Sig    amoxicillin-clavulanate (AUGMENTIN) 875-125 mg per tablet Take 1 Tablet by mouth two (2) times a day for 10 days.  traMADoL (ULTRAM) 50 mg tablet Take 1 Tablet by mouth every eight (8) hours as needed for Pain for up to 3 days. Max Daily Amount: 150 mg.    insulin glargine (Lantus Solostar U-100 Insulin) 100 unit/mL (3 mL) inpn 45 Units by SubCUTAneous route daily. INJECT 41 UNITS SUBCUTANEOUSLY ONCE DAILY    fluconazole (DIFLUCAN) 150 mg tablet Take 1 Tablet by mouth daily for 1 day. FDA advises cautious prescribing of oral fluconazole in pregnancy.  doxepin (SINEquan) 25 mg capsule Take 25 mg by mouth nightly as needed.  etonogestreL (Nexplanon) 68 mg impl 1 Each.  DULoxetine (CYMBALTA) 60 mg capsule Take 60 mg by mouth daily.  insulin lispro (HUMALOG) 100 unit/mL kwikpen 10 Units by SubCUTAneous route.  isosorbide mononitrate ER (IMDUR) 30 mg tablet Take 30 mg by mouth daily.  divalproex ER (Depakote ER) 500 mg ER tablet Take 1 Tablet by mouth three (3) times daily.  pantoprazole (PROTONIX) 40 mg tablet TAKE 1 TABLET BY MOUTH ONCE DAILY AS NEEDED FOR ACID REFLUX    cyclobenzaprine (FLEXERIL) 10 mg tablet Take 1 Tablet by mouth three (3) times daily as needed for Muscle Spasm(s).  metOLazone (ZAROXOLYN) 5 mg tablet Take 5 mg by mouth daily. ON MONDAYS    gabapentin (NEURONTIN) 600 mg tablet Take 1 Tablet by mouth three (3) times daily.  calcium citrate 200 mg (950 mg) tablet Take  by mouth daily.     atorvastatin (LIPITOR) 80 mg tablet TAKE 1 TABLET BY MOUTH EVERY NIGHT FOR HIGH CHOLESTEROL AND TRIGLYCERIDES    multivitamin (ONE A DAY) tablet Take 1 Tablet by mouth daily.  furosemide (Lasix) 40 mg tablet Take 40 mg by mouth daily.  sacubitriL-valsartan (Entresto) 49-51 mg tab tablet Take 1 Tablet by mouth two (2) times a day.  Cholecalciferol, Vitamin D3, 75 mcg (3,000 unit) tab Take 3,000 Units by mouth daily.  clopidogreL (Plavix) 75 mg tab Take 1 Tablet by mouth daily.  nitroglycerin (NITROSTAT) 0.4 mg SL tablet DISSOLVE 1 TABLET SUBLINGUALLY EVERY 5 MINUTES AS NEEDED FOR CHEST PAIN FOR UP TO 3 DOSES    apixaban (ELIQUIS) 5 mg tablet Take 1 Tablet by mouth two (2) times a day. Indications: deep vein thrombosis prevention    metoprolol succinate (TOPROL-XL) 50 mg XL tablet Take 1 Tablet by mouth daily. Indications: high blood pressure (Patient taking differently: Take 50 mg by mouth daily. Indications: high blood pressure, Patient is taking 25mg)    methadone (DOLOPHINE) 10 mg/mL solution Take 140 mg by mouth.  polyethylene glycol (MIRALAX) 17 gram packet Take 1 Packet by mouth daily. May take 1-2 packets as needed daily for constipation. (Patient not taking: Reported on 5/12/2022)    Yin Pen Needle 32 gauge x 5/32\" ndle USE 1 PEN NEEDLES FOUR TIMES A DAY     No current facility-administered medications for this visit. Review of Systems:   Constitutional:    Negative for fever and chills, negative diaphoresis. HEENT:              Negative for neck pain and stiffness. Eyes:                  Negative for visual disturbance, itching, redness or discharge. Respiratory:        Negative for cough and shortness of breath. Cardiovascular:  Negative for chest pain and palpitations. Gastrointestinal: Negative for nausea, vomiting, abdominal pain, diarrhea or constipation. Genitourinary:     Negative for dysuria and frequency.    Musculoskeletal: Negative for falls, tenderness and swelling. Skin:                    Negative for rash, masses or lesions. Neurological:       Negative for dizzyness, seizure, loss of consciousness, weakness and numbness. Objective:     Vitals:    05/12/22 1401   BP: 130/65   Pulse: 68   Resp: 18   Temp: 98.3 °F (36.8 °C)   TempSrc: Temporal   SpO2: 97%   Weight: 196 lb (88.9 kg)   Height: 5' 3\" (1.6 m)       Results for orders placed or performed in visit on 05/12/22   AMB POC URINE, MICROALBUMIN, SEMIQUANT (3 RESULTS)   Result Value Ref Range    ALBUMIN, URINE POC 80 Negative mg/L    CREATININE, URINE POC 50 mg/dL    Microalbumin/creat ratio (POC) >300 <30 MG/G   AMB POC GLUCOSE BLOOD, BY GLUCOSE MONITORING DEVICE   Result Value Ref Range    Glucose POC 77 MG/DL     Gen: Oriented to person, place and time and well-developed, well-nourished and in no distress. HEENT:    Head: normocephalic and atraumatic. Eyes:  EOM are normal. Pupils equal and round. Neck:  Normal range of motion. Neck supple. Cardiovascular: normal rate, regular rhythm and normal heart sounds. Pulmonary/Chest:  Effort normal and breath sounds normal.  No respiratory distress. No wheezes, no rales. Abdominal: soft, normal  bowel sounds. Musculoskeletal:  No edema, no tenderness. No calf tenderness or edema. Neurological:  Alert, oriented to person, place and time. Skin: skin is warm and dry. Left foot: small open area near bunion, surrounding skin is warm/red  No drainage noted now         Assessment/ Plan:      Can't have continued opiates, pain mgmt info given  recc she speak w/ HR about time off for sx and fu podiatry    1. Essential hypertension      2. Stage 3a chronic kidney disease ([facility]      3. Mixed hyperlipidemia      4. Cellulitis of left lower extremity    - amoxicillin-clavulanate (AUGMENTIN) 875-125 mg per tablet; Take 1 Tablet by mouth two (2) times a day for 10 days. Dispense: 20 Tablet; Refill: 0  - traMADoL (ULTRAM) 50 mg tablet;  Take 1 Tablet by mouth every eight (8) hours as needed for Pain for up to 3 days. Max Daily Amount: 150 mg. Dispense: 9 Tablet; Refill: 0  - fluconazole (DIFLUCAN) 150 mg tablet; Take 1 Tablet by mouth daily for 1 day. FDA advises cautious prescribing of oral fluconazole in pregnancy. Dispense: 1 Tablet; Refill: 0    5. Anxiety    - REFERRAL TO PSYCHIATRY    6. Hyperglycemia due to type 1 diabetes mellitus (HCC)  Insulin increase  - AMB POC URINE, MICROALBUMIN, SEMIQUANT (3 RESULTS)  - AMB POC GLUCOSE BLOOD, BY GLUCOSE MONITORING DEVICE  - insulin glargine (Lantus Solostar U-100 Insulin) 100 unit/mL (3 mL) inpn; 45 Units by SubCUTAneous route daily. INJECT 41 UNITS SUBCUTANEOUSLY ONCE DAILY  Dispense: 5 Adjustable Dose Pre-filled Pen Syringe; Refill: 1  - REFERRAL TO ENDOCRINOLOGY  - AMB SUPPLY ORDER        I have discussed the diagnosis with the patient and the intended plan as seen in the above orders. The patient has received an after-visit summary and questions were answered concerning future plans. Pt conveyed understanding of plan. Medication Side Effects and Warnings were discussed with patient: yes  Patient Labs were reviewed: yes  Patient Past Records were reviewed:  yes    Celeste Dunne. Kaycee Stokes NP     This is the Subsequent Medicare Annual Wellness Exam, performed 12 months or more after the Initial AWV or the last Subsequent AWV    I have reviewed the patient's medical history in detail and updated the computerized patient record. Assessment/Plan   Education and counseling provided:  Are appropriate based on today's review and evaluation    1. Essential hypertension  2. Stage 3a chronic kidney disease ([facility]  3. Mixed hyperlipidemia  4. Cellulitis of left lower extremity  -     amoxicillin-clavulanate (AUGMENTIN) 875-125 mg per tablet; Take 1 Tablet by mouth two (2) times a day for 10 days. , Normal, Disp-20 Tablet, R-0  -     traMADoL (ULTRAM) 50 mg tablet;  Take 1 Tablet by mouth every eight (8) hours as needed for Pain for up to 3 days. Max Daily Amount: 150 mg., Normal, Disp-9 Tablet, R-0  -     fluconazole (DIFLUCAN) 150 mg tablet; Take 1 Tablet by mouth daily for 1 day. FDA advises cautious prescribing of oral fluconazole in pregnancy. , Normal, Disp-1 Tablet, R-0  5. Anxiety  -     REFERRAL TO PSYCHIATRY  6. Hyperglycemia due to type 1 diabetes mellitus (HCC)  -     AMB POC URINE, MICROALBUMIN, SEMIQUANT (3 RESULTS)  -     AMB POC GLUCOSE BLOOD, BY GLUCOSE MONITORING DEVICE  -     insulin glargine (Lantus Solostar U-100 Insulin) 100 unit/mL (3 mL) inpn; 45 Units by SubCUTAneous route daily. INJECT 41 UNITS SUBCUTANEOUSLY ONCE DAILY, Normal, Disp-5 Adjustable Dose Pre-filled Pen Syringe, R-1Dose increase  -     REFERRAL TO ENDOCRINOLOGY       Depression Risk Factor Screening     3 most recent PHQ Screens 5/12/2022   Little interest or pleasure in doing things Not at all   Feeling down, depressed, irritable, or hopeless Not at all   Total Score PHQ 2 0       Alcohol & Drug Abuse Risk Screen    Do you average more than 1 drink per night or more than 7 drinks a week:  No    On any one occasion in the past three months have you have had more than 3 drinks containing alcohol:  No          Functional Ability and Level of Safety    Hearing: Hearing is good. Activities of Daily Living: The home contains: no safety equipment. Patient does total self care      Ambulation: with no difficulty     Fall Risk:  Fall Risk Assessment, last 12 mths 9/7/2021   Able to walk? Yes   Fall in past 12 months? 1   Do you feel unsteady? 1   Are you worried about falling 1   Is the gait abnormal? 0   Number of falls in past 12 months 2   Fall with injury?  1      Abuse Screen:  Patient is not abused       Cognitive Screening    Has your family/caregiver stated any concerns about your memory: no     Cognitive Screening: Normal - based on H&P    Health Maintenance Due     Health Maintenance Due   Topic Date Due    Hepatitis C Screening Never done    Foot Exam Q1  Never done    Eye Exam Retinal or Dilated  Never done    DTaP/Tdap/Td series (1 - Tdap) Never done    Cervical cancer screen  Never done    Medicare Yearly Exam  Never done       Patient Care Team   Patient Care Team:  Rosina Bowen, NP as PCP - General (Nurse Practitioner)  Rosina Bowen NP as PCP - [facility] Empaneled Provider    History     Patient Active Problem List   Diagnosis Code    SOB (shortness of breath) R06.02    NSTEMI (non-ST elevated myocardial infarction) ([facility] I21.4    Acute exacerbation of CHF (congestive heart failure) ([facility]) I50.9    Insulin overdose T38.3X1A    Bipolar 1 disorder ([facility]) F31.9    Radial neuropathy, left C38.30    Umbilical hernia F08.4    Type 2 diabetes mellitus E11.9    Chronic deep vein thrombosis (DVT) of proximal vein of lower extremity, unspecified laterality ([facility]) I82.5Y9    Chronic renal disease, stage III N18.30     Past Medical History:   Diagnosis Date    Adverse effect of anesthesia 2017    COULD HEAR AND FEEL WHILE UNDER ANESTHESIA BUT COULD NOT RESPOND    Arthritis     Bipolar 1 disorder ([address]     Chronic pain     Depression     Diabetes ([address]     TYPE 1    Gastrointestinal disorder     gastroparesis    Gastroparesis     GERD (gastroesophageal reflux disease)     Heart disease     Heart failure ([address]     Hypertension     Psychiatric disorder     Bipolar    Sleep apnea     SUPPOSED TO USE BIPAP    Thromboembolus ([address]     R LEG    Umbilical hernia       Past Surgical History:   Procedure Laterality Date    HX ANKLE FRACTURE TX Right     3 SCREWS PLACED    HX CAROTID STENT      HX GYN      BTL    HX ORTHOPAEDIC Left     BONE SPUR REMOVED    [place] CARDIAC SURG PROCEDURE UNLIST  2019    CABG W/2 STENTS PLACED    VASCULAR SURGERY PROCEDURE UNLIST  2017    R LEG THROMBECTOMY     Current Outpatient Medications   Medication Sig Dispense Refill    amoxicillin-clavulanate (AUGMENTIN) 875-125 mg per tablet Take 1 Tablet by mouth two (2) times a day for 10 days. 20 Tablet 0    traMADoL (ULTRAM) 50 mg tablet Take 1 Tablet by mouth every eight (8) hours as needed for Pain for up to 3 days. Max Daily Amount: 150 mg. 9 Tablet 0    insulin glargine (Lantus Solostar U-100 Insulin) 100 unit/mL (3 mL) inpn 45 Units by SubCUTAneous route daily. INJECT 41 UNITS SUBCUTANEOUSLY ONCE DAILY 5 Adjustable Dose Pre-filled Pen Syringe 1    fluconazole (DIFLUCAN) 150 mg tablet Take 1 Tablet by mouth daily for 1 day. FDA advises cautious prescribing of oral fluconazole in pregnancy. 1 Tablet 0    doxepin (SINEquan) 25 mg capsule Take 25 mg by mouth nightly as needed.  etonogestreL (Nexplanon) 68 mg impl 1 Each.  DULoxetine (CYMBALTA) 60 mg capsule Take 60 mg by mouth daily.  insulin lispro (HUMALOG) 100 unit/mL kwikpen 10 Units by SubCUTAneous route.  isosorbide mononitrate ER (IMDUR) 30 mg tablet Take 30 mg by mouth daily.  divalproex ER (Depakote ER) 500 mg ER tablet Take 1 Tablet by mouth three (3) times daily. 180 Tablet 0    pantoprazole (PROTONIX) 40 mg tablet TAKE 1 TABLET BY MOUTH ONCE DAILY AS NEEDED FOR ACID REFLUX 90 Tablet 0    cyclobenzaprine (FLEXERIL) 10 mg tablet Take 1 Tablet by mouth three (3) times daily as needed for Muscle Spasm(s). 20 Tablet 0    metOLazone (ZAROXOLYN) 5 mg tablet Take 5 mg by mouth daily. ON MONDAYS      gabapentin (NEURONTIN) 600 mg tablet Take 1 Tablet by mouth three (3) times daily. 90 Tablet 5    calcium citrate 200 mg (950 mg) tablet Take  by mouth daily.  atorvastatin (LIPITOR) 80 mg tablet TAKE 1 TABLET BY MOUTH EVERY NIGHT FOR HIGH CHOLESTEROL AND TRIGLYCERIDES 30 Tablet 10    multivitamin (ONE A DAY) tablet Take 1 Tablet by mouth daily.  furosemide (Lasix) 40 mg tablet Take 40 mg by mouth daily.  sacubitriL-valsartan (Entresto) 49-51 mg tab tablet Take 1 Tablet by mouth two (2) times a day.       Cholecalciferol, Vitamin D3, 75 mcg (3,000 unit) tab Take 3,000 Units by mouth daily.  clopidogreL (Plavix) 75 mg tab Take 1 Tablet by mouth daily. 90 Tablet 0    nitroglycerin (NITROSTAT) 0.4 mg SL tablet DISSOLVE 1 TABLET SUBLINGUALLY EVERY 5 MINUTES AS NEEDED FOR CHEST PAIN FOR UP TO 3 DOSES 25 Tablet 11    apixaban (ELIQUIS) 5 mg tablet Take 1 Tablet by mouth two (2) times a day. Indications: deep vein thrombosis prevention 90 Tablet 0    metoprolol succinate (TOPROL-XL) 50 mg XL tablet Take 1 Tablet by mouth daily. Indications: high blood pressure (Patient taking differently: Take 50 mg by mouth daily. Indications: high blood pressure, Patient is taking 25mg) 90 Tablet 0    methadone (DOLOPHINE) 10 mg/mL solution Take 140 mg by mouth.  polyethylene glycol (MIRALAX) 17 gram packet Take 1 Packet by mouth daily. May take 1-2 packets as needed daily for constipation. (Patient not taking: Reported on 5/12/2022) 14 Packet 1    Yin Pen Needle 32 gauge x 5/32\" ndle USE 1 PEN NEEDLES FOUR TIMES A  Pen Needle 10     Allergies   Allergen Reactions    Voltaren [Diclofenac Sodium] Anaphylaxis    Dramamine Ii [Meclizine] Itching     Auditory and Visual hallucinations    Imipramine Hives    Levaquin [Levofloxacin] Itching and Nausea and Vomiting    Ambien [Zolpidem] Other (comments)     SLEEP WALKING    Lisinopril Cough       Family History   Problem Relation Age of Onset    Thyroid Disease Mother     Hypertension Father     Heart Disease Father     Diabetes Sister     Heart Disease Sister     Hypertension Sister     Thyroid Disease Brother     Anesth Problems Neg Hx      Social History     Tobacco Use    Smoking status: Current Every Day Smoker     Packs/day: 0.50     Years: 34.00     Pack years: 17.00    Smokeless tobacco: Never Used   Substance Use Topics    Alcohol use: Not Currently     Comment: occasionally         Sofia Gar NP

## 2022-05-12 NOTE — TELEPHONE ENCOUNTER
Received call from [address] at [facility] with Red Flag Complaint. Subjective: Caller states \"pain in bunion on left foot. \"     Current Symptoms: Area has been open for 6 months, now looks infected. Has been seeing podiatrist and \"his methods aren't working\"  Some red streaks going toward ankle    Onset: several months ago; gradual, worsening    Associated Symptoms: NA    Pain Severity: 10/10; sharp; constant    Temperature: denies     What has been tried: tries to limit time wearing shoes. Applies wound care spray    LMP: NA Pregnant: NA    Recommended disposition: See PCP within 24 Hours    Care advice provided, patient verbalizes understanding; denies any other questions or concerns; instructed to call back for any new or worsening symptoms. Patient/Caller agrees with recommended disposition; writer provided warm transfer to Thuy Condon at [facility] for appointment scheduling    Attention Provider: Thank you for allowing me to participate in the care of your patient. The patient was connected to triage in response to information provided to the ECC. Please do not respond through this encounter as the response is not directed to a shared pool.         Reason for Disposition   [1] Ulcer, wound, blister, sore, or red area AND [2] new or increasing    Protocols used: DIABETES - FOOT PROBLEMS AND QUESTIONS-ADULT-

## 2022-05-12 NOTE — PROGRESS NOTES
Chief Complaint   Patient presents with    Toe Pain     bunion/open sore from wearing steel toed boots, great toe left foot, red streaks to ankle x 6 months, pt thinks toe is infected        1. \"Have you been to the ER, urgent care clinic since your last visit? Hospitalized since your last visit? \" No    2. \"Have you seen or consulted any other health care providers outside of the [address] [place] [address] since your last visit? \" No     3. For patients aged 39-70: Has the patient had a colonoscopy / FIT/ Cologuard? Yes - Care Gap present. Most recent result on file      If the patient is female:    4. For patients aged 41-77: Has the patient had a mammogram within the past 2 years? Yes - Care Gap present. Most recent result on file      5. For patients aged 21-65: Has the patient had a pap smear?  No

## 2022-05-17 ENCOUNTER — OFFICE VISIT (OUTPATIENT)
Dept: SURGERY | Age: 47
End: 2022-05-17
Payer: MEDICARE

## 2022-05-17 VITALS
SYSTOLIC BLOOD PRESSURE: 126 MMHG | WEIGHT: 199 LBS | BODY MASS INDEX: 35.26 KG/M2 | HEIGHT: 63 IN | DIASTOLIC BLOOD PRESSURE: 68 MMHG | RESPIRATION RATE: 16 BRPM | TEMPERATURE: 98.4 F | OXYGEN SATURATION: 96 % | HEART RATE: 70 BPM

## 2022-05-17 DIAGNOSIS — E11.22 TYPE 2 DIABETES MELLITUS WITH STAGE 3 CHRONIC KIDNEY DISEASE, WITH LONG-TERM CURRENT USE OF INSULIN, UNSPECIFIED WHETHER STAGE 3A OR 3B CKD (HCC): ICD-10-CM

## 2022-05-17 DIAGNOSIS — R10.9 RIGHT SIDED ABDOMINAL PAIN: ICD-10-CM

## 2022-05-17 DIAGNOSIS — R10.9 ABDOMINAL PAIN, ACUTE: Primary | ICD-10-CM

## 2022-05-17 DIAGNOSIS — Z87.19 S/P REPAIR OF VENTRAL HERNIA: ICD-10-CM

## 2022-05-17 DIAGNOSIS — N18.30 TYPE 2 DIABETES MELLITUS WITH STAGE 3 CHRONIC KIDNEY DISEASE, WITH LONG-TERM CURRENT USE OF INSULIN, UNSPECIFIED WHETHER STAGE 3A OR 3B CKD (HCC): ICD-10-CM

## 2022-05-17 DIAGNOSIS — N18.30 STAGE 3 CHRONIC KIDNEY DISEASE, UNSPECIFIED WHETHER STAGE 3A OR 3B CKD (HCC): ICD-10-CM

## 2022-05-17 DIAGNOSIS — Z79.4 TYPE 2 DIABETES MELLITUS WITH STAGE 3 CHRONIC KIDNEY DISEASE, WITH LONG-TERM CURRENT USE OF INSULIN, UNSPECIFIED WHETHER STAGE 3A OR 3B CKD (HCC): ICD-10-CM

## 2022-05-17 DIAGNOSIS — Z98.890 S/P REPAIR OF VENTRAL HERNIA: ICD-10-CM

## 2022-05-17 PROCEDURE — 99213 OFFICE O/P EST LOW 20 MIN: CPT | Performed by: NURSE PRACTITIONER

## 2022-05-17 PROCEDURE — G8417 CALC BMI ABV UP PARAM F/U: HCPCS | Performed by: NURSE PRACTITIONER

## 2022-05-17 PROCEDURE — 3052F HG A1C>EQUAL 8.0%<EQUAL 9.0%: CPT | Performed by: NURSE PRACTITIONER

## 2022-05-17 PROCEDURE — G8752 SYS BP LESS 140: HCPCS | Performed by: NURSE PRACTITIONER

## 2022-05-17 PROCEDURE — 2022F DILAT RTA XM EVC RTNOPTHY: CPT | Performed by: NURSE PRACTITIONER

## 2022-05-17 PROCEDURE — G8427 DOCREV CUR MEDS BY ELIG CLIN: HCPCS | Performed by: NURSE PRACTITIONER

## 2022-05-17 PROCEDURE — G9717 DOC PT DX DEP/BP F/U NT REQ: HCPCS | Performed by: NURSE PRACTITIONER

## 2022-05-17 PROCEDURE — G8754 DIAS BP LESS 90: HCPCS | Performed by: NURSE PRACTITIONER

## 2022-05-17 RX ORDER — OXYCODONE AND ACETAMINOPHEN 5; 325 MG/1; MG/1
1 TABLET ORAL
Qty: 20 TABLET | Refills: 0 | OUTPATIENT
Start: 2022-05-17 | End: 2022-06-16

## 2022-05-17 RX ORDER — ACETAMINOPHEN 500 MG
1000 TABLET ORAL DAILY
COMMUNITY

## 2022-05-17 NOTE — PROGRESS NOTES
Chief Complaint   Patient presents with    Post OP Follow Up     Eval surgical site, experiencing pain, S/P ROBOTIC REPAIR OF MULTIPLE VENTRAL HERNIA WITH MESH, repair of rectus diastases 2/21/22     Jose Greene presents about 3 months s/p   ROBOTIC REPAIR OF MULTIPLE VENTRAL HERNIA WITH MESH, repair of rectus diastases. C/o worsening abdominal pain past 2 days. \"I have had pain since the surgery and never gone away, but past 2 days has been worse\"  Pain is located on the left side and \"I feel a lump\"; pain is constant and \"just aches\". Pain does not radiate. No associated fevers or chills, no nausea or vomiting, no diarrhea   Voiding without difficulty   Has BM most days and \"normal\"   Denies bloating or xs gas   Pain is not exacerbated by eating or drinking   Increased pain with bending over   Tylenol does not provide relief   Does not recall any precipitating event or activity to cause the increased pain  BS in the 200's   C/O SOB and this is not new; says she has fluid overload and has not been taking her medication \"like I should\"     Visit Vitals  /68 (BP 1 Location: Left upper arm, BP Patient Position: Sitting)   Pulse 70   Temp 98.4 °F (36.9 °C) (Oral)   Resp 16   Ht 5' 3\" (1.6 m)   Wt 199 lb (90.3 kg)   SpO2 96%   BMI 35.25 kg/m²     A + O x 3, appears uncomfortable  Tearful    Chest  Diminished bases, but clear   COR  RRR  ABD Soft, obese; tender right mid abdomen, some faint bruising right mid abdomen (she thinks from insulin shots) with voluntary guarding, no palpable masses or hernia that I can appreciate; examined supine and standing; /ND,active BS  EXT 1+ LE edema; ambulating independently      ICD-10-CM ICD-9-CM    1. Abdominal pain, acute  R10.9 789.00 CT ABD PELV W WO CONT     338.19    2. S/P repair of ventral hernia  Z98.890 V45.89 CT ABD PELV W WO CONT    Z87.19     3.  Right sided abdominal pain  R10.9 789.09 CT ABD PELV W WO CONT   4. Stage 3 chronic kidney disease, unspecified whether stage 3a or 3b CKD (HCC)  N18.30 585.3    5. Type 2 diabetes mellitus with stage 3 chronic kidney disease, with long-term current use of insulin, unspecified whether stage 3a or 3b CKD (HCC)  E11.22 250.40     N18.30 585.3     Z79.4 V58.67       3 months s/p   ROBOTIC REPAIR OF MULTIPLE VENTRAL HERNIA WITH MESH, repair of rectus diastases  Acute on chronic abdominal pain  Pain right abdomen is new and started 2 days ago; has a gallbladder but no GI symptoms   Could not feel another hernia, but she was quite tender   ? Hernia recurrence  CT abd / pelvis and then to see Dr. Palmer Ramirez   She asked for something for the discomfort and declined as if pain increases do not want to mask any worsening issues  Offered work note and she declined   Advised if increased pain, vomiting and or fevers she should come to [address] verbalized understanding and questions were answered to the best of my knowledge and ability. CT and pain  educational materials were provided.

## 2022-05-17 NOTE — PROGRESS NOTES
1. Have you been to the ER, urgent care clinic since your last visit? Hospitalized since your last visit? No    2. Have you seen or consulted any other health care providers outside of the [address] [place] [address] since your last visit? Include any pap smears or colon screening.  No

## 2022-05-17 NOTE — PATIENT INSTRUCTIONS
Next steps:    CT scan on the abdomen and you can call to schedule   Central Scheduling contact   [phone]     Follow up with Dr. Alexia Yost after the [phone]     If your pain worsens and or you have vomiting, fevers you need to be evaluated and should come to the ER

## 2022-05-19 DIAGNOSIS — E10.65 HYPERGLYCEMIA DUE TO TYPE 1 DIABETES MELLITUS (HCC): ICD-10-CM

## 2022-05-19 RX ORDER — INSULIN GLARGINE 100 [IU]/ML
50 INJECTION, SOLUTION SUBCUTANEOUS DAILY
Qty: 5 ADJUSTABLE DOSE PRE-FILLED PEN SYRINGE | Refills: 1 | Status: SHIPPED | OUTPATIENT
Start: 2022-05-19 | End: 2022-09-12

## 2022-05-20 ENCOUNTER — HOSPITAL ENCOUNTER (OUTPATIENT)
Dept: CT IMAGING | Age: 47
Discharge: HOME | End: 2022-05-20
Attending: NURSE PRACTITIONER
Payer: MEDICARE

## 2022-05-20 DIAGNOSIS — R10.9 RIGHT SIDED ABDOMINAL PAIN: ICD-10-CM

## 2022-05-20 DIAGNOSIS — Z87.19 S/P REPAIR OF VENTRAL HERNIA: ICD-10-CM

## 2022-05-20 DIAGNOSIS — R10.9 ABDOMINAL PAIN, ACUTE: ICD-10-CM

## 2022-05-20 DIAGNOSIS — Z98.890 S/P REPAIR OF VENTRAL HERNIA: ICD-10-CM

## 2022-05-20 LAB — CREAT BLD-MCNC: 1.1 MG/DL (ref 0.6–1.3)

## 2022-05-20 PROCEDURE — 82565 ASSAY OF CREATININE: CPT

## 2022-05-20 PROCEDURE — 74011000636 HC RX REV CODE- 636: Performed by: NURSE PRACTITIONER

## 2022-05-20 PROCEDURE — 74177 CT ABD & PELVIS W/CONTRAST: CPT

## 2022-05-20 RX ADMIN — IOPAMIDOL 100 ML: 755 INJECTION, SOLUTION INTRAVENOUS at 16:05

## 2022-05-24 ENCOUNTER — TELEPHONE (OUTPATIENT)
Dept: SURGERY | Age: 47
End: 2022-05-24

## 2022-05-25 ENCOUNTER — APPOINTMENT (OUTPATIENT)
Dept: ULTRASOUND IMAGING | Age: 47
End: 2022-05-25
Attending: EMERGENCY MEDICINE
Payer: MEDICARE

## 2022-05-25 ENCOUNTER — TELEPHONE (OUTPATIENT)
Dept: SURGERY | Age: 47
End: 2022-05-25

## 2022-05-25 ENCOUNTER — APPOINTMENT (OUTPATIENT)
Dept: GENERAL RADIOLOGY | Age: 47
End: 2022-05-25
Attending: EMERGENCY MEDICINE
Payer: MEDICARE

## 2022-05-25 ENCOUNTER — HOSPITAL ENCOUNTER (EMERGENCY)
Age: 47
Discharge: HOME | End: 2022-05-25
Attending: EMERGENCY MEDICINE
Payer: MEDICARE

## 2022-05-25 VITALS
HEART RATE: 59 BPM | SYSTOLIC BLOOD PRESSURE: 141 MMHG | RESPIRATION RATE: 18 BRPM | DIASTOLIC BLOOD PRESSURE: 65 MMHG | TEMPERATURE: 97.6 F | OXYGEN SATURATION: 98 %

## 2022-05-25 DIAGNOSIS — R07.9 CHEST PAIN, UNSPECIFIED TYPE: Primary | ICD-10-CM

## 2022-05-25 LAB
ALBUMIN SERPL-MCNC: 3.1 G/DL (ref 3.5–5)
ALBUMIN/GLOB SERPL: 0.9 {RATIO} (ref 1.1–2.2)
ALP SERPL-CCNC: 121 U/L (ref 45–117)
ALT SERPL-CCNC: 25 U/L (ref 12–78)
ANION GAP SERPL CALC-SCNC: 2 MMOL/L (ref 5–15)
AST SERPL-CCNC: 42 U/L (ref 15–37)
ATRIAL RATE: 65 BPM
BASOPHILS # BLD: 0 K/UL (ref 0–0.1)
BASOPHILS NFR BLD: 1 % (ref 0–1)
BILIRUB SERPL-MCNC: 0.2 MG/DL (ref 0.2–1)
BNP SERPL-MCNC: 828 PG/ML
BUN SERPL-MCNC: 16 MG/DL (ref 6–20)
BUN/CREAT SERPL: 16 (ref 12–20)
CALCIUM SERPL-MCNC: 9.3 MG/DL (ref 8.5–10.1)
CALCULATED P AXIS, ECG09: 32 DEGREES
CALCULATED R AXIS, ECG10: 94 DEGREES
CALCULATED T AXIS, ECG11: 68 DEGREES
CHLORIDE SERPL-SCNC: 106 MMOL/L (ref 97–108)
CO2 SERPL-SCNC: 31 MMOL/L (ref 21–32)
CREAT SERPL-MCNC: 1.01 MG/DL (ref 0.55–1.02)
DIAGNOSIS, 93000: NORMAL
DIFFERENTIAL METHOD BLD: ABNORMAL
EOSINOPHIL # BLD: 0.1 K/UL (ref 0–0.4)
EOSINOPHIL NFR BLD: 2 % (ref 0–7)
ERYTHROCYTE [DISTWIDTH] IN BLOOD BY AUTOMATED COUNT: 16.3 % (ref 11.5–14.5)
GLOBULIN SER CALC-MCNC: 3.6 G/DL (ref 2–4)
GLUCOSE SERPL-MCNC: 86 MG/DL (ref 65–100)
HCT VFR BLD AUTO: 37.7 % (ref 35–47)
HGB BLD-MCNC: 11.6 G/DL (ref 11.5–16)
IMM GRANULOCYTES # BLD AUTO: 0 K/UL (ref 0–0.04)
IMM GRANULOCYTES NFR BLD AUTO: 1 % (ref 0–0.5)
LYMPHOCYTES # BLD: 2.2 K/UL (ref 0.8–3.5)
LYMPHOCYTES NFR BLD: 35 % (ref 12–49)
MCH RBC QN AUTO: 25.8 PG (ref 26–34)
MCHC RBC AUTO-ENTMCNC: 30.8 G/DL (ref 30–36.5)
MCV RBC AUTO: 84 FL (ref 80–99)
MONOCYTES # BLD: 0.5 K/UL (ref 0–1)
MONOCYTES NFR BLD: 8 % (ref 5–13)
NEUTS SEG # BLD: 3.4 K/UL (ref 1.8–8)
NEUTS SEG NFR BLD: 53 % (ref 32–75)
NRBC # BLD: 0 K/UL (ref 0–0.01)
NRBC BLD-RTO: 0 PER 100 WBC
P-R INTERVAL, ECG05: 150 MS
PLATELET # BLD AUTO: 237 K/UL (ref 150–400)
PMV BLD AUTO: 10.2 FL (ref 8.9–12.9)
POTASSIUM SERPL-SCNC: 4.2 MMOL/L (ref 3.5–5.1)
PROT SERPL-MCNC: 6.7 G/DL (ref 6.4–8.2)
Q-T INTERVAL, ECG07: 474 MS
QRS DURATION, ECG06: 118 MS
QTC CALCULATION (BEZET), ECG08: 492 MS
RBC # BLD AUTO: 4.49 M/UL (ref 3.8–5.2)
SODIUM SERPL-SCNC: 139 MMOL/L (ref 136–145)
TROPONIN-HIGH SENSITIVITY: 14 NG/L (ref 0–51)
TROPONIN-HIGH SENSITIVITY: 15 NG/L (ref 0–51)
VENTRICULAR RATE, ECG03: 65 BPM
WBC # BLD AUTO: 6.3 K/UL (ref 3.6–11)

## 2022-05-25 PROCEDURE — 96374 THER/PROPH/DIAG INJ IV PUSH: CPT

## 2022-05-25 PROCEDURE — 85025 COMPLETE CBC W/AUTO DIFF WBC: CPT

## 2022-05-25 PROCEDURE — 36415 COLL VENOUS BLD VENIPUNCTURE: CPT

## 2022-05-25 PROCEDURE — 84484 ASSAY OF TROPONIN QUANT: CPT

## 2022-05-25 PROCEDURE — 83880 ASSAY OF NATRIURETIC PEPTIDE: CPT

## 2022-05-25 PROCEDURE — 76705 ECHO EXAM OF ABDOMEN: CPT

## 2022-05-25 PROCEDURE — 80053 COMPREHEN METABOLIC PANEL: CPT

## 2022-05-25 PROCEDURE — 74011250636 HC RX REV CODE- 250/636: Performed by: EMERGENCY MEDICINE

## 2022-05-25 PROCEDURE — 93005 ELECTROCARDIOGRAM TRACING: CPT

## 2022-05-25 PROCEDURE — 74011250637 HC RX REV CODE- 250/637: Performed by: STUDENT IN AN ORGANIZED HEALTH CARE EDUCATION/TRAINING PROGRAM

## 2022-05-25 PROCEDURE — 99285 EMERGENCY DEPT VISIT HI MDM: CPT

## 2022-05-25 PROCEDURE — 71045 X-RAY EXAM CHEST 1 VIEW: CPT

## 2022-05-25 RX ORDER — NITROGLYCERIN 0.4 MG/1
0.4 TABLET SUBLINGUAL
Status: DISCONTINUED | OUTPATIENT
Start: 2022-05-25 | End: 2022-05-26 | Stop reason: HOSPADM

## 2022-05-25 RX ORDER — MORPHINE SULFATE 2 MG/ML
4 INJECTION, SOLUTION INTRAMUSCULAR; INTRAVENOUS
Status: COMPLETED | OUTPATIENT
Start: 2022-05-25 | End: 2022-05-25

## 2022-05-25 RX ADMIN — MORPHINE SULFATE 4 MG: 2 INJECTION, SOLUTION INTRAMUSCULAR; INTRAVENOUS at 15:32

## 2022-05-25 RX ADMIN — NITROGLYCERIN 0.4 MG: 0.4 TABLET, ORALLY DISINTEGRATING SUBLINGUAL at 16:21

## 2022-05-25 NOTE — TELEPHONE ENCOUNTER
Attempted to call the patient to reschedule her appointment tomorrow to 9:30am and her 8/8/22 appointment. Noted patient currently in ED. Will follow up after release.

## 2022-05-25 NOTE — ED PROVIDER NOTES
EMERGENCY DEPARTMENT HISTORY AND PHYSICAL EXAM          Date: 5/25/2022  Patient Name: Terell Sow  Attending of Record: Junior Pastor MD    History of Presenting Illness     Chief Complaint   Patient presents with    Chest Pain       History Provided By: Patient and EMS    HPI: Terell Sow is a 55 y.o. female with history of CAD s/p multiple PCIs and CABG x2, CHF, previous right leg thrombectomy, carotid stenting, diabetes, GERD, presents to the ED with chest pain. Patient reports sudden onset of sharp left-sided chest pain radiating to her left shoulder and arm starting 30 minutes prior to arrival.  She denies any relieving factors other than the nitroglycerin, pain is currently 6 out of 10. She states the pain feels similar to her previous MI. She attempted to take 2 doses of nitro with some improvement in her pain. She called EMS at that point, they gave 324 mg of aspirin. STEMI alert was called in the field due to concern for ST elevations in lead III and aVF. Patient reports chronic dyspnea but no increases, denies orthopnea, stable lower extremity edema. She notes she has had some chronic abdominal pain and recently had a CT scan which showed a contracted gallbladder and that she is supposed to follow-up with a surgeon for this. She [facility] Bev Sherman [facility]. PCP: Radha Marin, NP    There are no other complaints, changes, or physical findings at this time. Current Facility-Administered Medications   Medication Dose Route Frequency Provider Last Rate Last Admin    nitroglycerin (NITROSTAT) tablet 0.4 mg  0.4 mg SubLINGual Q5MIN PRN Stas Hudson MD   0.4 mg at 05/25/22 1621     Current Outpatient Medications   Medication Sig Dispense Refill    insulin glargine (Lantus Solostar U-100 Insulin) 100 unit/mL (3 mL) inpn 50 Units by SubCUTAneous route daily.  INJECT 41 UNITS SUBCUTANEOUSLY ONCE DAILY 5 Adjustable Dose Pre-filled Pen Syringe 1    acetaminophen (Tylenol Extra Strength) 500 mg tablet Take 1,000 mg by mouth daily.  doxepin (SINEquan) 25 mg capsule Take 25 mg by mouth nightly as needed.  etonogestreL (Nexplanon) 68 mg impl 1 Each.  DULoxetine (CYMBALTA) 60 mg capsule Take 60 mg by mouth daily.  insulin lispro (HUMALOG) 100 unit/mL kwikpen 10 Units by SubCUTAneous route.  isosorbide mononitrate ER (IMDUR) 30 mg tablet Take 30 mg by mouth daily.  divalproex ER (Depakote ER) 500 mg ER tablet Take 1 Tablet by mouth three (3) times daily. 180 Tablet 0    pantoprazole (PROTONIX) 40 mg tablet TAKE 1 TABLET BY MOUTH ONCE DAILY AS NEEDED FOR ACID REFLUX 90 Tablet 0    metOLazone (ZAROXOLYN) 5 mg tablet Take 5 mg by mouth daily. ON MONDAYS      gabapentin (NEURONTIN) 600 mg tablet Take 1 Tablet by mouth three (3) times daily. 90 Tablet 5    Yin Pen Needle 32 gauge x 5/32\" ndle USE 1 PEN NEEDLES FOUR TIMES A  Pen Needle 10    atorvastatin (LIPITOR) 80 mg tablet TAKE 1 TABLET BY MOUTH EVERY NIGHT FOR HIGH CHOLESTEROL AND TRIGLYCERIDES 30 Tablet 10    multivitamin (ONE A DAY) tablet Take 1 Tablet by mouth daily.  furosemide (Lasix) 40 mg tablet Take 40 mg by mouth daily.  sacubitriL-valsartan (Entresto) 49-51 mg tab tablet Take 1 Tablet by mouth two (2) times a day.  clopidogreL (Plavix) 75 mg tab Take 1 Tablet by mouth daily. 90 Tablet 0    nitroglycerin (NITROSTAT) 0.4 mg SL tablet DISSOLVE 1 TABLET SUBLINGUALLY EVERY 5 MINUTES AS NEEDED FOR CHEST PAIN FOR UP TO 3 DOSES 25 Tablet 11    apixaban (ELIQUIS) 5 mg tablet Take 1 Tablet by mouth two (2) times a day. Indications: deep vein thrombosis prevention 90 Tablet 0    metoprolol succinate (TOPROL-XL) 50 mg XL tablet Take 1 Tablet by mouth daily. Indications: high blood pressure (Patient taking differently: Take 50 mg by mouth daily.  Indications: high blood pressure, Patient is taking 25mg) 90 Tablet 0    methadone (DOLOPHINE) 10 mg/mL solution Take 140 mg by mouth. Past History     Past Medical History:  Past Medical History:   Diagnosis Date    Adverse effect of anesthesia 2017    COULD HEAR AND FEEL WHILE UNDER ANESTHESIA BUT COULD NOT RESPOND    Arthritis     B[facility]     Chronic pain     Depression     Diabetes ([facility]     TYPE 1    Gastrointestinal disorder     gastroparesis    Gastroparesis     GERD (gastroesophageal reflux disease)     Heart disease     Heart failure ([facility]     Hypertension     Psychiatric disorder     Bipolar    Sleep apnea     SUPPOSED TO USE BIPAP    Thromboembolus ([facility]     R LEG    Umbilical hernia        Past Surgical History:  Past Surgical History:   Procedure Laterality Date    HX ANKLE FRACTURE TX Right     3 SCREWS PLACED    HX CAROTID STENT      HX GYN      BTL    HX ORTHOPAEDIC Left     BONE SPUR REMOVED    TN CARDIAC SURG PROCEDURE UNLIST  2019    CABG W/2 STENTS PLACED    VASCULAR SURGERY PROCEDURE UNLIST  2017    R LEG THROMBECTOMY       Family History:  Family History   Problem Relation Age of Onset    Thyroid Disease Mother     Hypertension Father     Heart Disease Father     Diabetes Sister     Heart Disease Sister     Hypertension Sister     Thyroid Disease Brother     Anesth Problems Neg Hx        Social History:  Social History     Tobacco Use    Smoking status: Current Every Day Smoker     Packs/day: 0.50     Years: 34.00     Pack years: 17.00    Smokeless tobacco: Never Used   Vaping Use    Vaping Use: Never used   Substance Use Topics    Alcohol use: Not Currently     Comment: occasionally    Drug use: Not Currently     Types: Cocaine       Allergies:   Allergies   Allergen Reactions    Voltaren [Diclofenac Sodium] Anaphylaxis    Dramamine Ii [Meclizine] Itching     Auditory and Visual hallucinations    Imipramine Hives    Levaquin [Levofloxacin] Itching and Nausea and Vomiting    Ambien [Zolpidem] Other (comments)     SLEEP WALKING  Lisinopril Cough         Review of Systems   Review of Systems   Constitutional: Negative for chills and fever. HENT: Negative for rhinorrhea and sore throat. Respiratory: Positive for shortness of breath. Negative for cough. Cardiovascular: Positive for chest pain. Gastrointestinal: Positive for abdominal pain. Negative for nausea and vomiting. Genitourinary: Negative for dysuria and hematuria. Musculoskeletal: Negative for myalgias. Skin: Negative for rash. Neurological: Negative for dizziness and headaches. Psychiatric/Behavioral: Negative for agitation. Physical Exam   Physical Exam  Constitutional:       Appearance: She is well-developed. She is not ill-appearing. HENT:      Head: Normocephalic and atraumatic. Cardiovascular:      Rate and Rhythm: Normal rate and regular rhythm. Heart sounds: Normal heart sounds. No murmur heard. Pulmonary:      Effort: Pulmonary effort is normal. No tachypnea or accessory muscle usage. Breath sounds: Normal breath sounds. Chest:      Chest wall: No deformity. Comments: Slight tenderness over left chest wall but is not reproducing her current pain  Abdominal:      Palpations: Abdomen is soft. Tenderness: There is no guarding or rebound. Comments: Mild right upper quadrant tenderness   Musculoskeletal:      Right lower leg: Edema (trace) present. Left lower leg: Edema (trace) present. Skin:     General: Skin is warm and dry. Neurological:      General: No focal deficit present. Mental Status: She is alert and oriented to person, place, and time.          Diagnostic Study Results     Labs -     Recent Results (from the past 12 hour(s))   EKG, 12 LEAD, INITIAL    Collection Time: 05/25/22  3:07 PM   Result Value Ref Range    Ventricular Rate 65 BPM    Atrial Rate 65 BPM    P-R Interval 150 ms    QRS Duration 118 ms    Q-T Interval 474 ms    QTC Calculation (Bezet) 492 ms    Calculated P Axis 32 degrees Calculated R Axis 94 degrees    Calculated T Axis 68 degrees    Diagnosis       Normal sinus rhythm  Rightward axis  Low voltage QRS  Inferior infarct , age undetermined  Cannot rule out Anterior infarct , age undetermined  When compared with ECG of 06-DEC-2019 13:16,  QRS duration has increased  Minimal criteria for Anterior infarct are now present  Confirmed by Tammy Parmar ([id number]) on 5/25/2022 8:21:49 PM     CBC WITH AUTOMATED DIFF    Collection Time: 05/25/22  3:18 PM   Result Value Ref Range    WBC 6.3 3.6 - 11.0 K/uL    RBC 4.49 3.80 - 5.20 M/uL    HGB 11.6 11.5 - 16.0 g/dL    HCT 37.7 35.0 - 47.0 %    MCV 84.0 80.0 - 99.0 FL    MCH 25.8 (L) 26.0 - 34.0 PG    MCHC 30.8 30.0 - 36.5 g/dL    RDW 16.3 (H) 11.5 - 14.5 %    PLATELET 315 902 - 635 K/uL    MPV 10.2 8.9 - 12.9 FL    NRBC 0.0 0  WBC    ABSOLUTE NRBC 0.00 0.00 - 0.01 K/uL    NEUTROPHILS 53 32 - 75 %    LYMPHOCYTES 35 12 - 49 %    MONOCYTES 8 5 - 13 %    EOSINOPHILS 2 0 - 7 %    BASOPHILS 1 0 - 1 %    IMMATURE GRANULOCYTES 1 (H) 0.0 - 0.5 %    ABS. NEUTROPHILS 3.4 1.8 - 8.0 K/UL    ABS. LYMPHOCYTES 2.2 0.8 - 3.5 K/UL    ABS. MONOCYTES 0.5 0.0 - 1.0 K/UL    ABS. EOSINOPHILS 0.1 0.0 - 0.4 K/UL    ABS. BASOPHILS 0.0 0.0 - 0.1 K/UL    ABS. IMM. GRANS. 0.0 0.00 - 0.04 K/UL    DF AUTOMATED     METABOLIC PANEL, COMPREHENSIVE    Collection Time: 05/25/22  3:18 PM   Result Value Ref Range    Sodium 139 136 - 145 mmol/L    Potassium 4.2 3.5 - 5.1 mmol/L    Chloride 106 97 - 108 mmol/L    CO2 31 21 - 32 mmol/L    Anion gap 2 (L) 5 - 15 mmol/L    Glucose 86 65 - 100 mg/dL    BUN 16 6 - 20 MG/DL    Creatinine 1.01 0.55 - 1.02 MG/DL    BUN/Creatinine ratio 16 12 - 20      GFR est AA >60 >60 ml/min/1.73m2    GFR est non-AA 59 (L) >60 ml/min/1.73m2    Calcium 9.3 8.5 - 10.1 MG/DL    Bilirubin, total 0.2 0.2 - 1.0 MG/DL    ALT (SGPT) 25 12 - 78 U/L    AST (SGOT) 42 (H) 15 - 37 U/L    Alk.  phosphatase 121 (H) 45 - 117 U/L    Protein, total 6.7 6.4 - 8.2 g/dL Albumin 3.1 (L) 3.5 - 5.0 g/dL    Globulin 3.6 2.0 - 4.0 g/dL    A-G Ratio 0.9 (L) 1.1 - 2.2     TROPONIN-HIGH SENSITIVITY    Collection Time: 05/25/22  3:18 PM   Result Value Ref Range    Troponin-High Sensitivity 14 0 - 51 ng/L   NT-PRO BNP    Collection Time: 05/25/22  6:57 PM   Result Value Ref Range    NT pro- (H) <125 PG/ML   TROPONIN-HIGH SENSITIVITY    Collection Time: 05/25/22  6:57 PM   Result Value Ref Range    Troponin-High Sensitivity 15 0 - 51 ng/L       Radiologic Studies -   US ABD LTD   Final Result   Negative. XR CHEST PORT   Final Result   No acute infiltrate. CT Results  (Last 48 hours)    None        CXR Results  (Last 48 hours)               05/25/22 1538  XR CHEST PORT Final result    Impression:  No acute infiltrate. Narrative:  Clinical indication: Chest pain. AP portable view of the chest obtained, comparison August 8. Heart size is   normal. There is no acute infiltrate. Medical Decision Making   I am the first provider for this patient. I reviewed the vital signs, available nursing notes, past medical history, past surgical history, family history and social history. Vital Signs-Reviewed the patient's vital signs.   Patient Vitals for the past 12 hrs:   Temp Pulse Resp BP SpO2   05/25/22 1935  (!) 59 18 (!) 141/65 98 %   05/25/22 1751 97.6 °F (36.4 °C) 60 17 135/70 96 %   05/25/22 1721    (!) 141/64    05/25/22 1651  61 12 138/66 99 %   05/25/22 1621  66 19 138/65 98 %       ECG Interpretation: Normal sinus rhythm, normal intervals, right axis deviation, nonspecific ST-T wave changes    Records Reviewed: Nursing Notes and Old Medical Records    Provider Notes (Medical Decision Making):   DDx: ACS, pneumonia, cholecystitis, biliary colic  72-[id number] female with history of previous MI s/p CABG x2 in 2021, presents the ED after 30 minutes of sudden onset left-sided chest pain, improved with nitro, initially presented as a STEMI alert. On review of her EKG, this showed normal sinus rhythm with nonspecific ST changes not concerning for active ischemia. On exam, she is slightly uncomfortable appearing but cardiopulmonary exam fairly unremarkable, trace lower extremity edema. Will obtain labs including CBC, CMP, troponin, BNP, chest x-ray, will also obtain right upper quadrant ultrasound given her report of abnormal gallbladder as this could be an alternative source of her pain. She received 324 mg aspirin prior to arrival, will treat with nitroglycerin tabs and as needed morphine. Spoke to cardiology and they will follow the patient, no emergent indications for Cath Lab at this time. Suspect she will need admission. ED Course and Progress Notes:   Initial assessment performed. The patients presenting problems have been discussed, and they are in agreement with the care plan formulated and outlined with them. I have encouraged them to ask questions as they arise throughout their visit. ED Course as of 05/25/22 2200   Wed May 25, 2022   1510 EKG interpreted by me. Shows NSR with a HR of 65. No ST elevations or depressions concerning for ischemia. Normal intervals. [MB]   5838 First troponin 14, sending second troponin now. [AA]   2154 Second opponent is 15. I discussed the results of the patient's labs with her, she had a normal ultrasound of the gallbladder, chest x-ray normal, labs unremarkable. At this time given her multiple risk factors I offered an observation admission with cardiology to see her in the morning versus going home with close cardiology follow-up. She already has a scheduled follow-up with her cardiologist tomorrow and through shared decision making she has opted to go home at this time. I gave her return precautions which she understood. [AA]      ED Course User Index  [AA] Herminia Sarmiento MD  [MB] Everette Munoz MD         Diagnosis     Clinical Impression:   1.  Chest pain, unspecified type Disposition:  Home    DISCHARGE PLAN:   1. Current Discharge Medication List        2. Follow-up Information     Follow up With Specialties Details Why Contact Info    [facility] EMERGENCY DEPT Emergency Medicine  As needed, If symptoms worsen [address] [facility] [address]    [place], [facility] Vascular Surgery, Cardiovascular Disease Physician   [address] [id number] [phone]          3. Return to ED if worse         Resident Signature:    Rigoberto Palumbo MD  Emergency Medicine Resident, PGY-2

## 2022-05-25 NOTE — ED NOTES
CP started 40 min PTA radiating to left arm. Pt also complains of abdominal pain. She received 2 NTG and 324 ASA PTA via EMS.

## 2022-05-25 NOTE — TELEPHONE ENCOUNTER
I called patient to schedule an appointment with Dr. Carlin Del Valle at [address] [place] [address] on 5/26. No answer, left VM.

## 2022-05-26 NOTE — DISCHARGE INSTRUCTIONS
You are seen in the ED for chest pain and abdominal pain. We obtained lab work, an EKG, chest x-ray, and an ultrasound of your gallbladder. Your EKG was not showing signs of a heart attack and your lab work did not show signs of a heart attack. We gave you nitroglycerin as well as morphine for your chest pain and it improved. We offered to observe you in the ED versus following up with your cardiologist and you felt comfortable with seeing your cardiologist tomorrow. Your ultrasound did not show signs of of gallstones or acute inflammation of the gallbladder called cholecystitis. We would still recommend that you follow-up with the surgeon that is supposed to see you for this.

## 2022-06-13 ENCOUNTER — OFFICE VISIT (OUTPATIENT)
Dept: SURGERY | Age: 47
End: 2022-06-13
Payer: MEDICARE

## 2022-06-13 VITALS
BODY MASS INDEX: 35.26 KG/M2 | HEIGHT: 63 IN | DIASTOLIC BLOOD PRESSURE: 69 MMHG | TEMPERATURE: 98.1 F | OXYGEN SATURATION: 96 % | SYSTOLIC BLOOD PRESSURE: 119 MMHG | RESPIRATION RATE: 18 BRPM | HEART RATE: 72 BPM | WEIGHT: 199 LBS

## 2022-06-13 DIAGNOSIS — K80.50 BILIARY COLIC: Primary | ICD-10-CM

## 2022-06-13 DIAGNOSIS — N18.30 STAGE 3 CHRONIC KIDNEY DISEASE, UNSPECIFIED WHETHER STAGE 3A OR 3B CKD (HCC): ICD-10-CM

## 2022-06-13 DIAGNOSIS — I50.22 CHRONIC SYSTOLIC (CONGESTIVE) HEART FAILURE (HCC): ICD-10-CM

## 2022-06-13 PROCEDURE — G8427 DOCREV CUR MEDS BY ELIG CLIN: HCPCS | Performed by: SURGERY

## 2022-06-13 PROCEDURE — 99214 OFFICE O/P EST MOD 30 MIN: CPT | Performed by: SURGERY

## 2022-06-13 PROCEDURE — G8754 DIAS BP LESS 90: HCPCS | Performed by: SURGERY

## 2022-06-13 PROCEDURE — G8417 CALC BMI ABV UP PARAM F/U: HCPCS | Performed by: SURGERY

## 2022-06-13 PROCEDURE — G9717 DOC PT DX DEP/BP F/U NT REQ: HCPCS | Performed by: SURGERY

## 2022-06-13 PROCEDURE — G8752 SYS BP LESS 140: HCPCS | Performed by: SURGERY

## 2022-06-13 RX ORDER — OXYCODONE AND ACETAMINOPHEN 5; 325 MG/1; MG/1
1 TABLET ORAL
Qty: 15 TABLET | Refills: 0 | Status: SHIPPED | OUTPATIENT
Start: 2022-06-13 | End: 2022-06-16

## 2022-06-13 NOTE — PROGRESS NOTES
1. Have you been to the ER, urgent care clinic since your last visit? Hospitalized since your last visit? Yes When: Yes, [facility], Chest Pain    2. Have you seen or consulted any other health care providers outside of the [address] [place] [address] since your last visit? Include any pap smears or colon screening.  No

## 2022-06-13 NOTE — PROGRESS NOTES
Surgery Progress Note    6/13/2022    CC: Abdominal pain    Subjective:     She is 4 months out from ventral hernia repair robotically. She has had about a month and a half now of right upper quadrant abdominal pain. It is constant in nature and sharp. Appears to be a 7 out of 10. Some radiation to her right side. She has been unable to correlated to food. She has been seen in our office and also in the ER repeatedly with ultrasounds and CT scans showing a contracted gallbladder but no gallstones. She has been taking pain medication to help her. Some constipation. No other acute events. Her hernia repair from last spring appears to be doing fine. No fevers or chills. She is here today to discuss next steps.   Think she feels a bump/knot in her right upper quadrant    Constitutional: No fever or chills  Neurologic: No headache  Eyes: No scleral icterus or irritated eyes  Nose: No nasal pain or drainage  Mouth: No oral lesions or sore throat  Cardiac: No palpations or chest pain  Pulmonary: No cough or shortness of breath  Gastrointestinal: Abdominal pain, no nausea, emesis, diarrhea, or constipation  Genitourinary: No dysuria  Musculoskeletal: No muscle or joint tenderness  Skin: No rashes or lesions  Psychiatric: No anxiety or depressed mood    Objective:     Visit Vitals  /69 (BP 1 Location: Left upper arm, BP Patient Position: Sitting, BP Cuff Size: Adult)   Pulse 72   Temp 98.1 °F (36.7 °C) (Oral)   Resp 18   Ht 5' 3\" (1.6 m)   Wt 199 lb (90.3 kg)   SpO2 96%   BMI 35.25 kg/m²       General: No acute distress, conversant  Eyes: PERRLA, no scleral icterus  HENT: Normocephalic without oral lesions  Neck: Trachea midline without LAD  Cardiac: Normal pulse rate and rhythm  Pulmonary: Symmetric chest rise with normal effort  GI: Soft, tenderness in right upper quadrant, ND, no hernia, no splenomegaly  Skin: Warm without rash  Extremities: No edema or joint stiffness  Psych: Appropriate mood and affect    Assessment:     55-year-old female with right upper quadrant pain and a history of robotic ventral hernia repair with mesh    Plan:     Ultrasound and CT scan reviewed. Labs reviewed. No signs of acute biliary pathology. Plan for HIDA scan to evaluate for biliary dyskinesia. I also told her to start taking her MiraLAX twice a day to see if this helps with her constipation. We discussed that if this is her gallbladder we will be able to take it out clear robotically. We discussed the incidence of bleeding, infection, bile tree injury, cystic duct leak, CBD stone, liver injury, and the risk of anesthesia. I will call her with the results of the HIDA scan and discuss the next steps.       Larry Starks MD  Bariatric and General Surgeon  [facility]

## 2022-06-22 ENCOUNTER — HOSPITAL ENCOUNTER (OUTPATIENT)
Dept: NUCLEAR MEDICINE | Age: 47
Discharge: HOME | End: 2022-06-22
Attending: SURGERY
Payer: MEDICARE

## 2022-06-22 DIAGNOSIS — K80.50 BILIARY COLIC: ICD-10-CM

## 2022-06-22 PROCEDURE — 78226 HEPATOBILIARY SYSTEM IMAGING: CPT

## 2022-06-22 RX ORDER — KIT FOR THE PREPARATION OF TECHNETIUM TC 99M MEBROFENIN 45 MG/10ML
5.4 INJECTION, POWDER, LYOPHILIZED, FOR SOLUTION INTRAVENOUS
Status: COMPLETED | OUTPATIENT
Start: 2022-06-22 | End: 2022-06-22

## 2022-06-22 RX ADMIN — KIT FOR THE PREPARATION OF TECHNETIUM TC 99M MEBROFENIN 5.4 MILLICURIE: 45 INJECTION, POWDER, LYOPHILIZED, FOR SOLUTION INTRAVENOUS at 13:00

## 2022-06-24 ENCOUNTER — TELEPHONE (OUTPATIENT)
Dept: SURGERY | Age: 47
End: 2022-06-24

## 2022-06-24 DIAGNOSIS — K31.84 GASTROPARESIS: Primary | ICD-10-CM

## 2022-06-24 NOTE — TELEPHONE ENCOUNTER
Patient called regarding right upper quadrant pain. It appears the patient does not have any acute or chronic gallbladder pathology. Negative CT scan, ultrasound, and HIDA. Review of the CT scan demonstrates dense food in her stomach. Known history of gastroparesis. She vomits in the morning sometimes food she ate the night before. Last spring she was thinking about undergoing the gastric bypass to help her with her medical issues, GERD, and gastroparesis. At this point she is hesitant to proceed. I talked to the patient about gastroparesis therapy being medication, possible pyloroplasty, and bypass. I think that the gastric bypass is the best solution for her for not only her reflux and gastroparesis but also for her morbid obesity and her multiple underlying medical issues to give her the highest quality, longest length of life. The patient will call us back if she wants to proceed. I will refer her to GI for better long-term gastroparesis therapy.     Rajan Neil MD  Bariatric and General Surgeon  [address] [place] [address] [facility]

## 2022-07-05 RX ORDER — PANTOPRAZOLE SODIUM 40 MG/1
TABLET, DELAYED RELEASE ORAL
Qty: 90 TABLET | Refills: 0 | Status: SHIPPED | OUTPATIENT
Start: 2022-07-05 | End: 2022-10-24

## 2022-07-20 ENCOUNTER — PATIENT MESSAGE (OUTPATIENT)
Dept: INTERNAL MEDICINE CLINIC | Age: 47
End: 2022-07-20

## 2022-07-20 NOTE — TELEPHONE ENCOUNTER
From: Dara Greene  To: Sofia Barajass, NP  Sent: 7/20/2022 6:03 AM EDT  Subject: Refill of my cymbalta medication     I really need my cymbalta refill but I'm unable to get it refilled through MyChart what do I need to do to get it filled I take that medicine for my fibromyalgia please give me a call [phone] Thanks

## 2022-07-20 NOTE — TELEPHONE ENCOUNTER
Last Visit: 5/12/22 with NP Janel Horner  Next Appointment: 8/15/22 with ALEXIA Horner  Previous Refill Encounter(s): 6/7/21 #90 with 1 refill    Requested Prescriptions     Pending Prescriptions Disp Refills    DULoxetine (CYMBALTA) 60 mg capsule 90 Capsule 1     Sig: Take 1 Capsule by mouth in the morning. Indications: anxiousness associated with depression         For Pharmacy [facility] in place:   Recommendation Provided To:    Intervention Detail: New Rx: 1, reason: Patient Preference  Gap Closed?:   Intervention Accepted By:   Time Spent (min): 5

## 2022-07-21 RX ORDER — DULOXETIN HYDROCHLORIDE 60 MG/1
60 CAPSULE, DELAYED RELEASE ORAL DAILY
Qty: 90 CAPSULE | Refills: 1 | Status: SHIPPED | OUTPATIENT
Start: 2022-07-21

## 2022-08-01 DIAGNOSIS — R20.2 PARESTHESIA OF BOTH FEET: ICD-10-CM

## 2022-08-01 DIAGNOSIS — E11.69 TYPE 2 DIABETES MELLITUS WITH OTHER SPECIFIED COMPLICATION, UNSPECIFIED WHETHER LONG TERM INSULIN USE (HCC): ICD-10-CM

## 2022-08-01 RX ORDER — PEN NEEDLE, DIABETIC 31 GX3/16"
NEEDLE, DISPOSABLE MISCELLANEOUS
Qty: 100 PEN NEEDLE | Refills: 0 | Status: SHIPPED | OUTPATIENT
Start: 2022-08-01 | End: 2022-09-12

## 2022-08-01 RX ORDER — DIVALPROEX SODIUM 500 MG/1
500 TABLET, EXTENDED RELEASE ORAL 3 TIMES DAILY
Qty: 90 TABLET | Refills: 0 | Status: SHIPPED | OUTPATIENT
Start: 2022-08-01 | End: 2022-09-19 | Stop reason: SDUPTHER

## 2022-08-01 RX ORDER — GABAPENTIN 600 MG/1
600 TABLET ORAL 3 TIMES DAILY
Qty: 90 TABLET | Refills: 2 | Status: CANCELLED | OUTPATIENT
Start: 2022-08-01

## 2022-08-01 NOTE — TELEPHONE ENCOUNTER
Last visit 08/15/2022 ALEXIA Villarreal   Next appointment 05/12/2022 ALEXIA Crespo   Previous refill encounter(s)   12/07/2021 Yin Pen Needle #100 with 10 refills,  04/22/2022 Depakote-ER #180. For Pharmacy Admin Tracking Only    Intervention Detail: New Rx: 2, reason: Patient Preference  Time Spent (min): 5      Requested Prescriptions     Pending Prescriptions Disp Refills    Insulin Needles, Disposable, (Yin Pen Needle) 32 gauge x 5/32\" ndle 100 Pen Needle 0     Sig: USE 1 PEN NEEDLES FOUR TIMES A DAY    divalproex ER (Depakote ER) 500 mg ER tablet 90 Tablet 0     Sig: Take 1 Tablet by mouth three (3) times daily.

## 2022-08-02 ENCOUNTER — PATIENT MESSAGE (OUTPATIENT)
Dept: INTERNAL MEDICINE CLINIC | Age: 47
End: 2022-08-02

## 2022-08-23 RX ORDER — ATORVASTATIN CALCIUM 80 MG/1
TABLET, FILM COATED ORAL
Qty: 30 TABLET | Refills: 0 | Status: SHIPPED | OUTPATIENT
Start: 2022-08-23 | End: 2022-10-07

## 2022-08-23 NOTE — TELEPHONE ENCOUNTER
Duplicate request: insulin Lispro (Humalog) was denied by ALEXIA Vang on 08/01/2022. Last visit 05/12/2022 ALEXIA Vang   Next appointment 09/19/2022 ALEXIA Vang   Previous refill encounter(s)   11/04/2021 Lipitor #30 with 10 refills. For Pharmacy Admin Tracking Only    Intervention Detail: New Rx: 2, reason: Patient Preference  Time Spent (min): 5      Requested Prescriptions     Pending Prescriptions Disp Refills    atorvastatin (LIPITOR) 80 mg tablet 30 Tablet 0     Sig: TAKE 1 TABLET BY MOUTH EVERY NIGHT FOR HIGH CHOLESTEROL AND TRIGLYCERIDES       ----- Message from [employer] sent at 8/23/2022  2:55 PM EDT -----  Subject: Refill Request    QUESTIONS  Name of Medication? Other - insulin lispro (HUMALOG) 100 unit/mL kwikpen  Patient-reported dosage and instructions? 10 units with meals and 1 unit   as needed per 50 over her sugar  How many days do you have left? 2  Preferred Pharmacy? [address] [address]  Pharmacy phone number (if available)? [phone]  ---------------------------------------------------------------------------  --------------,  Name of Medication? atorvastatin (LIPITOR) 80 mg tablet  Patient-reported dosage and instructions? 1 at bedtime  How many days do you have left? 0  [facility] [address]  Pharmacy phone number (if available)? [phone]  Additional Information for Provider? Been out for about 2 weeks. ---------------------------------------------------------------------------  --------------  Mark Baezs INFO  What is the best way for the office to contact you? OK to leave message on   voicemail  Preferred Call Back Phone Number? [phone]  ---------------------------------------------------------------------------  --------------  SCRIPT ANSWERS  Relationship to Patient?  Self

## 2022-08-25 DIAGNOSIS — E10.65 HYPERGLYCEMIA DUE TO TYPE 1 DIABETES MELLITUS (HCC): Primary | ICD-10-CM

## 2022-08-25 RX ORDER — BLOOD-GLUCOSE,RECEIVER,CONT
EACH MISCELLANEOUS
Qty: 1 EACH | Refills: 0 | Status: SHIPPED | OUTPATIENT
Start: 2022-08-25

## 2022-08-25 RX ORDER — INSULIN LISPRO 100 [IU]/ML
10 INJECTION, SOLUTION INTRAVENOUS; SUBCUTANEOUS
Qty: 5 ADJUSTABLE DOSE PRE-FILLED PEN SYRINGE | Refills: 2 | Status: SHIPPED | OUTPATIENT
Start: 2022-08-25 | End: 2022-08-30 | Stop reason: SDUPTHER

## 2022-08-25 RX ORDER — BLOOD-GLUCOSE SENSOR
EACH MISCELLANEOUS
Qty: 3 EACH | Refills: 2 | Status: SHIPPED | OUTPATIENT
Start: 2022-08-25

## 2022-08-30 RX ORDER — INSULIN LISPRO 100 [IU]/ML
INJECTION, SOLUTION INTRAVENOUS; SUBCUTANEOUS
Qty: 5 ADJUSTABLE DOSE PRE-FILLED PEN SYRINGE | Refills: 2 | Status: SHIPPED | OUTPATIENT
Start: 2022-08-30 | End: 2022-08-31 | Stop reason: CLARIF

## 2022-08-31 RX ORDER — INSULIN ASPART 100 [IU]/ML
INJECTION, SOLUTION INTRAVENOUS; SUBCUTANEOUS
Qty: 5 PEN | Refills: 3 | Status: SHIPPED | OUTPATIENT
Start: 2022-08-31 | End: 2022-09-19 | Stop reason: SDUPTHER

## 2022-09-07 ENCOUNTER — TRANSCRIBE ORDER (OUTPATIENT)
Dept: SCHEDULING | Age: 47
End: 2022-09-07

## 2022-09-09 ENCOUNTER — TRANSCRIBE ORDER (OUTPATIENT)
Dept: SCHEDULING | Age: 47
End: 2022-09-09

## 2022-09-09 DIAGNOSIS — S92.201A: Primary | ICD-10-CM

## 2022-09-12 DIAGNOSIS — E10.65 HYPERGLYCEMIA DUE TO TYPE 1 DIABETES MELLITUS (HCC): ICD-10-CM

## 2022-09-12 RX ORDER — PEN NEEDLE, DIABETIC 31 GX3/16"
NEEDLE, DISPOSABLE MISCELLANEOUS
Qty: 100 PEN NEEDLE | Refills: 11 | Status: SHIPPED | OUTPATIENT
Start: 2022-09-12 | End: 2022-09-19 | Stop reason: SDUPTHER

## 2022-09-12 RX ORDER — INSULIN GLARGINE 100 [IU]/ML
INJECTION, SOLUTION SUBCUTANEOUS
Qty: 15 ML | Refills: 3 | Status: SHIPPED | OUTPATIENT
Start: 2022-09-12 | End: 2022-09-19 | Stop reason: SDUPTHER

## 2022-09-19 ENCOUNTER — OFFICE VISIT (OUTPATIENT)
Dept: INTERNAL MEDICINE CLINIC | Age: 47
End: 2022-09-19
Payer: MEDICARE

## 2022-09-19 VITALS
DIASTOLIC BLOOD PRESSURE: 66 MMHG | BODY MASS INDEX: 36.5 KG/M2 | OXYGEN SATURATION: 97 % | RESPIRATION RATE: 18 BRPM | HEART RATE: 66 BPM | WEIGHT: 206 LBS | SYSTOLIC BLOOD PRESSURE: 131 MMHG | HEIGHT: 63 IN | TEMPERATURE: 98.2 F

## 2022-09-19 DIAGNOSIS — I82.509 CHRONIC DEEP VEIN THROMBOSIS (DVT) OF LOWER EXTREMITY, UNSPECIFIED LATERALITY, UNSPECIFIED VEIN (HCC): ICD-10-CM

## 2022-09-19 DIAGNOSIS — Z98.890 S/P REPAIR OF VENTRAL HERNIA: ICD-10-CM

## 2022-09-19 DIAGNOSIS — I50.22 SYSTOLIC CHF, CHRONIC (HCC): ICD-10-CM

## 2022-09-19 DIAGNOSIS — E10.65 HYPERGLYCEMIA DUE TO TYPE 1 DIABETES MELLITUS (HCC): ICD-10-CM

## 2022-09-19 DIAGNOSIS — Z87.19 S/P REPAIR OF VENTRAL HERNIA: ICD-10-CM

## 2022-09-19 DIAGNOSIS — I10 ESSENTIAL HYPERTENSION: Primary | ICD-10-CM

## 2022-09-19 DIAGNOSIS — F51.01 PRIMARY INSOMNIA: ICD-10-CM

## 2022-09-19 DIAGNOSIS — K31.84 GASTROPARESIS: ICD-10-CM

## 2022-09-19 DIAGNOSIS — E78.2 MIXED HYPERLIPIDEMIA: ICD-10-CM

## 2022-09-19 DIAGNOSIS — Z97.5 NEXPLANON IN PLACE: ICD-10-CM

## 2022-09-19 DIAGNOSIS — N63.20 MASS OF LEFT BREAST, UNSPECIFIED QUADRANT: ICD-10-CM

## 2022-09-19 DIAGNOSIS — M79.89 RIGHT LEG SWELLING: ICD-10-CM

## 2022-09-19 PROBLEM — E11.9 TYPE 2 DIABETES MELLITUS (HCC): Status: RESOLVED | Noted: 2022-01-24 | Resolved: 2022-09-19

## 2022-09-19 PROBLEM — T38.3X1A INSULIN OVERDOSE: Status: RESOLVED | Noted: 2019-07-24 | Resolved: 2022-09-19

## 2022-09-19 LAB
CHOLEST SERPL-MCNC: 101 MG/DL
GLUCOSE POC: 137 MG/DL
HBA1C MFR BLD HPLC: 8.7 %
HDLC SERPL-MCNC: 33 MG/DL
LDL CHOLESTEROL POC: 45 MG/DL
NON-HDL GOAL (POC): 68
TCHOL/HDL RATIO (POC): 1.4
TRIGL SERPL-MCNC: 115 MG/DL

## 2022-09-19 PROCEDURE — 99215 OFFICE O/P EST HI 40 MIN: CPT | Performed by: NURSE PRACTITIONER

## 2022-09-19 PROCEDURE — G9717 DOC PT DX DEP/BP F/U NT REQ: HCPCS | Performed by: NURSE PRACTITIONER

## 2022-09-19 PROCEDURE — 82962 GLUCOSE BLOOD TEST: CPT | Performed by: NURSE PRACTITIONER

## 2022-09-19 PROCEDURE — G8427 DOCREV CUR MEDS BY ELIG CLIN: HCPCS | Performed by: NURSE PRACTITIONER

## 2022-09-19 PROCEDURE — G8417 CALC BMI ABV UP PARAM F/U: HCPCS | Performed by: NURSE PRACTITIONER

## 2022-09-19 PROCEDURE — G8752 SYS BP LESS 140: HCPCS | Performed by: NURSE PRACTITIONER

## 2022-09-19 PROCEDURE — G8754 DIAS BP LESS 90: HCPCS | Performed by: NURSE PRACTITIONER

## 2022-09-19 PROCEDURE — 80061 LIPID PANEL: CPT | Performed by: NURSE PRACTITIONER

## 2022-09-19 PROCEDURE — 83036 HEMOGLOBIN GLYCOSYLATED A1C: CPT | Performed by: NURSE PRACTITIONER

## 2022-09-19 PROCEDURE — 2022F DILAT RTA XM EVC RTNOPTHY: CPT | Performed by: NURSE PRACTITIONER

## 2022-09-19 RX ORDER — INSULIN GLARGINE 100 [IU]/ML
INJECTION, SOLUTION SUBCUTANEOUS
Qty: 15 ML | Refills: 3 | Status: SHIPPED | OUTPATIENT
Start: 2022-09-19

## 2022-09-19 RX ORDER — METOPROLOL SUCCINATE 50 MG/1
50 TABLET, EXTENDED RELEASE ORAL DAILY
Qty: 90 TABLET | Refills: 0 | Status: CANCELLED | OUTPATIENT
Start: 2022-09-19

## 2022-09-19 RX ORDER — INSULIN ASPART 100 [IU]/ML
INJECTION, SOLUTION INTRAVENOUS; SUBCUTANEOUS
Qty: 5 PEN | Refills: 3 | Status: SHIPPED | OUTPATIENT
Start: 2022-09-19

## 2022-09-19 RX ORDER — DIVALPROEX SODIUM 500 MG/1
500 TABLET, EXTENDED RELEASE ORAL 3 TIMES DAILY
Qty: 90 TABLET | Refills: 2 | Status: SHIPPED | OUTPATIENT
Start: 2022-09-19

## 2022-09-19 RX ORDER — METOPROLOL SUCCINATE 25 MG/1
25 TABLET, EXTENDED RELEASE ORAL DAILY
Qty: 90 TABLET | Refills: 0 | Status: SHIPPED | OUTPATIENT
Start: 2022-09-19

## 2022-09-19 RX ORDER — PEN NEEDLE, DIABETIC 31 GX3/16"
NEEDLE, DISPOSABLE MISCELLANEOUS
Qty: 100 PEN NEEDLE | Refills: 11 | Status: SHIPPED | OUTPATIENT
Start: 2022-09-19
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
## 2022-09-19 NOTE — PATIENT INSTRUCTIONS
[facility]  Address: [address], [place], [address]  Phone: [phone]
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
## 2022-09-26 ENCOUNTER — CLINICAL SUPPORT (OUTPATIENT)
Dept: INTERNAL MEDICINE CLINIC | Age: 47
End: 2022-09-26
Payer: MEDICARE

## 2022-09-26 DIAGNOSIS — Z23 NEEDS FLU SHOT: Primary | ICD-10-CM

## 2022-09-26 PROCEDURE — G0008 ADMIN INFLUENZA VIRUS VAC: HCPCS | Performed by: INTERNAL MEDICINE

## 2022-09-26 PROCEDURE — 90686 IIV4 VACC NO PRSV 0.5 ML IM: CPT | Performed by: INTERNAL MEDICINE

## 2022-09-26 NOTE — PATIENT INSTRUCTIONS
Vaccine Information Statement    Influenza (Flu) Vaccine (Inactivated or Recombinant): What You Need to Know    Many vaccine information statements are available in [place] and other languages. See www.immunize.org/vis. Hojas de información sobre vacunas están disponibles en español y en muchos otros idiomas. Visite www.immunize.org/vis. 1. Why get vaccinated? Influenza vaccine can prevent influenza (flu). Flu is a contagious disease that spreads around the [facility] every year, usually between October and May. Anyone can get the flu, but it is more dangerous for some people. Infants and young children, people 72 years and older, pregnant people, and people with certain health conditions or a weakened immune system are at greatest risk of flu complications. Pneumonia, bronchitis, sinus infections, and ear infections are examples of flu-related complications. If you have a medical condition, such as heart disease, cancer, or diabetes, flu can make it worse. Flu can cause fever and chills, sore throat, muscle aches, fatigue, cough, headache, and runny or stuffy nose. Some people may have vomiting and diarrhea, though this is more common in children than adults. In an average year, thousands of people in the [facility] die from flu, and many more are hospitalized. Flu vaccine prevents millions of illnesses and flu-related visits to the doctor each year. 2. Influenza vaccines     CDC recommends everyone 6 months and older get vaccinated every flu season. Children 6 months through 6years of age may need 2 doses during a single flu season. Everyone else needs only 1 dose each flu season. It takes about 2 weeks for protection to develop after vaccination. There are many flu viruses, and they are always changing. Each year a new flu vaccine is made to protect against the influenza viruses believed to be likely to cause disease in the upcoming flu season.  Even when the vaccine doesnt exactly match these viruses, it may still provide some protection. Influenza vaccine does not cause flu. Influenza vaccine may be given at the same time as other vaccines. 3. Talk with your health care provider    Tell your vaccination provider if the person getting the vaccine:  Has had an allergic reaction after a previous dose of influenza vaccine, or has any severe, life-threatening allergies   Has ever had Guillain-Barré Syndrome (also called GBS)    In some cases, your health care provider may decide to postpone influenza vaccination until a future visit. Influenza vaccine can be administered at any time during pregnancy. People who are or will be pregnant during influenza season should receive inactivated influenza vaccine. People with minor illnesses, such as a cold, may be vaccinated. People who are moderately or severely ill should usually wait until they recover before getting influenza vaccine. Your health care provider can give you more information. 4. Risks of a vaccine reaction    Soreness, redness, and swelling where the shot is given, fever, muscle aches, and headache can happen after influenza vaccination. There may be a very small increased risk of Guillain-Barré Syndrome (GBS) after inactivated influenza vaccine (the flu shot). [employer] children who get the flu shot along with pneumococcal vaccine (PCV13) and/or DTaP vaccine at the same time might be slightly more likely to have a seizure caused by fever. Tell your health care provider if a child who is getting flu vaccine has ever had a seizure. People sometimes faint after medical procedures, including vaccination. Tell your provider if you feel dizzy or have vision changes or ringing in the ears. As with any medicine, there is a very remote chance of a vaccine causing a severe allergic reaction, other serious injury, or death. 5. What if there is a serious problem?     An allergic reaction could occur after the vaccinated person leaves the clinic. If you see signs of a severe allergic reaction (hives, swelling of the face and throat, difficulty breathing, a fast heartbeat, dizziness, or weakness), call 9-1-1 and get the person to the nearest hospital.    For other signs that concern you, call your health care provider. Adverse reactions should be reported to the Vaccine Adverse Event Reporting System (VAERS). Your health care provider will usually file this report, or you can do it yourself. Visit the VAERS website at www.vaers. [facility].gov or call 7-547.998.7088. VAERS is only for reporting reactions, and VAERS staff members do not give medical advice. 6. The National Vaccine Injury Compensation Program    The [facility] Vaccine Injury Compensation Program (VICP) is a federal program that was created to compensate people who may have been injured by certain vaccines. Claims regarding alleged injury or death due to vaccination have a time limit for filing, which may be as short as two years. Visit the VICP website at www.[facility].gov/vaccinecompensation or call 8-984.242.4391 to learn about the program and about filing a claim. 7. How can I learn more? Ask your health care provider. Call your local or state health department. Visit the website of the Food and Drug Administration (FDA) for vaccine package inserts and additional information at www.fda.gov/vaccines-blood-biologics/vaccines. Contact the Centers for Disease Control and Prevention (CDC): Call 5-403.507.3195 (1-800-CDC-INFO) or  Visit CDCs influenza website at www.cdc.gov/flu. Vaccine Information Statement   Inactivated Influenza Vaccine   8/6/2021  42 ALIDA Cunha [id number]   Department of Health and Human Services  Centers for Disease Control and Prevention    Office Use Only

## 2022-09-26 NOTE — PROGRESS NOTES
Chief Complaint   Patient presents with    Immunization/Injection     Michelle Mckeon is a 55 y.o. female who presents for routine immunizations. She denies any symptoms , reactions or allergies that would exclude them from being immunized today. Risks and adverse reactions were discussed and the VIS was given to them. All questions were addressed. She was observed for 10 min post injection. There were no reactions observed.     Anne Patel    Verbal order given by Vicky Chaney NP

## 2022-09-27 ENCOUNTER — HOSPITAL ENCOUNTER (OUTPATIENT)
Dept: MRI IMAGING | Age: 47
Discharge: HOME | End: 2022-09-27
Attending: PODIATRIST
Payer: MEDICARE

## 2022-09-27 DIAGNOSIS — S92.201A: ICD-10-CM

## 2022-09-27 PROCEDURE — 73718 MRI LOWER EXTREMITY W/O DYE: CPT

## 2022-09-30 ENCOUNTER — TELEPHONE (OUTPATIENT)
Dept: INTERNAL MEDICINE CLINIC | Age: 47
End: 2022-09-30

## 2022-09-30 NOTE — TELEPHONE ENCOUNTER
----- Message from Banter! sent at 9/29/2022  3:15 PM EDT -----  Subject: Referral Request    Reason for referral request? Dr. Alfredo Phan, [facility] second opinion rt   foot stress fractures   Provider patient wants to be referred to(if known):     Provider Phone Number(if known):[phone]    Additional Information for Provider? Dr. Alfredo Phan, [facility] new age foot   and ankle surgery has appt on 10/6/22.  please call patient when completed   ---------------------------------------------------------------------------  --------------  [facility]    [phone]; OK to leave message on voicemail  ---------------------------------------------------------------------------  --------------

## 2022-10-03 DIAGNOSIS — S92.901G CLOSED FRACTURE OF RIGHT FOOT WITH DELAYED HEALING, SUBSEQUENT ENCOUNTER: Primary | ICD-10-CM

## 2022-10-04 ENCOUNTER — HOSPITAL ENCOUNTER (OUTPATIENT)
Dept: MAMMOGRAPHY | Age: 47
Discharge: HOME | End: 2022-10-04
Attending: NURSE PRACTITIONER
Payer: MEDICARE

## 2022-10-04 ENCOUNTER — HOSPITAL ENCOUNTER (OUTPATIENT)
Dept: VASCULAR SURGERY | Age: 47
Discharge: HOME | End: 2022-10-04
Attending: NURSE PRACTITIONER
Payer: MEDICARE

## 2022-10-04 DIAGNOSIS — N63.20 MASS OF LEFT BREAST, UNSPECIFIED QUADRANT: ICD-10-CM

## 2022-10-04 DIAGNOSIS — N63.24 MASS OF LOWER INNER QUADRANT OF LEFT BREAST: ICD-10-CM

## 2022-10-04 DIAGNOSIS — M79.89 RIGHT LEG SWELLING: ICD-10-CM

## 2022-10-04 PROCEDURE — 76642 ULTRASOUND BREAST LIMITED: CPT

## 2022-10-04 PROCEDURE — 77061 BREAST TOMOSYNTHESIS UNI: CPT

## 2022-10-04 PROCEDURE — 93971 EXTREMITY STUDY: CPT

## 2022-10-07 RX ORDER — ATORVASTATIN CALCIUM 80 MG/1
TABLET, FILM COATED ORAL
Qty: 30 TABLET | Refills: 0 | Status: SHIPPED | OUTPATIENT
Start: 2022-10-07

## 2022-10-24 RX ORDER — PANTOPRAZOLE SODIUM 40 MG/1
TABLET, DELAYED RELEASE ORAL
Qty: 90 TABLET | Refills: 0 | Status: SHIPPED | OUTPATIENT
Start: 2022-10-24

## 2022-10-29 DIAGNOSIS — R20.2 PARESTHESIA OF BOTH FEET: ICD-10-CM

## 2022-10-29 DIAGNOSIS — E11.69 TYPE 2 DIABETES MELLITUS WITH OTHER SPECIFIED COMPLICATION, UNSPECIFIED WHETHER LONG TERM INSULIN USE (HCC): ICD-10-CM

## 2022-11-01 RX ORDER — GABAPENTIN 600 MG/1
TABLET ORAL
Qty: 90 TABLET | Refills: 0 | Status: SHIPPED | OUTPATIENT
Start: 2022-11-01 | End: 2022-11-10 | Stop reason: SDUPTHER

## 2022-11-10 ENCOUNTER — OFFICE VISIT (OUTPATIENT)
Dept: NEUROLOGY | Age: 47
End: 2022-11-10
Payer: MEDICARE

## 2022-11-10 VITALS
WEIGHT: 202 LBS | DIASTOLIC BLOOD PRESSURE: 76 MMHG | HEART RATE: 73 BPM | OXYGEN SATURATION: 97 % | HEIGHT: 63 IN | BODY MASS INDEX: 35.79 KG/M2 | SYSTOLIC BLOOD PRESSURE: 138 MMHG

## 2022-11-10 DIAGNOSIS — R20.2 PARESTHESIA OF BOTH FEET: ICD-10-CM

## 2022-11-10 DIAGNOSIS — G62.9 POLYNEUROPATHY: Primary | ICD-10-CM

## 2022-11-10 DIAGNOSIS — G89.29 ENCOUNTER FOR CHRONIC PAIN MANAGEMENT: ICD-10-CM

## 2022-11-10 DIAGNOSIS — E11.69 TYPE 2 DIABETES MELLITUS WITH OTHER SPECIFIED COMPLICATION, UNSPECIFIED WHETHER LONG TERM INSULIN USE (HCC): ICD-10-CM

## 2022-11-10 PROCEDURE — G8417 CALC BMI ABV UP PARAM F/U: HCPCS | Performed by: PSYCHIATRY & NEUROLOGY

## 2022-11-10 PROCEDURE — G9717 DOC PT DX DEP/BP F/U NT REQ: HCPCS | Performed by: PSYCHIATRY & NEUROLOGY

## 2022-11-10 PROCEDURE — G8427 DOCREV CUR MEDS BY ELIG CLIN: HCPCS | Performed by: PSYCHIATRY & NEUROLOGY

## 2022-11-10 PROCEDURE — G8752 SYS BP LESS 140: HCPCS | Performed by: PSYCHIATRY & NEUROLOGY

## 2022-11-10 PROCEDURE — 2022F DILAT RTA XM EVC RTNOPTHY: CPT | Performed by: PSYCHIATRY & NEUROLOGY

## 2022-11-10 PROCEDURE — G8754 DIAS BP LESS 90: HCPCS | Performed by: PSYCHIATRY & NEUROLOGY

## 2022-11-10 PROCEDURE — 99214 OFFICE O/P EST MOD 30 MIN: CPT | Performed by: PSYCHIATRY & NEUROLOGY

## 2022-11-10 PROCEDURE — 3052F HG A1C>EQUAL 8.0%<EQUAL 9.0%: CPT | Performed by: PSYCHIATRY & NEUROLOGY

## 2022-11-10 RX ORDER — PREDNISONE 5 MG/1
TABLET, DELAYED RELEASE ORAL
COMMUNITY
Start: 2022-10-21

## 2022-11-10 RX ORDER — GABAPENTIN 600 MG/1
600 TABLET ORAL 3 TIMES DAILY
Qty: 90 TABLET | Refills: 2 | Status: SHIPPED | OUTPATIENT
Start: 2022-11-10
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
## 2022-11-11 ENCOUNTER — DOCUMENTATION ONLY (OUTPATIENT)
Dept: NEUROLOGY | Age: 47
End: 2022-11-11

## 2022-11-20 ENCOUNTER — APPOINTMENT (OUTPATIENT)
Dept: CT IMAGING | Age: 47
End: 2022-11-20
Attending: NURSE PRACTITIONER
Payer: MEDICARE

## 2022-11-20 ENCOUNTER — HOSPITAL ENCOUNTER (EMERGENCY)
Age: 47
Discharge: HOME | End: 2022-11-20
Attending: EMERGENCY MEDICINE
Payer: MEDICARE

## 2022-11-20 VITALS
WEIGHT: 201 LBS | RESPIRATION RATE: 18 BRPM | BODY MASS INDEX: 35.61 KG/M2 | TEMPERATURE: 98.4 F | SYSTOLIC BLOOD PRESSURE: 166 MMHG | DIASTOLIC BLOOD PRESSURE: 65 MMHG | OXYGEN SATURATION: 100 % | HEART RATE: 71 BPM

## 2022-11-20 DIAGNOSIS — S80.02XA CONTUSION OF LEFT KNEE, INITIAL ENCOUNTER: Primary | ICD-10-CM

## 2022-11-20 DIAGNOSIS — M25.512 PAIN IN JOINT OF LEFT SHOULDER: ICD-10-CM

## 2022-11-20 DIAGNOSIS — R51.9 ACUTE NONINTRACTABLE HEADACHE, UNSPECIFIED HEADACHE TYPE: ICD-10-CM

## 2022-11-20 PROCEDURE — 99284 EMERGENCY DEPT VISIT MOD MDM: CPT

## 2022-11-20 PROCEDURE — 70450 CT HEAD/BRAIN W/O DYE: CPT

## 2022-11-20 RX ORDER — METHOCARBAMOL 500 MG/1
500 TABLET, FILM COATED ORAL 4 TIMES DAILY
Qty: 20 TABLET | Refills: 0 | Status: SHIPPED | OUTPATIENT
Start: 2022-11-20

## 2022-11-20 RX ORDER — ACETAMINOPHEN 500 MG
1000 TABLET ORAL
Qty: 20 TABLET | Refills: 0 | Status: SHIPPED | OUTPATIENT
Start: 2022-11-20

## 2022-11-20 NOTE — LETTER
[address] EMERGENCY DEPT  [address] [facility] [id number] [phone]    Work/School Note    Date: 11/20/2022    To Whom It May concern:    Linda Travis was seen and treated today in the emergency room by the following provider(s):  Attending Provider: Heidi Tai MD  Nurse Practitioner: Chino Tinajero NP. Linda Travis may return to work on Nov24 2022.     Sincerely,          Rosenda Myrick NP

## 2022-11-21 NOTE — ED PROVIDER NOTES
EMERGENCY DEPARTMENT HISTORY AND PHYSICAL EXAM          Date: 11/20/2022  Patient Name: Hilary Rolle    History of Presenting Illness     Chief Complaint   Patient presents with    Fall         History Provided By: Patient    Chief Complaint: knee pain  Duration: onset just PTA   Timing:  Acute  Location: right and left knees  Quality:  throbbing  Severity: 9 out of 10  Modifying Factors: walking worsens pain  Associated Symptoms:  left shoulder pain and headache      HPI: Hilary Rolle is a 55 y.o. female with a PMH of  DM DVT gastroparesis  who presents with knee pain acute onset just prior to arrival.  Patient was at work when she tripped over a cord on a heater and fell she denies loss of consciousness but states she did hit her head on a cabinet. Patient is on Plavix. Patient also states that she fell onto her left side and hit her left shoulder. PCP: Az Freeman, NP    Current Outpatient Medications   Medication Sig Dispense Refill    acetaminophen (Tylenol Extra Strength) 500 mg tablet Take 2 Tablets by mouth every six (6) hours as needed for Pain. 20 Tablet 0    methocarbamoL (ROBAXIN) 500 mg tablet Take 1 Tablet by mouth four (4) times daily. 20 Tablet 0    Scott 5 mg TbEC TAKE 2 TABLETS BY MOUTH ONCE DAILY AT BEDTIME      gabapentin (NEURONTIN) 600 mg tablet Take 1 Tablet by mouth three (3) times daily. 90 Tablet 2    pantoprazole (PROTONIX) 40 mg tablet TAKE 1 TABLET BY MOUTH ONCE DAILY AS  NEEDED  FOR  ACID  REFLUX 90 Tablet 0    atorvastatin (LIPITOR) 80 mg tablet TAKE 1 TABLET BY MOUTH ONCE DAILY AT NIGHT FOR  HIGH  CHOLESTEROL AND TRIGLYERIDES 30 Tablet 0    divalproex ER (Depakote ER) 500 mg ER tablet Take 1 Tablet by mouth three (3) times daily. 90 Tablet 2    insulin aspart U-100 (NOVOLOG) 100 unit/mL (3 mL) inpn 10 units before breakfast, lunch and dinner with additional sliding scale if needed.  Sliding Scale, 140- 169 Insulin 2 Units 170-219 Insulin 4 [id number] Insulin 6 [id number] Insulin 8 Units 5 Pen 3    Insulin Needles, Disposable, 32 gauge x 5/32\" ndle USE AS DIRECTED 4 TIMES DAILY 100 Pen Needle 11    Lantus Solostar U-100 Insulin 100 unit/mL (3 mL) inpn INJECT 50 UNITS SUBCUTANEOUSLY  ONCE DAILY 15 mL 3    metoprolol succinate (TOPROL-XL) 25 mg XL tablet Take 1 Tablet by mouth daily. 90 Tablet 0    DULoxetine (CYMBALTA) 60 mg capsule Take 1 Capsule by mouth in the morning. Indications: anxiousness associated with depression 90 Capsule 1    etonogestreL (Nexplanon) 68 mg impl 1 Each.      isosorbide mononitrate ER (IMDUR) 30 mg tablet Take 30 mg by mouth daily. metOLazone (ZAROXOLYN) 5 mg tablet Take 5 mg by mouth daily. ON MONDAYS      multivitamin (ONE A DAY) tablet Take 1 Tablet by mouth daily. furosemide (LASIX) 40 mg tablet Take 40 mg by mouth daily. sacubitriL-valsartan (Entresto) 49-51 mg tab tablet Take 1 Tablet by mouth two (2) times a day. clopidogreL (Plavix) 75 mg tab Take 1 Tablet by mouth daily. 90 Tablet 0    nitroglycerin (NITROSTAT) 0.4 mg SL tablet DISSOLVE 1 TABLET SUBLINGUALLY EVERY 5 MINUTES AS NEEDED FOR CHEST PAIN FOR UP TO 3 DOSES 25 Tablet 11    apixaban (ELIQUIS) 5 mg tablet Take 1 Tablet by mouth two (2) times a day. Indications: deep vein thrombosis prevention 90 Tablet 0    methadone (DOLOPHINE) 10 mg/mL solution Take 140 mg by mouth.          Past History     Past Medical History:  Past Medical History:   Diagnosis Date    Adverse effect of anesthesia 2017    COULD HEAR AND FEEL WHILE UNDER ANESTHESIA BUT COULD NOT RESPOND    Arthritis     Bipolar 1 disorder ([address]     Chronic pain     Depression     Diabetes ([address]     TYPE 1    Gastrointestinal disorder     gastroparesis    Gastroparesis     GERD (gastroesophageal reflux disease)     Heart disease     Heart failure (HCC)     Hypertension     Psychiatric disorder     Bipolar    Sleep apnea     SUPPOSED TO USE BIPAP    Thromboembolus ([address]     R LEG    Umbilical hernia        Past Surgical History:  Past Surgical History:   Procedure Laterality Date    HX ANKLE FRACTURE TX Right     3 SCREWS PLACED    HX CAROTID STENT      HX GYN      BTL    HX ORTHOPAEDIC Left     BONE SPUR REMOVED    [place] CARDIAC SURG PROCEDURE UNLIST  2019    CABG W/2 STENTS PLACED    VASCULAR SURGERY PROCEDURE UNLIST  2017    R LEG THROMBECTOMY       Family History:  Family History   Problem Relation Age of Onset    Thyroid Disease Mother     Hypertension Father     Heart Disease Father     Diabetes Sister     Heart Disease Sister     Hypertension Sister     Thyroid Disease Brother     Anesth Problems Neg Hx        Social History:  Social History     Tobacco Use    Smoking status: Every Day     Packs/day: 0.50     Years: 34.00     Pack years: 17.00     Types: Cigarettes    Smokeless tobacco: Never   Vaping Use    Vaping Use: Never used   Substance Use Topics    Alcohol use: Not Currently    Drug use: Not Currently     Types: Cocaine       Allergies: Allergies   Allergen Reactions    Voltaren [Diclofenac Sodium] Anaphylaxis    Dramamine Ii [Meclizine] Itching     Auditory and Visual hallucinations    Imipramine Hives    Levaquin [Levofloxacin] Itching and Nausea and Vomiting    Ambien [Zolpidem] Other (comments)     SLEEP WALKING    Lisinopril Cough         Review of Systems   Review of Systems   Constitutional:  Negative for fatigue and fever. Respiratory:  Negative for shortness of breath and wheezing. Cardiovascular:  Negative for chest pain. Gastrointestinal:  Negative for abdominal pain. Musculoskeletal:  Negative for arthralgias, myalgias, neck pain and neck stiffness. Knee pain shoulder pain   Skin:  Negative for pallor and rash. Neurological:  Positive for headaches. Negative for dizziness and tremors. All other systems reviewed and are negative.     Physical Exam     Vitals:    11/20/22 1438   BP: (!) 166/65   Pulse: 71   Resp: 18   Temp: 98.4 °F (36.9 °C)   SpO2: 100% Weight: 91.2 kg (201 lb)     Physical Exam  Vitals and nursing note reviewed. Constitutional:       General: She is not in acute distress. Appearance: Normal appearance. She is well-developed. HENT:      Head: Normocephalic and atraumatic. Right Ear: External ear normal.      Left Ear: External ear normal.      Nose: Nose normal.   Eyes:      Conjunctiva/sclera: Conjunctivae normal.   Cardiovascular:      Rate and Rhythm: Normal rate and regular rhythm. Heart sounds: Normal heart sounds. Pulmonary:      Effort: Pulmonary effort is normal. No respiratory distress. Breath sounds: Normal breath sounds. No wheezing. Abdominal:      General: Bowel sounds are normal.      Palpations: Abdomen is soft. Tenderness: There is no abdominal tenderness. Musculoskeletal:         General: Normal range of motion. Cervical back: Normal range of motion and neck supple. Comments: Bruise right and left knee +TTP left shoulder DNV intact    Lymphadenopathy:      Cervical: No cervical adenopathy. Skin:     General: Skin is warm and dry. Findings: No rash. Neurological:      Mental Status: She is alert and oriented to person, place, and time. Cranial Nerves: No cranial nerve deficit. Coordination: Coordination normal.   Psychiatric:         Behavior: Behavior normal.         Thought Content: Thought content normal.         Judgment: Judgment normal.             Medical Decision Making   I am the first provider for this patient. I reviewed the vital signs, available nursing notes, past medical history, past surgical history, family history and social history. Vital Signs-Reviewed the patient's vital signs.     Records Reviewed: Nursing Notes    Provider Notes (Medical Decision Making):   DDX SAH contusion abrasion sprain            Procedures:  Procedures    Diagnostic Study Results     Labs -   No results found for this or any previous visit (from the past 15 hour(s)). Radiologic Studies -   CT HEAD WO CONT   Final Result   No acute intracranial abnormalities. CT Results  (Last 48 hours)                 11/20/22 1629  CT HEAD WO CONT Final result    Impression:  No acute intracranial abnormalities. Narrative:  EXAM: CT HEAD WO CONT       INDICATION: hit head on plavix       COMPARISON: CT December 2019. CONTRAST: None. TECHNIQUE: Unenhanced CT of the head was performed using 5 mm images. Brain and   bone windows were generated. Coronal and sagittal reformats. CT dose reduction   was achieved through use of a standardized protocol tailored for this   examination and automatic exposure control for dose modulation. FINDINGS:   The ventricles and sulci are normal in size, shape and configuration. . There is   no significant white matter disease. There is no intracranial hemorrhage,   extra-axial collection, or mass effect. The basilar cisterns are open. No CT   evidence of acute infarct. The bone windows demonstrate no abnormalities. The visualized portions of the   paranasal sinuses and mastoid air cells are clear. CXR Results  (Last 48 hours)      None                Disposition:  home    DISCHARGE NOTE:           Care plan outlined and precautions discussed. Patient has no new complaints, changes, or physical findings. Results of CT were reviewed with the patient. All medications were reviewed with the patient; will d/c home with robaxin. All of pt's questions and concerns were addressed. Patient was instructed and agrees to follow up with PCP, as well as to return to the ED upon further deterioration. Patient is ready to go home.     Follow-up Information       Follow up With Specialties Details Why Contact Info    Ulises Welch, NP Nurse Practitioner In 1 week  Melissa Henriquez   [address]  [phone]              Discharge Medication List as of 11/20/2022  5:13 PM START taking these medications    Details   methocarbamoL (ROBAXIN) 500 mg tablet Take 1 Tablet by mouth four (4) times daily. , Normal, Disp-20 Tablet, R-0           CONTINUE these medications which have CHANGED    Details   acetaminophen (Tylenol Extra Strength) 500 mg tablet Take 2 Tablets by mouth every six (6) hours as needed for Pain., Normal, Disp-20 Tablet, R-0           CONTINUE these medications which have NOT CHANGED    Details   Scott 5 mg TbEC TAKE 2 TABLETS BY MOUTH ONCE DAILY AT BEDTIME, Historical Med, MAVIS      gabapentin (NEURONTIN) 600 mg tablet Take 1 Tablet by mouth three (3) times daily. , Normal, Disp-90 Tablet, R-2      pantoprazole (PROTONIX) 40 mg tablet TAKE 1 TABLET BY MOUTH ONCE DAILY AS  NEEDED  FOR  ACID  REFLUX, Normal, Disp-90 Tablet, R-0      atorvastatin (LIPITOR) 80 mg tablet TAKE 1 TABLET BY MOUTH ONCE DAILY AT NIGHT FOR  HIGH  CHOLESTEROL AND TRIGLYERIDES, Normal, Disp-30 Tablet, R-0      divalproex ER (Depakote ER) 500 mg ER tablet Take 1 Tablet by mouth three (3) times daily. , Normal, Disp-90 Tablet, R-2      insulin aspart U-100 (NOVOLOG) 100 unit/mL (3 mL) inpn 10 units before breakfast, lunch and dinner with additional sliding scale if needed. Sliding Scale, 140- 169 Insulin 2 Units 170-219 Insulin 4 [id number] Insulin 6 [id number] Insulin 8 Units, Normal, Disp-5 Pen, R-3Instead of humalog due to insuranc e      Insulin Needles, Disposable, 32 gauge x 5/32\" ndle USE AS DIRECTED 4 TIMES DAILY, Normal, Disp-100 Pen Needle, R-11      Lantus Solostar U-100 Insulin 100 unit/mL (3 mL) inpn INJECT 50 UNITS SUBCUTANEOUSLY  ONCE DAILY, Normal, Disp-15 mL, R-3, MAVIS      metoprolol succinate (TOPROL-XL) 25 mg XL tablet Take 1 Tablet by mouth daily. , Normal, Disp-90 Tablet, R-0      DULoxetine (CYMBALTA) 60 mg capsule Take 1 Capsule by mouth in the morning.  Indications: anxiousness associated with depression, Normal, Disp-90 Capsule, R-1      etonogestreL (Nexplanon) 68 mg impl 1 Each., Historical Med      isosorbide mononitrate ER (IMDUR) 30 mg tablet Take 30 mg by mouth daily. , Historical Med      metOLazone (ZAROXOLYN) 5 mg tablet Take 5 mg by mouth daily. ON MONDAYS, Historical Med      multivitamin (ONE A DAY) tablet Take 1 Tablet by mouth daily. , Historical Med      furosemide (LASIX) 40 mg tablet Take 40 mg by mouth daily. , Historical Med      sacubitriL-valsartan (Entresto) 49-51 mg tab tablet Take 1 Tablet by mouth two (2) times a day., Historical Med      clopidogreL (Plavix) 75 mg tab Take 1 Tablet by mouth daily. , Normal, Disp-90 Tablet, R-0      nitroglycerin (NITROSTAT) 0.4 mg SL tablet DISSOLVE 1 TABLET SUBLINGUALLY EVERY 5 MINUTES AS NEEDED FOR CHEST PAIN FOR UP TO 3 DOSES, Normal, Disp-25 Tablet, R-11      apixaban (ELIQUIS) 5 mg tablet Take 1 Tablet by mouth two (2) times a day. Indications: deep vein thrombosis prevention, Print, Disp-90 Tablet, R-0      methadone (DOLOPHINE) 10 mg/mL solution Take 140 mg by mouth., Historical Med               Please note that this dictation was completed with Dragon, computer voice recognition software. Quite often unanticipated grammatical, syntax, homophones, and other interpretive errors are inadvertently transcribed by the computer software. Please disregard these errors. Additionally, please excuse any errors that have escaped final proofreading. Diagnosis     Clinical Impression:   1. Contusion of left knee, initial encounter    2. Pain in joint of left shoulder    3.  Acute nonintractable headache, unspecified headache type

## 2022-12-01 ENCOUNTER — PATIENT OUTREACH (OUTPATIENT)
Dept: CASE MANAGEMENT | Age: 47
End: 2022-12-01

## 2022-12-01 NOTE — PROGRESS NOTES
Ambulatory Care Management Note    Date/Time:  12/1/2022 3:36 PM    This patient was received as a referral from Daily assignment. Ambulatory Care Manager outreached to patient today to offer care management services. ACM was unable to reach the patient by telephone today; lvm requesting a return phone call to this ACM.

## 2022-12-02 NOTE — PROGRESS NOTES
12/2/2022  9:47 AM    Second patient outreach attempt by this [facility] to offer care management services. [facility] was unable to reach the patient by telephone today; lvm requesting a return phone call to this ACM.

## 2022-12-05 ENCOUNTER — PATIENT OUTREACH (OUTPATIENT)
Dept: CASE MANAGEMENT | Age: 47
End: 2022-12-05

## 2022-12-05 NOTE — PROGRESS NOTES
12/5/2022  3:57 PM    Third patient outreach attempt by this AC to offer care management services. [facility] was unable to reach the patient by telephone today; lvm requesting a return phone call to this ACM.

## 2022-12-07 ENCOUNTER — PATIENT OUTREACH (OUTPATIENT)
Dept: CASE MANAGEMENT | Age: 47
End: 2022-12-07

## 2022-12-07 NOTE — PROGRESS NOTES
12/7/2022  1:04 PM    Fourth patient outreach attempt by this AC to offer care management services. AC was unable to reach the patient by telephone today; lvm requesting a return phone call to this ACM. No further patient outreach by this AC is scheduled at this time.

## 2023-01-04 ENCOUNTER — OFFICE VISIT (OUTPATIENT)
Dept: URGENT CARE | Age: 48
End: 2023-01-04
Payer: MEDICARE

## 2023-01-04 VITALS
HEART RATE: 69 BPM | WEIGHT: 202 LBS | RESPIRATION RATE: 18 BRPM | SYSTOLIC BLOOD PRESSURE: 143 MMHG | DIASTOLIC BLOOD PRESSURE: 60 MMHG | TEMPERATURE: 98.2 F | BODY MASS INDEX: 35.78 KG/M2 | OXYGEN SATURATION: 98 %

## 2023-01-04 DIAGNOSIS — M25.561 ACUTE PAIN OF RIGHT KNEE: ICD-10-CM

## 2023-01-04 DIAGNOSIS — M25.531 RIGHT WRIST PAIN: ICD-10-CM

## 2023-01-04 DIAGNOSIS — N30.01 ACUTE CYSTITIS WITH HEMATURIA: Primary | ICD-10-CM

## 2023-01-04 PROBLEM — E11.9 TYPE 2 DIABETES MELLITUS (HCC): Status: ACTIVE | Noted: 2023-01-04

## 2023-01-04 LAB
BILIRUB UR QL STRIP: NEGATIVE
GLUCOSE UR-MCNC: ABNORMAL MG/DL
KETONES P FAST UR STRIP-MCNC: NEGATIVE MG/DL
PH UR STRIP: 7 [PH] (ref 4.6–8)
PROT UR QL STRIP: ABNORMAL
SP GR UR STRIP: 1.02 (ref 1–1.03)
UA UROBILINOGEN AMB POC: ABNORMAL (ref 0.2–1)
URINALYSIS CLARITY POC: ABNORMAL
URINALYSIS COLOR POC: ABNORMAL
URINE BLOOD POC: ABNORMAL
URINE LEUKOCYTES POC: ABNORMAL
URINE NITRITES POC: NEGATIVE

## 2023-01-04 RX ORDER — NITROFURANTOIN 25; 75 MG/1; MG/1
100 CAPSULE ORAL 2 TIMES DAILY
Qty: 14 CAPSULE | Refills: 0 | Status: SHIPPED | OUTPATIENT
Start: 2023-01-04 | End: 2023-01-11

## 2023-01-05 NOTE — PROGRESS NOTES
Urinary Pain  This is a new problem. Episode onset: 1 week ago. The problem occurs constantly. The problem has not changed since onset. Pertinent negatives include no abdominal pain. Nothing aggravates the symptoms. Nothing relieves the symptoms. She has tried water for the symptoms. The treatment provided mild relief. Wrist Pain  This is a new problem. Episode onset: ~ 2 weeks ago. The problem occurs daily. The problem has not changed since onset. Pertinent negatives include no abdominal pain. The symptoms are aggravated by bending and twisting. Treatments tried: otc pain reliever. The treatment provided mild relief. Fell on wrist and knee couple weeks ago. Hx arthritis in knee.     Past Medical History:   Diagnosis Date    Adverse effect of anesthesia 2017    COULD HEAR AND FEEL WHILE UNDER ANESTHESIA BUT COULD NOT RESPOND    Arthritis     Bipolar 1 disorder ([address]     Chronic pain     Depression     Diabetes ([address]     TYPE 1    Gastrointestinal disorder     gastroparesis    Gastroparesis     GERD (gastroesophageal reflux disease)     Heart disease     Heart failure ([address]     Hypertension     Psychiatric disorder     Bipolar    Sleep apnea     SUPPOSED TO USE BIPAP    Thromboembolus ([address]     R LEG    Umbilical hernia         Past Surgical History:   Procedure Laterality Date    HX ANKLE FRACTURE TX Right     3 SCREWS PLACED    HX CAROTID STENT      HX GYN      BTL    HX ORTHOPAEDIC Left     BONE SPUR REMOVED    [place] UNLISTED PROCEDURE CARDIAC SURGERY  2019    CABG W/2 STENTS PLACED    [place] UNLISTED PROCEDURE VASCULAR SURGERY  2017    R LEG THROMBECTOMY         Family History   Problem Relation Age of Onset    Thyroid Disease Mother     Hypertension Father     Heart Disease Father     Diabetes Sister     Heart Disease Sister     Hypertension Sister     Thyroid Disease Brother     Anesth Problems Neg Hx         Social History     Socioeconomic History    Marital status:      Spouse name: Not on file Number of children: Not on file    Years of education: Not on file    Highest education level: Not on file   Occupational History    Not on file   Tobacco Use    Smoking status: Every Day     Packs/day: 0.50     Years: 34.00     Pack years: 17.00     Types: Cigarettes    Smokeless tobacco: Never   Vaping Use    Vaping Use: Never used   Substance and Sexual Activity    Alcohol use: Not Currently    Drug use: Not Currently     Types: Cocaine    Sexual activity: Yes   Other Topics Concern    Not on file   Social History Narrative    Not on file     Social Determinants of Health     Financial Resource Strain: Not on file   Food Insecurity: Not on file   Transportation Needs: Not on file   Physical Activity: Not on file   Stress: Not on file   Social Connections: Not on file   Intimate Partner Violence: Not on file   Housing Stability: Not on file                ALLERGIES: Voltaren [diclofenac sodium], Dramamine ii [meclizine], Imipramine, Levaquin [levofloxacin], Ambien [zolpidem], and Lisinopril    Review of Systems   Constitutional:  Negative for chills and fever. Gastrointestinal:  Negative for abdominal pain, diarrhea, nausea and vomiting. Genitourinary:  Positive for dysuria, frequency and urgency. Musculoskeletal:  Positive for arthralgias. Negative for back pain. Vitals:    01/04/23 1832   BP: (!) 143/60   Pulse: 69   Resp: 18   Temp: 98.2 °F (36.8 °C)   SpO2: 98%   Weight: 202 lb (91.6 kg)       Physical Exam  Constitutional:       General: She is not in acute distress. Appearance: Normal appearance. She is not ill-appearing or toxic-appearing. HENT:      Head: Normocephalic and atraumatic. Abdominal:      General: Abdomen is flat. Bowel sounds are normal.      Palpations: Abdomen is soft. Tenderness: There is no abdominal tenderness. There is no right CVA tenderness, left CVA tenderness or guarding.    Musculoskeletal:      Right forearm: Normal.      Right wrist: Normal.      Right hand: Normal.   Neurological:      Mental Status: She is alert. Results for orders placed or performed in visit on 01/04/23   AMB POC URINALYSIS DIP STICK MANUAL W/ MICRO   Result Value Ref Range    Color (UA POC)      Clarity (UA POC)      Glucose (UA POC) 2+ Negative    Bilirubin (UA POC) Negative Negative    Ketones (UA POC) Negative Negative    Specific gravity (UA POC) 1.020 1.001 - 1.035    Blood (UA POC) 2+ Negative    pH (UA POC) 7.0 4.6 - 8.0    Protein (UA POC) 2+ Negative    Urobilinogen (UA POC) 0.2 mg/dL 0.2 - 1    Nitrites (UA POC) Negative Negative    Leukocyte esterase (UA POC) 1+ Negative     XR Results (most recent):  Results from Appointment encounter on 01/04/23    XR WRIST RT AP/LAT/OBL MIN 3V    Narrative  EXAM: XR WRIST RT AP/LAT/OBL MIN 3V    INDICATION: wrist pain, fall. COMPARISON: None. FINDINGS: 4  views of the right wrist demonstrate no fracture or other acute  osseous or articular abnormality. The soft tissues are within normal limits. Impression  No acute abnormality. ICD-10-CM ICD-9-CM   1. Acute cystitis with hematuria  N30.01 595.0   2. Right wrist pain  M25.531 719.43   3. Acute pain of right knee  M25.561 719.46       Orders Placed This Encounter    CULTURE, URINE     Standing Status:   Future     Number of Occurrences:   1     Standing Expiration Date:   1/4/2024    XR WRIST RT AP/LAT/OBL MIN 3V     Standing Status:   Future     Number of Occurrences:   1     Standing Expiration Date:   1/5/2024     Order Specific Question:   Is Patient Pregnant? Answer:   No     Order Specific Question:   Reason for Exam     Answer:   wrist pain, fall    XR KNEE RT 3 V     Standing Status:   Future     Number of Occurrences:   1     Standing Expiration Date:   1/5/2024     Order Specific Question:   Is Patient Pregnant?      Answer:   No     Order Specific Question:   Reason for Exam     Answer:   knee pain, fall    AMB POC URINALYSIS DIP STICK MANUAL W/ MICRO    nitrofurantoin, macrocrystal-monohydrate, (MACROBID) 100 mg capsule     Sig: Take 1 Capsule by mouth two (2) times a day for 7 days. Dispense:  14 Capsule     Refill:  0      Increase fluids. For wrist, rest, ice, continue otc analgesics. The patient is to follow up with PCP. If signs and symptoms become worse the pt is to go to the ER.      Cherie Hairston NP       MDM    Procedures

## 2023-01-05 NOTE — PATIENT INSTRUCTIONS
Urinary Tract Infection (UTI) in Women: Care Instructions  Overview     A urinary tract infection, or UTI, is a general term for an infection anywhere between the kidneys and the urethra (where urine comes out). Most UTIs are bladder infections. They often cause pain or burning when you urinate. UTIs are caused by bacteria and can be cured with antibiotics. Be sure to complete your treatment so that the infection does not get worse. Follow-up care is a key part of your treatment and safety. Be sure to make and go to all appointments, and call your doctor if you are having problems. It's also a good idea to know your test results and keep a list of the medicines you take. How can you care for yourself at home? Take your antibiotics as directed. Do not stop taking them just because you feel better. You need to take the full course of antibiotics. Drink extra water and other fluids for the next day or two. This will help make the urine less concentrated and help wash out the bacteria that are causing the infection. (If you have kidney, heart, or liver disease and have to limit fluids, talk with your doctor before you increase the amount of fluids you drink.)  Avoid drinks that are carbonated or have caffeine. They can irritate the bladder. Urinate often. Try to empty your bladder each time. To relieve pain, take a hot bath or lay a heating pad set on low over your lower belly or genital area. Never go to sleep with a heating pad in place. To prevent UTIs  Drink plenty of water each day. This helps you urinate often, which clears bacteria from your system. (If you have kidney, heart, or liver disease and have to limit fluids, talk with your doctor before you increase the amount of fluids you drink.)  Urinate when you need to. If you are sexually active, urinate right after you have sex. Change sanitary pads often.   Avoid douches, bubble baths, feminine hygiene sprays, and other feminine hygiene products that have deodorants. After going to the bathroom, wipe from front to back. When should you call for help? Call your doctor now or seek immediate medical care if:    Symptoms such as fever, chills, nausea, or vomiting get worse or appear for the first time. You have new pain in your back just below your rib cage. This is called flank pain. There is new blood or pus in your urine. You have any problems with your antibiotic medicine. Watch closely for changes in your health, and be sure to contact your doctor if:    You are not getting better after taking an antibiotic for 2 days. Your symptoms go away but then come back. Where can you learn more? Go to http://www.gray.com/  Enter J861 in the search box to learn more about \"Urinary Tract Infection (UTI) in Women: Care Instructions. \"  Current as of: June 16, 2022               Content Version: 13.4  © 0897-4480 [employer]. Care instructions adapted under license by [employer] (which disclaims liability or warranty for this information). If you have questions about a medical condition or this instruction, always ask your healthcare professional. [place] [address] any warranty or liability for your use of this information.

## 2023-01-06 NOTE — Clinical Note
Dear Berkley Mccloud,  Thank you for visiting the [facility] today for your visit, I appreciate your time. I hope that we have addressed any concerns you may have had.  Sincerely,  Dr. Villa Diaz, [email or web address]., F.A.C.C., F.A.C.O.I.    Below are the tests/labs ordered along with any new medications / refills:  Orders Placed This Encounter    CT HEART CALCIUM SCORING    Lipid Panel With Reflex    Glycohemoglobin    2D Echo With Doppler & Color Flow    Stress Echo (Includes Stress Test)       If you have never been screened for heart disease or vascular disease, we have many easy and quick ways to do this. Please ask me how!    We always advise our patients to follow a well balanced diet, specifically a mediterranean style diet focusing on fruits, vegetables, extra virgin olive oil and fish. We suggest avoiding foods high in trans saturated fats, red meats, eggs, processed foods or those high in sugar content.    We also recommend aerobic activity up to 30 minutes daily    Healthy eating habits and exercise are critical to us as we work towards achieving your best health.  Ways to lower blood pressure non medically: Weight reduction, DASH diet (low sodium diet), Restricting sodium, increasing potassium intake, increasing physical activity and limiting alcohol  To look for the best blood pressure cuff please search www.validatebp.org  Follow up as scheduled and please call with any questions or concerns that may arise.    HOW TO CHECK YOUR BLOOD PRESSURE:    Listed below are a copy of some recent blood work      Recent Labs     01/02/23  1108   WBC 4.4   RBC 4.55   HGB 14.1   HCT 42.4   MCV 93.2   MCHC 33.3   RDWCV 11.9      TLYMPH 34     Recent Labs     01/02/23  1108   ALBUMIN 3.7   BILIRUBIN 0.5   GPT 39   TOTPROTEIN 8.0   ALKPT 98   AST 26           Multiple views of the right coronary artery obtained using power injection.

## 2023-01-07 LAB
BACTERIA SPEC CULT: ABNORMAL
CC UR VC: ABNORMAL
SERVICE CMNT-IMP: ABNORMAL

## 2023-01-07 NOTE — PROGRESS NOTES
Return call received from the patient, ID verified. Advised of positive urine culture results but that bacteria should be treated by the antibiotic that was prescribed. She voices understanding and has no questions at this time.

## 2023-01-07 NOTE — PROGRESS NOTES
Attempted to contact the patient regarding above lab results. Unable to reach, left message on voicemail to return call to center.

## 2023-01-07 NOTE — PROGRESS NOTES
Please let patient know that her urine grew out bacteria. The macrobid she was prescribed at last visit should be effective. Take until completion.

## 2023-01-15 RX ORDER — DULOXETIN HYDROCHLORIDE 60 MG/1
CAPSULE, DELAYED RELEASE ORAL
Qty: 90 CAPSULE | Refills: 0 | Status: SHIPPED | OUTPATIENT
Start: 2023-01-15

## 2023-01-27 RX ORDER — PANTOPRAZOLE SODIUM 40 MG/1
TABLET, DELAYED RELEASE ORAL
Qty: 90 TABLET | Refills: 0 | Status: SHIPPED | OUTPATIENT
Start: 2023-01-27

## 2023-01-31 RX ORDER — DIVALPROEX SODIUM 500 MG/1
TABLET, EXTENDED RELEASE ORAL
Qty: 90 TABLET | Refills: 0 | Status: SHIPPED | OUTPATIENT
Start: 2023-01-31

## 2023-04-20 RX ORDER — DULOXETIN HYDROCHLORIDE 60 MG/1
CAPSULE, DELAYED RELEASE ORAL
Qty: 90 CAPSULE | Refills: 0 | OUTPATIENT
Start: 2023-04-20

## 2023-04-20 RX ORDER — DIVALPROEX SODIUM 500 MG/1
TABLET, EXTENDED RELEASE ORAL
Qty: 90 TABLET | Refills: 0 | OUTPATIENT
Start: 2023-04-20

## 2023-04-24 RX ORDER — DULOXETIN HYDROCHLORIDE 60 MG/1
CAPSULE, DELAYED RELEASE ORAL
Qty: 90 CAPSULE | Refills: 0 | Status: SHIPPED | OUTPATIENT
Start: 2023-04-24

## 2023-04-25 RX ORDER — PANTOPRAZOLE SODIUM 40 MG/1
TABLET, DELAYED RELEASE ORAL
Qty: 90 TABLET | Refills: 0 | Status: SHIPPED | OUTPATIENT
Start: 2023-04-25

## 2023-07-24 ENCOUNTER — TELEPHONE (OUTPATIENT)
Facility: CLINIC | Age: 48
End: 2023-07-24

## 2023-09-25 RX ORDER — INSULIN ASPART 100 [IU]/ML
INJECTION, SOLUTION INTRAVENOUS; SUBCUTANEOUS
Qty: 5 ADJUSTABLE DOSE PRE-FILLED PEN SYRINGE | Refills: 0 | Status: SHIPPED | OUTPATIENT
Start: 2023-09-25

## 2023-09-25 NOTE — TELEPHONE ENCOUNTER
None of the patients medication comverted over. Please review. Last appointment: 09/19/2022 NP Ora Esters   Next appointment: 10/11/2023 NP Ora Esters   Previous refill encounter(s):   09/19/2022 Novolog Pen #5 pens with 3 refills. For Pharmacy Admin Tracking Only    Program: Medication Refill  Intervention Detail: New Rx: 1, reason: Patient Preference  Time Spent (min): 5      Requested Prescriptions     Pending Prescriptions Disp Refills    insulin aspart (NOVOLOG FLEXPEN) 100 UNIT/ML injection pen 5 Adjustable Dose Pre-filled Pen Syringe 0     Sig: 10 units before breakfast, lunch and dinner with additional sliding scale if needed.  Sliding Scale, 140- 169 Insulin 2 Units 170-219 Insulin 4 [id number] Insulin 6 [id number] Insulin 8 Units

## 2023-09-28 RX ORDER — INSULIN GLARGINE 100 [IU]/ML
INJECTION, SOLUTION SUBCUTANEOUS
Qty: 15 ML | Refills: 0 | Status: SHIPPED | OUTPATIENT
Start: 2023-09-28

## 2023-09-28 NOTE — TELEPHONE ENCOUNTER
None of the patients medications converted over. Please review. Last appointment: 09/19/2022 NP Allison Serrano   Next appointment: 10/11/2023 NP Sameul Friendly   Previous refill encounter(s):   09/19/2022 Lantus Solostar #15 ml with 3 refills.      For Pharmacy Admin Tracking Only    Program: Medication Refill  Intervention Detail: New Rx: 1, reason: Patient Preference  Time Spent (min): 5    Requested Prescriptions     Pending Prescriptions Disp Refills    insulin glargine (LANTUS SOLOSTAR) 100 UNIT/ML injection pen 15 mL 0     Sig: INJECT 50 UNITS SUBCUTANEOUSLY  ONCE DAILY

## 2023-10-03 RX ORDER — DULOXETIN HYDROCHLORIDE 60 MG/1
CAPSULE, DELAYED RELEASE ORAL
Qty: 30 CAPSULE | Refills: 0 | Status: SHIPPED | OUTPATIENT
Start: 2023-10-03 | End: 2023-10-11 | Stop reason: SDUPTHER

## 2023-10-03 NOTE — TELEPHONE ENCOUNTER
None of the patients medications converted over. Please review. Last appointment: 09/19/2022 RUKHSANA Armstrong   Next appointment: 10/11/2023 RUKHSANA Armstrong  Previous refill encounter(s):   04/24/2023 Cymbalta #90     For Pharmacy Admin Tracking Only    Program: Medication Refill  Intervention Detail: New Rx: 1, reason: Patient Preference  Time Spent (min): 5      Requested Prescriptions     Pending Prescriptions Disp Refills    DULoxetine (CYMBALTA) 60 MG extended release capsule 90 capsule 0     Sig: Take 1 capsule by mouth once daily in the morning     ----- Message from Mara Goncalves sent at 10/3/2023 10:26 AM EDT -----  Subject: Refill Request    QUESTIONS  Name of Medication? Other - Cymbalta   Patient-reported dosage and instructions? 60 mg taken once a day (patient   is not sure if she was prescribe 60 or 80 mg but she remembers she was   prescribed the highest dosage for this medication). How many days do you have left? 0  Preferred Pharmacy? [address] [place], [place] [address] [facility],[address]  Pharmacy phone number (if available)? [phone]  ---------------------------------------------------------------------------  --------------  CALL BACK INFO  What is the best way for the office to contact you? OK to leave message on   voicemail  Preferred Call Back Phone Number? [phone]  ---------------------------------------------------------------------------  --------------  SCRIPT ANSWERS  Relationship to Patient?  Self

## 2023-10-11 ENCOUNTER — OFFICE VISIT (OUTPATIENT)
Facility: CLINIC | Age: 48
End: 2023-10-11

## 2023-10-11 VITALS
SYSTOLIC BLOOD PRESSURE: 183 MMHG | TEMPERATURE: 98 F | OXYGEN SATURATION: 97 % | HEIGHT: 63 IN | RESPIRATION RATE: 17 BRPM | DIASTOLIC BLOOD PRESSURE: 82 MMHG | HEART RATE: 97 BPM | WEIGHT: 168 LBS | BODY MASS INDEX: 29.77 KG/M2

## 2023-10-11 DIAGNOSIS — E04.9 GOITER: ICD-10-CM

## 2023-10-11 DIAGNOSIS — I10 PRIMARY HYPERTENSION: ICD-10-CM

## 2023-10-11 DIAGNOSIS — E11.69 TYPE 2 DIABETES MELLITUS WITH OTHER SPECIFIED COMPLICATION, WITH LONG-TERM CURRENT USE OF INSULIN (HCC): Primary | ICD-10-CM

## 2023-10-11 DIAGNOSIS — Z79.4 TYPE 2 DIABETES MELLITUS WITH OTHER SPECIFIED COMPLICATION, WITH LONG-TERM CURRENT USE OF INSULIN (HCC): Primary | ICD-10-CM

## 2023-10-11 DIAGNOSIS — Z12.31 SCREENING MAMMOGRAM FOR BREAST CANCER: ICD-10-CM

## 2023-10-11 DIAGNOSIS — Z79.4 TYPE 2 DIABETES MELLITUS WITH OTHER SPECIFIED COMPLICATION, WITH LONG-TERM CURRENT USE OF INSULIN (HCC): ICD-10-CM

## 2023-10-11 DIAGNOSIS — N18.31 STAGE 3A CHRONIC KIDNEY DISEASE (HCC): ICD-10-CM

## 2023-10-11 DIAGNOSIS — I82.509 CHRONIC THROMBOEMBOLISM OF DEEP VEIN OF LOWER EXTREMITY, UNSPECIFIED LATERALITY (HCC): ICD-10-CM

## 2023-10-11 DIAGNOSIS — E11.69 TYPE 2 DIABETES MELLITUS WITH OTHER SPECIFIED COMPLICATION, WITH LONG-TERM CURRENT USE OF INSULIN (HCC): ICD-10-CM

## 2023-10-11 DIAGNOSIS — I50.22 CHRONIC SYSTOLIC (CONGESTIVE) HEART FAILURE (HCC): ICD-10-CM

## 2023-10-11 PROBLEM — I25.10 ARTERIOSCLEROSIS OF CORONARY ARTERY: Status: ACTIVE | Noted: 2019-06-27

## 2023-10-11 LAB
ALBUMIN SERPL-MCNC: 3.7 G/DL (ref 3.5–5)
ALBUMIN, URINE, POC: 30 MG/L
ALBUMIN/GLOB SERPL: 1.2 (ref 1.1–2.2)
ALP SERPL-CCNC: 166 U/L (ref 45–117)
ALT SERPL-CCNC: 18 U/L (ref 12–78)
ANION GAP SERPL CALC-SCNC: 5 MMOL/L (ref 5–15)
AST SERPL-CCNC: 13 U/L (ref 15–37)
BILIRUB SERPL-MCNC: 0.4 MG/DL (ref 0.2–1)
BUN SERPL-MCNC: 10 MG/DL (ref 6–20)
BUN/CREAT SERPL: 10 (ref 12–20)
CALCIUM SERPL-MCNC: 9.8 MG/DL (ref 8.5–10.1)
CHLORIDE SERPL-SCNC: 105 MMOL/L (ref 97–108)
CHOLEST SERPL-MCNC: 211 MG/DL
CO2 SERPL-SCNC: 28 MMOL/L (ref 21–32)
CREAT SERPL-MCNC: 1 MG/DL (ref 0.55–1.02)
CREATININE, URINE, POC: 300 MG/DL
ERYTHROCYTE [DISTWIDTH] IN BLOOD BY AUTOMATED COUNT: 14.6 % (ref 11.5–14.5)
EST. AVERAGE GLUCOSE BLD GHB EST-MCNC: 203 MG/DL
GLOBULIN SER CALC-MCNC: 3 G/DL (ref 2–4)
GLUCOSE SERPL-MCNC: 221 MG/DL (ref 65–100)
HBA1C MFR BLD: 8.7 % (ref 4–5.6)
HCT VFR BLD AUTO: 45.9 % (ref 35–47)
HDLC SERPL-MCNC: 68 MG/DL
HDLC SERPL: 3.1 (ref 0–5)
HGB BLD-MCNC: 14.5 G/DL (ref 11.5–16)
LDLC SERPL CALC-MCNC: 127.4 MG/DL (ref 0–100)
MCH RBC QN AUTO: 25.8 PG (ref 26–34)
MCHC RBC AUTO-ENTMCNC: 31.6 G/DL (ref 30–36.5)
MCV RBC AUTO: 81.5 FL (ref 80–99)
MICROALB/CREAT RATIO, POC: NORMAL MG/G
NRBC # BLD: 0 K/UL (ref 0–0.01)
NRBC BLD-RTO: 0 PER 100 WBC
PLATELET # BLD AUTO: 253 K/UL (ref 150–400)
PMV BLD AUTO: 10.3 FL (ref 8.9–12.9)
POTASSIUM SERPL-SCNC: 4.8 MMOL/L (ref 3.5–5.1)
PROT SERPL-MCNC: 6.7 G/DL (ref 6.4–8.2)
RBC # BLD AUTO: 5.63 M/UL (ref 3.8–5.2)
SODIUM SERPL-SCNC: 138 MMOL/L (ref 136–145)
TRIGL SERPL-MCNC: 78 MG/DL
VLDLC SERPL CALC-MCNC: 15.6 MG/DL
WBC # BLD AUTO: 7.9 K/UL (ref 3.6–11)

## 2023-10-11 RX ORDER — GABAPENTIN 300 MG/1
300 CAPSULE ORAL 3 TIMES DAILY
Qty: 180 CAPSULE | Refills: 1 | Status: SHIPPED | OUTPATIENT
Start: 2023-10-11 | End: 2024-10-11

## 2023-10-11 RX ORDER — ISOSORBIDE MONONITRATE 120 MG/1
120 TABLET, EXTENDED RELEASE ORAL DAILY
COMMUNITY
Start: 2023-07-05 | End: 2023-10-12 | Stop reason: SDUPTHER

## 2023-10-11 RX ORDER — DIVALPROEX SODIUM 500 MG/1
500 TABLET, DELAYED RELEASE ORAL 3 TIMES DAILY
COMMUNITY
Start: 2020-09-08 | End: 2023-10-11

## 2023-10-11 RX ORDER — METOPROLOL SUCCINATE 25 MG/1
25 TABLET, EXTENDED RELEASE ORAL DAILY
Qty: 90 TABLET | Refills: 1 | Status: SHIPPED | OUTPATIENT
Start: 2023-10-11

## 2023-10-11 RX ORDER — DULOXETIN HYDROCHLORIDE 60 MG/1
CAPSULE, DELAYED RELEASE ORAL
Qty: 90 CAPSULE | Refills: 1 | Status: SHIPPED | OUTPATIENT
Start: 2023-10-11

## 2023-10-11 RX ORDER — CLOPIDOGREL BISULFATE 75 MG/1
75 TABLET ORAL DAILY
Qty: 90 TABLET | Refills: 1 | Status: SHIPPED | OUTPATIENT
Start: 2023-10-11

## 2023-10-11 RX ORDER — ETONOGESTREL 68 MG/1
1 IMPLANT SUBCUTANEOUS
COMMUNITY
End: 2023-10-11

## 2023-10-11 RX ORDER — SACUBITRIL AND VALSARTAN 49; 51 MG/1; MG/1
1 TABLET, FILM COATED ORAL 2 TIMES DAILY
COMMUNITY
End: 2023-10-11 | Stop reason: SDUPTHER

## 2023-10-11 RX ORDER — DIVALPROEX SODIUM 500 MG/1
500 TABLET, EXTENDED RELEASE ORAL 3 TIMES DAILY
Qty: 270 TABLET | Refills: 1 | Status: SHIPPED | OUTPATIENT
Start: 2023-10-11

## 2023-10-11 RX ORDER — GABAPENTIN 300 MG/1
300 CAPSULE ORAL 3 TIMES DAILY
COMMUNITY
End: 2023-10-11 | Stop reason: SDUPTHER

## 2023-10-11 RX ORDER — CLOPIDOGREL BISULFATE 75 MG/1
TABLET ORAL
COMMUNITY
Start: 2021-03-15 | End: 2023-10-11 | Stop reason: SDUPTHER

## 2023-10-11 RX ORDER — NITROGLYCERIN 0.4 MG/1
TABLET SUBLINGUAL
COMMUNITY
Start: 2021-02-20 | End: 2023-10-11 | Stop reason: SDUPTHER

## 2023-10-11 RX ORDER — METOPROLOL SUCCINATE 25 MG/1
TABLET, EXTENDED RELEASE ORAL DAILY
COMMUNITY
Start: 2020-06-01 | End: 2023-10-11 | Stop reason: SDUPTHER

## 2023-10-11 RX ORDER — DOXEPIN HYDROCHLORIDE 25 MG/1
25 CAPSULE ORAL NIGHTLY PRN
COMMUNITY
End: 2023-10-11

## 2023-10-11 RX ORDER — ATORVASTATIN CALCIUM 80 MG/1
TABLET, FILM COATED ORAL
COMMUNITY
Start: 2022-10-07 | End: 2023-10-11 | Stop reason: SDUPTHER

## 2023-10-11 RX ORDER — DIVALPROEX SODIUM 500 MG/1
1 TABLET, EXTENDED RELEASE ORAL 3 TIMES DAILY
COMMUNITY
Start: 2023-01-31 | End: 2023-10-11 | Stop reason: SDUPTHER

## 2023-10-11 RX ORDER — SACUBITRIL AND VALSARTAN 49; 51 MG/1; MG/1
1 TABLET, FILM COATED ORAL 2 TIMES DAILY
Qty: 180 TABLET | Refills: 0 | Status: SHIPPED | OUTPATIENT
Start: 2023-10-11

## 2023-10-11 RX ORDER — ATORVASTATIN CALCIUM 80 MG/1
TABLET, FILM COATED ORAL
Qty: 90 TABLET | Refills: 1 | Status: SHIPPED | OUTPATIENT
Start: 2023-10-11

## 2023-10-11 RX ORDER — BUMETANIDE 2 MG/1
2 TABLET ORAL DAILY
COMMUNITY
End: 2023-10-11

## 2023-10-11 RX ORDER — NITROGLYCERIN 0.4 MG/1
0.4 TABLET SUBLINGUAL EVERY 5 MIN PRN
Qty: 25 TABLET | Refills: 1 | Status: SHIPPED | OUTPATIENT
Start: 2023-10-11

## 2023-10-12 ENCOUNTER — TELEPHONE (OUTPATIENT)
Facility: CLINIC | Age: 48
End: 2023-10-12

## 2023-10-12 RX ORDER — INSULIN GLARGINE 100 [IU]/ML
30 INJECTION, SOLUTION SUBCUTANEOUS NIGHTLY
Qty: 15 ML | Refills: 0
Start: 2023-10-12

## 2023-10-12 RX ORDER — ISOSORBIDE MONONITRATE 120 MG/1
120 TABLET, EXTENDED RELEASE ORAL DAILY
Qty: 90 TABLET | Refills: 1 | Status: SHIPPED | OUTPATIENT
Start: 2023-10-12

## 2023-10-13 LAB — TSH SERPL DL<=0.05 MIU/L-ACNC: 1.36 UIU/ML (ref 0.45–4.5)

## 2023-10-19 ENCOUNTER — TELEPHONE (OUTPATIENT)
Facility: CLINIC | Age: 48
End: 2023-10-19

## 2023-10-19 NOTE — TELEPHONE ENCOUNTER
Pt is requesting rx refill for her pen needles to be sent to [facility] @ [address]  Pt # [phone]

## 2023-10-21 RX ORDER — PEN NEEDLE, DIABETIC 31 G X1/4"
1 NEEDLE, DISPOSABLE MISCELLANEOUS 4 TIMES DAILY
Qty: 100 EACH | Refills: 3 | Status: SHIPPED | OUTPATIENT
Start: 2023-10-21

## 2023-10-25 ENCOUNTER — NURSE ONLY (OUTPATIENT)
Facility: CLINIC | Age: 48
End: 2023-10-25

## 2023-10-25 VITALS — HEART RATE: 78 BPM | DIASTOLIC BLOOD PRESSURE: 62 MMHG | SYSTOLIC BLOOD PRESSURE: 118 MMHG

## 2023-10-25 DIAGNOSIS — I10 ESSENTIAL (PRIMARY) HYPERTENSION: Primary | ICD-10-CM

## 2023-11-13 SDOH — ECONOMIC STABILITY: HOUSING INSECURITY
IN THE LAST 12 MONTHS, WAS THERE A TIME WHEN YOU DID NOT HAVE A STEADY PLACE TO SLEEP OR SLEPT IN A SHELTER (INCLUDING NOW)?: NO

## 2023-11-13 SDOH — ECONOMIC STABILITY: FOOD INSECURITY: WITHIN THE PAST 12 MONTHS, THE FOOD YOU BOUGHT JUST DIDN'T LAST AND YOU DIDN'T HAVE MONEY TO GET MORE.: NEVER TRUE

## 2023-11-13 SDOH — ECONOMIC STABILITY: FOOD INSECURITY: WITHIN THE PAST 12 MONTHS, YOU WORRIED THAT YOUR FOOD WOULD RUN OUT BEFORE YOU GOT MONEY TO BUY MORE.: NEVER TRUE

## 2023-11-13 SDOH — ECONOMIC STABILITY: INCOME INSECURITY: HOW HARD IS IT FOR YOU TO PAY FOR THE VERY BASICS LIKE FOOD, HOUSING, MEDICAL CARE, AND HEATING?: NOT HARD AT ALL

## 2023-11-13 SDOH — ECONOMIC STABILITY: TRANSPORTATION INSECURITY
IN THE PAST 12 MONTHS, HAS LACK OF TRANSPORTATION KEPT YOU FROM MEETINGS, WORK, OR FROM GETTING THINGS NEEDED FOR DAILY LIVING?: NO

## 2023-11-14 ENCOUNTER — TELEMEDICINE (OUTPATIENT)
Facility: CLINIC | Age: 48
End: 2023-11-14
Payer: MEDICARE

## 2023-11-14 DIAGNOSIS — M79.642 LEFT HAND PAIN: Primary | ICD-10-CM

## 2023-11-14 PROCEDURE — G8419 CALC BMI OUT NRM PARAM NOF/U: HCPCS | Performed by: NURSE PRACTITIONER

## 2023-11-14 PROCEDURE — G8484 FLU IMMUNIZE NO ADMIN: HCPCS | Performed by: NURSE PRACTITIONER

## 2023-11-14 PROCEDURE — 99213 OFFICE O/P EST LOW 20 MIN: CPT | Performed by: NURSE PRACTITIONER

## 2023-11-14 PROCEDURE — G8427 DOCREV CUR MEDS BY ELIG CLIN: HCPCS | Performed by: NURSE PRACTITIONER

## 2023-11-14 PROCEDURE — 4004F PT TOBACCO SCREEN RCVD TLK: CPT | Performed by: NURSE PRACTITIONER

## 2023-11-14 ASSESSMENT — PATIENT HEALTH QUESTIONNAIRE - PHQ9
SUM OF ALL RESPONSES TO PHQ QUESTIONS 1-9: 2
6. FEELING BAD ABOUT YOURSELF - OR THAT YOU ARE A FAILURE OR HAVE LET YOURSELF OR YOUR FAMILY DOWN: 0
1. LITTLE INTEREST OR PLEASURE IN DOING THINGS: 1
2. FEELING DOWN, DEPRESSED OR HOPELESS: 1
SUM OF ALL RESPONSES TO PHQ QUESTIONS 1-9: 2
SUM OF ALL RESPONSES TO PHQ9 QUESTIONS 1 & 2: 2
10. IF YOU CHECKED OFF ANY PROBLEMS, HOW DIFFICULT HAVE THESE PROBLEMS MADE IT FOR YOU TO DO YOUR WORK, TAKE CARE OF THINGS AT HOME, OR GET ALONG WITH OTHER PEOPLE: 1
3. TROUBLE FALLING OR STAYING ASLEEP: 0
SUM OF ALL RESPONSES TO PHQ QUESTIONS 1-9: 2
SUM OF ALL RESPONSES TO PHQ QUESTIONS 1-9: 2
8. MOVING OR SPEAKING SO SLOWLY THAT OTHER PEOPLE COULD HAVE NOTICED. OR THE OPPOSITE, BEING SO FIGETY OR RESTLESS THAT YOU HAVE BEEN MOVING AROUND A LOT MORE THAN USUAL: 0
7. TROUBLE CONCENTRATING ON THINGS, SUCH AS READING THE NEWSPAPER OR WATCHING TELEVISION: 0
5. POOR APPETITE OR OVEREATING: 0
9. THOUGHTS THAT YOU WOULD BE BETTER OFF DEAD, OR OF HURTING YOURSELF: 0
4. FEELING TIRED OR HAVING LITTLE ENERGY: 0

## 2023-11-14 ASSESSMENT — ANXIETY QUESTIONNAIRES
4. TROUBLE RELAXING: 0
3. WORRYING TOO MUCH ABOUT DIFFERENT THINGS: 0
IF YOU CHECKED OFF ANY PROBLEMS ON THIS QUESTIONNAIRE, HOW DIFFICULT HAVE THESE PROBLEMS MADE IT FOR YOU TO DO YOUR WORK, TAKE CARE OF THINGS AT HOME, OR GET ALONG WITH OTHER PEOPLE: SOMEWHAT DIFFICULT
GAD7 TOTAL SCORE: 2
6. BECOMING EASILY ANNOYED OR IRRITABLE: 0
7. FEELING AFRAID AS IF SOMETHING AWFUL MIGHT HAPPEN: 0
2. NOT BEING ABLE TO STOP OR CONTROL WORRYING: 1
5. BEING SO RESTLESS THAT IT IS HARD TO SIT STILL: 0
1. FEELING NERVOUS, ANXIOUS, OR ON EDGE: 1

## 2023-11-14 ASSESSMENT — COLUMBIA-SUICIDE SEVERITY RATING SCALE - C-SSRS
5. HAVE YOU STARTED TO WORK OUT OR WORKED OUT THE DETAILS OF HOW TO KILL YOURSELF? DO YOU INTEND TO CARRY OUT THIS PLAN?: NO
3. HAVE YOU BEEN THINKING ABOUT HOW YOU MIGHT KILL YOURSELF?: NO
7. DID THIS OCCUR IN THE LAST THREE MONTHS: NO
4. HAVE YOU HAD THESE THOUGHTS AND HAD SOME INTENTION OF ACTING ON THEM?: NO

## 2023-11-14 NOTE — PROGRESS NOTES
Genny Dao is a 52 y.o. female  HIPAA verified by two patient identifiers. Health Maintenance Due   Topic Date Due    Hepatitis B vaccine (1 of 3 - 3-dose series) Never done    Diabetic foot exam  Never done    Diabetic retinal exam  Never done    Cervical cancer screen  Never done    Pneumococcal 0-64 years Vaccine (2 - PCV) 09/07/2022    Flu vaccine (1) 08/01/2023    COVID-19 Vaccine (4 - 2023-24 season) 09/01/2023    Depression Monitoring  11/10/2023     Chief Complaint   Patient presents with    Joint Pain         11/14/2023    11:38 AM   Patient-Reported Vitals   Patient-Reported Weight 168lb         Pain Scale: /10  Pain Location:   1. Have you been to the ER, urgent care clinic since your last visit? Hospitalized since your last visit? No    2. Have you seen or consulted any other health care providers outside of the [address] [place] [address] Avenue since your last visit? Include any pap smears or colon screening.  No

## 2023-11-14 NOTE — PROGRESS NOTES
[facility], was evaluated through a synchronous (real-time) audio-video encounter. The patient (or guardian if applicable) is aware that this is a billable service, which includes applicable co-pays. This Virtual Visit was conducted with patient's (and/or legal guardian's) consent. Patient identification was verified, and a caregiver was present when appropriate. The patient was located at Home: [address] [address]  Provider was located at Home ([address] [facility]): [address] (:  1975) is a Established patient, presenting virtually for evaluation of the following:    Assessment & Plan   Below is the assessment and plan developed based on review of pertinent history, physical exam, labs, studies, and medications. ? Carpal tunnel  Recc home exercises, otc nsaids  Fu INI    1. Left hand pain    No follow-ups on file.        Subjective   HPI  Review of Systems    Left hand pain x 2 weeks, mostly index and thumb  She is R handed, pain is burning, sore  No swelling, warmth, redness  She does a lot of mopping, sweeping             Objective   Patient-Reported Vitals  No data recorded     Physical Exam  [INSTRUCTIONS:  \"[x]\" Indicates a positive item  \"[]\" Indicates a negative item  -- DELETE ALL ITEMS NOT EXAMINED]    Constitutional: [x] Appears well-developed and well-nourished [x] No apparent distress      [] Abnormal -     Mental status: [x] Alert and awake  [x] Oriented to person/place/time [x] Able to follow commands    [] Abnormal -     Eyes:   EOM    [x]  Normal    [] Abnormal -   Sclera  [x]  Normal    [] Abnormal -          Discharge [x]  None visible   [] Abnormal -     HENT: [x] Normocephalic, atraumatic  [] Abnormal -   [x] Mouth/Throat: Mucous membranes are moist    External Ears [x] Normal  [] Abnormal -    Neck: [x] No visualized mass [] Abnormal -     Pulmonary/Chest: [x] Respiratory effort normal   [x] No visualized signs of difficulty

## 2023-11-22 NOTE — TELEPHONE ENCOUNTER
Please review the directions for the Lantus insulin. Is the pt injecting 30 or 50 units? Last visit on 11/14/2023 notes say both. Please review.     Last appointment: 11/14/2023 Virtual visit RUKHSANA Pierson   Next appointment: 01/24/2024 RUKHSANA Pierson   Previous refill encounter(s):   10/12/2023 Lantus #15 ml was set to\"No Print\".     For Pharmacy Admin Tracking Only    Program: Medication Refill  Intervention Detail: New Rx: 1, reason: Patient Preference  Time Spent (min): 5      Requested Prescriptions     Pending Prescriptions Disp Refills    insulin glargine (LANTUS SOLOSTAR) 100 UNIT/ML injection pen 15 mL 0     Sig: Inject 30 Units into the skin nightly INJECT 50 UNITS SUBCUTANEOUSLY  ONCE DAILY

## 2023-11-24 RX ORDER — INSULIN GLARGINE 100 [IU]/ML
30 INJECTION, SOLUTION SUBCUTANEOUS NIGHTLY
Qty: 15 ML | Refills: 2 | Status: SHIPPED | OUTPATIENT
Start: 2023-11-24 | End: 2024-01-26 | Stop reason: SDUPTHER

## 2023-12-01 RX ORDER — INSULIN GLARGINE-YFGN 100 [IU]/ML
INJECTION, SOLUTION SUBCUTANEOUS
Qty: 15 ML | Refills: 0 | OUTPATIENT
Start: 2023-12-01

## 2024-01-24 ENCOUNTER — OFFICE VISIT (OUTPATIENT)
Facility: CLINIC | Age: 49
End: 2024-01-24

## 2024-01-24 VITALS
RESPIRATION RATE: 19 BRPM | HEIGHT: 63 IN | HEART RATE: 66 BPM | SYSTOLIC BLOOD PRESSURE: 137 MMHG | TEMPERATURE: 97.7 F | DIASTOLIC BLOOD PRESSURE: 56 MMHG | OXYGEN SATURATION: 99 % | BODY MASS INDEX: 31.1 KG/M2 | WEIGHT: 175.5 LBS

## 2024-01-24 DIAGNOSIS — Z79.4 TYPE 2 DIABETES MELLITUS WITH OTHER SPECIFIED COMPLICATION, WITH LONG-TERM CURRENT USE OF INSULIN (HCC): Primary | ICD-10-CM

## 2024-01-24 DIAGNOSIS — G56.03 BILATERAL CARPAL TUNNEL SYNDROME: ICD-10-CM

## 2024-01-24 DIAGNOSIS — I25.2 HISTORY OF NON-ST ELEVATION MYOCARDIAL INFARCTION (NSTEMI): ICD-10-CM

## 2024-01-24 DIAGNOSIS — I50.22 CHRONIC SYSTOLIC (CONGESTIVE) HEART FAILURE (HCC): ICD-10-CM

## 2024-01-24 DIAGNOSIS — E11.69 TYPE 2 DIABETES MELLITUS WITH OTHER SPECIFIED COMPLICATION, WITH LONG-TERM CURRENT USE OF INSULIN (HCC): Primary | ICD-10-CM

## 2024-01-24 DIAGNOSIS — N18.31 STAGE 3A CHRONIC KIDNEY DISEASE (HCC): ICD-10-CM

## 2024-01-24 DIAGNOSIS — I82.509 CHRONIC THROMBOEMBOLISM OF DEEP VEIN OF LOWER EXTREMITY, UNSPECIFIED LATERALITY (HCC): ICD-10-CM

## 2024-01-24 DIAGNOSIS — Z95.1 HISTORY OF CORONARY ARTERY BYPASS GRAFT X 2: ICD-10-CM

## 2024-01-24 DIAGNOSIS — G89.18 PAIN AT SURGICAL SITE: ICD-10-CM

## 2024-01-24 DIAGNOSIS — F31.31 BIPOLAR AFFECTIVE DISORDER, CURRENTLY DEPRESSED, MILD (HCC): ICD-10-CM

## 2024-01-24 LAB
GLUCOSE, POC: 178 MG/DL
HBA1C MFR BLD: 9.4 %

## 2024-01-24 RX ORDER — LIDOCAINE AND PRILOCAINE 25; 25 MG/G; MG/G
CREAM TOPICAL
Qty: 30 G | Refills: 0 | Status: SHIPPED | OUTPATIENT
Start: 2024-01-24

## 2024-01-24 SDOH — ECONOMIC STABILITY: FOOD INSECURITY: WITHIN THE PAST 12 MONTHS, YOU WORRIED THAT YOUR FOOD WOULD RUN OUT BEFORE YOU GOT MONEY TO BUY MORE.: NEVER TRUE

## 2024-01-24 SDOH — ECONOMIC STABILITY: INCOME INSECURITY: HOW HARD IS IT FOR YOU TO PAY FOR THE VERY BASICS LIKE FOOD, HOUSING, MEDICAL CARE, AND HEATING?: NOT HARD AT ALL

## 2024-01-24 SDOH — ECONOMIC STABILITY: FOOD INSECURITY: WITHIN THE PAST 12 MONTHS, THE FOOD YOU BOUGHT JUST DIDN'T LAST AND YOU DIDN'T HAVE MONEY TO GET MORE.: NEVER TRUE

## 2024-01-24 ASSESSMENT — PATIENT HEALTH QUESTIONNAIRE - PHQ9
SUM OF ALL RESPONSES TO PHQ QUESTIONS 1-9: 0
2. FEELING DOWN, DEPRESSED OR HOPELESS: 0
SUM OF ALL RESPONSES TO PHQ QUESTIONS 1-9: 0
1. LITTLE INTEREST OR PLEASURE IN DOING THINGS: 0
SUM OF ALL RESPONSES TO PHQ QUESTIONS 1-9: 0
SUM OF ALL RESPONSES TO PHQ9 QUESTIONS 1 & 2: 0
SUM OF ALL RESPONSES TO PHQ QUESTIONS 1-9: 0

## 2024-01-24 NOTE — PROGRESS NOTES
Subjective: (As above and below)     Chief Complaint   Patient presents with    Diabetes     3 month follow up     soreness     Pt states it sore in the location of which she had the surgery 4/10 pain level     Referral - General     For Ortho, left had pain due to carpal tunnel     Eder Kaur is a 48 y.o. year old female who presents for     Diabetic Review of Systems - medication compliance: compliant most of the time, diabetic diet compliance: noncompliant some of the time, home glucose monitoring: is performed sporadically.   Eye exam utd    Hypertension ROS:  taking medications as instructed, no medication side effects noted, no TIAs, no chest pain on exertion, no dyspnea on exertion, no swelling of ankles      Hx of ventral hernia repair    S/p CABG 2019  CHF: follow w cardiology  Has soreness to chest wall at surgical site    Bipolar: stable    Chronic DVT ;on eliquis    Hx of carpal tunnel L hand, worsening despite brace    Reviewed PmHx, RxHx, FmHx, SocHx, AllgHx and updated in chart.  Family History   Problem Relation Age of Onset    Thyroid Disease Mother     Anesth Problems Neg Hx     Heart Disease Father     Diabetes Sister     Heart Disease Sister     Thyroid Disease Brother     Hypertension Father     Hypertension Sister        Past Medical History:   Diagnosis Date    Acute exacerbation of CHF (congestive heart failure) ([facility]) 6/8/2019    Adverse effect of anesthesia 2017    COULD HEAR AND FEEL WHILE UNDER ANESTHESIA BUT COULD NOT RESPOND    Arthritis     Bipolar 1 disorder ([facility])     Chronic pain     Depression     Diabetes ([facility])     TYPE 1    Gastrointestinal disorder     gastroparesis    Gastroparesis     GERD (gastroesophageal reflux disease)     Heart disease     Heart failure ([facility])     Hypertension     NSTEMI (non-ST elevated myocardial infarction) ([facility]) 6/8/2019    Psychiatric disorder     Bipolar    Sleep apnea     SUPPOSED TO USE BIPAP    Thromboembolus ([facility])     R LEG    Umbilical

## 2024-01-24 NOTE — PROGRESS NOTES
Pt is here for   Chief Complaint   Patient presents with    Diabetes     3 month follow up     soreness     Pt states it sore in the location of which she had the surgery 4/10 pain level     Referral - General     For Ortho, left had pain due to carpal tunnel       1. Have you been to the ER, urgent care clinic since your last visit?  Hospitalized since your last visit?No    2. Have you seen or consulted any other health care providers outside of the [facility] since your last visit?  Include any pap smears or colon screening. No      Pt states that she has ab abnormal stress test, states that she has an appointment on Friday with cath lab

## 2024-01-25 PROBLEM — I21.4 NSTEMI (NON-ST ELEVATED MYOCARDIAL INFARCTION) (HCC): Status: RESOLVED | Noted: 2019-06-08 | Resolved: 2024-01-25

## 2024-01-25 PROBLEM — Z95.1 HISTORY OF CORONARY ARTERY BYPASS GRAFT X 2: Status: ACTIVE | Noted: 2024-01-25

## 2024-01-25 PROBLEM — I50.9 ACUTE EXACERBATION OF CHF (CONGESTIVE HEART FAILURE) (HCC): Status: RESOLVED | Noted: 2019-06-08 | Resolved: 2024-01-25

## 2024-01-25 PROBLEM — I25.2 HISTORY OF NON-ST ELEVATION MYOCARDIAL INFARCTION (NSTEMI): Status: ACTIVE | Noted: 2024-01-25

## 2024-01-25 PROBLEM — K42.9 UMBILICAL HERNIA: Status: RESOLVED | Noted: 2021-12-28 | Resolved: 2024-01-25

## 2024-01-26 RX ORDER — INSULIN GLARGINE 100 [IU]/ML
34 INJECTION, SOLUTION SUBCUTANEOUS NIGHTLY
Qty: 15 ML | Refills: 2 | Status: SHIPPED | OUTPATIENT
Start: 2024-01-26

## 2024-02-05 DIAGNOSIS — Z79.4 TYPE 2 DIABETES MELLITUS WITH OTHER SPECIFIED COMPLICATION, WITH LONG-TERM CURRENT USE OF INSULIN (HCC): ICD-10-CM

## 2024-02-05 DIAGNOSIS — E11.69 TYPE 2 DIABETES MELLITUS WITH OTHER SPECIFIED COMPLICATION, WITH LONG-TERM CURRENT USE OF INSULIN (HCC): ICD-10-CM

## 2024-02-05 RX ORDER — GABAPENTIN 300 MG/1
CAPSULE ORAL
Qty: 180 CAPSULE | Refills: 0 | Status: SHIPPED | OUTPATIENT
Start: 2024-02-05 | End: 2025-02-04

## 2024-02-05 RX ORDER — INSULIN ASPART 100 [IU]/ML
INJECTION, SOLUTION INTRAVENOUS; SUBCUTANEOUS
Qty: 5 ADJUSTABLE DOSE PRE-FILLED PEN SYRINGE | Refills: 0 | Status: SHIPPED | OUTPATIENT
Start: 2024-02-05

## 2024-02-05 NOTE — TELEPHONE ENCOUNTER
Last appointment: 01/24/2024 RUKHSANA Pierson   Next appointment: 04/24/2024 RUKHSANA Pierson   Previous refill encounter(s):   09/25/2023 Novolog Flexpen #5 pens     For Pharmacy Admin Tracking Only    Program: Medication Refill  Intervention Detail: New Rx: 1, reason: Patient Preference  Time Spent (min): 5    Requested Prescriptions     Pending Prescriptions Disp Refills    insulin aspart (NOVOLOG FLEXPEN) 100 UNIT/ML injection pen 5 Adjustable Dose Pre-filled Pen Syringe 0     Sig: 10 units before breakfast, lunch and dinner with additional sliding scale if needed. Sliding Scale, 140- 169 Insulin 2 Units 170-219 Insulin 4 [id number] Insulin 6 [id number] Insulin 8 Units

## 2024-02-26 RX ORDER — PEN NEEDLE, DIABETIC 32 GX 1/4"
NEEDLE, DISPOSABLE MISCELLANEOUS
Qty: 100 EACH | Refills: 0 | Status: SHIPPED | OUTPATIENT
Start: 2024-02-26

## 2024-02-26 RX ORDER — PEN NEEDLE, DIABETIC 31 G X1/4"
1 NEEDLE, DISPOSABLE MISCELLANEOUS 4 TIMES DAILY
Qty: 100 EACH | Refills: 3 | OUTPATIENT
Start: 2024-02-26

## 2024-03-13 ENCOUNTER — OFFICE VISIT (OUTPATIENT)
Age: 49
End: 2024-03-13
Payer: MEDICARE

## 2024-03-13 VITALS
BODY MASS INDEX: 31.18 KG/M2 | HEART RATE: 80 BPM | SYSTOLIC BLOOD PRESSURE: 152 MMHG | HEIGHT: 63 IN | DIASTOLIC BLOOD PRESSURE: 75 MMHG | WEIGHT: 176 LBS

## 2024-03-13 DIAGNOSIS — E10.65 TYPE 1 DIABETES MELLITUS WITH HYPERGLYCEMIA (HCC): Primary | ICD-10-CM

## 2024-03-13 PROCEDURE — 99204 OFFICE O/P NEW MOD 45 MIN: CPT | Performed by: INTERNAL MEDICINE

## 2024-03-13 PROCEDURE — 2022F DILAT RTA XM EVC RTNOPTHY: CPT | Performed by: INTERNAL MEDICINE

## 2024-03-13 PROCEDURE — G8428 CUR MEDS NOT DOCUMENT: HCPCS | Performed by: INTERNAL MEDICINE

## 2024-03-13 PROCEDURE — 3046F HEMOGLOBIN A1C LEVEL >9.0%: CPT | Performed by: INTERNAL MEDICINE

## 2024-03-13 PROCEDURE — 4004F PT TOBACCO SCREEN RCVD TLK: CPT | Performed by: INTERNAL MEDICINE

## 2024-03-13 PROCEDURE — G8417 CALC BMI ABV UP PARAM F/U: HCPCS | Performed by: INTERNAL MEDICINE

## 2024-03-13 PROCEDURE — G8484 FLU IMMUNIZE NO ADMIN: HCPCS | Performed by: INTERNAL MEDICINE

## 2024-03-13 RX ORDER — ACYCLOVIR 400 MG/1
1 TABLET ORAL DAILY
Qty: 1 EACH | Refills: 0 | Status: SHIPPED | OUTPATIENT
Start: 2024-03-13

## 2024-03-13 RX ORDER — ACYCLOVIR 400 MG/1
1 TABLET ORAL
Qty: 9 EACH | Refills: 3 | Status: SHIPPED | OUTPATIENT
Start: 2024-03-13

## 2024-03-13 RX ORDER — FUROSEMIDE 40 MG/1
40 TABLET ORAL DAILY
COMMUNITY
Start: 2024-01-27

## 2024-03-13 NOTE — PROGRESS NOTES
normal speech, no focal findings or movement disorder noted  Musculoskeletal - no joint tenderness, deformity or swelling    Diabetic foot exam:   Left Foot:   Visual Exam: normal   Pulse DP: 1+ (weak)   Filament test: absent sensation   Vibratory Sensation: absent  Right Foot:   Visual Exam: normal   Pulse DP: 1+ (weak)   Filament test: absent sensation   Vibratory Sensation: absent     ASSESSMENT & PLAN    Impression  1.  Type 1 diabetes mellitus x 34 years with a recent A1c of 9.4%  2.  Diabetic peripheral neuropathy  3.  Diabetic proliferative retinopathy  4.  Coronary disease status post bypass surgery in 2019    Plan:  1.  Continue the above insulin regimen for now  2.  I sent a prescription for freestyle grady and/or Dexcom so hopefully she will be able to monitor her blood sugar more effectively get her blood sugar under control  3.  I will see her back in 1 month    Please note that this dictation was completed with [employer], the computer voice recognition software.  Quite often unanticipated grammatical, syntax, homophones, and other interpretive errors are inadvertently transcribed by the computer software.  Please disregard these errors.  Please excuse any errors that have escaped final proofreading.

## 2024-03-21 ENCOUNTER — TELEPHONE (OUTPATIENT)
Age: 49
End: 2024-03-21

## 2024-03-21 NOTE — TELEPHONE ENCOUNTER
3/21/2024  3:12 PM      Good Afternoon,    Ms. Washington called and stated that she received her G7 device but she isn't able to download the minda so that Dr. Ospina can read her sugars because its asking for a clinic code. Pt would like to know if she could receive a call back with the code.    Pt can be reached back at [phone].    Thanks  Sandi Mayes

## 2024-04-10 ENCOUNTER — OFFICE VISIT (OUTPATIENT)
Age: 49
End: 2024-04-10
Payer: MEDICARE

## 2024-04-10 VITALS
WEIGHT: 175 LBS | HEIGHT: 63 IN | DIASTOLIC BLOOD PRESSURE: 67 MMHG | BODY MASS INDEX: 31.01 KG/M2 | HEART RATE: 67 BPM | SYSTOLIC BLOOD PRESSURE: 142 MMHG

## 2024-04-10 DIAGNOSIS — E10.65 TYPE 1 DIABETES MELLITUS WITH HYPERGLYCEMIA (HCC): Primary | ICD-10-CM

## 2024-04-10 DIAGNOSIS — E10.65 TYPE 1 DIABETES MELLITUS WITH HYPERGLYCEMIA (HCC): ICD-10-CM

## 2024-04-10 LAB
ALBUMIN SERPL-MCNC: 3 G/DL (ref 3.5–5)
ALBUMIN/GLOB SERPL: 1 (ref 1.1–2.2)
ALP SERPL-CCNC: 111 U/L (ref 45–117)
ALT SERPL-CCNC: 15 U/L (ref 12–78)
ANION GAP SERPL CALC-SCNC: 4 MMOL/L (ref 5–15)
AST SERPL-CCNC: 14 U/L (ref 15–37)
BILIRUB SERPL-MCNC: 0.3 MG/DL (ref 0.2–1)
BUN SERPL-MCNC: 20 MG/DL (ref 6–20)
BUN/CREAT SERPL: 19 (ref 12–20)
CALCIUM SERPL-MCNC: 9.4 MG/DL (ref 8.5–10.1)
CHLORIDE SERPL-SCNC: 105 MMOL/L (ref 97–108)
CO2 SERPL-SCNC: 29 MMOL/L (ref 21–32)
CREAT SERPL-MCNC: 1.05 MG/DL (ref 0.55–1.02)
GLOBULIN SER CALC-MCNC: 3.1 G/DL (ref 2–4)
GLUCOSE SERPL-MCNC: 93 MG/DL (ref 65–100)
POTASSIUM SERPL-SCNC: 4.9 MMOL/L (ref 3.5–5.1)
PROT SERPL-MCNC: 6.1 G/DL (ref 6.4–8.2)
SODIUM SERPL-SCNC: 138 MMOL/L (ref 136–145)

## 2024-04-10 PROCEDURE — G8428 CUR MEDS NOT DOCUMENT: HCPCS | Performed by: INTERNAL MEDICINE

## 2024-04-10 PROCEDURE — 3046F HEMOGLOBIN A1C LEVEL >9.0%: CPT | Performed by: INTERNAL MEDICINE

## 2024-04-10 PROCEDURE — G8417 CALC BMI ABV UP PARAM F/U: HCPCS | Performed by: INTERNAL MEDICINE

## 2024-04-10 PROCEDURE — 99214 OFFICE O/P EST MOD 30 MIN: CPT | Performed by: INTERNAL MEDICINE

## 2024-04-10 PROCEDURE — 2022F DILAT RTA XM EVC RTNOPTHY: CPT | Performed by: INTERNAL MEDICINE

## 2024-04-10 PROCEDURE — 4004F PT TOBACCO SCREEN RCVD TLK: CPT | Performed by: INTERNAL MEDICINE

## 2024-04-19 DIAGNOSIS — E11.69 TYPE 2 DIABETES MELLITUS WITH OTHER SPECIFIED COMPLICATION, WITH LONG-TERM CURRENT USE OF INSULIN (HCC): ICD-10-CM

## 2024-04-19 DIAGNOSIS — Z79.4 TYPE 2 DIABETES MELLITUS WITH OTHER SPECIFIED COMPLICATION, WITH LONG-TERM CURRENT USE OF INSULIN (HCC): ICD-10-CM

## 2024-04-19 RX ORDER — INSULIN GLARGINE 100 [IU]/ML
30 INJECTION, SOLUTION SUBCUTANEOUS NIGHTLY
Qty: 30 ML | Refills: 3 | Status: SHIPPED | OUTPATIENT
Start: 2024-04-19

## 2024-04-19 RX ORDER — PEN NEEDLE, DIABETIC 32 GX 1/4"
NEEDLE, DISPOSABLE MISCELLANEOUS
Qty: 400 EACH | Refills: 3 | Status: SHIPPED | OUTPATIENT
Start: 2024-04-19

## 2024-04-19 NOTE — TELEPHONE ENCOUNTER
Requested Prescriptions     Pending Prescriptions Disp Refills    insulin glargine (LANTUS SOLOSTAR) 100 UNIT/ML injection pen 30 mL 3     Sig: Inject 30 Units into the skin nightly    Insulin Pen Needle (BD PEN NEEDLE MICRO U/F) 32G X 6 MM MISC 400 each 3     Sig: INJECT 1 PEN NEEDLE WITH INSULIN PENS SUBCUTANEOUSLY 4 TIMES DAILY

## 2024-05-17 RX ORDER — ATORVASTATIN CALCIUM 80 MG/1
TABLET, FILM COATED ORAL
Qty: 90 TABLET | Refills: 0 | Status: SHIPPED | OUTPATIENT
Start: 2024-05-17

## 2024-05-21 DIAGNOSIS — Z79.4 TYPE 2 DIABETES MELLITUS WITH OTHER SPECIFIED COMPLICATION, WITH LONG-TERM CURRENT USE OF INSULIN (HCC): ICD-10-CM

## 2024-05-21 DIAGNOSIS — E11.69 TYPE 2 DIABETES MELLITUS WITH OTHER SPECIFIED COMPLICATION, WITH LONG-TERM CURRENT USE OF INSULIN (HCC): ICD-10-CM

## 2024-05-21 NOTE — TELEPHONE ENCOUNTER
Last appointment: 01/24/2024 RUKHSANA Pierson   Next appointment: 06/26/2024 RUKHSANA Pierson   Previous refill encounter(s):   10/11/2023:  - Depakote-ER #270 with 1 refill,   - Plavix #90 with 1 refill,   - Cymbalta #90 with 1 refill,   10/12/2023 Imdur #90 with 1 refill,   02/05/2023 Neurontin #180.     No access to PDMP     For Pharmacy Admin Tracking Only    Program: Medication Refill  Intervention Detail: New Rx: 5, reason: Patient Preference  Time Spent (min): 5    Requested Prescriptions     Pending Prescriptions Disp Refills    isosorbide mononitrate (IMDUR) 120 MG extended release tablet [Pharmacy Med Name: Isosorbide Mononitrate  MG Oral Tablet Extended Release 24 Hour] 90 tablet 0     Sig: Take 1 tablet by mouth once daily    gabapentin (NEURONTIN) 300 MG capsule [Pharmacy Med Name: Gabapentin 300 MG Oral Capsule] 180 capsule 0     Sig: TAKE 1 CAPSULE BY MOUTH THREE TIMES DAILY . DO NOT EXCEED 3 PER 24 HOURS    divalproex (DEPAKOTE ER) 500 MG extended release tablet [Pharmacy Med Name: Divalproex Sodium  MG Oral Tablet Extended Release 24 Hour] 270 tablet 0     Sig: TAKE 1 TABLET BY MOUTH THREE TIMES DAILY    clopidogrel (PLAVIX) 75 MG tablet [Pharmacy Med Name: Clopidogrel Bisulfate 75 MG Oral Tablet] 90 tablet 0     Sig: Take 1 tablet by mouth once daily    DULoxetine (CYMBALTA) 60 MG extended release capsule [Pharmacy Med Name: DULoxetine HCl 60 MG Oral Capsule Delayed Release Particles] 90 capsule 0     Sig: Take 1 capsule by mouth once daily in the morning

## 2024-05-22 ENCOUNTER — OFFICE VISIT (OUTPATIENT)
Age: 49
End: 2024-05-22
Payer: MEDICARE

## 2024-05-22 VITALS
HEIGHT: 63 IN | BODY MASS INDEX: 28.7 KG/M2 | HEART RATE: 69 BPM | DIASTOLIC BLOOD PRESSURE: 67 MMHG | WEIGHT: 162 LBS | SYSTOLIC BLOOD PRESSURE: 152 MMHG

## 2024-05-22 DIAGNOSIS — E10.65 TYPE 1 DIABETES MELLITUS WITH HYPERGLYCEMIA (HCC): Primary | ICD-10-CM

## 2024-05-22 LAB — HBA1C MFR BLD: 8 %

## 2024-05-22 PROCEDURE — 3046F HEMOGLOBIN A1C LEVEL >9.0%: CPT | Performed by: INTERNAL MEDICINE

## 2024-05-22 PROCEDURE — 4004F PT TOBACCO SCREEN RCVD TLK: CPT | Performed by: INTERNAL MEDICINE

## 2024-05-22 PROCEDURE — 83036 HEMOGLOBIN GLYCOSYLATED A1C: CPT | Performed by: INTERNAL MEDICINE

## 2024-05-22 PROCEDURE — 99214 OFFICE O/P EST MOD 30 MIN: CPT | Performed by: INTERNAL MEDICINE

## 2024-05-22 PROCEDURE — G8428 CUR MEDS NOT DOCUMENT: HCPCS | Performed by: INTERNAL MEDICINE

## 2024-05-22 PROCEDURE — G8417 CALC BMI ABV UP PARAM F/U: HCPCS | Performed by: INTERNAL MEDICINE

## 2024-05-22 PROCEDURE — 2022F DILAT RTA XM EVC RTNOPTHY: CPT | Performed by: INTERNAL MEDICINE

## 2024-05-22 RX ORDER — INSULIN LISPRO 100 [IU]/ML
6 INJECTION, SOLUTION INTRAVENOUS; SUBCUTANEOUS 3 TIMES DAILY
Qty: 15 ML | Refills: 4 | Status: SHIPPED | OUTPATIENT
Start: 2024-05-22

## 2024-05-22 NOTE — PROGRESS NOTES
This is a 49-year-old woman with a history of type 1 diabetes mellitus diagnosed in 1990.  I previously cared for her at [facility] until I left in 2015.  Since that time she has been cared for by endocrinology at [facility] and then more recently by primary care at [facility].  She now returns for ongoing care..  She presented in March with an A1c of 9.4%. She presents today with an A1c of 8.0%     Past Medical History  She has a history of coronary disease status post stents and coronary bypass surgery in 2019  She has a history of proliferative diabetic retinopathy status post bilateral laser treatments and vitrectomy OS  She has a history of diabetic peripheral neuropathy  Renal function stable with GFR > 60     Current diabetes medication  Lantus 20 units at bedtime  Humalog 1:10 with meals plus a correction scale of 1:50         She works at a [employer] from 7 AM to 3 PM.  She eats her first meal about 9:00 in the morning.  That usually a pancake and sausage.  Lunch is a salad with apple juice.  Dinner can be pizza or meat and a starch.  She drinks juice.  Because of residual chest pain, she is  minimally physically active.  She monitors blood sugar several times daily.  The blood sugars she had generally range between 150 and 250 which is consistent with her A1c.  She is very pleased with her improvement in blood sugars since adding the Dexcom G7.     This patient is seen every 3-4 months, monitors blood sugar 4-6 times daily and injects insulin 4-5 times daily, Insulin dosing is based on glucose readings    Examination  Blood pressure 152/67  Pulse 69  Weight 74 kg  BMI 28.7  HEENT unremarkable  Lungs clear  Heart reveals a regular rate and rhythm  Abdomen benign  Extremities unremarkable    Impression  1.  Type 1 diabetes mellitus with improving glucose control on basal bolus insulin using a Dexcom G7  2.  Depression    Plan:  1.  I have asked her to begin to focus on her mealtime insulin.  We may determine the 1-10 is

## 2024-05-28 RX ORDER — DULOXETIN HYDROCHLORIDE 60 MG/1
CAPSULE, DELAYED RELEASE ORAL
Qty: 90 CAPSULE | Refills: 0 | Status: SHIPPED | OUTPATIENT
Start: 2024-05-28

## 2024-05-28 RX ORDER — ISOSORBIDE MONONITRATE 120 MG/1
120 TABLET, EXTENDED RELEASE ORAL DAILY
Qty: 90 TABLET | Refills: 0 | Status: SHIPPED | OUTPATIENT
Start: 2024-05-28

## 2024-05-28 RX ORDER — DIVALPROEX SODIUM 500 MG/1
500 TABLET, EXTENDED RELEASE ORAL 3 TIMES DAILY
Qty: 270 TABLET | Refills: 0 | Status: SHIPPED | OUTPATIENT
Start: 2024-05-28

## 2024-05-28 RX ORDER — CLOPIDOGREL BISULFATE 75 MG/1
75 TABLET ORAL DAILY
Qty: 90 TABLET | Refills: 0 | Status: SHIPPED | OUTPATIENT
Start: 2024-05-28

## 2024-05-28 RX ORDER — GABAPENTIN 300 MG/1
CAPSULE ORAL
Qty: 180 CAPSULE | Refills: 0 | Status: SHIPPED | OUTPATIENT
Start: 2024-05-28 | End: 2025-05-28

## 2024-05-30 DIAGNOSIS — Z79.4 TYPE 2 DIABETES MELLITUS WITH OTHER SPECIFIED COMPLICATION, WITH LONG-TERM CURRENT USE OF INSULIN (HCC): ICD-10-CM

## 2024-05-30 DIAGNOSIS — E11.69 TYPE 2 DIABETES MELLITUS WITH OTHER SPECIFIED COMPLICATION, WITH LONG-TERM CURRENT USE OF INSULIN (HCC): ICD-10-CM

## 2024-05-31 RX ORDER — GABAPENTIN 300 MG/1
CAPSULE ORAL
Qty: 180 CAPSULE | Refills: 0 | OUTPATIENT
Start: 2024-05-31 | End: 2025-05-31

## 2024-06-25 ENCOUNTER — HOSPITAL ENCOUNTER (OUTPATIENT)
Facility: HOSPITAL | Age: 49
Setting detail: OBSERVATION
Discharge: HOME | End: 2024-06-26
Attending: EMERGENCY MEDICINE | Admitting: INTERNAL MEDICINE
Payer: MEDICARE

## 2024-06-25 ENCOUNTER — APPOINTMENT (OUTPATIENT)
Facility: HOSPITAL | Age: 49
End: 2024-06-25
Payer: MEDICARE

## 2024-06-25 DIAGNOSIS — Z79.4 TYPE 2 DIABETES MELLITUS WITH OTHER SPECIFIED COMPLICATION, WITH LONG-TERM CURRENT USE OF INSULIN (HCC): ICD-10-CM

## 2024-06-25 DIAGNOSIS — R07.9 CHEST PAIN, UNSPECIFIED TYPE: Primary | ICD-10-CM

## 2024-06-25 DIAGNOSIS — E11.69 TYPE 2 DIABETES MELLITUS WITH OTHER SPECIFIED COMPLICATION, WITH LONG-TERM CURRENT USE OF INSULIN (HCC): ICD-10-CM

## 2024-06-25 LAB
ALBUMIN SERPL-MCNC: 3.4 G/DL (ref 3.5–5)
ALBUMIN/GLOB SERPL: 1.1 (ref 1.1–2.2)
ALP SERPL-CCNC: 110 U/L (ref 45–117)
ALT SERPL-CCNC: 13 U/L (ref 12–78)
ANION GAP SERPL CALC-SCNC: 7 MMOL/L (ref 5–15)
APPEARANCE UR: CLEAR
AST SERPL-CCNC: 10 U/L (ref 15–37)
BACTERIA URNS QL MICRO: NEGATIVE /HPF
BASOPHILS # BLD: 0 K/UL (ref 0–0.1)
BASOPHILS NFR BLD: 0 % (ref 0–1)
BILIRUB SERPL-MCNC: 1.1 MG/DL (ref 0.2–1)
BILIRUB UR QL: NEGATIVE
BUN SERPL-MCNC: 16 MG/DL (ref 6–20)
BUN/CREAT SERPL: 16 (ref 12–20)
CALCIUM SERPL-MCNC: 9.4 MG/DL (ref 8.5–10.1)
CHLORIDE SERPL-SCNC: 104 MMOL/L (ref 97–108)
CLUE CELLS VAG QL WET PREP: NORMAL
CO2 SERPL-SCNC: 25 MMOL/L (ref 21–32)
COLOR UR: ABNORMAL
CREAT SERPL-MCNC: 1.01 MG/DL (ref 0.55–1.02)
DIFFERENTIAL METHOD BLD: ABNORMAL
EOSINOPHIL # BLD: 0 K/UL (ref 0–0.4)
EOSINOPHIL NFR BLD: 0 % (ref 0–7)
EPITH CASTS URNS QL MICRO: ABNORMAL /LPF
ERYTHROCYTE [DISTWIDTH] IN BLOOD BY AUTOMATED COUNT: 14.2 % (ref 11.5–14.5)
GLOBULIN SER CALC-MCNC: 3.2 G/DL (ref 2–4)
GLUCOSE BLD STRIP.AUTO-MCNC: 263 MG/DL (ref 65–117)
GLUCOSE BLD STRIP.AUTO-MCNC: 282 MG/DL (ref 65–117)
GLUCOSE SERPL-MCNC: 423 MG/DL (ref 65–100)
GLUCOSE UR STRIP.AUTO-MCNC: >1000 MG/DL
HCT VFR BLD AUTO: 46.5 % (ref 35–47)
HGB BLD-MCNC: 15.1 G/DL (ref 11.5–16)
HGB UR QL STRIP: ABNORMAL
HYALINE CASTS URNS QL MICRO: ABNORMAL /LPF (ref 0–2)
IMM GRANULOCYTES # BLD AUTO: 0 K/UL (ref 0–0.04)
IMM GRANULOCYTES NFR BLD AUTO: 0 % (ref 0–0.5)
KETONES UR QL STRIP.AUTO: 15 MG/DL
LEUKOCYTE ESTERASE UR QL STRIP.AUTO: NEGATIVE
LYMPHOCYTES # BLD: 0.9 K/UL (ref 0.8–3.5)
LYMPHOCYTES NFR BLD: 11 % (ref 12–49)
MCH RBC QN AUTO: 27.3 PG (ref 26–34)
MCHC RBC AUTO-ENTMCNC: 32.5 G/DL (ref 30–36.5)
MCV RBC AUTO: 83.9 FL (ref 80–99)
MONOCYTES # BLD: 0.3 K/UL (ref 0–1)
MONOCYTES NFR BLD: 3 % (ref 5–13)
NEUTS SEG # BLD: 7.2 K/UL (ref 1.8–8)
NEUTS SEG NFR BLD: 86 % (ref 32–75)
NITRITE UR QL STRIP.AUTO: NEGATIVE
NRBC # BLD: 0 K/UL (ref 0–0.01)
NRBC BLD-RTO: 0 PER 100 WBC
NT PRO BNP: 1673 PG/ML
PH UR STRIP: 6 (ref 5–8)
PLATELET # BLD AUTO: 235 K/UL (ref 150–400)
PMV BLD AUTO: 11 FL (ref 8.9–12.9)
POTASSIUM SERPL-SCNC: 4.1 MMOL/L (ref 3.5–5.1)
PROT SERPL-MCNC: 6.6 G/DL (ref 6.4–8.2)
PROT UR STRIP-MCNC: 100 MG/DL
RBC # BLD AUTO: 5.54 M/UL (ref 3.8–5.2)
RBC #/AREA URNS HPF: ABNORMAL /HPF (ref 0–5)
SERVICE CMNT-IMP: ABNORMAL
SERVICE CMNT-IMP: ABNORMAL
SODIUM SERPL-SCNC: 136 MMOL/L (ref 136–145)
SP GR UR REFRACTOMETRY: 1.02
T VAGINALIS VAG QL WET PREP: NORMAL
TROPONIN I SERPL HS-MCNC: 11 NG/L (ref 0–51)
TROPONIN I SERPL HS-MCNC: 13 NG/L (ref 0–51)
TROPONIN I SERPL HS-MCNC: 19 NG/L (ref 0–51)
URINE CULTURE IF INDICATED: ABNORMAL
UROBILINOGEN UR QL STRIP.AUTO: 1 EU/DL (ref 0.2–1)
WBC # BLD AUTO: 8.5 K/UL (ref 3.6–11)
WBC URNS QL MICRO: ABNORMAL /HPF (ref 0–4)
YEAST: NORMAL

## 2024-06-25 PROCEDURE — 85025 COMPLETE CBC W/AUTO DIFF WBC: CPT

## 2024-06-25 PROCEDURE — 87591 N.GONORRHOEAE DNA AMP PROB: CPT

## 2024-06-25 PROCEDURE — 2580000003 HC RX 258: Performed by: INTERNAL MEDICINE

## 2024-06-25 PROCEDURE — 99285 EMERGENCY DEPT VISIT HI MDM: CPT

## 2024-06-25 PROCEDURE — 80053 COMPREHEN METABOLIC PANEL: CPT

## 2024-06-25 PROCEDURE — 82962 GLUCOSE BLOOD TEST: CPT

## 2024-06-25 PROCEDURE — 96374 THER/PROPH/DIAG INJ IV PUSH: CPT

## 2024-06-25 PROCEDURE — 6360000002 HC RX W HCPCS: Performed by: EMERGENCY MEDICINE

## 2024-06-25 PROCEDURE — 87210 SMEAR WET MOUNT SALINE/INK: CPT

## 2024-06-25 PROCEDURE — 6370000000 HC RX 637 (ALT 250 FOR IP): Performed by: EMERGENCY MEDICINE

## 2024-06-25 PROCEDURE — 81001 URINALYSIS AUTO W/SCOPE: CPT

## 2024-06-25 PROCEDURE — 71045 X-RAY EXAM CHEST 1 VIEW: CPT

## 2024-06-25 PROCEDURE — 87491 CHLMYD TRACH DNA AMP PROBE: CPT

## 2024-06-25 PROCEDURE — 93005 ELECTROCARDIOGRAM TRACING: CPT | Performed by: EMERGENCY MEDICINE

## 2024-06-25 PROCEDURE — G0378 HOSPITAL OBSERVATION PER HR: HCPCS

## 2024-06-25 PROCEDURE — 6370000000 HC RX 637 (ALT 250 FOR IP): Performed by: INTERNAL MEDICINE

## 2024-06-25 PROCEDURE — 83880 ASSAY OF NATRIURETIC PEPTIDE: CPT

## 2024-06-25 PROCEDURE — 36415 COLL VENOUS BLD VENIPUNCTURE: CPT

## 2024-06-25 PROCEDURE — 84484 ASSAY OF TROPONIN QUANT: CPT

## 2024-06-25 RX ORDER — ONDANSETRON 4 MG/1
4 TABLET, ORALLY DISINTEGRATING ORAL EVERY 8 HOURS PRN
Status: DISCONTINUED | OUTPATIENT
Start: 2024-06-25 | End: 2024-06-26 | Stop reason: HOSPADM

## 2024-06-25 RX ORDER — ACETAMINOPHEN 325 MG/1
650 TABLET ORAL EVERY 6 HOURS PRN
Status: DISCONTINUED | OUTPATIENT
Start: 2024-06-25 | End: 2024-06-26 | Stop reason: HOSPADM

## 2024-06-25 RX ORDER — POLYETHYLENE GLYCOL 3350 17 G/17G
17 POWDER, FOR SOLUTION ORAL DAILY PRN
Status: DISCONTINUED | OUTPATIENT
Start: 2024-06-25 | End: 2024-06-26 | Stop reason: HOSPADM

## 2024-06-25 RX ORDER — DIVALPROEX SODIUM 500 MG/1
500 TABLET, EXTENDED RELEASE ORAL 3 TIMES DAILY
Status: DISCONTINUED | OUTPATIENT
Start: 2024-06-25 | End: 2024-06-26 | Stop reason: HOSPADM

## 2024-06-25 RX ORDER — ATORVASTATIN CALCIUM 40 MG/1
80 TABLET, FILM COATED ORAL NIGHTLY
Status: DISCONTINUED | OUTPATIENT
Start: 2024-06-25 | End: 2024-06-26 | Stop reason: HOSPADM

## 2024-06-25 RX ORDER — ONDANSETRON 2 MG/ML
4 INJECTION INTRAMUSCULAR; INTRAVENOUS EVERY 6 HOURS PRN
Status: DISCONTINUED | OUTPATIENT
Start: 2024-06-25 | End: 2024-06-26 | Stop reason: HOSPADM

## 2024-06-25 RX ORDER — CLOPIDOGREL BISULFATE 75 MG/1
75 TABLET ORAL DAILY
Status: DISCONTINUED | OUTPATIENT
Start: 2024-06-26 | End: 2024-06-26 | Stop reason: HOSPADM

## 2024-06-25 RX ORDER — ISOSORBIDE MONONITRATE 30 MG/1
120 TABLET, EXTENDED RELEASE ORAL DAILY
Status: DISCONTINUED | OUTPATIENT
Start: 2024-06-25 | End: 2024-06-26 | Stop reason: HOSPADM

## 2024-06-25 RX ORDER — METOPROLOL SUCCINATE 25 MG/1
50 TABLET, EXTENDED RELEASE ORAL DAILY
Status: ON HOLD | COMMUNITY
End: 2024-06-26 | Stop reason: HOSPADM

## 2024-06-25 RX ORDER — PHENAZOPYRIDINE HYDROCHLORIDE 100 MG/1
200 TABLET, FILM COATED ORAL
Status: DISCONTINUED | OUTPATIENT
Start: 2024-06-25 | End: 2024-06-25

## 2024-06-25 RX ORDER — INSULIN LISPRO 100 [IU]/ML
0-4 INJECTION, SOLUTION INTRAVENOUS; SUBCUTANEOUS NIGHTLY
Status: DISCONTINUED | OUTPATIENT
Start: 2024-06-25 | End: 2024-06-26 | Stop reason: HOSPADM

## 2024-06-25 RX ORDER — PHENAZOPYRIDINE HYDROCHLORIDE 100 MG/1
200 TABLET, FILM COATED ORAL
Status: DISCONTINUED | OUTPATIENT
Start: 2024-06-25 | End: 2024-06-26 | Stop reason: HOSPADM

## 2024-06-25 RX ORDER — NITROGLYCERIN 0.4 MG/1
0.4 TABLET SUBLINGUAL EVERY 5 MIN PRN
Status: DISCONTINUED | OUTPATIENT
Start: 2024-06-25 | End: 2024-06-26 | Stop reason: HOSPADM

## 2024-06-25 RX ORDER — INSULIN LISPRO 100 [IU]/ML
0-8 INJECTION, SOLUTION INTRAVENOUS; SUBCUTANEOUS
Status: DISCONTINUED | OUTPATIENT
Start: 2024-06-25 | End: 2024-06-26 | Stop reason: HOSPADM

## 2024-06-25 RX ORDER — DULOXETIN HYDROCHLORIDE 30 MG/1
60 CAPSULE, DELAYED RELEASE ORAL DAILY
Status: DISCONTINUED | OUTPATIENT
Start: 2024-06-26 | End: 2024-06-26 | Stop reason: HOSPADM

## 2024-06-25 RX ORDER — MORPHINE SULFATE 4 MG/ML
4 INJECTION, SOLUTION INTRAMUSCULAR; INTRAVENOUS
Status: COMPLETED | OUTPATIENT
Start: 2024-06-25 | End: 2024-06-25

## 2024-06-25 RX ORDER — SODIUM CHLORIDE 0.9 % (FLUSH) 0.9 %
5-40 SYRINGE (ML) INJECTION PRN
Status: DISCONTINUED | OUTPATIENT
Start: 2024-06-25 | End: 2024-06-26 | Stop reason: HOSPADM

## 2024-06-25 RX ORDER — SODIUM CHLORIDE 0.9 % (FLUSH) 0.9 %
5-40 SYRINGE (ML) INJECTION EVERY 12 HOURS SCHEDULED
Status: DISCONTINUED | OUTPATIENT
Start: 2024-06-25 | End: 2024-06-26 | Stop reason: HOSPADM

## 2024-06-25 RX ORDER — INSULIN GLARGINE 100 [IU]/ML
30 INJECTION, SOLUTION SUBCUTANEOUS NIGHTLY
Status: DISCONTINUED | OUTPATIENT
Start: 2024-06-25 | End: 2024-06-26 | Stop reason: HOSPADM

## 2024-06-25 RX ORDER — METOPROLOL SUCCINATE 50 MG/1
50 TABLET, EXTENDED RELEASE ORAL DAILY
Status: DISCONTINUED | OUTPATIENT
Start: 2024-06-26 | End: 2024-06-26 | Stop reason: HOSPADM

## 2024-06-25 RX ORDER — ACETAMINOPHEN 650 MG/1
650 SUPPOSITORY RECTAL EVERY 6 HOURS PRN
Status: DISCONTINUED | OUTPATIENT
Start: 2024-06-25 | End: 2024-06-26 | Stop reason: HOSPADM

## 2024-06-25 RX ORDER — SODIUM CHLORIDE 9 MG/ML
INJECTION, SOLUTION INTRAVENOUS PRN
Status: DISCONTINUED | OUTPATIENT
Start: 2024-06-25 | End: 2024-06-26 | Stop reason: HOSPADM

## 2024-06-25 RX ORDER — INSULIN LISPRO 100 [IU]/ML
10 INJECTION, SOLUTION INTRAVENOUS; SUBCUTANEOUS
Status: DISCONTINUED | OUTPATIENT
Start: 2024-06-25 | End: 2024-06-26 | Stop reason: HOSPADM

## 2024-06-25 RX ORDER — FUROSEMIDE 40 MG/1
40 TABLET ORAL DAILY
Status: DISCONTINUED | OUTPATIENT
Start: 2024-06-25 | End: 2024-06-25

## 2024-06-25 RX ORDER — GABAPENTIN 300 MG/1
300 CAPSULE ORAL 3 TIMES DAILY
Status: DISCONTINUED | OUTPATIENT
Start: 2024-06-25 | End: 2024-06-26 | Stop reason: HOSPADM

## 2024-06-25 RX ADMIN — INSULIN LISPRO 4 UNITS: 100 INJECTION, SOLUTION INTRAVENOUS; SUBCUTANEOUS at 17:05

## 2024-06-25 RX ADMIN — SACUBITRIL AND VALSARTAN 1 TABLET: 49; 51 TABLET, FILM COATED ORAL at 21:24

## 2024-06-25 RX ADMIN — INSULIN GLARGINE 30 UNITS: 100 INJECTION, SOLUTION SUBCUTANEOUS at 21:26

## 2024-06-25 RX ADMIN — GABAPENTIN 300 MG: 300 CAPSULE ORAL at 21:24

## 2024-06-25 RX ADMIN — ISOSORBIDE MONONITRATE 120 MG: 30 TABLET, EXTENDED RELEASE ORAL at 14:54

## 2024-06-25 RX ADMIN — DIVALPROEX SODIUM 500 MG: 500 TABLET, EXTENDED RELEASE ORAL at 21:24

## 2024-06-25 RX ADMIN — NITROGLYCERIN 0.5 INCH: 20 OINTMENT TOPICAL at 13:00

## 2024-06-25 RX ADMIN — ACETAMINOPHEN 650 MG: 325 TABLET ORAL at 16:44

## 2024-06-25 RX ADMIN — MORPHINE SULFATE 4 MG: 4 INJECTION INTRAVENOUS at 12:03

## 2024-06-25 RX ADMIN — INSULIN LISPRO 10 UNITS: 100 INJECTION, SOLUTION INTRAVENOUS; SUBCUTANEOUS at 17:03

## 2024-06-25 RX ADMIN — GABAPENTIN 300 MG: 300 CAPSULE ORAL at 14:55

## 2024-06-25 RX ADMIN — ATORVASTATIN CALCIUM 80 MG: 40 TABLET, FILM COATED ORAL at 21:24

## 2024-06-25 RX ADMIN — PHENAZOPYRIDINE 200 MG: 100 TABLET ORAL at 16:42

## 2024-06-25 RX ADMIN — APIXABAN 5 MG: 5 TABLET, FILM COATED ORAL at 21:24

## 2024-06-25 RX ADMIN — SODIUM CHLORIDE, PRESERVATIVE FREE 10 ML: 5 INJECTION INTRAVENOUS at 21:28

## 2024-06-25 ASSESSMENT — PAIN SCALES - GENERAL
PAINLEVEL_OUTOF10: 7
PAINLEVEL_OUTOF10: 5
PAINLEVEL_OUTOF10: 4
PAINLEVEL_OUTOF10: 7
PAINLEVEL_OUTOF10: 8

## 2024-06-25 ASSESSMENT — PAIN DESCRIPTION - LOCATION
LOCATION: BACK;PELVIS
LOCATION: BACK
LOCATION: CHEST

## 2024-06-25 ASSESSMENT — PAIN - FUNCTIONAL ASSESSMENT: PAIN_FUNCTIONAL_ASSESSMENT: ACTIVITIES ARE NOT PREVENTED

## 2024-06-25 ASSESSMENT — PAIN DESCRIPTION - FREQUENCY: FREQUENCY: INTERMITTENT

## 2024-06-25 ASSESSMENT — LIFESTYLE VARIABLES
HOW MANY STANDARD DRINKS CONTAINING ALCOHOL DO YOU HAVE ON A TYPICAL DAY: PATIENT DOES NOT DRINK
HOW OFTEN DO YOU HAVE A DRINK CONTAINING ALCOHOL: NEVER

## 2024-06-25 ASSESSMENT — PAIN DESCRIPTION - ORIENTATION: ORIENTATION: MID

## 2024-06-25 ASSESSMENT — PAIN DESCRIPTION - DESCRIPTORS: DESCRIPTORS: ACHING

## 2024-06-25 ASSESSMENT — PAIN DESCRIPTION - PAIN TYPE: TYPE: ACUTE PAIN

## 2024-06-25 ASSESSMENT — PAIN DESCRIPTION - ONSET: ONSET: ON-GOING

## 2024-06-25 NOTE — H&P
/hpf    RBC, UA 0-5 0 - 5 /hpf    Epithelial Cells, UA MODERATE (A) FEW /lpf    BACTERIA, URINE Negative NEG /hpf    Urine Culture if Indicated CULTURE NOT INDICATED BY UA RESULT CNI      Hyaline Casts, UA 0-2 0 - 2 /lpf         XR CHEST PORTABLE    Result Date: 6/25/2024  INDICATION: CP  COMPARISON: 5/25/2020 FINDINGS: AP portable view of the chest demonstrates prior median sternotomy. Heart size is normal. There is no edema, effusion, consolidation, or pneumothorax. The osseous structures are unremarkable.     No acute process. Electronically signed by Abdoulaye Ornelas     _______________________________________________________________________    TOTAL TIME:  76 Minutes    Critical Care Provided     Minutes non procedure based    Signed: Tab Grayson MD    Procedures: see electronic medical records for all procedures/Xrays and details which were not copied into this note but were reviewed prior to creation of Plan.

## 2024-06-25 NOTE — ED NOTES
TRANSFER - OUT REPORT:    Verbal report given to Mary DRAKE on [facility]  being transferred to [facility] for routine progression of patient care       Report consisted of patient's Situation, Background, Assessment and   Recommendations(SBAR).     Information from the following report(s) Nurse Handoff Report, ED Encounter Summary, ED SBAR, and MAR was reviewed with the receiving nurse.    [place] Fall Assessment:    Presents to emergency department  because of falls (Syncope, seizure, or loss of consciousness): No  Age > 70: No  Altered Mental Status, Intoxication with alcohol or substance confusion (Disorientation, impaired judgment, poor safety awaremess, or inability to follow instructions): No  Impaired Mobility: Ambulates or transfers with assistive devices or assistance; Unable to ambulate or transer.: Yes  Nursing Judgement: Yes          Lines:   Peripheral IV 06/25/24 Right;Anterior Basilic (Active)   Site Assessment Clean, dry & intact 06/25/24 1110   Phlebitis Assessment No symptoms 06/25/24 1110   Infiltration Assessment 0 06/25/24 1110   Dressing Status Clean, dry & intact 06/25/24 1110   Dressing Type Transparent 06/25/24 1110        Opportunity for questions and clarification was provided.      Patient transported with:  Monitor and Tech

## 2024-06-25 NOTE — ED PROVIDER NOTES
(has no administration in time range)   polyethylene glycol (GLYCOLAX) packet 17 g (has no administration in time range)   acetaminophen (TYLENOL) tablet 650 mg (has no administration in time range)     Or   acetaminophen (TYLENOL) suppository 650 mg (has no administration in time range)   phenazopyridine (PYRIDIUM) tablet 200 mg (has no administration in time range)   morphine sulfate (PF) injection 4 mg (4 mg IntraVENous Given 6/25/24 1203)   nitroglycerin (NITRO-BID) 2 % ointment 0.5 inch (0.5 inches Topical Given 6/25/24 1300)       Medical Decision Making  Amount and/or Complexity of Data Reviewed  Labs: ordered. Decision-making details documented in ED Course.  Radiology: ordered. Decision-making details documented in ED Course.  ECG/medicine tests: ordered and independent interpretation performed. Decision-making details documented in ED Course.  Discussion of management or test interpretation with external provider(s): Hospitalist    Risk  Prescription drug management.  Drug therapy requiring intensive monitoring for toxicity.  Decision regarding hospitalization.      48-year-old female presenting to the emergency department with chest pain onset yesterday, no aggravating or alleviating factors, nonexertional.  She is afebrile and vital signs are stable.  States that her pain is normally resolved with nitroglycerin but this did not help today and pain has been ongoing.  She has known coronary disease and states that she has a chronically occluded vessel \"that my cardiologist will not do anything about\".  She states that she is frustrated because \"medications are not working\".  She follows with [facility] and I am unable to see the records.  EKG is nonischemic, initial troponin negative at 13.  She has received multiple doses of morphine, sublingual nitroglycerin, and I have started nitroglycerin paste without significant improvement in her symptoms.  Given her known coronary disease discussed

## 2024-06-25 NOTE — PROGRESS NOTES
End of Shift Note    Bedside shift change report given to NOELLE Anderson (oncoming nurse) by Ariel Steele RN (offgoing nurse).  Report included the following information SBAR, MAR, Cardiac Rhythm NSR, and Quality Measures    Shift worked:  7a-7p     Shift summary and any significant changes:     New admit from ED, Pt is complaining of chest,back and pelvis pain. MD is aware     Concerns for physician to address:  Pt has chronic chest pain tht she stated she takes up to 6 nitro tablets.      Zone phone for oncoming shift:          Activity:     Number times ambulated in hallways past shift: 0  Number of times OOB to chair past shift: 1    Cardiac:   Cardiac Monitoring: Yes           Access:  Current line(s): PIV     Genitourinary:   Urinary status: voiding    Respiratory:      Chronic home O2 use?: NO  Incentive spirometer at bedside: NO       GI:     Current diet:  ADULT DIET; Regular; 4 carb choices (60 gm/meal); Low Fat/Low Chol/High Fiber/2 gm Na  Diet NPO  Passing flatus: YES  Tolerating current diet: YES       Pain Management:   Patient states pain is manageable on current regimen: YES    Skin:     Interventions: increase time out of bed    Patient Safety:  Fall Score:    Interventions: bed/chair alarm, gripper socks, and pt to call before getting OOB       Length of Stay:  Expected LOS: 2  Actual LOS: 0      Ariel Steele RN

## 2024-06-25 NOTE — CARE COORDINATION
who? No   Financial Resources Medicare;Medicaid   Social/Functional History   Lives With Alone  (DaughterBola, currently staying with pt)   Type of Home Apartment   Home Layout One level   Home Access Stairs to enter with rails   Entrance Stairs - Number of Steps 8   Bathroom Shower/Tub None   Bathroom Equipment None   Home Equipment None   Receives Help From Family   ADL Assistance Independent   Homemaking Assistance Independent   Homemaking Responsibilities Yes   Ambulation Assistance Independent   Transfer Assistance Independent   Active  Yes   Mode of Transportation Car   Occupation Full time employment   Discharge Planning   Type of Residence Apartment   Living Arrangements Alone  (Daughter staying with pt during the summer)   Current Services Prior To Admission None   Potential Assistance Needed N/A   Patient expects to be discharged to: Apartment         Advance Care Planning     General Advance Care Planning (ACP) Conversation    Date of Conversation: 6/25/2024  Conducted with: Patient with Decision Making Capacity  Other persons present: None    Healthcare Decision Maker:   Primary Decision Maker: Bola Kaur - Child - [phone]  Click here to complete Healthcare Decision Makers including selection of the Healthcare Decision Maker Relationship (ie \"Primary\").   Today we documented Decision Maker(s) consistent with Legal Next of Kin hierarchy.    Content/Action Overview:  Has NO ACP documents-Information provided  Reviewed DNR/DNI and patient elects Full Code (Attempt Resuscitation)      Length of Voluntary ACP Conversation in minutes:  <16 minutes (Non-Billable)    NAVEED Maria.  Care Manager, [facility]  [phone]/Available on Perfect Serve

## 2024-06-25 NOTE — PLAN OF CARE
Problem: Discharge Planning  Goal: Discharge to home or other facility with appropriate resources  Outcome: Progressing     Problem: Pain  Goal: Verbalizes/displays adequate comfort level or baseline comfort level  Outcome: Progressing      1.Take ibuprofen 600mg by mouth with food every 6  hours as needed for pain or swelling.  2.Use heating pad every 4 hours for pain relief , to the area of pain  3. No heavy lifting over 15 lbs, or strenuous exercise until pain is resolved.  4.Followup with your physician in 48-72 hours  5.Return to ED immediately for any numbness or weakness in your legs, bowel or bladder issues, fever, or persisting or worsening symptoms

## 2024-06-26 ENCOUNTER — APPOINTMENT (OUTPATIENT)
Facility: HOSPITAL | Age: 49
End: 2024-06-26
Attending: INTERNAL MEDICINE
Payer: MEDICARE

## 2024-06-26 VITALS
SYSTOLIC BLOOD PRESSURE: 147 MMHG | RESPIRATION RATE: 18 BRPM | OXYGEN SATURATION: 100 % | WEIGHT: 142 LBS | HEART RATE: 64 BPM | TEMPERATURE: 98.5 F | HEIGHT: 63 IN | DIASTOLIC BLOOD PRESSURE: 77 MMHG | BODY MASS INDEX: 25.16 KG/M2

## 2024-06-26 DIAGNOSIS — I20.9 ANGINA PECTORIS (HCC): Primary | ICD-10-CM

## 2024-06-26 LAB
C TRACH DNA SPEC QL NAA+PROBE: NEGATIVE
EKG ATRIAL RATE: 80 BPM
EKG DIAGNOSIS: NORMAL
EKG P AXIS: 46 DEGREES
EKG P-R INTERVAL: 128 MS
EKG Q-T INTERVAL: 424 MS
EKG QRS DURATION: 106 MS
EKG QTC CALCULATION (BAZETT): 489 MS
EKG R AXIS: 86 DEGREES
EKG T AXIS: 62 DEGREES
EKG VENTRICULAR RATE: 80 BPM
GLUCOSE BLD STRIP.AUTO-MCNC: 147 MG/DL (ref 65–117)
GLUCOSE BLD STRIP.AUTO-MCNC: 232 MG/DL (ref 65–117)
N GONORRHOEA DNA SPEC QL NAA+PROBE: NEGATIVE
SAMPLE TYPE: NORMAL
SERVICE CMNT-IMP: ABNORMAL
SERVICE CMNT-IMP: ABNORMAL
SERVICE CMNT-IMP: NORMAL
SPECIMEN SOURCE: NORMAL
TROPONIN I SERPL HS-MCNC: 17 NG/L (ref 0–51)

## 2024-06-26 PROCEDURE — 82962 GLUCOSE BLOOD TEST: CPT

## 2024-06-26 PROCEDURE — 6370000000 HC RX 637 (ALT 250 FOR IP): Performed by: STUDENT IN AN ORGANIZED HEALTH CARE EDUCATION/TRAINING PROGRAM

## 2024-06-26 PROCEDURE — G0378 HOSPITAL OBSERVATION PER HR: HCPCS

## 2024-06-26 PROCEDURE — 84484 ASSAY OF TROPONIN QUANT: CPT

## 2024-06-26 PROCEDURE — 6370000000 HC RX 637 (ALT 250 FOR IP): Performed by: INTERNAL MEDICINE

## 2024-06-26 PROCEDURE — 2580000003 HC RX 258: Performed by: INTERNAL MEDICINE

## 2024-06-26 PROCEDURE — 36415 COLL VENOUS BLD VENIPUNCTURE: CPT

## 2024-06-26 RX ORDER — RANOLAZINE 500 MG/1
500 TABLET, EXTENDED RELEASE ORAL 2 TIMES DAILY
Status: DISCONTINUED | OUTPATIENT
Start: 2024-06-26 | End: 2024-06-26 | Stop reason: HOSPADM

## 2024-06-26 RX ORDER — PHENAZOPYRIDINE HYDROCHLORIDE 200 MG/1
200 TABLET, FILM COATED ORAL
Qty: 9 TABLET | Refills: 0 | Status: SHIPPED | OUTPATIENT
Start: 2024-06-26 | End: 2024-06-29

## 2024-06-26 RX ORDER — NITROGLYCERIN 0.4 MG/1
0.4 TABLET SUBLINGUAL EVERY 5 MIN PRN
Qty: 25 TABLET | Refills: 0 | Status: SHIPPED | OUTPATIENT
Start: 2024-06-26

## 2024-06-26 RX ORDER — ATORVASTATIN CALCIUM 80 MG/1
TABLET, FILM COATED ORAL
Qty: 30 TABLET | Refills: 0 | Status: SHIPPED | OUTPATIENT
Start: 2024-06-26

## 2024-06-26 RX ORDER — ISOSORBIDE MONONITRATE 120 MG/1
120 TABLET, EXTENDED RELEASE ORAL DAILY
Qty: 90 TABLET | Refills: 0 | Status: SHIPPED | OUTPATIENT
Start: 2024-06-26

## 2024-06-26 RX ORDER — SACUBITRIL AND VALSARTAN 49; 51 MG/1; MG/1
1 TABLET, FILM COATED ORAL 2 TIMES DAILY
Qty: 60 TABLET | Refills: 0 | Status: SHIPPED | OUTPATIENT
Start: 2024-06-26 | End: 2024-07-26

## 2024-06-26 RX ORDER — RANOLAZINE 500 MG/1
500 TABLET, EXTENDED RELEASE ORAL 2 TIMES DAILY
Qty: 60 TABLET | Refills: 0 | Status: SHIPPED | OUTPATIENT
Start: 2024-06-26

## 2024-06-26 RX ORDER — CLOPIDOGREL BISULFATE 75 MG/1
75 TABLET ORAL DAILY
Qty: 30 TABLET | Refills: 0 | Status: SHIPPED | OUTPATIENT
Start: 2024-06-26 | End: 2024-07-26

## 2024-06-26 RX ORDER — METOPROLOL SUCCINATE 50 MG/1
50 TABLET, EXTENDED RELEASE ORAL DAILY
Qty: 30 TABLET | Refills: 0 | Status: SHIPPED | OUTPATIENT
Start: 2024-06-27

## 2024-06-26 RX ORDER — GABAPENTIN 300 MG/1
300 CAPSULE ORAL 3 TIMES DAILY
Qty: 21 CAPSULE | Refills: 0 | Status: SHIPPED | OUTPATIENT
Start: 2024-06-26 | End: 2024-07-03

## 2024-06-26 RX ADMIN — APIXABAN 5 MG: 5 TABLET, FILM COATED ORAL at 10:48

## 2024-06-26 RX ADMIN — DULOXETINE HYDROCHLORIDE 60 MG: 30 CAPSULE, DELAYED RELEASE ORAL at 09:24

## 2024-06-26 RX ADMIN — GABAPENTIN 300 MG: 300 CAPSULE ORAL at 12:57

## 2024-06-26 RX ADMIN — INSULIN LISPRO 10 UNITS: 100 INJECTION, SOLUTION INTRAVENOUS; SUBCUTANEOUS at 13:59

## 2024-06-26 RX ADMIN — SODIUM CHLORIDE, PRESERVATIVE FREE 10 ML: 5 INJECTION INTRAVENOUS at 09:27

## 2024-06-26 RX ADMIN — CLOPIDOGREL BISULFATE 75 MG: 75 TABLET ORAL at 09:23

## 2024-06-26 RX ADMIN — METOPROLOL SUCCINATE 50 MG: 50 TABLET, EXTENDED RELEASE ORAL at 09:23

## 2024-06-26 RX ADMIN — PHENAZOPYRIDINE 200 MG: 100 TABLET ORAL at 09:24

## 2024-06-26 RX ADMIN — ISOSORBIDE MONONITRATE 120 MG: 30 TABLET, EXTENDED RELEASE ORAL at 09:24

## 2024-06-26 RX ADMIN — PHENAZOPYRIDINE 200 MG: 100 TABLET ORAL at 12:57

## 2024-06-26 RX ADMIN — DIVALPROEX SODIUM 500 MG: 500 TABLET, EXTENDED RELEASE ORAL at 09:23

## 2024-06-26 RX ADMIN — GABAPENTIN 300 MG: 300 CAPSULE ORAL at 09:23

## 2024-06-26 RX ADMIN — RANOLAZINE 500 MG: 500 TABLET, EXTENDED RELEASE ORAL at 13:43

## 2024-06-26 RX ADMIN — INSULIN LISPRO 5 UNITS: 100 INJECTION, SOLUTION INTRAVENOUS; SUBCUTANEOUS at 09:20

## 2024-06-26 RX ADMIN — DIVALPROEX SODIUM 500 MG: 500 TABLET, EXTENDED RELEASE ORAL at 12:57

## 2024-06-26 RX ADMIN — SACUBITRIL AND VALSARTAN 1 TABLET: 49; 51 TABLET, FILM COATED ORAL at 09:23

## 2024-06-26 ASSESSMENT — PAIN DESCRIPTION - LOCATION
LOCATION: PELVIS
LOCATION: PELVIS

## 2024-06-26 ASSESSMENT — PAIN SCALES - GENERAL
PAINLEVEL_OUTOF10: 7
PAINLEVEL_OUTOF10: 7

## 2024-06-26 NOTE — PLAN OF CARE
Problem: Discharge Planning  Goal: Discharge to home or other facility with appropriate resources  6/26/2024 1034 by Ariel Steele RN  Outcome: Progressing  6/25/2024 2210 by Blessed Camacho RN  Outcome: Progressing     Problem: Pain  Goal: Verbalizes/displays adequate comfort level or baseline comfort level  6/26/2024 1034 by Ariel Steele RN  Outcome: Progressing  6/25/2024 2210 by Blessed Camacho RN  Outcome: Progressing     Problem: Safety - Adult  Goal: Free from fall injury  6/26/2024 1034 by Ariel Steele RN  Outcome: Progressing  6/25/2024 2210 by Blessed Camacho RN  Outcome: Progressing

## 2024-06-26 NOTE — PROGRESS NOTES
[address] [place] [address] follow-up CARDIO transitional care appointment has been scheduled with Dr. Angela Felton on 7/5/24 0800. This is a previously scheduled appt. [facility] placed [facility] information AVS for patient resource. Pending patient discharge.  Karuna Beaulieu Care Management Assistant     [facility] follow-up transitional care appointment has been scheduled with RUKHSANA Box on 7/5/24 1100. [facility] placed [facility] information AVS for patient resource.   Pending patient discharge.  Karuna Beaulieu Care Management Assistant

## 2024-06-26 NOTE — CARDIO/PULMONARY
Chart reviewed: Patient is 48 y.o. female admitted with Acute chest pain [R07.9]  Chest pain, unspecified type [R07.9]    Education: CAD education folder to the bedside of Eder Kaur.    Consult received for Stable Angina. Pt educated regarding heart healthy lifestyle and enrolling in cardiac rehab. Pt agreeable and Intake scheduled for 07/24 @ 0900.       Per chart review, most recent EF 26-30% per Echo on 8/5/19    Pt is a current smoker. Quit Now flyer added to AVS    Ananda Brown RN

## 2024-06-26 NOTE — DISCHARGE SUMMARY
Discharge Summary    Name: Eder Kaur  [id number]  YOB: 1975 (Age: 48 y.o.)   Date of Admission: 6/25/2024  Date of Discharge: 6/26/2024  Attending Physician: Yojana Bynum MD    Discharge Diagnosis:   Atypical chest pain POA  Rule out ACS-low index of suspicion  CAD status post CABG status post stents  PAD status post right iliac stent  Chronic systolic CHF-30 to 35% EF as per patient  Diabetes type 1  Bipolar disorder  Chronic pain syndrome with opiate use disorder, stimulant use disorder  Fibromyalgia  History of recurrent DVT-on chronic Eliquis  GERD        Consultations:  IP CONSULT TO CARDIOLOGY  IP CONSULT TO CARDIAC REHAB      Brief Admission History/Reason for Admission Per Tab Grayson MD: Eder Kaur is a 48 y.o.  female with PMHx significant for CAD status post MI status post stents status post remote CABG, systolic heart failure, recurrent DVTs on Eliquis, PAD status post stent on dual antiplatelet agent and Lipitor, bipolar disorder with depression and suicidal attempts in the past with fibromyalgia and opioid /substance abuse disorder comes in with chief complaint of atypical chest pain lower abdominal pain/pelvic pain for the past 2 days.  Patient had unremarkable workup in the ED including UA and 2 sets of cardiac enzymes, chest x-ray being clear.  Patient suspects to have having STDs-test sent from ED pending, no evidence of sepsis/fever.     We were asked to admit for work up and evaluation of the above problems.         Brief Hospital Course by Main Problems:   Atypical chest pain POA  Rule out ACS-low index of suspicion  CAD status post CABG status post stents  PAD status post right iliac stent  Chronic systolic CHF-30 to 35% EF as per patient  Diabetes type 1  Bipolar disorder-with history of suicide attempt via intentional overdose   Chronic pain syndrome with opiate use disorder, stimulant use

## 2024-06-26 NOTE — PROGRESS NOTES
Abby of Shift Note    Bedside shift change report given to Ariel Rn (oncoming nurse) by Blessed Doug RN (offgoing nurse).  Report included the following information SBAR, Kardex, ED Summary, Cardiac Rhythm normal sinus rhythm, and Alarm Parameters     Shift worked:  7p-7a     Shift summary and any significant changes:     Patient alert and oriented to self,place,time and situation voiced once of some chest pain which did not radiate to any part of the body.Patient vital signs satisfactory currently patient is nil by mouth from midnight pending a cardiology consult.     Concerns for physician to address:  Patient states that prescribed tylenol is not helping with her chest pain or discomfort.     Zone phone for oncoming shift:   [phone]       Activity:     Number times ambulated in hallways past shift: 0  Number of times OOB to chair past shift: 2    Cardiac:   Cardiac Monitoring: Yes           Access:  Current line(s): PIV     Genitourinary:   Urinary status: voiding    Respiratory:      Chronic home O2 use?: N/A  Incentive spirometer at bedside: N/A       GI:     Current diet:  Diet NPO  Passing flatus: YES  Tolerating current diet: YES       Pain Management:   Patient states pain is manageable on current regimen: No    Skin:     Interventions: increase time out of bed    Patient Safety:  Fall Score:    Interventions: gripper socks and pt to call before getting OOB       Length of Stay:  Expected LOS: 2  Actual LOS: 0      Blessed Doug RN

## 2024-06-26 NOTE — PLAN OF CARE
Problem: Discharge Planning  Goal: Discharge to home or other facility with appropriate resources  6/26/2024 1258 by Nita Gambino RN  Outcome: Adequate for Discharge  6/26/2024 1034 by Ariel Steele RN  Outcome: Progressing     Problem: Pain  Goal: Verbalizes/displays adequate comfort level or baseline comfort level  6/26/2024 1258 by Nita Gambino RN  Outcome: Adequate for Discharge  6/26/2024 1034 by Ariel Steele RN  Outcome: Progressing     Problem: Safety - Adult  Goal: Free from fall injury  6/26/2024 1258 by Nita Gambino RN  Outcome: Adequate for Discharge  6/26/2024 1034 by Ariel Steele RN  Outcome: Progressing

## 2024-06-26 NOTE — PLAN OF CARE
Problem: Pain  Goal: Verbalizes/displays adequate comfort level or baseline comfort level  6/25/2024 2210 by Blessed Camacho RN  Outcome: Progressing  6/25/2024 1603 by Ariel Steele RN  Outcome: Progressing   Patient continues to voice of some chest discomfort  Problem: Pain  Goal: Verbalizes/displays adequate comfort level or baseline comfort level  6/25/2024 2210 by Blessed Camacho RN  Outcome: Progressing  6/25/2024 1603 by Ariel Steele RN  Outcome: Progressing     Problem: Safety - Adult  Goal: Free from fall injury  Outcome: Progressing   Patient remains free from falls or injury

## 2024-06-26 NOTE — PROGRESS NOTES
Attempted to schedule PCP hospital follow up appointment. Unable to reach anyone, unable to leave voicemail. [facility] placed [facility] information AVS for patient resource.  Pending patient discharge. Jessie Dyer, Care Management Assistant

## 2024-06-26 NOTE — CONSULTS
Called to see the patient for chest pain    48-year-old female with history of coronary artery disease, cardiomyopathy (left ventricular ejection fraction 40% at [facility] in the past), peripheral arterial disease status post interventions, diabetes mellitus type 1, bipolar disorder, chronic pain syndrome, fibromyalgia, recurrent deep venous thrombosis lifelong Eliquis    The patient was seen and examined at the bedside.  She has longstanding constant chest pain that had worsened that led to this presentation.  She had a heart catheterization procedure with [facility] in February of this year and was noted to have 1 small vessel with blockages that was not amenable to percutaneous coronary intervention.  The patient reports 3-4 pillow orthopnea with no recent change, possible PND.    On examination, vital signs are stable with the exception of a few diastolic blood pressures in the 40s and 50s.  There is no evidence of jugular venous distention.    Serial troponin levels have been negative  Twelve-lead electrocardiogram with no clear evidence of ischemia  Telemetry shows sinus rhythm with no arrhythmias    Noted hallux valgus deformity of both feet  Both feet are normal in color, warm to touch  Left DP is 2+, right DP is nonpalpable    1.  Coronary artery disease, status post stent placement (history of CABG), angina pectoris: She does not appear to have major side effects from beta-blocker, nitrate but seems to have limitation of low blood pressures for further up titration.  Will add ranolazine 500 mg twice a day (QT interval okay).  Recommend cardiac rehab    Continue clopidogrel in the meantime.    2.  Cardiomyopathy: Continue beta-blocker.  I have advised the patient to cut sacubitril-valsartan/Entresto in half in order to avoid diastolic hypotension.    3.  Peripheral arterial disease: Outpatient follow-up    The patient is okay for outpatient follow-up from cardiology standpoint.  The patient would like

## 2024-06-28 ENCOUNTER — CLINICAL DOCUMENTATION (OUTPATIENT)
Facility: CLINIC | Age: 49
End: 2024-06-28

## 2024-06-28 NOTE — PROGRESS NOTES
Care Transitions Initial Follow Up Call    Outreach made within 2 business days of discharge: Yes    Patient: Eder Kaur Patient : 1975   MRN: [id number]  Reason for Admission: There are no discharge diagnoses documented for the most recent discharge.  Discharge Date: 24       Spoke with: Unable to leave message, mailbox full     Discharge department/facility: [facility]    Scheduled appointment with PCP within 7-14 days    Follow Up  Future Appointments   Date Time Provider Department Center   2024 11:00 AM Melinda Pierson, APRN - NP [facility] BS AMB   2024  9:00 AM [facility] CARDIOPULM INTAKE [facility]   2024 11:30 AM Adriel Ospina MD RDE [facility] [address] BS AMB       BERTA MERCER LPN

## 2024-07-03 ENCOUNTER — OFFICE VISIT (OUTPATIENT)
Facility: CLINIC | Age: 49
End: 2024-07-03
Payer: MEDICARE

## 2024-07-03 VITALS
SYSTOLIC BLOOD PRESSURE: 124 MMHG | OXYGEN SATURATION: 98 % | HEART RATE: 71 BPM | DIASTOLIC BLOOD PRESSURE: 70 MMHG | HEIGHT: 63 IN | RESPIRATION RATE: 18 BRPM | BODY MASS INDEX: 28.1 KG/M2 | TEMPERATURE: 98.1 F | WEIGHT: 158.6 LBS

## 2024-07-03 DIAGNOSIS — R30.0 DYSURIA: Primary | ICD-10-CM

## 2024-07-03 DIAGNOSIS — R30.0 DYSURIA: ICD-10-CM

## 2024-07-03 LAB
BILIRUBIN, URINE, POC: NEGATIVE
BLOOD URINE, POC: ABNORMAL
COMMENT:: NORMAL
GLUCOSE URINE, POC: 500
KETONES, URINE, POC: NEGATIVE
LEUKOCYTE ESTERASE, URINE, POC: ABNORMAL
NITRITE, URINE, POC: POSITIVE
PH, URINE, POC: 6 (ref 4.6–8)
PROTEIN,URINE, POC: 100
SPECIFIC GRAVITY, URINE, POC: 1.02 (ref 1–1.03)
SPECIMEN HOLD: NORMAL
URINALYSIS CLARITY, POC: ABNORMAL
URINALYSIS COLOR, POC: YELLOW
UROBILINOGEN, POC: NORMAL

## 2024-07-03 PROCEDURE — 4004F PT TOBACCO SCREEN RCVD TLK: CPT | Performed by: INTERNAL MEDICINE

## 2024-07-03 PROCEDURE — G8427 DOCREV CUR MEDS BY ELIG CLIN: HCPCS | Performed by: INTERNAL MEDICINE

## 2024-07-03 PROCEDURE — G8417 CALC BMI ABV UP PARAM F/U: HCPCS | Performed by: INTERNAL MEDICINE

## 2024-07-03 PROCEDURE — 99213 OFFICE O/P EST LOW 20 MIN: CPT | Performed by: INTERNAL MEDICINE

## 2024-07-03 PROCEDURE — 81003 URINALYSIS AUTO W/O SCOPE: CPT | Performed by: INTERNAL MEDICINE

## 2024-07-03 RX ORDER — GABAPENTIN 300 MG/1
300 CAPSULE ORAL 3 TIMES DAILY
Qty: 21 CAPSULE | Refills: 0 | Status: CANCELLED | OUTPATIENT
Start: 2024-07-03 | End: 2024-07-10

## 2024-07-03 RX ORDER — SULFAMETHOXAZOLE AND TRIMETHOPRIM 800; 160 MG/1; MG/1
1 TABLET ORAL 2 TIMES DAILY
Qty: 14 TABLET | Refills: 0 | Status: SHIPPED | OUTPATIENT
Start: 2024-07-03 | End: 2024-07-10

## 2024-07-03 RX ORDER — NICOTINE 21 MG/24HR
1 PATCH, TRANSDERMAL 24 HOURS TRANSDERMAL DAILY
COMMUNITY
Start: 2024-03-29

## 2024-07-03 ASSESSMENT — PATIENT HEALTH QUESTIONNAIRE - PHQ9
10. IF YOU CHECKED OFF ANY PROBLEMS, HOW DIFFICULT HAVE THESE PROBLEMS MADE IT FOR YOU TO DO YOUR WORK, TAKE CARE OF THINGS AT HOME, OR GET ALONG WITH OTHER PEOPLE: NOT DIFFICULT AT ALL
4. FEELING TIRED OR HAVING LITTLE ENERGY: NOT AT ALL
SUM OF ALL RESPONSES TO PHQ QUESTIONS 1-9: 3
3. TROUBLE FALLING OR STAYING ASLEEP: NEARLY EVERY DAY
SUM OF ALL RESPONSES TO PHQ QUESTIONS 1-9: 3
7. TROUBLE CONCENTRATING ON THINGS, SUCH AS READING THE NEWSPAPER OR WATCHING TELEVISION: NOT AT ALL
SUM OF ALL RESPONSES TO PHQ QUESTIONS 1-9: 3
1. LITTLE INTEREST OR PLEASURE IN DOING THINGS: NOT AT ALL
SUM OF ALL RESPONSES TO PHQ9 QUESTIONS 1 & 2: 0
2. FEELING DOWN, DEPRESSED OR HOPELESS: NOT AT ALL
SUM OF ALL RESPONSES TO PHQ QUESTIONS 1-9: 3
9. THOUGHTS THAT YOU WOULD BE BETTER OFF DEAD, OR OF HURTING YOURSELF: NOT AT ALL
8. MOVING OR SPEAKING SO SLOWLY THAT OTHER PEOPLE COULD HAVE NOTICED. OR THE OPPOSITE, BEING SO FIGETY OR RESTLESS THAT YOU HAVE BEEN MOVING AROUND A LOT MORE THAN USUAL: NOT AT ALL
5. POOR APPETITE OR OVEREATING: NOT AT ALL
6. FEELING BAD ABOUT YOURSELF - OR THAT YOU ARE A FAILURE OR HAVE LET YOURSELF OR YOUR FAMILY DOWN: NOT AT ALL

## 2024-07-03 NOTE — PROGRESS NOTES
Eder Kaur is a 48 y.o. female  Chief Complaint   Patient presents with    Urinary Tract Infection     Painful when urinating    Diabetes     PHQ-9 Total Score: 3 (7/3/2024 10:01 AM)  Thoughts that you would be better off dead, or of hurting yourself in some way: 0 (7/3/2024 10:01 AM)    /70 (Site: Left Upper Arm, Position: Sitting, Cuff Size: Small Adult)   Pulse 71   Temp 98.1 °F (36.7 °C)   Resp 18   Ht 1.6 m (5' 3\")   Wt 71.9 kg (158 lb 9.6 oz)   SpO2 98%   BMI 28.09 kg/m²     
(RANEXA) 500 MG extended release tablet, Take 1 tablet by mouth 2 times daily, Disp: 60 tablet, Rfl: 0    atorvastatin (LIPITOR) 80 MG tablet, TAKE 1 TABLET BY MOUTH ONCE DAILY NIGHTLY, Disp: 30 tablet, Rfl: 0    metoprolol succinate (TOPROL XL) 50 MG extended release tablet, Take 1 tablet by mouth daily, Disp: 30 tablet, Rfl: 0    sacubitril-valsartan (ENTRESTO) 49-51 MG per tablet, Take 1 tablet by mouth 2 times daily, Disp: 60 tablet, Rfl: 0    clopidogrel (PLAVIX) 75 MG tablet, Take 1 tablet by mouth daily, Disp: 30 tablet, Rfl: 0    gabapentin (NEURONTIN) 300 MG capsule, Take 1 capsule by mouth 3 times daily for 7 days. Max Daily Amount: 900 mg, Disp: 21 capsule, Rfl: 0    divalproex (DEPAKOTE ER) 500 MG extended release tablet, TAKE 1 TABLET BY MOUTH THREE TIMES DAILY, Disp: 270 tablet, Rfl: 0    DULoxetine (CYMBALTA) 60 MG extended release capsule, Take 1 capsule by mouth once daily in the morning, Disp: 90 capsule, Rfl: 0    HUMALOG KWIKPEN 100 UNIT/ML SOPN, Inject 6 Units into the skin 3 times daily, Disp: 15 mL, Rfl: 4    insulin glargine (LANTUS SOLOSTAR) 100 UNIT/ML injection pen, Inject 30 Units into the skin nightly, Disp: 30 mL, Rfl: 3    Insulin Pen Needle (BD PEN NEEDLE MICRO U/F) 32G X 6 MM MISC, INJECT 1 PEN NEEDLE WITH INSULIN PENS SUBCUTANEOUSLY 4 TIMES DAILY, Disp: 400 each, Rfl: 3    Continuous Blood Gluc Sensor (DEXCOM G7 SENSOR) MISC, 1 each by Does not apply route every 10 days, Disp: 9 each, Rfl: 3    Continuous Blood Gluc  (DEXCOM G7 ) MARJORIE, 1 each by Does not apply route daily, Disp: 1 each, Rfl: 0    insulin aspart (NOVOLOG FLEXPEN) 100 UNIT/ML injection pen, 10 units before breakfast, lunch and dinner with additional sliding scale if needed. Sliding Scale, 140- 169 Insulin 2 Units 170-219 Insulin 4 [id number] Insulin 6 [id number] Insulin 8 Units, Disp: 5 Adjustable Dose Pre-filled Pen Syringe, Rfl: 0    lidocaine-prilocaine (EMLA) 2.5-2.5 % cream, Apply topically as

## 2024-07-05 ENCOUNTER — TELEMEDICINE (OUTPATIENT)
Facility: CLINIC | Age: 49
End: 2024-07-05
Payer: MEDICARE

## 2024-07-05 DIAGNOSIS — E11.69 TYPE 2 DIABETES MELLITUS WITH OTHER SPECIFIED COMPLICATION, WITH LONG-TERM CURRENT USE OF INSULIN (HCC): ICD-10-CM

## 2024-07-05 DIAGNOSIS — Z79.4 TYPE 2 DIABETES MELLITUS WITH OTHER SPECIFIED COMPLICATION, WITH LONG-TERM CURRENT USE OF INSULIN (HCC): ICD-10-CM

## 2024-07-05 DIAGNOSIS — I25.2 HISTORY OF NON-ST ELEVATION MYOCARDIAL INFARCTION (NSTEMI): ICD-10-CM

## 2024-07-05 DIAGNOSIS — Z95.1 HISTORY OF CORONARY ARTERY BYPASS GRAFT X 2: Primary | ICD-10-CM

## 2024-07-05 DIAGNOSIS — N30.90 CYSTITIS: ICD-10-CM

## 2024-07-05 PROCEDURE — 3046F HEMOGLOBIN A1C LEVEL >9.0%: CPT | Performed by: NURSE PRACTITIONER

## 2024-07-05 PROCEDURE — G8417 CALC BMI ABV UP PARAM F/U: HCPCS | Performed by: NURSE PRACTITIONER

## 2024-07-05 PROCEDURE — G8427 DOCREV CUR MEDS BY ELIG CLIN: HCPCS | Performed by: NURSE PRACTITIONER

## 2024-07-05 PROCEDURE — 99214 OFFICE O/P EST MOD 30 MIN: CPT | Performed by: NURSE PRACTITIONER

## 2024-07-05 PROCEDURE — 4004F PT TOBACCO SCREEN RCVD TLK: CPT | Performed by: NURSE PRACTITIONER

## 2024-07-05 PROCEDURE — 2022F DILAT RTA XM EVC RTNOPTHY: CPT | Performed by: NURSE PRACTITIONER

## 2024-07-05 RX ORDER — GABAPENTIN 300 MG/1
300 CAPSULE ORAL 3 TIMES DAILY
Qty: 90 CAPSULE | Refills: 1 | Status: SHIPPED | OUTPATIENT
Start: 2024-07-05 | End: 2024-09-03

## 2024-07-05 ASSESSMENT — PATIENT HEALTH QUESTIONNAIRE - PHQ9
SUM OF ALL RESPONSES TO PHQ QUESTIONS 1-9: 0
SUM OF ALL RESPONSES TO PHQ QUESTIONS 1-9: 0
1. LITTLE INTEREST OR PLEASURE IN DOING THINGS: NOT AT ALL
SUM OF ALL RESPONSES TO PHQ QUESTIONS 1-9: 0
SUM OF ALL RESPONSES TO PHQ QUESTIONS 1-9: 0

## 2024-07-05 ASSESSMENT — ANXIETY QUESTIONNAIRES
1. FEELING NERVOUS, ANXIOUS, OR ON EDGE: NOT AT ALL
4. TROUBLE RELAXING: NOT AT ALL
3. WORRYING TOO MUCH ABOUT DIFFERENT THINGS: NOT AT ALL
5. BEING SO RESTLESS THAT IT IS HARD TO SIT STILL: NOT AT ALL
2. NOT BEING ABLE TO STOP OR CONTROL WORRYING: NOT AT ALL
GAD7 TOTAL SCORE: 0
7. FEELING AFRAID AS IF SOMETHING AWFUL MIGHT HAPPEN: NOT AT ALL
6. BECOMING EASILY ANNOYED OR IRRITABLE: NOT AT ALL

## 2024-07-05 NOTE — PROGRESS NOTES
Chief Complaint   Patient presents with    Follow-Up from [facility] 06/25/2024 for chest pain observation     \"Have you been to the ER, urgent care clinic since your last visit?  Hospitalized since your last visit?\"    YES - When: approximately 10 days ago.  Where and Why: [facility] for chest pain.    “Have you seen or consulted any other health care providers outside of [facility] since your last visit?”    NO     “Have you had a pap smear?”    NO    No cervical cancer screening on file             Click Here for Release of Records Request  HIPPA confirmed by two patient identifiers.

## 2024-07-05 NOTE — PROGRESS NOTES
Eder aKur, was evaluated through a synchronous (real-time) audio-video encounter. The patient (or guardian if applicable) is aware that this is a billable service, which includes applicable co-pays. This Virtual Visit was conducted with patient's (and/or legal guardian's) consent. Patient identification was verified, and a caregiver was present when appropriate.   The patient was located at Home: [address]  [place] [address]  Provider was located at Home [place]Appt Dept State[place]  Confirm you are appropriately licensed, registered, or certified to deliver care in the state where the patient is located as indicated above. If you are not or unsure, please re-schedule the visit: Yes, I confirm.     Eder Kaur (:  1975) is a Established patient, presenting virtually for evaluation of the following:    Assessment & Plan   Below is the assessment and plan developed based on review of pertinent history, physical exam, labs, studies, and medications.    Fu cards    1. History of coronary artery bypass graft x 2  2. Type 2 diabetes mellitus with other specified complication, with long-term current use of insulin (HCC)  -     gabapentin (NEURONTIN) 300 MG capsule; Take 1 capsule by mouth 3 times daily for 60 days. Max Daily Amount: 900 mg, Disp-90 capsule, R-1Normal  3. Cystitis  4. History of non-ST elevation myocardial infarction (NSTEMI)    No follow-ups on file.       Subjective   HPI  Review of Systems    ER follow up: presented on  for chest pain  Hx of NSTEMI and CABG  Workup negative  Saw cardiology today- ranexa helps with chest pain but makes her feel \"weird and dizzy\"  Dosage reduced by cards this morning    She is on abx for +UTI  G&C pending    Diabetic Review of Systems - medication compliance: compliant all of the time, diabetic diet compliance: compliant most of the time, home glucose monitoring: is performed regularly.       Last A1c 8%        Objective

## 2024-07-06 LAB
BACTERIA SPEC CULT: ABNORMAL
C TRACH RRNA SPEC QL NAA+PROBE: NEGATIVE
CC UR VC: ABNORMAL
N GONORRHOEA RRNA SPEC QL NAA+PROBE: NEGATIVE
SERVICE CMNT-IMP: ABNORMAL
SPECIMEN SOURCE: NORMAL
T VAGINALIS RRNA SPEC QL NAA+PROBE: NEGATIVE

## 2024-07-20 ENCOUNTER — APPOINTMENT (OUTPATIENT)
Facility: HOSPITAL | Age: 49
End: 2024-07-20
Payer: MEDICARE

## 2024-07-20 PROCEDURE — 73630 X-RAY EXAM OF FOOT: CPT

## 2024-07-20 PROCEDURE — 99284 EMERGENCY DEPT VISIT MOD MDM: CPT

## 2024-07-20 PROCEDURE — 73502 X-RAY EXAM HIP UNI 2-3 VIEWS: CPT

## 2024-07-20 PROCEDURE — 70450 CT HEAD/BRAIN W/O DYE: CPT

## 2024-07-21 ENCOUNTER — HOSPITAL ENCOUNTER (EMERGENCY)
Facility: HOSPITAL | Age: 49
Discharge: HOME | End: 2024-07-21
Attending: EMERGENCY MEDICINE
Payer: MEDICARE

## 2024-07-21 VITALS
OXYGEN SATURATION: 96 % | HEART RATE: 72 BPM | DIASTOLIC BLOOD PRESSURE: 50 MMHG | SYSTOLIC BLOOD PRESSURE: 127 MMHG | TEMPERATURE: 98.3 F | RESPIRATION RATE: 18 BRPM

## 2024-07-21 DIAGNOSIS — S90.31XA CONTUSION OF RIGHT FOOT, INITIAL ENCOUNTER: ICD-10-CM

## 2024-07-21 DIAGNOSIS — S70.02XA CONTUSION OF LEFT HIP, INITIAL ENCOUNTER: ICD-10-CM
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
## 2024-07-21 NOTE — ED PROVIDER NOTES
ankle: No swelling, deformity or ecchymosis. No tenderness. Normal pulse.      Right foot: No swelling or deformity. Normal pulse.      Left foot: No swelling or deformity. Normal pulse.   Skin:     General: Skin is warm and dry.   Neurological:      General: No focal deficit present.      Mental Status: She is oriented to person, place, and time. Mental status is at baseline.         DIAGNOSTIC RESULTS     EKG:   All EKG's are interpreted by an Emergency Department Physician who either signs or Co-signs this chart in the absence of a cardiologist. Interpretation provided in ED course below    RADIOLOGY:   Non-plain film images such as CT, Ultrasound and MRI are read by the radiologist. Plain radiographic images are visualized and preliminarily interpreted by the emergency physician with the findings as noted in the ED course.    Interpretation per the Radiologist below, if available at the time of this note:    XR FOOT RIGHT (MIN 3 VIEWS)   Final Result   No acute abnormality.      Electronically signed by YANCI SAMUEL      XR HIP 2-3 VW W PELVIS LEFT   Final Result   No acute abnormality.      Electronically signed by YANCI SAMUEL      CT HEAD WO CONTRAST   Final Result   No acute abnormality.            Electronically signed by YANCI SAMUEL           LABS:  Labs Reviewed - No data to display    All other labs were within normal range or not returned as of this dictation.    EMERGENCY DEPARTMENT COURSE and DIFFERENTIAL DIAGNOSIS/MDM:   Vitals:    Vitals:    07/20/24 2313 07/21/24 0130   BP: (!) 104/57 (!) 127/50   Pulse: 80 72   Resp: 18    Temp: 98.3 °F (36.8 °C)    TempSrc: Oral    SpO2: 95% 96%           Medical Decision Making  Pt presents with injury to L hip and R ankle but appears NVI on exam without severe deformity. She is sleepy but able to wake up and make eye contact and nod yes/shake no before falling back asleep. No gross evidence of injury on exam.    Planned ER w/u:   -XR of hip and ankle  -CT

## 2024-07-21 NOTE — DISCHARGE INSTRUCTIONS
You have been evaluated in the Emergency Department today for ankle and hip pain. Your evaluation did not find evidence of medical conditions requiring emergent intervention at this time.    You may take acetaminophen (Tylenol), either 3 regular strength (325mg) tablets or 2 extra-strength (500mg) tablets every 6 hours as well as an anti-inflammatory, either prescribed (Voltaren, Mobic) or over-the-counter (2 naproxen aka Aleve every 12 hours OR 3 ibuprofen aka Motrin every 6 hours) as needed for pain. Taking both the Tylenol as well as anti-inflammatory medications in combination can be especially effective.    Avoid lifting/twisting over the next 48 hours.   Attempt to move and stretch as able.  Apply ice every 6-8 hours for 20 minutes as needed for pain.     Return to the Emergency Department if you experience worsening pain, numbness, tingling, change of color in your limbs, or any other concerning symptoms.

## 2024-07-21 NOTE — ED TRIAGE NOTES
Triage: Pt arrives from home with CC of falling resulting in pain to her left hip and right foot. Pt reports she fell asleep because she was overly tired. Denies taking anything for sleep, however, falls asleep intermittently during triage. Pt doesn't know if she hit her head. She is on Eliquis and Plavix. Reports difficulty weight baring.

## 2024-08-20 RX ORDER — DIVALPROEX SODIUM 500 MG/1
500 TABLET, EXTENDED RELEASE ORAL 3 TIMES DAILY
Qty: 270 TABLET | Refills: 0 | Status: SHIPPED | OUTPATIENT
Start: 2024-08-20

## 2024-08-20 RX ORDER — DULOXETIN HYDROCHLORIDE 60 MG/1
CAPSULE, DELAYED RELEASE ORAL
Qty: 90 CAPSULE | Refills: 0 | Status: SHIPPED | OUTPATIENT
Start: 2024-08-20

## 2024-08-20 RX ORDER — CLOPIDOGREL BISULFATE 75 MG/1
75 TABLET ORAL DAILY
Qty: 30 TABLET | Refills: 0 | Status: SHIPPED | OUTPATIENT
Start: 2024-08-20 | End: 2024-09-19

## 2024-08-20 NOTE — TELEPHONE ENCOUNTER
Last appointment: 07/05/2024 Virtual visit RUKHSNAA Pierson   Next appointment: Nothing scheduled  Previous refill encounter(s):   06/26/2024 Plavix #30. Last prescribed by Dr. PRAKASH Bynum.    For Pharmacy Admin Tracking Only    Program: Medication Refill  Intervention Detail: New Rx: 1, reason: Patient Preference  Time Spent (min): 5  Requested Prescriptions     Pending Prescriptions Disp Refills    clopidogrel (PLAVIX) 75 MG tablet 30 tablet 0     Sig: Take 1 tablet by mouth daily

## 2024-08-29 ENCOUNTER — HOSPITAL ENCOUNTER (EMERGENCY)
Facility: HOSPITAL | Age: 49
Discharge: HOME | End: 2024-08-29
Payer: MEDICARE

## 2024-08-29 ENCOUNTER — APPOINTMENT (OUTPATIENT)
Facility: HOSPITAL | Age: 49
End: 2024-08-29
Payer: MEDICARE

## 2024-08-29 VITALS
OXYGEN SATURATION: 99 % | TEMPERATURE: 98.2 F | HEART RATE: 83 BPM | HEIGHT: 63 IN | WEIGHT: 135 LBS | SYSTOLIC BLOOD PRESSURE: 111 MMHG | BODY MASS INDEX: 23.92 KG/M2 | RESPIRATION RATE: 16 BRPM | DIASTOLIC BLOOD PRESSURE: 66 MMHG

## 2024-08-29 DIAGNOSIS — M79.604 RIGHT LEG PAIN: Primary | ICD-10-CM

## 2024-08-29 LAB — ECHO BSA: 1.65 M2

## 2024-08-29 PROCEDURE — 93971 EXTREMITY STUDY: CPT

## 2024-08-29 PROCEDURE — 99284 EMERGENCY DEPT VISIT MOD MDM: CPT

## 2024-08-29 ASSESSMENT — PAIN SCALES - GENERAL: PAINLEVEL_OUTOF10: 8

## 2024-08-29 NOTE — DISCHARGE INSTRUCTIONS
Thank You!    It was a pleasure taking care of you in our Emergency Department today. We know that when you come to our Emergency Department, you are entrusting us with your health, comfort, and safety. Our physicians and nurses honor that trust, and truly appreciate the opportunity to care for you and your loved ones.      We also value your feedback. If you receive a survey about your Emergency Department experience today, please fill it out.  We care about our patients' feedback, and we listen to what you have to say.  Thank you.    SASCHA Morales  ________________________________________________________________________  I have included a copy of your lab results and/or radiologic studies from today's visit so you can have them easily available at your follow-up visit. We hope you feel better and please do not hesitate to contact the ED if you have any questions at all!    Vascular duplex lower extremity venous right (Final result)   Component (Lab Inquiry)Collection Time Result Time BSA    08/29/24 14:08:50 1.65         Final result by Chivo Black MD (08/29/24 14:09:03)                Narrative:      No evidence of deep vein thrombosis in the right lower extremity. No  evidence of deep vein or superficial vein thrombosis in the right lower  extremity. Vessels demonstrate normal compressibility, color filling, and  phasic and spontaneous flow.                The exam and treatment you received in the Emergency Department were for an urgent problem and are not intended as complete care. It is important that you follow up with a doctor, nurse practitioner, or physician assistant for ongoing care. If your symptoms become worse or you do not improve as expected and you are unable to reach your usual health care provider, you should return to the Emergency Department. We are available 24 hours a day.    Please take your discharge instructions with you when you go to your follow-up appointment.     If a

## 2024-08-29 NOTE — ED PROVIDER NOTES
\[facility]\"" EMERGENCY DEPT  EMERGENCY DEPARTMENT ENCOUNTER       Pt Name: Eder Kaur  MRN: [id number]  Birthdate 1975  Date of evaluation: 8/29/2024  Provider: SASCHA Morales   PCP: Melinda Pierson APRN - NP  Note Started: 2:24 PM EDT 8/29/24     CHIEF COMPLAINT       Chief Complaint   Patient presents with    Leg Pain     Ambulatory to triage c/o RLE pain, swelling, and diminished sensation to toes for about a week. Hx DVT, \"this feels like my last clot\" per pt.     HISTORY OF PRESENT ILLNESS: 1 or more elements      History From: Patient  HPI Limitations: None     Eder Kaur is a 48 y.o. female who presents by POV with complaints of right leg pain. This has been progressively worsening over the past several days and feels like when she has had DVT's in the past. Patient currently taking Eliquis and Plavix. She has not missed any doses. Denies trauma and all other complaints.      Nursing Notes were all reviewed and agreed with or any disagreements were addressed in the HPI.     REVIEW OF SYSTEMS      Review of Systems     Positives and Pertinent negatives as per HPI.    PAST HISTORY     Past Medical History:  Past Medical History:   Diagnosis Date    Acute exacerbation of CHF (congestive heart failure) ([facility]) 6/8/2019    Adverse effect of anesthesia 2017    COULD HEAR AND FEEL WHILE UNDER ANESTHESIA BUT COULD NOT RESPOND    Arthritis     Bipolar 1 disorder ([facility])     Chronic pain     Depression     Diabetes ([facility])     TYPE 1    Gastrointestinal disorder     gastroparesis    Gastroparesis     GERD (gastroesophageal reflux disease)     Heart disease     Heart failure ([facility])     Hypertension     NSTEMI (non-ST elevated myocardial infarction) ([facility]) 6/8/2019    Psychiatric disorder     Bipolar    Sleep apnea     SUPPOSED TO USE BIPAP    Thromboembolus ([facility])     R LEG    Umbilical hernia     Umbilical hernia 12/28/2021       Past Surgical History:  Past Surgical History:   Procedure Laterality

## 2024-09-11 ENCOUNTER — OFFICE VISIT (OUTPATIENT)
Age: 49
End: 2024-09-11
Payer: MEDICARE

## 2024-09-11 VITALS
WEIGHT: 134.92 LBS | BODY MASS INDEX: 23.91 KG/M2 | HEIGHT: 63 IN | SYSTOLIC BLOOD PRESSURE: 135 MMHG | OXYGEN SATURATION: 99 % | HEART RATE: 75 BPM | DIASTOLIC BLOOD PRESSURE: 72 MMHG

## 2024-09-11 DIAGNOSIS — E10.65 TYPE 1 DIABETES MELLITUS WITH HYPERGLYCEMIA (HCC): Primary | ICD-10-CM

## 2024-09-11 LAB — HBA1C MFR BLD: 8.6 %

## 2024-09-11 PROCEDURE — 99214 OFFICE O/P EST MOD 30 MIN: CPT | Performed by: INTERNAL MEDICINE

## 2024-09-11 PROCEDURE — 83036 HEMOGLOBIN GLYCOSYLATED A1C: CPT | Performed by: INTERNAL MEDICINE

## 2024-09-11 RX ORDER — ACYCLOVIR 400 MG/1
1 TABLET ORAL
Qty: 9 EACH | Refills: 3 | Status: SHIPPED | OUTPATIENT
Start: 2024-09-11

## (undated) DEVICE — GENERAL LAPAROSCOPY - SMH: Brand: MEDLINE INDUSTRIES, INC.

## (undated) DEVICE — SUTURE V-LOC 180 SZ 3-0 L6IN ABSRB VLT CV-23 L17MM 1/2 CIR VLOCM0804

## (undated) DEVICE — COVER MPLR TIP CRV SCIS ACC DA VINCI

## (undated) DEVICE — SUTURE VCRL SZ 2-0 L27IN ABSRB UD L26MM SH 1/2 CIR J417H

## (undated) DEVICE — GARMENT,MEDLINE,DVT,INT,CALF,MED, GEN2: Brand: MEDLINE

## (undated) DEVICE — 3M™ TEGADERM™ TRANSPARENT FILM DRESSING FRAME STYLE, 1626W, 4 IN X 4-3/4 IN (10 CM X 12 CM), 50/CT 4CT/CASE: Brand: 3M™ TEGADERM™

## (undated) DEVICE — ANGIOGRAPHIC CATHETER: Brand: IMPULSE™

## (undated) DEVICE — SUTURE ABSORBABLE MONOFILAMENT 0 CTX 36 IN VIO PDS + PDP370T

## (undated) DEVICE — TR BAND RADIAL ARTERY COMPRESSION DEVICE: Brand: TR BAND

## (undated) DEVICE — PINNACLE PRECISION ACCESS SYSTEM INTRODUCER SHEATH: Brand: PINNACLE PRECISION ACCESS SYSTEM

## (undated) DEVICE — SPECIAL PROCEDURE DRAPE 32" X 34": Brand: SPECIAL PROCEDURE DRAPE

## (undated) DEVICE — ANGIOGRAPHY KIT

## (undated) DEVICE — SYSTEM EVAC SMOKE LAPARSCOPIC

## (undated) DEVICE — SUTURE DEV SZ 3-0 V-LOC 90 L12IN TO L18IN CV-23 VLT VLOCM0844

## (undated) DEVICE — PACK PROCEDURE SURG HRT CATH

## (undated) DEVICE — VISUALIZATION SYSTEM: Brand: CLEARIFY

## (undated) DEVICE — KIT DISPOSABLE ACC 4ARM ENDOWRIST DAVINCI

## (undated) DEVICE — DERMABOND SKIN ADH 0.7ML -- DERMABOND ADVANCED 12/BX

## (undated) DEVICE — DRAPE PRB US TRNSDCR 6X96IN --

## (undated) DEVICE — SUTURE MCRYL SZ 4-0 L27IN ABSRB UD L19MM PS-2 1/2 CIR PRIM Y426H

## (undated) DEVICE — CATH DIAG IMPLS FL4 5FRX100CM -- IMPULSE 16391-22

## (undated) DEVICE — OBTRTR BLDELSS 8MM DISP -- DA VINCI - SNGL USE

## (undated) DEVICE — REDUCER CANN ENDOWRIST 12-8MM -- DA VINCI XI - SNGL USE

## (undated) DEVICE — HI-TORQUE VERSACORE MODIFIED J GUIDE WIRE SYSTEM 260 CM: Brand: HI-TORQUE VERSACORE

## (undated) DEVICE — KIT HND CTRL 3 W STPCOCK ROT END 54IN PREM HI PRSS TBNG AT

## (undated) DEVICE — SPLINT WR POS F/ARTERIAL ACC -- BX/10

## (undated) DEVICE — GOWN,SIRUS,NONRNF,SETINSLV,XL,20/CS: Brand: MEDLINE

## (undated) DEVICE — SOLUTION IRRIG 1000ML 0.9% SOD CHL USP POUR PLAS BTL

## (undated) DEVICE — MARKER,SKIN,WI/RULER AND LABELS: Brand: MEDLINE

## (undated) DEVICE — GLIDESHEATH SLENDER STAINLESS STEEL KIT: Brand: GLIDESHEATH SLENDER

## (undated) DEVICE — SUTURE V-LOC SZ 0 L18IN NONABSORBABLE BLU L37MM GS-21 1/2 VLOCN0326

## (undated) DEVICE — KIT MFLD ISOLATN NACL CNTRST PRT TBNG SPIK W/ PRSS TRNSDUC

## (undated) DEVICE — PACK,BASIC,SIRUS,V: Brand: MEDLINE